# Patient Record
Sex: MALE | Race: WHITE | Employment: OTHER | URBAN - NONMETROPOLITAN AREA
[De-identification: names, ages, dates, MRNs, and addresses within clinical notes are randomized per-mention and may not be internally consistent; named-entity substitution may affect disease eponyms.]

---

## 2017-01-31 LAB
BUN BLDV-MCNC: 21 MG/DL
CALCIUM SERPL-MCNC: 9.3 MG/DL
CHLORIDE BLD-SCNC: 101 MMOL/L
CO2: 24 MMOL/L
CREAT SERPL-MCNC: 0.88 MG/DL
GFR CALCULATED: 83
GLUCOSE BLD-MCNC: 122 MG/DL
POTASSIUM SERPL-SCNC: 4.3 MMOL/L
SODIUM BLD-SCNC: 142 MMOL/L

## 2017-02-03 ENCOUNTER — TELEPHONE (OUTPATIENT)
Dept: NEPHROLOGY | Age: 77
End: 2017-02-03

## 2017-03-09 DIAGNOSIS — I10 ESSENTIAL HYPERTENSION: Primary | ICD-10-CM

## 2017-03-15 ENCOUNTER — TELEPHONE (OUTPATIENT)
Dept: NEPHROLOGY | Age: 77
End: 2017-03-15

## 2017-03-15 RX ORDER — AMLODIPINE BESYLATE 5 MG/1
5 TABLET ORAL DAILY
Qty: 90 TABLET | Refills: 2 | Status: SHIPPED | OUTPATIENT
Start: 2017-03-15 | End: 2018-03-12 | Stop reason: SDUPTHER

## 2017-03-16 RX ORDER — AMLODIPINE BESYLATE 5 MG/1
5 TABLET ORAL DAILY
Qty: 90 TABLET | Refills: 2 | Status: CANCELLED | OUTPATIENT
Start: 2017-03-16

## 2017-04-03 ENCOUNTER — TELEPHONE (OUTPATIENT)
Dept: NEPHROLOGY | Age: 77
End: 2017-04-03

## 2017-05-03 ENCOUNTER — TELEPHONE (OUTPATIENT)
Dept: NEPHROLOGY | Age: 77
End: 2017-05-03

## 2017-05-31 DIAGNOSIS — E78.5 HYPERLIPIDEMIA, UNSPECIFIED HYPERLIPIDEMIA TYPE: Primary | ICD-10-CM

## 2017-06-05 ENCOUNTER — TELEPHONE (OUTPATIENT)
Dept: NEPHROLOGY | Age: 77
End: 2017-06-05

## 2017-06-12 ENCOUNTER — OFFICE VISIT (OUTPATIENT)
Dept: NEPHROLOGY | Age: 77
End: 2017-06-12

## 2017-06-12 VITALS
BODY MASS INDEX: 27.76 KG/M2 | WEIGHT: 188 LBS | OXYGEN SATURATION: 97 % | DIASTOLIC BLOOD PRESSURE: 91 MMHG | SYSTOLIC BLOOD PRESSURE: 182 MMHG | HEART RATE: 58 BPM

## 2017-06-12 DIAGNOSIS — I10 ESSENTIAL HYPERTENSION: Primary | ICD-10-CM

## 2017-06-12 LAB
BACTERIA URINE, POC: NORMAL
BILIRUBIN URINE: NORMAL MG/DL
BLOOD, URINE: NEGATIVE
CASTS URINE, POC: NORMAL
CLARITY: CLEAR
COLOR: YELLOW
CRYSTALS URINE, POC: NORMAL
EPI CELLS URINE, POC: NORMAL
GLUCOSE URINE: NEGATIVE
KETONES, URINE: NEGATIVE
LEUKOCYTE EST, POC: NEGATIVE
NITRITE, URINE: NEGATIVE
PH UA: 6 (ref 4.5–8)
PROTEIN UA: POSITIVE
RBC URINE, POC: NORMAL
SPECIFIC GRAVITY UA: NORMAL (ref 1–1.03)
UROBILINOGEN, URINE: NORMAL
WBC URINE, POC: NORMAL
YEAST URINE, POC: NORMAL

## 2017-06-12 PROCEDURE — 99213 OFFICE O/P EST LOW 20 MIN: CPT | Performed by: INTERNAL MEDICINE

## 2017-06-12 PROCEDURE — 81000 URINALYSIS NONAUTO W/SCOPE: CPT | Performed by: INTERNAL MEDICINE

## 2017-06-12 RX ORDER — TRETINOIN 1 MG/G
CREAM TOPICAL
Refills: 0 | COMMUNITY
Start: 2017-06-02 | End: 2018-06-19 | Stop reason: ALTCHOICE

## 2017-06-12 RX ORDER — CARVEDILOL 25 MG/1
25 TABLET ORAL 2 TIMES DAILY WITH MEALS
COMMUNITY
End: 2018-07-23 | Stop reason: SDUPTHER

## 2017-06-12 ASSESSMENT — ENCOUNTER SYMPTOMS
WHEEZING: 0
BLOOD IN STOOL: 0
FACIAL SWELLING: 0
VOMITING: 0
SHORTNESS OF BREATH: 0
NAUSEA: 0
BACK PAIN: 0
DIARRHEA: 0
GASTROINTESTINAL NEGATIVE: 1
ABDOMINAL PAIN: 0
CHEST TIGHTNESS: 0
RESPIRATORY NEGATIVE: 1
ABDOMINAL DISTENTION: 0
COUGH: 0
CONSTIPATION: 0

## 2017-09-07 DIAGNOSIS — Z12.5 SCREENING PSA (PROSTATE SPECIFIC ANTIGEN): Primary | ICD-10-CM

## 2017-10-17 ENCOUNTER — TELEPHONE (OUTPATIENT)
Dept: NEPHROLOGY | Age: 77
End: 2017-10-17

## 2017-10-17 NOTE — TELEPHONE ENCOUNTER
Pt called and states he is now drinking beet juice. Pt heard that it is good for his blood pressure. Pt does notice his BP drops about 2-3 hours after drinking the beet juice. What are your thoughts on the beet juice?

## 2017-10-20 NOTE — TELEPHONE ENCOUNTER
I called pt and informed him of this. He states he spoke to his chiropractor who uses natural herbs and vitamins. He said that pt should be okay with taking this without any kidney problems.

## 2017-12-18 ENCOUNTER — HOSPITAL ENCOUNTER (OUTPATIENT)
Age: 77
Discharge: HOME | End: 2017-12-18
Payer: MEDICARE

## 2017-12-18 DIAGNOSIS — Z12.5 SCREENING PSA (PROSTATE SPECIFIC ANTIGEN): ICD-10-CM

## 2017-12-18 DIAGNOSIS — I10 ESSENTIAL HYPERTENSION: ICD-10-CM

## 2017-12-18 LAB
ANION GAP SERPL CALCULATED.3IONS-SCNC: 15 MEQ/L (ref 8–16)
BUN BLDV-MCNC: 20 MG/DL (ref 7–22)
CALCIUM SERPL-MCNC: 9.4 MG/DL (ref 8.5–10.5)
CHLORIDE BLD-SCNC: 102 MEQ/L (ref 98–111)
CO2: 25 MEQ/L (ref 23–33)
CREAT SERPL-MCNC: 0.6 MG/DL (ref 0.4–1.2)
GFR SERPL CREATININE-BSD FRML MDRD: > 90 ML/MIN/1.73M2
GLUCOSE BLD-MCNC: 113 MG/DL (ref 70–108)
POTASSIUM SERPL-SCNC: 4.5 MEQ/L (ref 3.5–5.2)
PROSTATE SPECIFIC ANTIGEN: 0.25 NG/ML (ref 0–1)
SODIUM BLD-SCNC: 142 MEQ/L (ref 135–145)

## 2017-12-18 PROCEDURE — G0103 PSA SCREENING: HCPCS

## 2017-12-18 PROCEDURE — 36415 COLL VENOUS BLD VENIPUNCTURE: CPT

## 2017-12-18 PROCEDURE — 80048 BASIC METABOLIC PNL TOTAL CA: CPT

## 2017-12-19 ENCOUNTER — OFFICE VISIT (OUTPATIENT)
Dept: NEPHROLOGY | Age: 77
End: 2017-12-19
Payer: MEDICARE

## 2017-12-19 VITALS
BODY MASS INDEX: 27.47 KG/M2 | SYSTOLIC BLOOD PRESSURE: 141 MMHG | DIASTOLIC BLOOD PRESSURE: 86 MMHG | OXYGEN SATURATION: 97 % | WEIGHT: 186 LBS | HEART RATE: 65 BPM

## 2017-12-19 DIAGNOSIS — I10 ESSENTIAL HYPERTENSION: Primary | ICD-10-CM

## 2017-12-19 DIAGNOSIS — I35.0 AORTIC VALVE STENOSIS, ETIOLOGY OF CARDIAC VALVE DISEASE UNSPECIFIED: ICD-10-CM

## 2017-12-19 DIAGNOSIS — R01.1 HEART MURMUR: ICD-10-CM

## 2017-12-19 DIAGNOSIS — I35.0 AORTIC STENOSIS, MODERATE: ICD-10-CM

## 2017-12-19 LAB
BACTERIA URINE, POC: NORMAL
BILIRUBIN URINE: NORMAL MG/DL
BLOOD, URINE: NEGATIVE
CASTS URINE, POC: NORMAL
CLARITY: CLEAR
COLOR: NORMAL
CRYSTALS URINE, POC: NORMAL
EPI CELLS URINE, POC: NORMAL
GLUCOSE URINE: NEGATIVE
KETONES, URINE: NEGATIVE
LEUKOCYTE EST, POC: NEGATIVE
NITRITE, URINE: NEGATIVE
PH UA: 6 (ref 4.5–8)
PROTEIN UA: NEGATIVE
RBC URINE, POC: NORMAL
SPECIFIC GRAVITY UA: NORMAL (ref 1–1.03)
UROBILINOGEN, URINE: NORMAL
WBC URINE, POC: NORMAL
YEAST URINE, POC: NORMAL

## 2017-12-19 PROCEDURE — G8484 FLU IMMUNIZE NO ADMIN: HCPCS | Performed by: INTERNAL MEDICINE

## 2017-12-19 PROCEDURE — 4040F PNEUMOC VAC/ADMIN/RCVD: CPT | Performed by: INTERNAL MEDICINE

## 2017-12-19 PROCEDURE — 81000 URINALYSIS NONAUTO W/SCOPE: CPT | Performed by: INTERNAL MEDICINE

## 2017-12-19 PROCEDURE — 99213 OFFICE O/P EST LOW 20 MIN: CPT | Performed by: INTERNAL MEDICINE

## 2017-12-19 PROCEDURE — G8419 CALC BMI OUT NRM PARAM NOF/U: HCPCS | Performed by: INTERNAL MEDICINE

## 2017-12-19 PROCEDURE — G8427 DOCREV CUR MEDS BY ELIG CLIN: HCPCS | Performed by: INTERNAL MEDICINE

## 2017-12-19 PROCEDURE — 1036F TOBACCO NON-USER: CPT | Performed by: INTERNAL MEDICINE

## 2017-12-19 PROCEDURE — 1123F ACP DISCUSS/DSCN MKR DOCD: CPT | Performed by: INTERNAL MEDICINE

## 2017-12-19 RX ORDER — SPIRONOLACTONE 25 MG/1
25 TABLET ORAL DAILY
Qty: 90 TABLET | Refills: 3 | Status: SHIPPED | OUTPATIENT
Start: 2017-12-19 | End: 2018-12-27 | Stop reason: SDUPTHER

## 2017-12-19 ASSESSMENT — ENCOUNTER SYMPTOMS
CONSTIPATION: 0
BACK PAIN: 0
BLOOD IN STOOL: 0
COUGH: 0
WHEEZING: 0
CHEST TIGHTNESS: 0
ABDOMINAL DISTENTION: 0
FACIAL SWELLING: 0
VOMITING: 0
SHORTNESS OF BREATH: 0
DIARRHEA: 0
RESPIRATORY NEGATIVE: 1
ABDOMINAL PAIN: 0
NAUSEA: 0
GASTROINTESTINAL NEGATIVE: 1

## 2017-12-19 NOTE — PROGRESS NOTES
WBC, HGB, HCT, MCV, PLT  BMP:    Lab Results   Component Value Date     12/18/2017     06/06/2017     01/31/2017    K 4.5 12/18/2017    K 4.3 06/06/2017    K 4.3 01/31/2017     12/18/2017     06/06/2017     01/31/2017    CO2 25 12/18/2017    CO2 25 06/06/2017    CO2 24 01/31/2017    BUN 20 12/18/2017    BUN 18 06/06/2017    BUN 21 01/31/2017    CREATININE 0.6 12/18/2017    CREATININE 0.6 06/06/2017    CREATININE 0.88 01/31/2017    GLUCOSE 113 (H) 12/18/2017    GLUCOSE 110 (H) 06/06/2017    GLUCOSE 122 01/31/2017      Hepatic: No results found for: AST, ALT, ALB, BILITOT, ALKPHOS  BNP: No results found for: BNP  Lipids:   Lab Results   Component Value Date    CHOL 185 06/06/2017    HDL 32 06/06/2017     INR: No results found for: INR       URINE: No results found for: Shahanaheriberto Nuñez                            Lab Results   Component Value Date    NITRU Negative 06/12/2017    COLORU Yellow 06/12/2017    PHUR 6.0 06/12/2017    CLARITYU Clear 06/12/2017    SPECGRAV 1.015 04/22/2014    LEUKOCYTESUR negative 06/12/2017    BILIRUBINUR negative 04/22/2014    BLOODU Negative 06/12/2017    GLUCOSEU negative 06/12/2017    KETUA Negative 06/12/2017         Microalbumen/Creatinine ratio:  No components found for: RUCREAT  psa 0.25  Assessment:     1. Essential hypertension  POC URINE with Microscopic   hyperlipidemia           Plan:    Fu 6 months   Serial labs  Discussed with patient        Rei Wahl DO

## 2017-12-28 ENCOUNTER — HOSPITAL ENCOUNTER (OUTPATIENT)
Dept: NON INVASIVE DIAGNOSTICS | Age: 77
Discharge: HOME | End: 2017-12-28
Payer: MEDICARE

## 2017-12-28 DIAGNOSIS — I35.0 AORTIC STENOSIS, MODERATE: ICD-10-CM

## 2017-12-28 DIAGNOSIS — I10 ESSENTIAL HYPERTENSION: ICD-10-CM

## 2017-12-28 DIAGNOSIS — I35.0 AORTIC VALVE STENOSIS, ETIOLOGY OF CARDIAC VALVE DISEASE UNSPECIFIED: ICD-10-CM

## 2017-12-28 DIAGNOSIS — R01.1 HEART MURMUR: ICD-10-CM

## 2017-12-28 LAB
LV EF: 68 %
LVEF MODALITY: NORMAL

## 2017-12-28 PROCEDURE — 93306 TTE W/DOPPLER COMPLETE: CPT

## 2018-01-02 ENCOUNTER — TELEPHONE (OUTPATIENT)
Dept: NEPHROLOGY | Age: 78
End: 2018-01-02

## 2018-03-12 RX ORDER — AMLODIPINE BESYLATE 5 MG/1
5 TABLET ORAL DAILY
Qty: 90 TABLET | Refills: 3 | Status: SHIPPED | OUTPATIENT
Start: 2018-03-12 | End: 2018-09-04 | Stop reason: DRUGHIGH

## 2018-06-13 ENCOUNTER — HOSPITAL ENCOUNTER (OUTPATIENT)
Age: 78
Discharge: HOME | End: 2018-06-13
Payer: MEDICARE

## 2018-06-13 DIAGNOSIS — I10 ESSENTIAL HYPERTENSION: ICD-10-CM

## 2018-06-13 LAB
ANION GAP SERPL CALCULATED.3IONS-SCNC: 14 MEQ/L (ref 8–16)
BUN BLDV-MCNC: 21 MG/DL (ref 7–22)
CALCIUM SERPL-MCNC: 9.2 MG/DL (ref 8.5–10.5)
CHLORIDE BLD-SCNC: 104 MEQ/L (ref 98–111)
CHOLESTEROL, TOTAL: 183 MG/DL (ref 100–199)
CO2: 25 MEQ/L (ref 23–33)
CREAT SERPL-MCNC: 0.6 MG/DL (ref 0.4–1.2)
GFR SERPL CREATININE-BSD FRML MDRD: > 90 ML/MIN/1.73M2
GLUCOSE BLD-MCNC: 109 MG/DL (ref 70–108)
HDLC SERPL-MCNC: 26 MG/DL
LDL CHOLESTEROL CALCULATED: 86 MG/DL
POTASSIUM SERPL-SCNC: 4.2 MEQ/L (ref 3.5–5.2)
SODIUM BLD-SCNC: 143 MEQ/L (ref 135–145)
TRIGL SERPL-MCNC: 355 MG/DL (ref 0–199)

## 2018-06-13 PROCEDURE — 36415 COLL VENOUS BLD VENIPUNCTURE: CPT

## 2018-06-13 PROCEDURE — 80061 LIPID PANEL: CPT

## 2018-06-13 PROCEDURE — 80048 BASIC METABOLIC PNL TOTAL CA: CPT

## 2018-06-19 ENCOUNTER — OFFICE VISIT (OUTPATIENT)
Dept: NEPHROLOGY | Age: 78
End: 2018-06-19
Payer: MEDICARE

## 2018-06-19 VITALS
BODY MASS INDEX: 27.23 KG/M2 | OXYGEN SATURATION: 98 % | WEIGHT: 184.4 LBS | DIASTOLIC BLOOD PRESSURE: 100 MMHG | HEART RATE: 74 BPM | SYSTOLIC BLOOD PRESSURE: 186 MMHG

## 2018-06-19 DIAGNOSIS — I10 ESSENTIAL HYPERTENSION: Primary | ICD-10-CM

## 2018-06-19 LAB
BACTERIA URINE, POC: NORMAL
BILIRUBIN URINE: NORMAL MG/DL
BLOOD, URINE: POSITIVE
CASTS URINE, POC: NORMAL
CLARITY: CLEAR
COLOR: YELLOW
CRYSTALS URINE, POC: NORMAL
EPI CELLS URINE, POC: NORMAL
GLUCOSE URINE: NEGATIVE
KETONES, URINE: NEGATIVE
LEUKOCYTE EST, POC: NEGATIVE
NITRITE, URINE: NEGATIVE
PH UA: 5 (ref 4.5–8)
PROTEIN UA: NEGATIVE
RBC URINE, POC: NORMAL
SPECIFIC GRAVITY UA: 1.02 (ref 1–1.03)
UROBILINOGEN, URINE: NORMAL
WBC URINE, POC: NORMAL
YEAST URINE, POC: NORMAL

## 2018-06-19 PROCEDURE — G8427 DOCREV CUR MEDS BY ELIG CLIN: HCPCS | Performed by: INTERNAL MEDICINE

## 2018-06-19 PROCEDURE — 81000 URINALYSIS NONAUTO W/SCOPE: CPT | Performed by: INTERNAL MEDICINE

## 2018-06-19 PROCEDURE — 4040F PNEUMOC VAC/ADMIN/RCVD: CPT | Performed by: INTERNAL MEDICINE

## 2018-06-19 PROCEDURE — 99213 OFFICE O/P EST LOW 20 MIN: CPT | Performed by: INTERNAL MEDICINE

## 2018-06-19 PROCEDURE — G8419 CALC BMI OUT NRM PARAM NOF/U: HCPCS | Performed by: INTERNAL MEDICINE

## 2018-06-19 PROCEDURE — 1036F TOBACCO NON-USER: CPT | Performed by: INTERNAL MEDICINE

## 2018-06-19 PROCEDURE — 1123F ACP DISCUSS/DSCN MKR DOCD: CPT | Performed by: INTERNAL MEDICINE

## 2018-06-19 ASSESSMENT — ENCOUNTER SYMPTOMS
VOMITING: 0
ABDOMINAL PAIN: 0
CHEST TIGHTNESS: 0
SHORTNESS OF BREATH: 0
DIARRHEA: 0
NAUSEA: 0
BLOOD IN STOOL: 0
ABDOMINAL DISTENTION: 0
WHEEZING: 0
BACK PAIN: 0
RESPIRATORY NEGATIVE: 1
COUGH: 0
GASTROINTESTINAL NEGATIVE: 1
FACIAL SWELLING: 0
CONSTIPATION: 0

## 2018-06-26 DIAGNOSIS — Z12.5 SCREENING PSA (PROSTATE SPECIFIC ANTIGEN): Primary | ICD-10-CM

## 2018-07-25 RX ORDER — CARVEDILOL 25 MG/1
25 TABLET ORAL 2 TIMES DAILY WITH MEALS
Qty: 180 TABLET | Refills: 0 | Status: SHIPPED | OUTPATIENT
Start: 2018-07-25 | End: 2018-10-16 | Stop reason: SDUPTHER

## 2018-08-07 ENCOUNTER — TELEPHONE (OUTPATIENT)
Dept: NEPHROLOGY | Age: 78
End: 2018-08-07

## 2018-08-07 NOTE — TELEPHONE ENCOUNTER
Pt called and states his BP has been running in the 160 range. This is a former pt of Dr. Erik Martin. This pt adjusts his medications to how he sees fit. Pt is taking the meds listed. What do you want to do?

## 2018-09-04 RX ORDER — AMLODIPINE BESYLATE 10 MG/1
10 TABLET ORAL DAILY
COMMUNITY
End: 2018-09-04 | Stop reason: SDUPTHER

## 2018-09-04 RX ORDER — AMLODIPINE BESYLATE 10 MG/1
10 TABLET ORAL DAILY
Qty: 90 TABLET | Refills: 1 | Status: SHIPPED | OUTPATIENT
Start: 2018-09-04 | End: 2018-12-26 | Stop reason: SDUPTHER

## 2018-10-17 RX ORDER — CARVEDILOL 25 MG/1
25 TABLET ORAL 2 TIMES DAILY WITH MEALS
Qty: 180 TABLET | Refills: 1 | Status: SHIPPED | OUTPATIENT
Start: 2018-10-17 | End: 2019-03-11 | Stop reason: SDUPTHER

## 2018-12-17 ENCOUNTER — HOSPITAL ENCOUNTER (OUTPATIENT)
Age: 78
Discharge: HOME | End: 2018-12-17
Payer: MEDICARE

## 2018-12-17 DIAGNOSIS — I10 ESSENTIAL HYPERTENSION: ICD-10-CM

## 2018-12-17 DIAGNOSIS — Z12.5 SCREENING PSA (PROSTATE SPECIFIC ANTIGEN): ICD-10-CM

## 2018-12-17 LAB
ANION GAP SERPL CALCULATED.3IONS-SCNC: 14 MEQ/L (ref 8–16)
BUN BLDV-MCNC: 19 MG/DL (ref 7–22)
CALCIUM SERPL-MCNC: 9.4 MG/DL (ref 8.5–10.5)
CHLORIDE BLD-SCNC: 104 MEQ/L (ref 98–111)
CO2: 25 MEQ/L (ref 23–33)
CREAT SERPL-MCNC: 0.7 MG/DL (ref 0.4–1.2)
GFR SERPL CREATININE-BSD FRML MDRD: > 90 ML/MIN/1.73M2
GLUCOSE BLD-MCNC: 114 MG/DL (ref 70–108)
POTASSIUM SERPL-SCNC: 4 MEQ/L (ref 3.5–5.2)
PROSTATE SPECIFIC ANTIGEN: 0.14 NG/ML (ref 0–1)
SODIUM BLD-SCNC: 143 MEQ/L (ref 135–145)

## 2018-12-17 PROCEDURE — 80048 BASIC METABOLIC PNL TOTAL CA: CPT

## 2018-12-17 PROCEDURE — 36415 COLL VENOUS BLD VENIPUNCTURE: CPT

## 2018-12-17 PROCEDURE — G0103 PSA SCREENING: HCPCS

## 2018-12-20 ENCOUNTER — OFFICE VISIT (OUTPATIENT)
Dept: NEPHROLOGY | Age: 78
End: 2018-12-20
Payer: MEDICARE

## 2018-12-20 VITALS
SYSTOLIC BLOOD PRESSURE: 172 MMHG | WEIGHT: 201.4 LBS | OXYGEN SATURATION: 97 % | DIASTOLIC BLOOD PRESSURE: 92 MMHG | HEART RATE: 59 BPM | BODY MASS INDEX: 29.74 KG/M2

## 2018-12-20 DIAGNOSIS — I10 ESSENTIAL HYPERTENSION: Primary | ICD-10-CM

## 2018-12-20 PROCEDURE — 1123F ACP DISCUSS/DSCN MKR DOCD: CPT | Performed by: INTERNAL MEDICINE

## 2018-12-20 PROCEDURE — G8484 FLU IMMUNIZE NO ADMIN: HCPCS | Performed by: INTERNAL MEDICINE

## 2018-12-20 PROCEDURE — G8419 CALC BMI OUT NRM PARAM NOF/U: HCPCS | Performed by: INTERNAL MEDICINE

## 2018-12-20 PROCEDURE — 1101F PT FALLS ASSESS-DOCD LE1/YR: CPT | Performed by: INTERNAL MEDICINE

## 2018-12-20 PROCEDURE — 99213 OFFICE O/P EST LOW 20 MIN: CPT | Performed by: INTERNAL MEDICINE

## 2018-12-20 PROCEDURE — 4040F PNEUMOC VAC/ADMIN/RCVD: CPT | Performed by: INTERNAL MEDICINE

## 2018-12-20 PROCEDURE — 1036F TOBACCO NON-USER: CPT | Performed by: INTERNAL MEDICINE

## 2018-12-20 PROCEDURE — G8427 DOCREV CUR MEDS BY ELIG CLIN: HCPCS | Performed by: INTERNAL MEDICINE

## 2018-12-20 NOTE — PROGRESS NOTES
BUN 19           Creatinine 0.7           eGFR >90                       UPCR            UMCR                          Renal Ultrasound Scan -- not done       Assessment    1. Renal - renal Fx is stable at baseline  - check UPCR next visit and a UA  2. Essential Hypertension - running high in office, at home runs well ~ 120  3. Hiatus Hernia  4. H/O Diastolic Herat failure  5. meds reviewed and D/W patient    Tests and orders placed this Encounter   No orders of the defined types were placed in this encounter. Alvin Alpers, M.D  [facility].

## 2018-12-26 RX ORDER — AMLODIPINE BESYLATE 10 MG/1
10 TABLET ORAL DAILY
Qty: 90 TABLET | Refills: 1 | Status: SHIPPED | OUTPATIENT
Start: 2018-12-26

## 2018-12-27 RX ORDER — SPIRONOLACTONE 25 MG/1
25 TABLET ORAL DAILY
Qty: 90 TABLET | Refills: 1 | Status: SHIPPED | OUTPATIENT
Start: 2018-12-27 | End: 2019-06-25 | Stop reason: SDUPTHER

## 2019-03-11 RX ORDER — CARVEDILOL 25 MG/1
25 TABLET ORAL 2 TIMES DAILY WITH MEALS
Qty: 180 TABLET | Refills: 3 | Status: SHIPPED | OUTPATIENT
Start: 2019-03-11 | End: 2020-06-25

## 2019-04-23 ENCOUNTER — TELEPHONE (OUTPATIENT)
Dept: NEPHROLOGY | Age: 79
End: 2019-04-23

## 2019-04-23 DIAGNOSIS — R94.31 ABNORMAL EKG: Primary | ICD-10-CM

## 2019-04-23 NOTE — TELEPHONE ENCOUNTER
Pt called and states he had EKG done while in [place]. It was abnormal and since he is coming back to [place] on 5/18/19, they recommend that he establish care with a local cardiologist.  Who do you recommend?

## 2019-04-25 NOTE — TELEPHONE ENCOUNTER
I called pt and informed him that Cardiology will be contacting him to set up an appt. He will call me if he does not hear anything within the week.

## 2019-05-09 NOTE — TELEPHONE ENCOUNTER
Called and talked to patient and appt set up for 5/20 at 1200. Patient confirmed appt date and time.

## 2019-05-20 ENCOUNTER — OFFICE VISIT (OUTPATIENT)
Dept: CARDIOLOGY CLINIC | Age: 79
End: 2019-05-20
Payer: MEDICARE

## 2019-05-20 VITALS
HEART RATE: 60 BPM | WEIGHT: 201 LBS | HEIGHT: 69 IN | SYSTOLIC BLOOD PRESSURE: 130 MMHG | BODY MASS INDEX: 29.77 KG/M2 | DIASTOLIC BLOOD PRESSURE: 92 MMHG

## 2019-05-20 DIAGNOSIS — R53.83 FATIGUE, UNSPECIFIED TYPE: Primary | ICD-10-CM

## 2019-05-20 DIAGNOSIS — R94.31 ABNORMAL ELECTROCARDIOGRAM: ICD-10-CM

## 2019-05-20 DIAGNOSIS — I49.3 PVC (PREMATURE VENTRICULAR CONTRACTION): ICD-10-CM

## 2019-05-20 PROCEDURE — 1123F ACP DISCUSS/DSCN MKR DOCD: CPT | Performed by: INTERNAL MEDICINE

## 2019-05-20 PROCEDURE — G8427 DOCREV CUR MEDS BY ELIG CLIN: HCPCS | Performed by: INTERNAL MEDICINE

## 2019-05-20 PROCEDURE — 4040F PNEUMOC VAC/ADMIN/RCVD: CPT | Performed by: INTERNAL MEDICINE

## 2019-05-20 PROCEDURE — 1036F TOBACCO NON-USER: CPT | Performed by: INTERNAL MEDICINE

## 2019-05-20 PROCEDURE — 99204 OFFICE O/P NEW MOD 45 MIN: CPT | Performed by: INTERNAL MEDICINE

## 2019-05-20 PROCEDURE — G8419 CALC BMI OUT NRM PARAM NOF/U: HCPCS | Performed by: INTERNAL MEDICINE

## 2019-05-20 NOTE — PROGRESS NOTES
family history of bicuspid aortic valve, aneurysms, heart transplant, pacemakers, defibrillators, or sudden cardiac death. Past Surgical History   Past Surgical History:   Procedure Laterality Date    CHOLECYSTECTOMY      COLONOSCOPY  2003, 2006, 2011   [address] [place] [address]    x 2    SIGMOIDOSCOPY      TONSILLECTOMY         Review of Systems   Constitutional: Negative for chills and fever  HENT: Negative for congestion, sinus pressure, sneezing and sore throat. Eyes: Negative for pain, discharge, redness and itching. Respiratory: Negative for apnea, cough  Gastrointestinal: Negative for blood in stool, constipation, diarrhea   Endocrine: Negative for cold intolerance, heat intolerance, polydipsia. Genitourinary: Negative for dysuria, enuresis, flank pain and hematuria. Musculoskeletal: Negative for arthralgias, joint swelling and neck pain. Neurological: Negative for numbness and headaches. Psychiatric/Behavioral: Negative for agitation, confusion, decreased concentration and dysphoric mood. Objective:     BP (!) 130/92   Pulse 60   Ht 5' 9\" (1.753 m)   Wt 201 lb (91.2 kg)   BMI 29.68 kg/m²     Wt Readings from Last 3 Encounters:   05/20/19 201 lb (91.2 kg)   12/20/18 201 lb 6.4 oz (91.4 kg)   06/19/18 184 lb 6.4 oz (83.6 kg)     BP Readings from Last 3 Encounters:   05/20/19 (!) 130/92   12/20/18 (!) 172/92   06/19/18 (!) 186/100       Nursing note and vitals reviewed. Physical Exam   Constitutional: Oriented to person, place, and time. Appears well-developed and well-nourished. HENT:   Head: Normocephalic and atraumatic. Eyes: EOM are normal. Pupils are equal, round, and reactive to light. Neck: Normal range of motion. Neck supple. No JVD present. Cardiovascular: Normal rate, regular rhythm, normal heart sounds and intact distal pulses. No murmur heard. Pulmonary/Chest: Effort normal and breath sounds normal. No respiratory distress. No wheezes. No rales.    Abdominal: Soft. Bowel sounds are normal. No distension. There is no tenderness. Musculoskeletal: Normal range of motion. No edema. Neurological: Alert and oriented to person, place, and time. No cranial nerve deficit. Coordination normal.   Skin: Skin is warm and dry. Psychiatric: Normal mood and affect.        No results found for: CKTOTAL, CKMB, CKMBINDEX    No results found for: WBC, RBC, HGB, HCT, MCV, MCH, MCHC, RDW, PLT, MPV    Lab Results   Component Value Date     12/17/2018    K 4.0 12/17/2018     12/17/2018    CO2 25 12/17/2018    BUN 19 12/17/2018    CREATININE 0.7 12/17/2018    CALCIUM 9.4 12/17/2018    LABGLOM >90 12/17/2018    GLUCOSE 114 12/17/2018       No results found for: ALKPHOS, ALT, AST, PROT, BILITOT, BILIDIR, IBILI, LABALBU    No results found for: MG    No results found for: INR, PROTIME      No results found for: LABA1C    Lab Results   Component Value Date    TRIG 355 06/13/2018    HDL 26 06/13/2018    LDLCALC 86 06/13/2018       No results found for: TSH      Testing Reviewed:      I have individually reviewed the cardiac test below:    ECHO:   Results for orders placed during the hospital encounter of 12/28/17   ECHO Complete 2D W Doppler W Color    Narrative Transthoracic Echocardiography Report (TTE)     Demographics      Patient Name   Campos Sarah  Gender              Male                  T      MR #           [id number]       Race                                                     Ethnicity      Account #      [de-identified]       Room Number      Accession      [id number]       Date of Study       12/28/2017   Number      Date of Birth  1940      Referring Physician Yesenia Spring MD      Age            68 year(s)      Ibrahima Tamayo,                                                      RDCS                                     Interpreting        Melina Moon MD Physician     Procedure    Type of Study      TTE procedure:ECHOCARDIOGRAM COMPLETE 2D W DOPPLER W COLOR. Procedure Date  Date: 12/28/2017 Start: 03:58 PM    Study Location: Echo Lab  Technical Quality: Adequate visualization    Indications: Aortic stenosis. Additional Medical History:Hypertension, left ventricular hypertrophy    Patient Status: Routine    Height: 69 inches Weight: 186 pounds BSA: 2 m^2 BMI: 27.47 kg/m^2    BP: 141/86 mmHg     Conclusions      Summary   Ejection fraction was estimated at 65-70%. There were no regional left   ventricular wall motion abnormalities. There is no subaortic obstruction by doppler, however, by 2D (M-mode) this   may be a possibility due to hypertrophied septum. There is no definitive   systolic anterior motion of the mitral valve. There is no late-peaking outflow tract obstruction. Moderate to severe hypertrophy; there is assymetric septal hypertrophy. Doppler parameters were consistent with abnormal left ventricular   relaxation (grade 1 diastolic dysfunction). Left atrial size was severely dilated. The aortic valve was trileaflet significant calcification, thickness, and   reduced opening. Transaortic velocity was within the normal range with mean gradient of 9   mmHg and a planimetered GREG of 2.51 cm2;   There was mild aortic regurgitation. The mitral valve structure demonstrated posterior leaflet calcification   with reduced mobility of the posterior leaflet. Aortic root dimension = 3.41 cm. Findings may be consistent with hypertrophic cardiomyopathy, consider   further imaging such as cardiac MRI. If clinically indicated, please consider JARAD or CT to further evaluate the   aortic valve. If clinically indicated, consider referral to the [id number] [place] [address]   ([phone]).       Signature      ----------------------------------------------------------------   Electronically signed by Neal Bansal MD Diastolic   LV Systolic Dimension:    AV Cusp Separation: 1.9 cmLA   Dimension: 4.2 2.5 cm                    Dimension: 4.5 cmAO Root   cm             LV Volume Diastolic: 76.2 Dimension: 3.7 cmLA Area: 23.8   LV FS:40.5 %   ml                        cm^2   LV PW          LV Volume Systolic: 86.7   Diastolic: 1.5 ml   cm             LV EDV/LV EDV Index: 78.6   Septum         ml/39 m^2LV ESV/LV ESV    RV Diastolic Dimension: 4.3 cm   Diastolic: 1.6 Index: 44.0 ml/11 m^2   cm             EF Calculated: 71.6 %     LA/Aorta: 1.22                                               LA volume/Index: 77.1 ml /39m^2     Doppler Measurements & Calculations      MV Peak E-Wave: 61.7 cm/s  AV Peak Velocity: 205   MV Peak A-Wave: 104 cm/s   cm/s   MV E/A Ratio: 0.59         AV Peak Gradient:      TV Peak E-Wave: 37 cm/s   MV Peak Gradient: 1.52     16.81 mmHg             TV Peak A-Wave: 47.4   mmHg                       AV Mean Velocity: 139  cm/s                              cm/s   MV Deceleration Time: 324  AV Mean Gradient: 9    TV Peak Gradient: 0.55   msec                       mmHg                   mmHg                              AV VTI: 42.4 cm        TR Velocity:201 cm/s                                                     TR Gradient:16.16 mmHg   MV E' Septal Velocity: 3.8                        PV Peak Velocity: 80.9   cm/s                                              cm/s   MV A' Septal Velocity:     IVRT: 127 msec         PV Peak Gradient: 2.62   6.63 cm/s                                         mmHg   MV E' Lateral Velocity:   3.9 cm/s   MV A' Lateral Velocity:   9.55 cm/s   E/E' septal: 16.24   E/E' lateral: 15.82     [email or web address] 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

## 2019-05-23 ENCOUNTER — HOSPITAL ENCOUNTER (OUTPATIENT)
Dept: NON INVASIVE DIAGNOSTICS | Age: 79
Discharge: HOME | End: 2019-05-23
Payer: MEDICARE

## 2019-05-23 DIAGNOSIS — R94.31 ABNORMAL ELECTROCARDIOGRAM: ICD-10-CM

## 2019-05-23 DIAGNOSIS — R53.83 FATIGUE, UNSPECIFIED TYPE: ICD-10-CM

## 2019-05-23 DIAGNOSIS — I49.3 PVC (PREMATURE VENTRICULAR CONTRACTION): ICD-10-CM

## 2019-05-23 LAB
LV EF: 68 %
LVEF MODALITY: NORMAL

## 2019-05-23 PROCEDURE — 0296T HC EXT ECG RECORDING 2-21 DAY HOOKUP: CPT

## 2019-05-23 PROCEDURE — 93306 TTE W/DOPPLER COMPLETE: CPT

## 2019-06-07 ENCOUNTER — TELEPHONE (OUTPATIENT)
Dept: CARDIOLOGY CLINIC | Age: 79
End: 2019-06-07

## 2019-06-13 ENCOUNTER — HOSPITAL ENCOUNTER (OUTPATIENT)
Age: 79
Discharge: HOME | End: 2019-06-13
Payer: MEDICARE

## 2019-06-13 ENCOUNTER — OFFICE VISIT (OUTPATIENT)
Dept: CARDIOLOGY CLINIC | Age: 79
End: 2019-06-13
Payer: MEDICARE

## 2019-06-13 ENCOUNTER — TELEPHONE (OUTPATIENT)
Dept: CARDIOLOGY CLINIC | Age: 79
End: 2019-06-13

## 2019-06-13 VITALS
BODY MASS INDEX: 28.66 KG/M2 | HEIGHT: 69 IN | HEART RATE: 60 BPM | SYSTOLIC BLOOD PRESSURE: 122 MMHG | WEIGHT: 193.5 LBS | DIASTOLIC BLOOD PRESSURE: 70 MMHG

## 2019-06-13 DIAGNOSIS — R94.31 ABNORMAL HOLTER MONITOR FINDING: Primary | ICD-10-CM

## 2019-06-13 DIAGNOSIS — I42.2 HYPERTROPHIC CARDIOMYOPATHY (HCC): Primary | ICD-10-CM

## 2019-06-13 DIAGNOSIS — I10 ESSENTIAL HYPERTENSION: ICD-10-CM

## 2019-06-13 LAB
ANION GAP SERPL CALCULATED.3IONS-SCNC: 13 MEQ/L (ref 8–16)
BACTERIA: NORMAL
BILIRUBIN URINE: NEGATIVE
BLOOD, URINE: NEGATIVE
BUN BLDV-MCNC: 18 MG/DL (ref 7–22)
CALCIUM SERPL-MCNC: 9.8 MG/DL (ref 8.5–10.5)
CASTS: NORMAL /LPF
CASTS: NORMAL /LPF
CHARACTER, URINE: NORMAL
CHLORIDE BLD-SCNC: 100 MEQ/L (ref 98–111)
CO2: 25 MEQ/L (ref 23–33)
COLOR: YELLOW
CREAT SERPL-MCNC: 0.8 MG/DL (ref 0.4–1.2)
CREATININE URINE: 167.1 MG/DL
CREATININE, URINE: 171.3 MG/DL
CRYSTALS: NORMAL
EPITHELIAL CELLS, UA: NORMAL /HPF
GFR SERPL CREATININE-BSD FRML MDRD: > 90 ML/MIN/1.73M2
GLUCOSE BLD-MCNC: 114 MG/DL (ref 70–108)
GLUCOSE, URINE: NEGATIVE MG/DL
KETONES, URINE: NEGATIVE
LEUKOCYTE ESTERASE, URINE: NEGATIVE
MICROALBUMIN UR-MCNC: 1.93 MG/DL
MICROALBUMIN/CREAT UR-RTO: 11 MG/G (ref 0–30)
MISCELLANEOUS LAB TEST RESULT: NORMAL
NITRITE, URINE: NEGATIVE
PH UA: 5.5 (ref 5–9)
POTASSIUM SERPL-SCNC: 4.7 MEQ/L (ref 3.5–5.2)
PROTEIN UA: NEGATIVE MG/DL
PROTEIN, URINE: 15.2 MG/DL
RBC URINE: NORMAL /HPF
RENAL EPITHELIAL, UA: NORMAL
SODIUM BLD-SCNC: 138 MEQ/L (ref 135–145)
SPECIFIC GRAVITY UA: 1.02 (ref 1–1.03)
UROBILINOGEN, URINE: 0.2 EU/DL (ref 0–1)
WBC UA: NORMAL /HPF
YEAST: NORMAL

## 2019-06-13 PROCEDURE — 4040F PNEUMOC VAC/ADMIN/RCVD: CPT | Performed by: INTERNAL MEDICINE

## 2019-06-13 PROCEDURE — G8419 CALC BMI OUT NRM PARAM NOF/U: HCPCS | Performed by: INTERNAL MEDICINE

## 2019-06-13 PROCEDURE — 82570 ASSAY OF URINE CREATININE: CPT

## 2019-06-13 PROCEDURE — 82043 UR ALBUMIN QUANTITATIVE: CPT

## 2019-06-13 PROCEDURE — G8427 DOCREV CUR MEDS BY ELIG CLIN: HCPCS | Performed by: INTERNAL MEDICINE

## 2019-06-13 PROCEDURE — 80048 BASIC METABOLIC PNL TOTAL CA: CPT

## 2019-06-13 PROCEDURE — 84156 ASSAY OF PROTEIN URINE: CPT

## 2019-06-13 PROCEDURE — 1036F TOBACCO NON-USER: CPT | Performed by: INTERNAL MEDICINE

## 2019-06-13 PROCEDURE — 81001 URINALYSIS AUTO W/SCOPE: CPT

## 2019-06-13 PROCEDURE — 36415 COLL VENOUS BLD VENIPUNCTURE: CPT

## 2019-06-13 PROCEDURE — 99213 OFFICE O/P EST LOW 20 MIN: CPT | Performed by: INTERNAL MEDICINE

## 2019-06-13 PROCEDURE — 1123F ACP DISCUSS/DSCN MKR DOCD: CPT | Performed by: INTERNAL MEDICINE

## 2019-06-13 NOTE — TELEPHONE ENCOUNTER
Per Dr Marianna Chowdary:  Abnormal rhythm, NSVT and PVC's. Take Coreg 25 mg BID. Have Cardiac MRI or see Dr Storm Rivero. Pt prefers to see Dr Storm Rivero. Referral ordered and given to Jesse Chowdhury. Please agree with verbal orders.

## 2019-06-13 NOTE — PROGRESS NOTES
 COLONOSCOPY  2003, 2006, 2011   [facility] [address]    x 2    SIGMOIDOSCOPY      TONSILLECTOMY         Review of Systems   Constitutional: Negative for chills and fever  HENT: Negative for congestion, sinus pressure, sneezing and sore throat. Eyes: Negative for pain, discharge, redness and itching. Respiratory: Negative for apnea, cough  Gastrointestinal: Negative for blood in stool, constipation, diarrhea   Endocrine: Negative for cold intolerance, heat intolerance, polydipsia. Genitourinary: Negative for dysuria, enuresis, flank pain and hematuria. Musculoskeletal: Negative for arthralgias, joint swelling and neck pain. Neurological: Negative for numbness and headaches. Psychiatric/Behavioral: Negative for agitation, confusion, decreased concentration and dysphoric mood. Objective:     /70   Pulse 60   Ht 5' 9\" (1.753 m)   Wt 193 lb 8 oz (87.8 kg)   BMI 28.57 kg/m²     Wt Readings from Last 3 Encounters:   06/13/19 193 lb 8 oz (87.8 kg)   05/20/19 201 lb (91.2 kg)   12/20/18 201 lb 6.4 oz (91.4 kg)     BP Readings from Last 3 Encounters:   06/13/19 122/70   05/20/19 (!) 130/92   12/20/18 (!) 172/92       Nursing note and vitals reviewed. Physical Exam   Constitutional: Oriented to person, place, and time. Appears well-developed and well-nourished. HENT:   Head: Normocephalic and atraumatic. Eyes: EOM are normal. Pupils are equal, round, and reactive to light. Neck: Normal range of motion. Neck supple. No JVD present. Cardiovascular: Normal rate, regular rhythm, normal heart sounds and intact distal pulses. Soft, 2/6 MEHDI, without augmentation. No radiation to the carotids. Pulmonary/Chest: Effort normal and breath sounds normal. No respiratory distress. No wheezes. No rales. Abdominal: Soft. Bowel sounds are normal. No distension. There is no tenderness. Musculoskeletal: Normal range of motion. No edema.    Neurological: Alert and oriented to person, place, and time. No cranial nerve deficit. Coordination normal.   Skin: Skin is warm and dry. Psychiatric: Normal mood and affect. No results found for: CKTOTAL, CKMB, CKMBINDEX    No results found for: WBC, RBC, HGB, HCT, MCV, MCH, MCHC, RDW, PLT, MPV    Lab Results   Component Value Date     06/13/2019    K 4.7 06/13/2019     06/13/2019    CO2 25 06/13/2019    BUN 18 06/13/2019    CREATININE 0.8 06/13/2019    CALCIUM 9.8 06/13/2019    LABGLOM >90 06/13/2019    GLUCOSE 114 06/13/2019       No results found for: ALKPHOS, ALT, AST, PROT, BILITOT, BILIDIR, IBILI, LABALBU    No results found for: MG    No results found for: INR, PROTIME      No results found for: LABA1C    Lab Results   Component Value Date    TRIG 355 06/13/2018    HDL 26 06/13/2018    LDLCALC 86 06/13/2018       No results found for: TSH      Testing Reviewed:      I have individually reviewed the cardiac test below:    ECHO:   Results for orders placed during the hospital encounter of 05/23/19   Echo 2D w doppler w color complete    Narrative Transthoracic Echocardiography Report (TTE)     Demographics      Patient Name   Brittney Madrid  Gender              Male                  T      MR #           [id number]       Race                                                     Ethnicity      Account #      [de-identified]       Room Number      Accession      [id number]       Date of Study       05/23/2019   Number      Date of Birth  1940      Referring Physician Sapna Cuellar MD      Age            66 year(s)      Reggie Stevens MD                                  Physician     Procedure    Type of Study      TTE procedure:ECHOCARDIOGRAM COMPLETE 2D W DOPPLER W COLOR.      Procedure Date  Date: 05/23/2019 Start: 01:01 PM    Study Location: Echo Lab  Technical Quality: Limited visualization due to poor acoustical window. Indications:Left Ventricular Hypertrophy. Additional Medical History:Hypertension, abnormal EKG, chronic kidney  disease    Patient Status: Routine    Height: 69 inches Weight: 201 pounds BSA: 2.07 m^2 BMI: 29.68 kg/m^2    BP: 130/92 mmHg     Conclusions      Summary   Normal left ventricular size and hyperdynamic systolic function. There were no regional wall motion abnormalities. Severe concentric hypertrophy. Ejection fraction was estimated at 65-70%. Doppler parameters were consistent with abnormal left ventricular   relaxation (grade 1 diastolic dysfunction). Possible LVOT obstruction with a peak velocity of approximately 2 m/s. Left atrial size was severely dilated. The mitral valve structure demonstrated posterior leaflet calcification   with normal leaflet separation. There is systolic anterior motion of the anterior mitral valve leaflet. There was trace mitral regurgitation. Ascending aorta = 3.6 cm. The above findings may be indicative of HOCM, consider further   investigation with cardiac MRI, if clinically indicated. Signature      ----------------------------------------------------------------   Electronically signed by Kevin Strickland MD (Interpreting   physician) on 05/23/2019 at 03:51 PM   ----------------------------------------------------------------      Findings      Mitral Valve   The mitral valve structure demonstrated posterior leaflet calcification   with normal leaflet separation. There is systolic anterior motion of the   anterior mitral valve leaflet. DOPPLER: The transmitral velocity was   within the normal range with no evidence for mitral stenosis. There was   trace mitral regurgitation. Aortic Valve   The aortic valve was trileaflet with increased thickness/calcification and   mildly reduced cuspal separation.  DOPPLER: Transaortic velocity was within   the normal range with no evidence of aortic stenosis. There was no   evidence of aortic regurgitation. Tricuspid Valve   The tricuspid valve structure was normal with normal leaflet separation. DOPPLER: There was no evidence of tricuspid stenosis. There was no   evidence of tricuspid regurgitation. Pulmonic Valve   The pulmonic valve leaflets were not well seen. DOPPLER: The transpulmonic   velocity was within the normal range with trace regurgitation. Left Atrium   Left atrial size was severely dilated. Left Ventricle   Normal left ventricular size and hyperdynamic systolic function. There were no regional wall motion abnormalities. Severe concentric hypertrophy. Ejection fraction was estimated at 65-70%. Doppler parameters were consistent with abnormal left ventricular   relaxation (grade 1 diastolic dysfunction). Possible LVOT obstruction with a peak velocity of approximately 2 m/s. Right Atrium   Right atrial size was normal.      Right Ventricle   The right ventricular size was normal with normal systolic function and   wall thickness. Pericardial Effusion   The pericardium was normal in appearance with no evidence of a pericardial   effusion. Pleural Effusion   No evidence of pleural effusion. Aorta / Great Vessels   -Ascending aorta = 3.6 cm.      M-Mode/2D Measurements & Calculations      LV Diastolic   LV Systolic Dimension:    AV Cusp Separation: 2 cmLA   Dimension: 4   2.4 cm                    Dimension: 5.4 cmAO Root   cm             LV Volume Diastolic: 70   Dimension: 4 cmLA Area: 29.1 cm^2   LV FS:40 %     ml   LV PW          LV Volume Systolic: 03.0   Diastolic: 1.5 ml   cm             LV EDV/LV EDV Index: 70   RV Diastolic Dimension: 4.5 cm   Septum         ml/34 m^2LV ESV/LV ESV   Diastolic: 1.9 Index: 50.9 ml/10 m^2     LA/Aorta: 1.35   cm             EF Calculated: 71.1 %     Ascending Aorta: 3.6 cm LA volume/Index: 106.6 ml /51m^2     Doppler Measurements & Calculations      MV Peak E-Wave: 72.4 cm/s   AV Peak Velocity: 216   MV Peak A-Wave: 111 cm/s    cm/s   MV E/A Ratio: 0.65          AV Peak Gradient:      TV Peak E-Wave: 41.9   MV Peak Gradient: 2.1 mmHg  18.66 mmHg             cm/s   MV Mean Gradient: 3 mmHg    AV Mean Velocity: 156  TV Peak A-Wave: 60.2   MV Mean Velocity: 78.3 cm/s cm/s                   cm/s   MV Deceleration Time: 320   AV Mean Gradient: 11   msec                        mmHg                   TV Peak Gradient: 0.7   MV P1/2t: 101 msec          AV VTI: 44.5 cm        mmHg   MVA by PHT:2.18 cm^2                                                      PV Peak Velocity: 79.8   MV E' Septal Velocity: 3.6                         cm/s   cm/s                        IVRT: 95 msec          PV Peak Gradient: 2.55   MV A' Septal Velocity: 9.4                         mmHg   cm/s   MV E' Lateral Velocity: 7.5   cm/s   MV A' Lateral Velocity:   14.6 cm/s   E/E' septal: 20.11   E/E' lateral: 9.65   MR Velocity: 675 cm/s     [email or web address] 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 Re-enforced adequate hydration, compliance with BB. Avoid dehydration. Zio patch pending. No volume on exam.  BP and HR well controlled. Discussed pursuing cardiac MRI to get the formal diagnosis as there can be findings of fibrosis or ventricular rhythm issues that may need prophylactic ICD. He does not want to pursue the MRI at this time. Will await Zio patch results. Discussed diet/exercise/BP/weight loss/health lifestyle choices/lipids; the patient understands the goals and will try to comply.     Disposition:  6-12 months         Electronically signed by Ritu Mujica MD   6/13/2019 at 1:23 PM

## 2019-06-14 NOTE — TELEPHONE ENCOUNTER
Agree he has findings suggestive of HCM and has NSVT on Zio patch, but asymptomatic. Increase BB, would check CMR and then f/u Jovi. Thanks.

## 2019-06-19 ENCOUNTER — OFFICE VISIT (OUTPATIENT)
Dept: NEPHROLOGY | Age: 79
End: 2019-06-19
Payer: MEDICARE

## 2019-06-19 ENCOUNTER — OFFICE VISIT (OUTPATIENT)
Dept: CARDIOLOGY CLINIC | Age: 79
End: 2019-06-19
Payer: MEDICARE

## 2019-06-19 VITALS
OXYGEN SATURATION: 97 % | HEART RATE: 56 BPM | SYSTOLIC BLOOD PRESSURE: 108 MMHG | BODY MASS INDEX: 28.56 KG/M2 | DIASTOLIC BLOOD PRESSURE: 70 MMHG | WEIGHT: 193.4 LBS

## 2019-06-19 VITALS
HEART RATE: 65 BPM | SYSTOLIC BLOOD PRESSURE: 123 MMHG | HEIGHT: 69 IN | WEIGHT: 191.3 LBS | DIASTOLIC BLOOD PRESSURE: 75 MMHG | BODY MASS INDEX: 28.33 KG/M2

## 2019-06-19 DIAGNOSIS — I42.2 HYPERTROPHIC CARDIOMYOPATHY (HCC): Primary | ICD-10-CM

## 2019-06-19 DIAGNOSIS — I10 ESSENTIAL HYPERTENSION: Primary | ICD-10-CM

## 2019-06-19 PROCEDURE — 93000 ELECTROCARDIOGRAM COMPLETE: CPT | Performed by: INTERNAL MEDICINE

## 2019-06-19 PROCEDURE — 4040F PNEUMOC VAC/ADMIN/RCVD: CPT | Performed by: INTERNAL MEDICINE

## 2019-06-19 PROCEDURE — 1123F ACP DISCUSS/DSCN MKR DOCD: CPT | Performed by: INTERNAL MEDICINE

## 2019-06-19 PROCEDURE — 1036F TOBACCO NON-USER: CPT | Performed by: INTERNAL MEDICINE

## 2019-06-19 PROCEDURE — 99213 OFFICE O/P EST LOW 20 MIN: CPT | Performed by: INTERNAL MEDICINE

## 2019-06-19 PROCEDURE — G8419 CALC BMI OUT NRM PARAM NOF/U: HCPCS | Performed by: INTERNAL MEDICINE

## 2019-06-19 PROCEDURE — G8427 DOCREV CUR MEDS BY ELIG CLIN: HCPCS | Performed by: INTERNAL MEDICINE

## 2019-06-19 PROCEDURE — 99214 OFFICE O/P EST MOD 30 MIN: CPT | Performed by: INTERNAL MEDICINE

## 2019-06-19 ASSESSMENT — ENCOUNTER SYMPTOMS
NAUSEA: 0
RIGHT EYE: 0
SORE THROAT: 0
WHEEZING: 0
DIARRHEA: 0
ABDOMINAL PAIN: 0
SHORTNESS OF BREATH: 0
LEFT EYE: 0
BACK PAIN: 0
COUGH: 0
BLURRED VISION: 0
VOMITING: 0
CONSTIPATION: 0
DOUBLE VISION: 0

## 2019-06-19 NOTE — PROGRESS NOTES
Needs    Financial resource strain: Not on file    Food insecurity:     Worry: Not on file     Inability: Not on file    Transportation needs:     Medical: Not on file     Non-medical: Not on file   Tobacco Use    Smoking status: Never Smoker    Smokeless tobacco: Never Used   Substance and Sexual Activity    Alcohol use: Yes     Comment: 1 drink a day     Drug use: No    Sexual activity: Not on file   Lifestyle    Physical activity:     Days per week: Not on file     Minutes per session: Not on file    Stress: Not on file   Relationships    Social connections:     Talks on phone: Not on file     Gets together: Not on file     Attends [employer] service: Not on file     Active member of club or organization: Not on file     Attends meetings of clubs or organizations: Not on file     Relationship status: Not on file    Intimate partner violence:     Fear of current or ex partner: Not on file     Emotionally abused: Not on file     Physically abused: Not on file     Forced sexual activity: Not on file   Other Topics Concern    Not on file   Social History Narrative    Not on file       MEDICATIONS:  Current Outpatient Medications   Medication Sig Dispense Refill    Coenzyme Q10 (CO Q 10 PO) Take 200 mg by mouth daily      carvedilol (COREG) 25 MG tablet Take 1 tablet by mouth 2 times daily (with meals) 180 tablet 3    spironolactone (ALDACTONE) 25 MG tablet Take 1 tablet by mouth daily 90 tablet 1    amLODIPine (NORVASC) 10 MG tablet Take 1 tablet by mouth daily 90 tablet 1    aspirin 81 MG EC tablet Take 81 mg by mouth daily.  MULTIPLE VITAMIN PO Take by mouth       No current facility-administered medications for this visit. REVIEW OFSYSTEMS:    Review of Systems   Constitution: Positive for malaise/fatigue. Negative for fever, weight gain and weight loss. HENT: Negative for hearing loss and sore throat.     Eyes: Negative for blurred vision, double vision, vision loss in left eye and vision loss in right eye. Cardiovascular: Positive for dyspnea on exertion. Negative for chest pain, irregular heartbeat, near-syncope, palpitations and syncope. Respiratory: Negative for cough, shortness of breath and wheezing. Endocrine: Negative for cold intolerance, heat intolerance and polyuria. Hematologic/Lymphatic: Negative for bleeding problem. Does not bruise/bleed easily. Skin: Negative for dry skin, itching and rash. Musculoskeletal: Negative for arthritis, back pain, joint pain and myalgias. Gastrointestinal: Negative for abdominal pain, constipation, diarrhea, nausea and vomiting. Genitourinary: Negative for dysuria, frequency, hematuria and urgency. Neurological: Negative for dizziness, headaches, light-headedness, numbness, paresthesias, seizures and vertigo. Psychiatric/Behavioral: Negative for depression and memory loss. The patient is not nervous/anxious. PHYSICAL EXAM:  Ht 5' 9\" (1.753 m)   Wt 191 lb 4.8 oz (86.8 kg)   BMI 28.25 kg/m²     Physical Exam   Constitutional: He is oriented to person, place, and time. No distress. HENT:   Head: Normocephalic and atraumatic. Head is without contusion. Right Ear: Hearing and external ear normal. No decreased hearing is noted. Left Ear: Hearing and external ear normal. No decreased hearing is noted. Nose: Nose normal. No sinus tenderness. No epistaxis. Mouth/Throat: Oropharynx is clear and moist. Mucous membranes are not dry. No oropharyngeal exudate. Eyes: Pupils are equal, round, and reactive to light. Conjunctivae and EOM are normal. Right eye exhibits no discharge. Left eye exhibits no discharge. Neck: Trachea normal. Neck supple. No JVD present. No edema present. No thyroid mass present. Cardiovascular: Normal rate, regular rhythm, normal heart sounds and normal pulses. Frequent extrasystoles are present. Pulmonary/Chest: Effort normal and breath sounds normal. He exhibits no tenderness.  Breasts are symmetrical.   Abdominal: Soft. Normal appearance and bowel sounds are normal. He exhibits no mass. There is no hepatosplenomegaly. There is no tenderness. No hernia. Musculoskeletal: Normal range of motion. Right shoulder: Normal. He exhibits normal range of motion and no swelling. Left shoulder: Normal. He exhibits normal range of motion and no swelling. Right hip: Normal. He exhibits normal range of motion and no swelling. Left hip: Normal. He exhibits normal range of motion and no swelling. Right knee: Normal. He exhibits normal range of motion and no swelling. Left knee: Normal. He exhibits normal range of motion and no swelling. Right upper arm: Normal.        Left upper arm: Normal.        Right upper leg: Normal.        Left upper leg: Normal.        Right lower leg: Normal.        Left lower leg: Normal.   Lymphadenopathy:        Head (right side): No submandibular adenopathy present. Head (left side): No submandibular adenopathy present. Neurological: He is alert and oriented to person, place, and time. He has normal strength. He displays normal reflexes. No cranial nerve deficit or sensory deficit. Coordination and gait normal.   Skin: Skin is warm and dry. No lesion and no rash noted. He is not diaphoretic. No cyanosis. Nails show no clubbing. Psychiatric: His speech is normal and behavior is normal. Judgment and thought content normal. His mood appears not anxious. His affect is not angry. Cognition and memory are normal. He does not exhibit a depressed mood. Nursing note and vitals reviewed. DIAGNOSTIC STUDIES & LABORATORY DATA:    I have personally reviewed andinterpreted the results of the following diagnostic testing  CARDIAC HISTORY:    ECHO: 05/23/2019  Summary   Normal left ventricular size and hyperdynamic systolic function.   There were no regional wall motion abnormalities.   Severe concentric hypertrophy.     LV Systolic Dimension 2.4 cm   Ejection fraction was estimated at 65-70%.   Doppler parameters were consistent with abnormal left ventricular   relaxation (grade 1 diastolic dysfunction).   Possible LVOT obstruction with a peak velocity of approximately 2 m/s.   Left atrial size was severely dilated.   The mitral valve structure demonstrated posterior leaflet calcification   with normal leaflet separation.   There is systolic anterior motion of the anterior mitral valve leaflet.   There was trace mitral regurgitation.   Ascending aorta = 3.6 cm.      The above findings may be indicative of HOCM, consider further   investigation with cardiac MRI, if clinically indicated. EC2019 Normal Sinus Rhythm, RBBB, PVC's. EVENT MONITOR:  2019        Lab Results   Component Value Date    CHOL 183 2018    TRIG 355 (H) 2018    HDL 26 2018     2019    K 4.7 2019     2019    CREATININE 0.8 2019    BUN 18 2019    CO2 25 2019    LABMICR 1.93 2019          IMPRESSION:  PVC's:  The patient has documentation of frequent monomorphic PVC's. 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 I discussed the diagnosis with the patient and recommended continued observation. I told him that these are not an indication for a high risk of SCD. However, I did explain to the patient that they could be a contributing factor to his symptoms of fatigue. I discussed treatment for this problem can be medication but it is usually ineffective. The other option is an ablation, but I do not feel the risks outweigh the benefits at this time. Concentric LVH:  The patient has concentric LVH from long standing hypertension. I reviewed the patient's measurements and the maximum diastolic dimension is 2.4 cm. Since it is less than 3.0 cm, he is low risk for SCD.   He does have diastolic dysfunction and I suspect this

## 2019-06-19 NOTE — PROGRESS NOTES
ekg Obtained    Zio patch results      Patient denies SOB,   Patient has some increased Fatigue, \"seems to be moving at a slower pace\"

## 2019-06-19 NOTE — PROGRESS NOTES
awake and alert    Labs, Radiology and Tests    Labs -    12/18 6/19          Potassium 4.0 4.7          BUN 19 18          Creatinine 0.7 0.8          eGFR >90 > 90                      UPCR  100          UMCR  11                        Renal Ultrasound Scan -- not done       Assessment    1. Renal - renal Fx is stable at baseline  - no protein and urine has no rbc or protein  2. Essential Hypertension - running lower. Decrease norvasc to 5 mg po daily  3. Hiatus Hernia  4. H/O Diastolic Herat failure  5. meds reviewed and D/W patient  6. See Prn    Advised PCP to check UA and BMP yearly    Tests and orders placed this Encounter   No orders of the defined types were placed in this encounter. Isabel Cornell M.D  [facility].

## 2019-06-25 RX ORDER — SPIRONOLACTONE 25 MG/1
25 TABLET ORAL DAILY
Qty: 90 TABLET | Refills: 3 | Status: SHIPPED | OUTPATIENT
Start: 2019-06-25 | End: 2020-06-30 | Stop reason: SDUPTHER

## 2019-09-18 ENCOUNTER — OFFICE VISIT (OUTPATIENT)
Dept: CARDIOLOGY CLINIC | Age: 79
End: 2019-09-18
Payer: MEDICARE

## 2019-09-18 VITALS
BODY MASS INDEX: 28.65 KG/M2 | SYSTOLIC BLOOD PRESSURE: 134 MMHG | HEART RATE: 57 BPM | DIASTOLIC BLOOD PRESSURE: 77 MMHG | WEIGHT: 194 LBS

## 2019-09-18 DIAGNOSIS — I49.3 PVC (PREMATURE VENTRICULAR CONTRACTION): Primary | ICD-10-CM

## 2019-09-18 PROCEDURE — G8427 DOCREV CUR MEDS BY ELIG CLIN: HCPCS | Performed by: INTERNAL MEDICINE

## 2019-09-18 PROCEDURE — 93000 ELECTROCARDIOGRAM COMPLETE: CPT | Performed by: INTERNAL MEDICINE

## 2019-09-18 PROCEDURE — 1123F ACP DISCUSS/DSCN MKR DOCD: CPT | Performed by: INTERNAL MEDICINE

## 2019-09-18 PROCEDURE — 4040F PNEUMOC VAC/ADMIN/RCVD: CPT | Performed by: INTERNAL MEDICINE

## 2019-09-18 PROCEDURE — 99213 OFFICE O/P EST LOW 20 MIN: CPT | Performed by: INTERNAL MEDICINE

## 2019-09-18 PROCEDURE — 1036F TOBACCO NON-USER: CPT | Performed by: INTERNAL MEDICINE

## 2019-09-18 PROCEDURE — G8419 CALC BMI OUT NRM PARAM NOF/U: HCPCS | Performed by: INTERNAL MEDICINE

## 2019-09-18 ASSESSMENT — ENCOUNTER SYMPTOMS
NAUSEA: 0
CONSTIPATION: 0
BLURRED VISION: 0
SHORTNESS OF BREATH: 0
LEFT EYE: 0
BACK PAIN: 0
DIARRHEA: 0
ABDOMINAL PAIN: 0
VOMITING: 0
SORE THROAT: 0
COUGH: 0
RIGHT EYE: 0
WHEEZING: 0
DOUBLE VISION: 0

## 2019-09-18 NOTE — PROGRESS NOTES
CARDIOLOGY - ELECTROPHYSIOLOGY  PROGRESS NOTE    Date:   9/18/2019  Patient name: Meryle Casa  Date of admission:   No admission date for patient encounter. MRN:   [id number]  YOB: 1940  PCP: Fabiana Araya MD    Subjective:  I am doing the same. Clinical Changes / Abnormalities:    HPI     06/19/2019:  The patient is a 67 y/o male that has a h/o hypertension since he was in his 19's. The patient has developed significant concentric LVH and diastolic dysfunction. There is also evidence on his ECHO he has LVOT obstruction and TYSHAWN.  Evaluation for frequent PVC's showed a burden of 13.8%. The patient has noted having shortness of breath with exertion and easy fatigue. He denies having any episodes of passing out, but he will have an occasional episode of dizziness. He also denies having any palpitations. The patient is being referred for further evaluation and management of his PVC's and abnormal ECHO findings. 09/18/2019:  The patient is doing the same since his last office visit. He is active but will require a period of time to rest after exerting himself. He does nopt feel his activities are limited.       Past Medical History:      Diagnosis Date    Diverticulosis     Heart murmur     High triglycerides     History of colonic polyps     Hypertension     Insulin resistance     LVH (left ventricular hypertrophy)      Past Surgical History:      Procedure Laterality Date    CHOLECYSTECTOMY      COLONOSCOPY  2003, 2006, 2011   [facility]    x 2    SIGMOIDOSCOPY      TONSILLECTOMY       Past Family History:       Problem Relation Age of Onset    Alzheimer's Disease Mother     Parkinsonism Father     Colon Cancer Neg Hx       Past Social History:     Social History     Socioeconomic History    Marital status:      Spouse name: None    Number of children: None    Years of education: None    Highest education level: None   Occupational History    palpitations and syncope. Respiratory: Negative for cough, shortness of breath and wheezing. Endocrine: Negative for cold intolerance, heat intolerance and polyuria. Hematologic/Lymphatic: Negative for bleeding problem. Does not bruise/bleed easily. Skin: Negative for dry skin, itching and rash. Musculoskeletal: Negative for arthritis, back pain, joint pain and myalgias. Gastrointestinal: Negative for abdominal pain, constipation, diarrhea, nausea and vomiting. Genitourinary: Negative for dysuria, frequency, hematuria and urgency. Neurological: Negative for dizziness, headaches, light-headedness, numbness, paresthesias, seizures and vertigo. Psychiatric/Behavioral: Negative for depression and memory loss. The patient is not nervous/anxious. PHYSICAL EXAM:  /77   Pulse 57   Wt 194 lb (88 kg)   BMI 28.65 kg/m²     Physical Exam   Constitutional: He is oriented to person, place, and time. No distress. HENT:   Head: Normocephalic and atraumatic. Head is without contusion. Right Ear: Hearing and external ear normal. No decreased hearing is noted. Left Ear: Hearing and external ear normal. No decreased hearing is noted. Nose: Nose normal. No sinus tenderness. No epistaxis. Mouth/Throat: Oropharynx is clear and moist. Mucous membranes are not dry. No oropharyngeal exudate. Eyes: Pupils are equal, round, and reactive to light. Conjunctivae and EOM are normal. Right eye exhibits no discharge. Left eye exhibits no discharge. Neck: Trachea normal. Neck supple. No JVD present. No edema present. No thyroid mass present. Cardiovascular: Normal rate, regular rhythm, normal heart sounds and normal pulses. No extrasystoles are present. Pulmonary/Chest: Effort normal and breath sounds normal. He exhibits no tenderness. Breasts are symmetrical.   Abdominal: Soft. Normal appearance and bowel sounds are normal. He exhibits no mass. There is no hepatosplenomegaly.  There is no

## 2020-06-01 ENCOUNTER — TELEPHONE (OUTPATIENT)
Dept: CARDIOLOGY CLINIC | Age: 80
End: 2020-06-01

## 2020-06-25 ENCOUNTER — OFFICE VISIT (OUTPATIENT)
Dept: CARDIOLOGY CLINIC | Age: 80
End: 2020-06-25
Payer: COMMERCIAL

## 2020-06-25 ENCOUNTER — TELEPHONE (OUTPATIENT)
Dept: CARDIOLOGY CLINIC | Age: 80
End: 2020-06-25

## 2020-06-25 VITALS
WEIGHT: 204.4 LBS | HEIGHT: 69 IN | SYSTOLIC BLOOD PRESSURE: 130 MMHG | HEART RATE: 64 BPM | DIASTOLIC BLOOD PRESSURE: 76 MMHG | BODY MASS INDEX: 30.27 KG/M2

## 2020-06-25 PROCEDURE — 99213 OFFICE O/P EST LOW 20 MIN: CPT | Performed by: INTERNAL MEDICINE

## 2020-06-25 RX ORDER — CARVEDILOL PHOSPHATE 10 MG/1
10 CAPSULE, EXTENDED RELEASE ORAL DAILY
Qty: 30 CAPSULE | Refills: 3 | Status: SHIPPED | OUTPATIENT
Start: 2020-06-25 | End: 2020-07-23 | Stop reason: ALTCHOICE

## 2020-06-25 NOTE — PROGRESS NOTES
[facility],[place] [address] [address] [place] [address]  Dept: [phone]  Dept Fax: [phone]  Loc: [phone]    Visit Date: 6/25/2020    Mr. Davina Machado is a [de-identified] y.o. male  who presented for:  Chief Complaint   Patient presents with    Follow-up       HPI:   HPI   67 yo M with CKD stage I, HTN who presents for follow-up. Hx of LAFB/RBBB. EF preserved. PVC burden 13.8%. He does feel fatigued. He has concetric LVH and mild LVOT obstruction. BP 130s. No chest pain, angina, DOS SANTOS, orthopnea, PND, sob at rest, palpitations, LE edema, or syncope. Able to walk and exert himself all day without major issue. Current Outpatient Medications:     spironolactone (ALDACTONE) 25 MG tablet, Take 1 tablet by mouth daily, Disp: 90 tablet, Rfl: 3    Coenzyme Q10 (CO Q 10 PO), Take 200 mg by mouth daily, Disp: , Rfl:     carvedilol (COREG) 25 MG tablet, Take 1 tablet by mouth 2 times daily (with meals), Disp: 180 tablet, Rfl: 3    amLODIPine (NORVASC) 10 MG tablet, Take 1 tablet by mouth daily (Patient taking differently: Take 10 mg by mouth every evening ), Disp: 90 tablet, Rfl: 1    MULTIPLE VITAMIN PO, Take by mouth, Disp: , Rfl:     aspirin 81 MG EC tablet, Take 81 mg by mouth daily. , Disp: , Rfl:     Past Medical History  Lester Christie  has a past medical history of Diverticulosis, Heart murmur, High triglycerides, History of colonic polyps, Hypertension, Insulin resistance, and LVH (left ventricular hypertrophy). Social History  Lester Christie  reports that he has never smoked. He has never used smokeless tobacco. He reports current alcohol use. He reports that he does not use drugs. Family History  Lester Christie family history includes Alzheimer's Disease in his mother; Parkinsonism in his father. There is no family history of bicuspid aortic valve, aneurysms, heart transplant, pacemakers, defibrillators, or sudden cardiac death.       Past Surgical oriented to person, place, and time. No cranial nerve deficit. Coordination normal.   Skin: Skin is warm and dry. Psychiatric: Normal mood and affect. No results found for: CKTOTAL, CKMB, CKMBINDEX    No results found for: WBC, RBC, HGB, HCT, MCV, MCH, MCHC, RDW, PLT, MPV    Lab Results   Component Value Date     06/13/2019    K 4.7 06/13/2019     06/13/2019    CO2 25 06/13/2019    BUN 18 06/13/2019    CREATININE 0.8 06/13/2019    CALCIUM 9.8 06/13/2019    LABGLOM >90 06/13/2019    GLUCOSE 114 06/13/2019       No results found for: ALKPHOS, ALT, AST, PROT, BILITOT, BILIDIR, IBILI, LABALBU    No results found for: MG    No results found for: INR, PROTIME      No results found for: LABA1C    Lab Results   Component Value Date    TRIG 355 06/13/2018    HDL 26 06/13/2018    LDLCALC 86 06/13/2018       No results found for: TSH      Testing Reviewed:      I have individually reviewed the cardiac test below:    ECHO:   Results for orders placed during the hospital encounter of 05/23/19   Echo 2D w doppler w color complete    Narrative Transthoracic Echocardiography Report (TTE)     Demographics      Patient Name   Denisa Vallejo  Gender              Male                  T      MR #           [id number]       Race                                                     Ethnicity      Account #      [de-identified]       Room Number      Accession      [id number]       Date of Study       05/23/2019   Number      Date of Birth  1940      Referring Physician Abel Beavers MD      Age            66 year(s)      Mi Parker MD                                  Physician     Procedure    Type of Study      TTE procedure:ECHOCARDIOGRAM COMPLETE 2D W DOPPLER W COLOR.      Procedure Date  Date: 05/23/2019 Start: 01:01 PM    Study Location: Echo Lab  Technical Quality: Limited visualization due to poor acoustical window. Indications:Left Ventricular Hypertrophy. Additional Medical History:Hypertension, abnormal EKG, chronic kidney  disease    Patient Status: Routine    Height: 69 inches Weight: 201 pounds BSA: 2.07 m^2 BMI: 29.68 kg/m^2    BP: 130/92 mmHg     Conclusions      Summary   Normal left ventricular size and hyperdynamic systolic function. There were no regional wall motion abnormalities. Severe concentric hypertrophy. Ejection fraction was estimated at 65-70%. Doppler parameters were consistent with abnormal left ventricular   relaxation (grade 1 diastolic dysfunction). Possible LVOT obstruction with a peak velocity of approximately 2 m/s. Left atrial size was severely dilated. The mitral valve structure demonstrated posterior leaflet calcification   with normal leaflet separation. There is systolic anterior motion of the anterior mitral valve leaflet. There was trace mitral regurgitation. Ascending aorta = 3.6 cm. The above findings may be indicative of HOCM, consider further   investigation with cardiac MRI, if clinically indicated. Signature      ----------------------------------------------------------------   Electronically signed by Augustine Shin MD (Interpreting   physician) on 05/23/2019 at 03:51 PM   ----------------------------------------------------------------      Findings      Mitral Valve   The mitral valve structure demonstrated posterior leaflet calcification   with normal leaflet separation. There is systolic anterior motion of the   anterior mitral valve leaflet. DOPPLER: The transmitral velocity was   within the normal range with no evidence for mitral stenosis. There was   trace mitral regurgitation.       Aortic Valve   The aortic valve was trileaflet with increased thickness/calcification and   mildly reduced cuspal separation. DOPPLER: Transaortic velocity was within   the normal range with no evidence of aortic stenosis. There was no   evidence of aortic regurgitation. Tricuspid Valve   The tricuspid valve structure was normal with normal leaflet separation. DOPPLER: There was no evidence of tricuspid stenosis. There was no   evidence of tricuspid regurgitation. Pulmonic Valve   The pulmonic valve leaflets were not well seen. DOPPLER: The transpulmonic   velocity was within the normal range with trace regurgitation. Left Atrium   Left atrial size was severely dilated. Left Ventricle   Normal left ventricular size and hyperdynamic systolic function. There were no regional wall motion abnormalities. Severe concentric hypertrophy. Ejection fraction was estimated at 65-70%. Doppler parameters were consistent with abnormal left ventricular   relaxation (grade 1 diastolic dysfunction). Possible LVOT obstruction with a peak velocity of approximately 2 m/s. Right Atrium   Right atrial size was normal.      Right Ventricle   The right ventricular size was normal with normal systolic function and   wall thickness. Pericardial Effusion   The pericardium was normal in appearance with no evidence of a pericardial   effusion. Pleural Effusion   No evidence of pleural effusion. Aorta / Great Vessels   -Ascending aorta = 3.6 cm.      M-Mode/2D Measurements & Calculations      LV Diastolic   LV Systolic Dimension:    AV Cusp Separation: 2 cmLA   Dimension: 4   2.4 cm                    Dimension: 5.4 cmAO Root   cm             LV Volume Diastolic: 70   Dimension: 4 cmLA Area: 29.1 cm^2   LV FS:40 %     ml   LV PW          LV Volume Systolic: 28.0   Diastolic: 1.5 ml   cm             LV EDV/LV EDV Index: 70   RV Diastolic Dimension: 4.5 cm   Septum         ml/34 m^2LV ESV/LV ESV   Diastolic: 1.9 Index: 08.3 ml/10 m^2     LA/Aorta: 1.35   cm             EF Calculated: 71.1 %     Ascending

## 2020-06-30 NOTE — TELEPHONE ENCOUNTER
Pt called and states he needs a refill on his spironolactone. Pt states he is to see his PCP at the end of July. Can you give him a 30 day supply?

## 2020-07-01 RX ORDER — SPIRONOLACTONE 25 MG/1
25 TABLET ORAL DAILY
Qty: 30 TABLET | Refills: 0 | Status: SHIPPED | OUTPATIENT
Start: 2020-07-01

## 2020-07-01 NOTE — TELEPHONE ENCOUNTER
Pt called back in regards to the spironolactone. I filled this for 30 days per his request until he can see his PCP.

## 2020-07-22 NOTE — TELEPHONE ENCOUNTER
Pt states Coreg extended release is not working. Pt states he is not feeling well on this medication. Pt c/o fatigue. Please advise.

## 2020-07-23 RX ORDER — METOPROLOL SUCCINATE 25 MG/1
25 TABLET, EXTENDED RELEASE ORAL DAILY
Qty: 30 TABLET | Refills: 11 | Status: SHIPPED | OUTPATIENT
Start: 2020-07-23 | End: 2020-10-08 | Stop reason: SDUPTHER

## 2020-07-23 RX ORDER — METOPROLOL SUCCINATE 25 MG/1
25 TABLET, EXTENDED RELEASE ORAL DAILY
COMMUNITY
End: 2020-07-23 | Stop reason: SDUPTHER

## 2020-07-23 NOTE — TELEPHONE ENCOUNTER
Called and talked to patient. He isn't feeling well on Coreg ER. B/P has been running 130s/70-80s. He doesn't monitor his HR. He has had lightheadedness and complains of feeling fatigue. Please advise.

## 2020-10-05 ENCOUNTER — TELEPHONE (OUTPATIENT)
Dept: CARDIOLOGY CLINIC | Age: 80
End: 2020-10-05

## 2020-10-08 ENCOUNTER — OFFICE VISIT (OUTPATIENT)
Dept: CARDIOLOGY CLINIC | Age: 80
End: 2020-10-08
Payer: COMMERCIAL

## 2020-10-08 ENCOUNTER — TELEPHONE (OUTPATIENT)
Dept: CARDIOLOGY CLINIC | Age: 80
End: 2020-10-08

## 2020-10-08 VITALS
HEIGHT: 69 IN | DIASTOLIC BLOOD PRESSURE: 88 MMHG | WEIGHT: 205.4 LBS | SYSTOLIC BLOOD PRESSURE: 160 MMHG | HEART RATE: 90 BPM | BODY MASS INDEX: 30.42 KG/M2

## 2020-10-08 PROCEDURE — 99212 OFFICE O/P EST SF 10 MIN: CPT | Performed by: INTERNAL MEDICINE

## 2020-10-08 PROCEDURE — 93000 ELECTROCARDIOGRAM COMPLETE: CPT | Performed by: INTERNAL MEDICINE

## 2020-10-08 RX ORDER — METOPROLOL SUCCINATE 25 MG/1
25 TABLET, EXTENDED RELEASE ORAL DAILY
Qty: 90 TABLET | Refills: 3 | Status: SHIPPED | OUTPATIENT
Start: 2020-10-08 | End: 2020-10-08

## 2020-10-08 NOTE — TELEPHONE ENCOUNTER
Pt was seen 10-8-20 by Dr. Le Mukherjee. Pt refused to schedule a follow up appt due to going to [place]. Pt states he will call to schedule an appt when he gets ready to come back from [place].

## 2020-10-08 NOTE — PROGRESS NOTES
REPAIR  1980    x 2    SIGMOIDOSCOPY      TONSILLECTOMY         Review of Systems   Constitutional: Negative for chills and fever  HENT: Negative for congestion, sinus pressure, sneezing and sore throat. Eyes: Negative for pain, discharge, redness and itching. Respiratory: Negative for apnea, cough  Gastrointestinal: Negative for blood in stool, constipation, diarrhea   Endocrine: Negative for cold intolerance, heat intolerance, polydipsia. Genitourinary: Negative for dysuria, enuresis, flank pain and hematuria. Musculoskeletal: Negative for arthralgias, joint swelling and neck pain. Neurological: Negative for numbness and headaches. Psychiatric/Behavioral: Negative for agitation, confusion, decreased concentration and dysphoric mood. Objective:     BP (!) 160/88   Pulse 90   Ht 5' 9\" (1.753 m)   Wt 205 lb 6.4 oz (93.2 kg)   BMI 30.33 kg/m²     Wt Readings from Last 3 Encounters:   10/08/20 205 lb 6.4 oz (93.2 kg)   06/25/20 204 lb 6.4 oz (92.7 kg)   09/18/19 194 lb (88 kg)     BP Readings from Last 3 Encounters:   10/08/20 (!) 160/88   06/25/20 130/76   09/18/19 134/77       Nursing note and vitals reviewed. Physical Exam   Constitutional: Oriented to person, place, and time. Appears well-developed and well-nourished. HENT:   Head: Normocephalic and atraumatic. Eyes: EOM are normal. Pupils are equal, round, and reactive to light. Neck: Normal range of motion. Neck supple. No JVD present. Cardiovascular: Normal rate, regular rhythm, normal heart sounds and intact distal pulses. 2/6 MEHDI. Pulmonary/Chest: Effort normal and breath sounds normal. No respiratory distress. No wheezes. No rales. Abdominal: Soft. Bowel sounds are normal. No distension. There is no tenderness. Musculoskeletal: Normal range of motion. No edema. Neurological: Alert and oriented to person, place, and time. No cranial nerve deficit. Coordination normal.   Skin: Skin is warm and dry.    Psychiatric: acoustical window. Indications:Left Ventricular Hypertrophy. Additional Medical History:Hypertension, abnormal EKG, chronic kidney  disease    Patient Status: Routine    Height: 69 inches Weight: 201 pounds BSA: 2.07 m^2 BMI: 29.68 kg/m^2    BP: 130/92 mmHg     Conclusions      Summary   Normal left ventricular size and hyperdynamic systolic function. There were no regional wall motion abnormalities. Severe concentric hypertrophy. Ejection fraction was estimated at 65-70%. Doppler parameters were consistent with abnormal left ventricular   relaxation (grade 1 diastolic dysfunction). Possible LVOT obstruction with a peak velocity of approximately 2 m/s. Left atrial size was severely dilated. The mitral valve structure demonstrated posterior leaflet calcification   with normal leaflet separation. There is systolic anterior motion of the anterior mitral valve leaflet. There was trace mitral regurgitation. Ascending aorta = 3.6 cm. The above findings may be indicative of HOCM, consider further   investigation with cardiac MRI, if clinically indicated. Signature      ----------------------------------------------------------------   Electronically signed by Anny Brandon MD (Interpreting   physician) on 05/23/2019 at 03:51 PM   ----------------------------------------------------------------      Findings      Mitral Valve   The mitral valve structure demonstrated posterior leaflet calcification   with normal leaflet separation. There is systolic anterior motion of the   anterior mitral valve leaflet. DOPPLER: The transmitral velocity was   within the normal range with no evidence for mitral stenosis. There was   trace mitral regurgitation. Aortic Valve   The aortic valve was trileaflet with increased thickness/calcification and   mildly reduced cuspal separation. DOPPLER: Transaortic velocity was within   the normal range with no evidence of aortic stenosis.  There was no evidence of aortic regurgitation. Tricuspid Valve   The tricuspid valve structure was normal with normal leaflet separation. DOPPLER: There was no evidence of tricuspid stenosis. There was no   evidence of tricuspid regurgitation. Pulmonic Valve   The pulmonic valve leaflets were not well seen. DOPPLER: The transpulmonic   velocity was within the normal range with trace regurgitation. Left Atrium   Left atrial size was severely dilated. Left Ventricle   Normal left ventricular size and hyperdynamic systolic function. There were no regional wall motion abnormalities. Severe concentric hypertrophy. Ejection fraction was estimated at 65-70%. Doppler parameters were consistent with abnormal left ventricular   relaxation (grade 1 diastolic dysfunction). Possible LVOT obstruction with a peak velocity of approximately 2 m/s. Right Atrium   Right atrial size was normal.      Right Ventricle   The right ventricular size was normal with normal systolic function and   wall thickness. Pericardial Effusion   The pericardium was normal in appearance with no evidence of a pericardial   effusion. Pleural Effusion   No evidence of pleural effusion. Aorta / Great Vessels   -Ascending aorta = 3.6 cm.      M-Mode/2D Measurements & Calculations      LV Diastolic   LV Systolic Dimension:    AV Cusp Separation: 2 cmLA   Dimension: 4   2.4 cm                    Dimension: 5.4 cmAO Root   cm             LV Volume Diastolic: 70   Dimension: 4 cmLA Area: 29.1 cm^2   LV FS:40 %     ml   LV PW          LV Volume Systolic: 15.1   Diastolic: 1.5 ml   cm             LV EDV/LV EDV Index: 70   RV Diastolic Dimension: 4.5 cm   Septum         ml/34 m^2LV ESV/LV ESV   Diastolic: 1.9 Index: 62.4 ml/10 m^2     LA/Aorta: 1.35   cm             EF Calculated: 71.1 %     Ascending Aorta: 3.6 cm                                            LA volume/Index: 106.6 ml /51m^2     Doppler Measurements & Calculations      MV Peak E-Wave: 72.4 cm/s   AV Peak Velocity: 216   MV Peak A-Wave: 111 cm/s    cm/s   MV E/A Ratio: 0.65          AV Peak Gradient:      TV Peak E-Wave: 41.9   MV Peak Gradient: 2.1 mmHg  18.66 mmHg             cm/s   MV Mean Gradient: 3 mmHg    AV Mean Velocity: 156  TV Peak A-Wave: 60.2   MV Mean Velocity: 78.3 cm/s cm/s                   cm/s   MV Deceleration Time: 320   AV Mean Gradient: 11   msec                        mmHg                   TV Peak Gradient: 0.7   MV P1/2t: 101 msec          AV VTI: 44.5 cm        mmHg   MVA by PHT:2.18 cm^2                                                      PV Peak Velocity: 79.8   MV E' Septal Velocity: 3.6                         cm/s   cm/s                        IVRT: 95 msec          PV Peak Gradient: 2.55   MV A' Septal Velocity: 9.4                         mmHg   cm/s   MV E' Lateral Velocity: 7.5   cm/s   MV A' Lateral Velocity:   14.6 cm/s   E/E' septal: 20.11   E/E' lateral: 9.65   MR Velocity: 675 cm/s     [email or web address] 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 He will continue the other meds, stop BB. BP elevated. Needs follow-up BP check. He is taking CCB 5 mg BID. BP running well at home. No syncope, remain hydrated. Discussed diet/exercise/BP/weight loss/health lifestyle choices/lipids; the patient understands the goals and will try to comply.       Disposition:  1 year         Electronically signed by Lu Rojas MD   10/8/2020 at 9:43 AM EDT

## 2021-06-16 ENCOUNTER — TELEPHONE (OUTPATIENT)
Dept: CARDIOLOGY CLINIC | Age: 81
End: 2021-06-16

## 2021-06-16 ENCOUNTER — OFFICE VISIT (OUTPATIENT)
Dept: CARDIOLOGY CLINIC | Age: 81
End: 2021-06-16
Payer: COMMERCIAL

## 2021-06-16 VITALS
WEIGHT: 200 LBS | DIASTOLIC BLOOD PRESSURE: 85 MMHG | BODY MASS INDEX: 29.62 KG/M2 | HEIGHT: 69 IN | HEART RATE: 81 BPM | SYSTOLIC BLOOD PRESSURE: 138 MMHG

## 2021-06-16 DIAGNOSIS — R35.1 NOCTURIA: ICD-10-CM

## 2021-06-16 DIAGNOSIS — R53.83 FATIGUE, UNSPECIFIED TYPE: Primary | ICD-10-CM

## 2021-06-16 DIAGNOSIS — G47.33 OSA (OBSTRUCTIVE SLEEP APNEA): ICD-10-CM

## 2021-06-16 PROCEDURE — 99213 OFFICE O/P EST LOW 20 MIN: CPT | Performed by: INTERNAL MEDICINE

## 2021-06-16 NOTE — PROGRESS NOTES
[address] [facility] [address]  [place] [address]  Dept: [phone]  Dept Fax: [phone]  Loc: [phone]    Visit Date: 6/16/2021    Mr. Janie Lang is a [de-identified] y.o. male  who presented for:  Chief Complaint   Patient presents with    Follow-up       HPI:   HPI   [de-identified] yo M with CKD stage 1, HTN, LAFB/RBBB, PVC 13.8% burden with mild LVOT obstruction who presents for follow-up. BB causing lightheadedness and dizziness. He is on ASA/aldactone/norvasc. He is fatigued and tired. He only sleeps 2 hours at a time due to bathroom issues. He has also had bladder/prostate surgery in the past.  No chest pain, angina, DOS SANTOS, orthopnea, PND, sob at rest, palpitations, LE edema, or syncope. Current Outpatient Medications:     spironolactone (ALDACTONE) 25 MG tablet, Take 1 tablet by mouth daily, Disp: 30 tablet, Rfl: 0    Coenzyme Q10 (CO Q 10 PO), Take 200 mg by mouth daily, Disp: , Rfl:     amLODIPine (NORVASC) 10 MG tablet, Take 1 tablet by mouth daily (Patient taking differently: Take 10 mg by mouth every evening ), Disp: 90 tablet, Rfl: 1    aspirin 81 MG EC tablet, Take 81 mg by mouth daily. , Disp: , Rfl:     Past Medical History  Yesika Huertas  has a past medical history of Diverticulosis, Heart murmur, High triglycerides, History of colonic polyps, Hypertension, Insulin resistance, and LVH (left ventricular hypertrophy). Social History  Yesika Huertas  reports that he has never smoked. He has never used smokeless tobacco. He reports current alcohol use. He reports that he does not use drugs. Family History  Yesika Huertas family history includes Alzheimer's Disease in his mother; Parkinsonism in his father. There is no family history of bicuspid aortic valve, aneurysms, heart transplant, pacemakers, defibrillators, or sudden cardiac death.       Past Surgical History   Past Surgical History:   Procedure Laterality Date    CHOLECYSTECTOMY      COLONOSCOPY  2003, 2006, 2011   [facility] [address]    x 2    SIGMOIDOSCOPY      TONSILLECTOMY         Review of Systems   Constitutional: Negative for chills and fever  HENT: Negative for congestion, sinus pressure, sneezing and sore throat. Eyes: Negative for pain, discharge, redness and itching. Respiratory: Negative for apnea, cough  Gastrointestinal: Negative for blood in stool, constipation, diarrhea   Endocrine: Negative for cold intolerance, heat intolerance, polydipsia. Genitourinary: Negative for dysuria, enuresis, flank pain and hematuria. Musculoskeletal: Negative for arthralgias, joint swelling and neck pain. Neurological: Negative for numbness and headaches. Psychiatric/Behavioral: Negative for agitation, confusion, decreased concentration and dysphoric mood. Objective:     /85   Pulse 81   Ht 5' 9\" (1.753 m)   Wt 200 lb (90.7 kg)   BMI 29.53 kg/m²     Wt Readings from Last 3 Encounters:   06/16/21 200 lb (90.7 kg)   10/08/20 205 lb 6.4 oz (93.2 kg)   06/25/20 204 lb 6.4 oz (92.7 kg)     BP Readings from Last 3 Encounters:   06/16/21 138/85   10/08/20 (!) 160/88   06/25/20 130/76       Nursing note and vitals reviewed. Physical Exam   Constitutional: Oriented to person, place, and time. Appears well-developed and well-nourished. HENT:   Head: Normocephalic and atraumatic. Eyes: EOM are normal. Pupils are equal, round, and reactive to light. Neck: Normal range of motion. Neck supple. No JVD present. Cardiovascular: Normal rate, regular rhythm, normal heart sounds and intact distal pulses. No murmur heard. Pulmonary/Chest: Effort normal and breath sounds normal. No respiratory distress. No wheezes. No rales. Abdominal: Soft. Bowel sounds are normal. No distension. There is no tenderness. Musculoskeletal: Normal range of motion. No edema. Neurological: Alert and oriented to person, place, and time. No cranial nerve deficit.  Coordination normal.   Skin: Valve  The pulmonic valve leaflets were not well seen. DOPPLER: The transpulmonic  velocity was within the normal range with trace regurgitation. Left Atrium  Left atrial size was severely dilated. Left Ventricle  Normal left ventricular size and hyperdynamic systolic function. There were no regional wall motion abnormalities. Severe concentric hypertrophy. Ejection fraction was estimated at 65-70%. Doppler parameters were consistent with abnormal left ventricular  relaxation (grade 1 diastolic dysfunction). Possible LVOT obstruction with a peak velocity of approximately 2 m/s. Right Atrium  Right atrial size was normal.    Right Ventricle  The right ventricular size was normal with normal systolic function and  wall thickness. Pericardial Effusion  The pericardium was normal in appearance with no evidence of a pericardial  effusion. Pleural Effusion  No evidence of pleural effusion. Aorta / Great Vessels  -Ascending aorta = 3.6 cm.     M-Mode/2D Measurements & Calculations    LV Diastolic   LV Systolic Dimension:    AV Cusp Separation: 2 cmLA  Dimension: 4   2.4 cm                    Dimension: 5.4 cmAO Root  cm             LV Volume Diastolic: 70   Dimension: 4 cmLA Area: 29.1 cm^2  LV FS:40 %     ml  LV PW          LV Volume Systolic: 73.5  Diastolic: 1.5 ml  cm             LV EDV/LV EDV Index: 70   RV Diastolic Dimension: 4.5 cm  Septum         ml/34 m^2LV ESV/LV ESV  Diastolic: 1.9 Index: 17.3 ml/10 m^2     LA/Aorta: 1.35  cm             EF Calculated: 71.1 %     Ascending Aorta: 3.6 cm  LA volume/Index: 106.6 ml /51m^2    Doppler Measurements & Calculations    MV Peak E-Wave: 72.4 cm/s   AV Peak Velocity: 216  MV Peak A-Wave: 111 cm/s    cm/s  MV E/A Ratio: 0.65          AV Peak Gradient:      TV Peak E-Wave: 41.9  MV Peak Gradient: 2.1 mmHg  18.66 mmHg             cm/s  MV Mean Gradient: 3 mmHg    AV Mean Velocity: 156  TV Peak A-Wave: 60.2  MV Mean Velocity: 78.3 cm/s cm/s cm/s  MV Deceleration Time: 320   AV Mean Gradient: 11  msec                        mmHg                   TV Peak Gradient: 0.7  MV P1/2t: 101 msec          AV VTI: 44.5 cm        mmHg  MVA by PHT:2.18 cm^2  PV Peak Velocity: 79.8  MV E' Septal Velocity: 3.6                         cm/s  cm/s                        IVRT: 95 msec          PV Peak Gradient: 2.55  MV A' Septal Velocity: 9.4                         mmHg  cm/s  MV E' Lateral Velocity: 7.5  cm/s  MV A' Lateral Velocity:  14.6 cm/s  E/E' septal: 20.11  E/E' lateral: 9.65  MR Velocity: 675 cm/s    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 Otherwise, continue current therapy. Discussed diet/exercise/BP/weight loss/health lifestyle choices/lipids; the patient understands the goals and will try to comply.       Disposition:  1 year         Electronically signed by Jose Treviño MD   6/16/2021 at 1:34 PM EDT

## 2023-06-21 ENCOUNTER — OFFICE VISIT (OUTPATIENT)
Dept: FAMILY MEDICINE CLINIC | Age: 83
End: 2023-06-21
Payer: MEDICARE

## 2023-06-21 VITALS
WEIGHT: 203.3 LBS | SYSTOLIC BLOOD PRESSURE: 124 MMHG | HEART RATE: 68 BPM | RESPIRATION RATE: 16 BRPM | DIASTOLIC BLOOD PRESSURE: 60 MMHG | HEIGHT: 67 IN | BODY MASS INDEX: 31.91 KG/M2

## 2023-06-21 DIAGNOSIS — R73.01 IFG (IMPAIRED FASTING GLUCOSE): ICD-10-CM

## 2023-06-21 DIAGNOSIS — R01.1 HEART MURMUR: ICD-10-CM

## 2023-06-21 DIAGNOSIS — R41.3 MEMORY DIFFICULTIES: Primary | ICD-10-CM

## 2023-06-21 DIAGNOSIS — I51.89 DIASTOLIC DYSFUNCTION: ICD-10-CM

## 2023-06-21 DIAGNOSIS — I10 HYPERTENSION, UNSPECIFIED TYPE: ICD-10-CM

## 2023-06-21 DIAGNOSIS — E78.5 HYPERLIPIDEMIA, UNSPECIFIED HYPERLIPIDEMIA TYPE: ICD-10-CM

## 2023-06-21 PROCEDURE — 99204 OFFICE O/P NEW MOD 45 MIN: CPT | Performed by: FAMILY MEDICINE

## 2023-06-21 PROCEDURE — 3078F DIAST BP <80 MM HG: CPT | Performed by: FAMILY MEDICINE

## 2023-06-21 PROCEDURE — 3074F SYST BP LT 130 MM HG: CPT | Performed by: FAMILY MEDICINE

## 2023-06-21 PROCEDURE — 1123F ACP DISCUSS/DSCN MKR DOCD: CPT | Performed by: FAMILY MEDICINE

## 2023-06-21 RX ORDER — METFORMIN HYDROCHLORIDE 500 MG/1
500 TABLET, EXTENDED RELEASE ORAL 2 TIMES DAILY
COMMUNITY

## 2023-06-21 RX ORDER — AMOXICILLIN 500 MG
1 CAPSULE ORAL 2 TIMES DAILY
COMMUNITY

## 2023-06-21 RX ORDER — CHOLECALCIFEROL (VITAMIN D3) 125 MCG
1 CAPSULE ORAL 2 TIMES DAILY
COMMUNITY

## 2023-06-21 SDOH — ECONOMIC STABILITY: INCOME INSECURITY: HOW HARD IS IT FOR YOU TO PAY FOR THE VERY BASICS LIKE FOOD, HOUSING, MEDICAL CARE, AND HEATING?: NOT HARD AT ALL

## 2023-06-21 SDOH — ECONOMIC STABILITY: FOOD INSECURITY: WITHIN THE PAST 12 MONTHS, YOU WORRIED THAT YOUR FOOD WOULD RUN OUT BEFORE YOU GOT MONEY TO BUY MORE.: NEVER TRUE

## 2023-06-21 SDOH — ECONOMIC STABILITY: FOOD INSECURITY: WITHIN THE PAST 12 MONTHS, THE FOOD YOU BOUGHT JUST DIDN'T LAST AND YOU DIDN'T HAVE MONEY TO GET MORE.: NEVER TRUE

## 2023-06-21 SDOH — ECONOMIC STABILITY: HOUSING INSECURITY
IN THE LAST 12 MONTHS, WAS THERE A TIME WHEN YOU DID NOT HAVE A STEADY PLACE TO SLEEP OR SLEPT IN A SHELTER (INCLUDING NOW)?: NO

## 2023-06-21 ASSESSMENT — PATIENT HEALTH QUESTIONNAIRE - PHQ9
SUM OF ALL RESPONSES TO PHQ QUESTIONS 1-9: 0
SUM OF ALL RESPONSES TO PHQ QUESTIONS 1-9: 0
1. LITTLE INTEREST OR PLEASURE IN DOING THINGS: 0
SUM OF ALL RESPONSES TO PHQ QUESTIONS 1-9: 0
SUM OF ALL RESPONSES TO PHQ QUESTIONS 1-9: 0
2. FEELING DOWN, DEPRESSED OR HOPELESS: 0
SUM OF ALL RESPONSES TO PHQ9 QUESTIONS 1 & 2: 0

## 2023-06-21 NOTE — PATIENT INSTRUCTIONS
Next appt 8/21/2023   Last appt 2/20/23    Refill Requested / Last Refill Info:  allopurinol (ZYLOPRIM) 100 MG tablet 180 tablet 1 8/10/2022     Sig: TAKE 2 TABLETS BY MOUTH DAILY        Refill unable to be completed per standing System's protocol due to: Medication Discontinued and  Unable to refill due to: Please see reason under each med    Gout Agents Refill  Protocol - 12 Month Protocol Failed 02/23/2023 02:06 PM   Protocol Details  AST resulted within last 12 months looking at last value    ALT resulted within last 12 months looking at last value         Orders pended, and routed to provider for approval.   Please route any notes back to your nursing pool via patient call NOT Rx Auth.  Thank you, Refill Center Staff     You may receive a survey about your visit with us today. The feedback from our patients helps us identify what is working well and where the service to all patients can be enhanced. Thank you!

## 2023-06-21 NOTE — PROGRESS NOTES
Attention and Perception: Attention normal.         Mood and Affect: Mood normal.         Speech: Speech normal.         Behavior: Behavior normal. Behavior is cooperative. Thought Content: Thought content normal.         Cognition and Memory: He exhibits impaired recent memory. Judgment: Judgment normal.       No flowsheet data found. Lab Results   Component Value Date/Time    CHOL 183 06/13/2018 06:15 AM    CHOL 185 06/06/2017 07:30 AM    CHOL 165 06/16/2016 06:16 AM    TRIG 355 06/13/2018 06:15 AM    TRIG 321 06/06/2017 07:30 AM    TRIG 255 06/16/2016 06:16 AM    HDL 26 06/13/2018 06:15 AM    HDL 32 06/06/2017 07:30 AM    HDL 32 06/16/2016 06:16 AM    LDLCALC 86 06/13/2018 06:15 AM    LDLCALC 89 06/06/2017 07:30 AM    LDLCALC 82 06/16/2016 06:16 AM    GLUCOSE 114 06/13/2019 06:42 AM       The ASCVD Risk score (Jacklyn CLEMENTS, et al., 2019) failed to calculate for the following reasons: The 2019 ASCVD risk score is only valid for ages 36 to 78    Immunization History   Administered Date(s) Administered    COVID-19, MODERNA BLUE border, Primary or Immunocompromised, (age 12y+), IM, 100 mcg/0.5mL 03/02/2021, 03/30/2021       Health Maintenance   Topic Date Due    DTaP/Tdap/Td vaccine (1 - Tdap) Never done    Shingles vaccine (1 of 2) Never done    Pneumococcal 65+ years Vaccine (1 - PCV) Never done    COVID-19 Vaccine (3 - Booster for Moderna series) 05/25/2021    Annual Wellness Visit (AWV)  06/21/2023    Flu vaccine (Season Ended) 08/01/2023    Depression Screen  06/21/2024    Hepatitis A vaccine  Aged Out    Hib vaccine  Aged Out    Meningococcal (ACWY) vaccine  Aged Out       Assessment & Plan   Memory difficulties  -     TSH with Reflex; Future  -     Vitamin B12 & Folate; Future  -     Vitamin B6; Future  -     Vitamin B1; Future  Hypertension, unspecified type  -     CBC with Auto Differential; Future  IFG (impaired fasting glucose)  -     Comprehensive Metabolic Panel;  Future  -

## 2023-06-22 PROBLEM — I51.89 DIASTOLIC DYSFUNCTION: Status: ACTIVE | Noted: 2023-06-22

## 2023-06-22 PROBLEM — R73.01 IFG (IMPAIRED FASTING GLUCOSE): Status: ACTIVE | Noted: 2023-06-22

## 2023-06-22 PROBLEM — E78.5 HYPERLIPIDEMIA: Status: ACTIVE | Noted: 2023-06-22

## 2023-06-22 PROBLEM — R01.1 HEART MURMUR: Status: ACTIVE | Noted: 2023-06-22

## 2023-06-22 PROBLEM — R41.3 MEMORY DIFFICULTIES: Status: ACTIVE | Noted: 2023-06-22

## 2023-06-22 ASSESSMENT — ENCOUNTER SYMPTOMS
RESPIRATORY NEGATIVE: 1
GASTROINTESTINAL NEGATIVE: 1

## 2023-06-28 ENCOUNTER — NURSE ONLY (OUTPATIENT)
Dept: LAB | Age: 83
End: 2023-06-28

## 2023-06-28 DIAGNOSIS — E78.5 HYPERLIPIDEMIA, UNSPECIFIED HYPERLIPIDEMIA TYPE: ICD-10-CM

## 2023-06-28 DIAGNOSIS — R41.3 MEMORY DIFFICULTIES: ICD-10-CM

## 2023-06-28 DIAGNOSIS — R73.01 IFG (IMPAIRED FASTING GLUCOSE): ICD-10-CM

## 2023-06-28 DIAGNOSIS — I10 HYPERTENSION, UNSPECIFIED TYPE: ICD-10-CM

## 2023-06-28 LAB
ALBUMIN SERPL BCG-MCNC: 4.6 G/DL (ref 3.5–5.1)
ALP SERPL-CCNC: 49 U/L (ref 38–126)
ALT SERPL W/O P-5'-P-CCNC: 18 U/L (ref 11–66)
ANION GAP SERPL CALC-SCNC: 13 MEQ/L (ref 8–16)
AST SERPL-CCNC: 22 U/L (ref 5–40)
BASOPHILS ABSOLUTE: 0.1 THOU/MM3 (ref 0–0.1)
BASOPHILS NFR BLD AUTO: 0.8 %
BILIRUB SERPL-MCNC: 1.2 MG/DL (ref 0.3–1.2)
BUN SERPL-MCNC: 20 MG/DL (ref 7–22)
CALCIUM SERPL-MCNC: 9.9 MG/DL (ref 8.5–10.5)
CHLORIDE SERPL-SCNC: 103 MEQ/L (ref 98–111)
CHOLEST SERPL-MCNC: 169 MG/DL (ref 100–199)
CO2 SERPL-SCNC: 26 MEQ/L (ref 23–33)
CREAT SERPL-MCNC: 0.7 MG/DL (ref 0.4–1.2)
DEPRECATED MEAN GLUCOSE BLD GHB EST-ACNC: 126 MG/DL (ref 70–126)
DEPRECATED RDW RBC AUTO: 44.1 FL (ref 35–45)
EOSINOPHIL NFR BLD AUTO: 2.9 %
EOSINOPHILS ABSOLUTE: 0.2 THOU/MM3 (ref 0–0.4)
ERYTHROCYTE [DISTWIDTH] IN BLOOD BY AUTOMATED COUNT: 13.2 % (ref 11.5–14.5)
FOLATE SERPL-MCNC: > 20 NG/ML (ref 4.8–24.2)
GFR SERPL CREATININE-BSD FRML MDRD: > 60 ML/MIN/1.73M2
GLUCOSE SERPL-MCNC: 109 MG/DL (ref 70–108)
HBA1C MFR BLD HPLC: 6.2 % (ref 4.4–6.4)
HCT VFR BLD AUTO: 52.2 % (ref 42–52)
HDLC SERPL-MCNC: 31 MG/DL
HGB BLD-MCNC: 16.9 GM/DL (ref 14–18)
IMM GRANULOCYTES # BLD AUTO: 0.03 THOU/MM3 (ref 0–0.07)
IMM GRANULOCYTES NFR BLD AUTO: 0.4 %
LDLC SERPL CALC-MCNC: 78 MG/DL
LYMPHOCYTES ABSOLUTE: 3.2 THOU/MM3 (ref 1–4.8)
LYMPHOCYTES NFR BLD AUTO: 41.7 %
MCH RBC QN AUTO: 29.8 PG (ref 26–33)
MCHC RBC AUTO-ENTMCNC: 32.4 GM/DL (ref 32.2–35.5)
MCV RBC AUTO: 91.9 FL (ref 80–94)
MONOCYTES ABSOLUTE: 0.7 THOU/MM3 (ref 0.4–1.3)
MONOCYTES NFR BLD AUTO: 9.4 %
NEUTROPHILS NFR BLD AUTO: 44.8 %
NRBC BLD AUTO-RTO: 0 /100 WBC
PLATELET # BLD AUTO: 181 THOU/MM3 (ref 130–400)
PMV BLD AUTO: 11 FL (ref 9.4–12.4)
POTASSIUM SERPL-SCNC: 4 MEQ/L (ref 3.5–5.2)
PROT SERPL-MCNC: 6.9 G/DL (ref 6.1–8)
RBC # BLD AUTO: 5.68 MILL/MM3 (ref 4.7–6.1)
SEGMENTED NEUTROPHILS ABSOLUTE COUNT: 3.4 THOU/MM3 (ref 1.8–7.7)
SODIUM SERPL-SCNC: 142 MEQ/L (ref 135–145)
TRIGL SERPL-MCNC: 300 MG/DL (ref 0–199)
TSH SERPL DL<=0.005 MIU/L-ACNC: 2.57 UIU/ML (ref 0.4–4.2)
VIT B12 SERPL-MCNC: 462 PG/ML (ref 211–911)
WBC # BLD AUTO: 7.6 THOU/MM3 (ref 4.8–10.8)

## 2023-07-01 LAB
PYRIDOXAL PHOS SERPL-SCNC: 100 NMOL/L (ref 20–125)
VIT B1 PYROPHOSHATE BLD-SCNC: 136 NMOL/L (ref 70–180)

## 2023-07-05 ENCOUNTER — OFFICE VISIT (OUTPATIENT)
Dept: FAMILY MEDICINE CLINIC | Age: 83
End: 2023-07-05
Payer: MEDICARE

## 2023-07-05 VITALS
BODY MASS INDEX: 31.76 KG/M2 | HEART RATE: 84 BPM | RESPIRATION RATE: 16 BRPM | DIASTOLIC BLOOD PRESSURE: 78 MMHG | SYSTOLIC BLOOD PRESSURE: 138 MMHG | WEIGHT: 202.8 LBS

## 2023-07-05 DIAGNOSIS — I51.89 DIASTOLIC DYSFUNCTION: ICD-10-CM

## 2023-07-05 DIAGNOSIS — R01.1 HEART MURMUR: ICD-10-CM

## 2023-07-05 DIAGNOSIS — I10 HYPERTENSION, UNSPECIFIED TYPE: ICD-10-CM

## 2023-07-05 DIAGNOSIS — R73.01 IFG (IMPAIRED FASTING GLUCOSE): ICD-10-CM

## 2023-07-05 DIAGNOSIS — R41.3 MEMORY DIFFICULTIES: Primary | ICD-10-CM

## 2023-07-05 DIAGNOSIS — E78.5 HYPERLIPIDEMIA, UNSPECIFIED HYPERLIPIDEMIA TYPE: ICD-10-CM

## 2023-07-05 PROCEDURE — 1123F ACP DISCUSS/DSCN MKR DOCD: CPT | Performed by: FAMILY MEDICINE

## 2023-07-05 PROCEDURE — 3078F DIAST BP <80 MM HG: CPT | Performed by: FAMILY MEDICINE

## 2023-07-05 PROCEDURE — 3075F SYST BP GE 130 - 139MM HG: CPT | Performed by: FAMILY MEDICINE

## 2023-07-05 PROCEDURE — 99214 OFFICE O/P EST MOD 30 MIN: CPT | Performed by: FAMILY MEDICINE

## 2023-07-05 ASSESSMENT — ENCOUNTER SYMPTOMS
GASTROINTESTINAL NEGATIVE: 1
RESPIRATORY NEGATIVE: 1

## 2023-07-05 NOTE — PROGRESS NOTES
Carmen Castellanos (:  1940) is a 80 y.o. male,Established patient, here for evaluation of the following chief complaint(s):  2 Week Follow-Up          Subjective   SUBJECTIVE/OBJECTIVE:  HPI  Chief Complaint   Patient presents with    2 Week Follow-Up     MMSE 27/30. Patient Active Problem List   Diagnosis    Myalgia    Bursitis of ankle    Diverticulitis large intestine    Hypertension    Memory difficulties    IFG (impaired fasting glucose)    Heart murmur    Diastolic dysfunction    Hyperlipidemia       Current Outpatient Medications   Medication Sig Dispense Refill    Cholecalciferol (VITAMIN D3) 50 MCG ( UT) TABS Take 1 tablet by mouth in the morning and at bedtime      Probiotic Product (PROBIOTIC DAILY PO) Take 1 tablet by mouth daily      Omega-3 Fatty Acids (FISH OIL) 1200 MG CAPS Take 1,200 mg by mouth in the morning and at bedtime      metFORMIN (GLUCOPHAGE-XR) 500 MG extended release tablet Take 1 tablet by mouth in the morning and at bedtime      NONFORMULARY Balance of nature take 3 pills BID      spironolactone (ALDACTONE) 25 MG tablet Take 1 tablet by mouth daily 30 tablet 0    Coenzyme Q10 (CO Q 10 PO) Take 200 mg by mouth daily      amLODIPine (NORVASC) 10 MG tablet Take 1 tablet by mouth daily (Patient taking differently: Take 0.5 tablets by mouth in the morning and at bedtime) 90 tablet 1    aspirin 81 MG EC tablet Take 1 tablet by mouth daily       No current facility-administered medications for this visit. Past Surgical History:   Procedure Laterality Date    CHOLECYSTECTOMY      COLONOSCOPY  , ,     [facility]    x 2    SIGMOIDOSCOPY      TONSILLECTOMY         Review of Systems   Constitutional: Negative. HENT: Negative. Respiratory: Negative. Cardiovascular: Negative. Gastrointestinal: Negative. Musculoskeletal: Negative. All other systems reviewed and are negative.        Objective   Physical Exam  Vitals and nursing note

## 2024-01-04 NOTE — TELEPHONE ENCOUNTER
----- Message from Kenneth Amezquita MD sent at 5/29/2019  9:50 PM EDT -----  When is he following up? Does not need to be immediate, but ensure 4-6 weeks.   Thanks.  ----- Message -----  From: Krzysztof Frazier Incoming Cardiovascular Orders To [facility]  Sent: 5/23/2019   3:51 PM  To: Kenneth Amezquita MD See Lactation Notes, LC to follow up with mother

## 2024-05-14 ENCOUNTER — TELEPHONE (OUTPATIENT)
Dept: CARDIOLOGY CLINIC | Age: 84
End: 2024-05-14

## 2024-05-14 NOTE — TELEPHONE ENCOUNTER
Juan Carlos-son, on HIPAA called for appt with Dr. Arguello.   Patient will be home from [place] on 5/22/2024.  I called and spoke to son and appt scheduled.   I called Dr. Qiu's office and spoke to Elizabeth to request records, images of heart cath, echo and stress test on disc.  She tells me there is a minimum 30 day turn around.   I called son back and he will find out what hospital patient was at so we can try to put a rush on this.   Pt appt with Dr. Arguello is 5/28/2024.

## 2024-05-14 NOTE — TELEPHONE ENCOUNTER
Spoke to patient's son.  Patient was at [facility].   Request faxed and they are aware to prepare CD asap and call son when completed to  and to also mail copy to our office.

## 2024-05-16 NOTE — TELEPHONE ENCOUNTER
Son called office today stating that he hasn't heard anything from [facility] as far as picking up CD.   [facility] and spoke to Katerine.  She states CD not completed.  She called [facility] and they are going to work on this CD.  I gave them the son's cell number to call when completed but this needs to be done by tomorrow as they fly home on Sunday.   I called son back and he is aware.  I advised him to call them tomorrow if they don't hear anything at [phone].  He is aware they are only open until 4pm.  Son verbalized understanding.

## 2024-05-28 ENCOUNTER — OFFICE VISIT (OUTPATIENT)
Dept: CARDIOLOGY CLINIC | Age: 84
End: 2024-05-28
Payer: MEDICARE

## 2024-05-28 ENCOUNTER — TELEPHONE (OUTPATIENT)
Dept: CARDIOLOGY CLINIC | Age: 84
End: 2024-05-28

## 2024-05-28 VITALS
BODY MASS INDEX: 29.83 KG/M2 | HEART RATE: 72 BPM | WEIGHT: 201.4 LBS | DIASTOLIC BLOOD PRESSURE: 75 MMHG | HEIGHT: 69 IN | SYSTOLIC BLOOD PRESSURE: 136 MMHG

## 2024-05-28 DIAGNOSIS — I35.0 NONRHEUMATIC AORTIC VALVE STENOSIS: ICD-10-CM

## 2024-05-28 DIAGNOSIS — I25.118 CORONARY ARTERY DISEASE INVOLVING NATIVE CORONARY ARTERY OF NATIVE HEART WITH OTHER FORM OF ANGINA PECTORIS (HCC): Primary | ICD-10-CM

## 2024-05-28 DIAGNOSIS — R01.1 SYSTOLIC MURMUR: ICD-10-CM

## 2024-05-28 DIAGNOSIS — R93.1 ABNORMAL FINDINGS ON CARDIAC CATHETERIZATION: ICD-10-CM

## 2024-05-28 PROCEDURE — 3075F SYST BP GE 130 - 139MM HG: CPT | Performed by: INTERNAL MEDICINE

## 2024-05-28 PROCEDURE — 3078F DIAST BP <80 MM HG: CPT | Performed by: INTERNAL MEDICINE

## 2024-05-28 PROCEDURE — 99214 OFFICE O/P EST MOD 30 MIN: CPT | Performed by: INTERNAL MEDICINE

## 2024-05-28 PROCEDURE — 1123F ACP DISCUSS/DSCN MKR DOCD: CPT | Performed by: INTERNAL MEDICINE

## 2024-05-28 RX ORDER — DICYCLOMINE HCL 20 MG
20 TABLET ORAL DAILY
COMMUNITY

## 2024-05-28 RX ORDER — ATORVASTATIN CALCIUM 10 MG/1
10 TABLET, FILM COATED ORAL DAILY
COMMUNITY

## 2024-05-28 RX ORDER — METOPROLOL SUCCINATE 25 MG/1
25 TABLET, EXTENDED RELEASE ORAL DAILY
COMMUNITY

## 2024-05-28 RX ORDER — VALSARTAN AND HYDROCHLOROTHIAZIDE 160; 12.5 MG/1; MG/1
1 TABLET, FILM COATED ORAL DAILY
COMMUNITY

## 2024-05-28 NOTE — TELEPHONE ENCOUNTER
PROCEDURE: cardiac cath     DATE OF SERVICE: 06/12/2024    SERVICE LOCATION: [facility]    CPT CODE: 84567    PHYSICIAN: DR WEAVER    DATE PRIOR AUTH SUBMITTED: 05/28/2024    STATUS: APPROVED.     AUTH NUMBER: [id number]     VALID:  05/28/2024-08/25/2024

## 2024-05-28 NOTE — PATIENT INSTRUCTIONS
Rooming documentation of social history includes tobacco screening only.       Your nurses today were JERAMY Turcios and JAVIER Williamson  Your provider today was Dr. Arguello  Phone number: [phone]     You may receive a survey regarding the care you received during your visit.  Your input is valuable to us.  We encourage you to complete and return your survey.  We hope you will choose us in the future for your healthcare needs.

## 2024-05-28 NOTE — PROGRESS NOTES
[facility] PHYSICIANS [place] SPECIALTY  [facility] CARDIOLOGY  [address]  [address]  [facility] [address]  Dept: [phone]  Dept Fax: [phone]  Loc: [phone]    Visit Date: 5/28/2024    Mr. Hummel is a 83 y.o. male  who presented for:  Chief Complaint   Patient presents with    Follow-up    Coronary Artery Disease    Valvular Heart Disease       HPI:   HPI   84 yo M hx of CKD, HTN, PVCS, LVOT obstruction who presents for follow-up.  He had a cath in [place] showing MV CAD.  He was recommended for bypass.  Patient apparently had a transaortic gradient of 100 mmHg with an GREG 0.4 cm2.  He was last seen 2 years ago.  He thinks he had an MI.  Apparently, he had a chest pain while he was walking and an ECG was done and that resulted in a heart cath.  His son tells us that he did not have a heart attack.  His cath was reviewed by myself shows LAD disease, distal RCA disease, and small OM disease.  , P 72.  He at times gets lightheaded at times.  There was history of LVOT obstruction 2 m/s previously.       Current Outpatient Medications:     dicyclomine (BENTYL) 20 MG tablet, Take 1 tablet by mouth daily, Disp: , Rfl:     metoprolol succinate (TOPROL XL) 25 MG extended release tablet, Take 1 tablet by mouth daily, Disp: , Rfl:     valsartan-hydroCHLOROthiazide (DIOVAN-HCT) 160-12.5 MG per tablet, Take 1 tablet by mouth daily, Disp: , Rfl:     atorvastatin (LIPITOR) 10 MG tablet, Take 1 tablet by mouth daily, Disp: , Rfl:     Misc Natural Products (PROSTATE HEALTH PO), Take by mouth, Disp: , Rfl:     Cholecalciferol (VITAMIN D3) 50 MCG (2000 UT) TABS, Take 1 tablet by mouth in the morning and at bedtime, Disp: , Rfl:     Probiotic Product (PROBIOTIC DAILY PO), Take 1 tablet by mouth daily, Disp: , Rfl:     Omega-3 Fatty Acids (FISH OIL) 1200 MG CAPS, Take 1,200 mg by mouth in the morning and at bedtime, Disp: , Rfl:     metFORMIN (GLUCOPHAGE-XR) 500 MG extended release tablet, Take 1 tablet

## 2024-05-28 NOTE — PROGRESS NOTES
Follow-up.   He denies having any chest pain, pressure, shortness of breath, dizziness or palpitations.   Family states his voice sounds weaker.   Cath CD from [place] reviewed.

## 2024-05-28 NOTE — TELEPHONE ENCOUNTER
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    Labs: needs blood work

## 2024-05-28 NOTE — TELEPHONE ENCOUNTER
Pt brought to scheduling to schedule right and left heart cath and JARAD.   ?Severe AS.  Please follow.

## 2024-05-29 PROBLEM — R93.1 ABNORMAL FINDINGS ON CARDIAC CATHETERIZATION: Status: ACTIVE | Noted: 2024-05-28

## 2024-06-11 ENCOUNTER — PREP FOR PROCEDURE (OUTPATIENT)
Dept: CARDIOLOGY | Age: 84
End: 2024-06-11

## 2024-06-11 RX ORDER — SODIUM CHLORIDE 9 MG/ML
INJECTION, SOLUTION INTRAVENOUS CONTINUOUS
Status: CANCELLED | OUTPATIENT
Start: 2024-06-11

## 2024-06-11 RX ORDER — NITROGLYCERIN 0.4 MG/1
0.4 TABLET SUBLINGUAL EVERY 5 MIN PRN
Status: CANCELLED | OUTPATIENT
Start: 2024-06-11

## 2024-06-11 RX ORDER — ASPIRIN 325 MG
325 TABLET ORAL ONCE
Status: CANCELLED | OUTPATIENT
Start: 2024-06-11 | End: 2024-06-11

## 2024-06-11 RX ORDER — DIPHENHYDRAMINE HYDROCHLORIDE 50 MG/ML
50 INJECTION INTRAMUSCULAR; INTRAVENOUS ONCE
Status: CANCELLED | OUTPATIENT
Start: 2024-06-11 | End: 2024-06-11

## 2024-06-11 RX ORDER — SODIUM CHLORIDE 0.9 % (FLUSH) 0.9 %
5-40 SYRINGE (ML) INJECTION EVERY 12 HOURS SCHEDULED
Status: CANCELLED | OUTPATIENT
Start: 2024-06-11

## 2024-06-11 RX ORDER — SODIUM CHLORIDE 0.9 % (FLUSH) 0.9 %
5-40 SYRINGE (ML) INJECTION PRN
Status: CANCELLED | OUTPATIENT
Start: 2024-06-11

## 2024-06-11 NOTE — PROGRESS NOTES
NPO after midnight  Bring drivers license and insurance information  Wear comfortable clean clothes  Shower morning of and night before with liquid antibacterial soap  Remove jewelry   May have to stay overnight if have PTCA/stent  Bring medications in original bottles  Made aware of visitors limit to 2 at a time  Follow all instructions given by your physician  Please notify doctor office if you need to cancel or reschedule your procedure   needed at discharge

## 2024-06-12 ENCOUNTER — HOSPITAL ENCOUNTER (OUTPATIENT)
Age: 84
Discharge: HOME | End: 2024-06-13
Attending: INTERNAL MEDICINE | Admitting: INTERNAL MEDICINE
Payer: MEDICARE

## 2024-06-12 ENCOUNTER — APPOINTMENT (OUTPATIENT)
Age: 84
End: 2024-06-12
Attending: INTERNAL MEDICINE
Payer: MEDICARE

## 2024-06-12 DIAGNOSIS — I35.0 NONRHEUMATIC AORTIC VALVE STENOSIS: ICD-10-CM

## 2024-06-12 DIAGNOSIS — R93.1 ABNORMAL FINDINGS ON CARDIAC CATHETERIZATION: ICD-10-CM

## 2024-06-12 PROBLEM — Z95.820 STATUS POST ANGIOPLASTY WITH STENT: Status: ACTIVE | Noted: 2024-06-12

## 2024-06-12 LAB
ABO: NORMAL
ACTIVATED CLOTTING TIME: 244 SECONDS (ref 1–150)
ANION GAP SERPL CALC-SCNC: 14 MEQ/L (ref 8–16)
ANTIBODY SCREEN: NORMAL
APTT PPP: 34 SECONDS (ref 22–38)
BDY SITE: ABNORMAL
BDY SITE: NORMAL
BUN SERPL-MCNC: 22 MG/DL (ref 7–22)
CALCIUM SERPL-MCNC: 9.8 MG/DL (ref 8.5–10.5)
CHLORIDE SERPL-SCNC: 103 MEQ/L (ref 98–111)
CO2 SERPL-SCNC: 25 MEQ/L (ref 23–33)
COLLECTED BY:: ABNORMAL
COLLECTED BY:: NORMAL
CREAT SERPL-MCNC: 0.7 MG/DL (ref 0.4–1.2)
DEPRECATED RDW RBC AUTO: 41.8 FL (ref 35–45)
EKG ATRIAL RATE: 64 BPM
EKG ATRIAL RATE: 74 BPM
EKG P AXIS: 51 DEGREES
EKG P AXIS: 62 DEGREES
EKG P-R INTERVAL: 208 MS
EKG P-R INTERVAL: 252 MS
EKG Q-T INTERVAL: 462 MS
EKG Q-T INTERVAL: 496 MS
EKG QRS DURATION: 174 MS
EKG QRS DURATION: 182 MS
EKG QTC CALCULATION (BAZETT): 511 MS
EKG QTC CALCULATION (BAZETT): 512 MS
EKG R AXIS: -80 DEGREES
EKG R AXIS: -93 DEGREES
EKG T AXIS: 58 DEGREES
EKG T AXIS: 61 DEGREES
EKG VENTRICULAR RATE: 64 BPM
EKG VENTRICULAR RATE: 74 BPM
ERYTHROCYTE [DISTWIDTH] IN BLOOD BY AUTOMATED COUNT: 12.7 % (ref 11.5–14.5)
GFR SERPL CREATININE-BSD FRML MDRD: > 90 ML/MIN/1.73M2
GLUCOSE SERPL-MCNC: 126 MG/DL (ref 70–108)
HCT VFR BLD AUTO: 49.3 % (ref 42–52)
HGB BLD-MCNC: 16.7 GM/DL (ref 14–18)
INR PPP: 1.03 (ref 0.85–1.13)
MCH RBC QN AUTO: 30.4 PG (ref 26–33)
MCHC RBC AUTO-ENTMCNC: 33.9 GM/DL (ref 32.2–35.5)
MCV RBC AUTO: 89.8 FL (ref 80–94)
PLATELET # BLD AUTO: 176 THOU/MM3 (ref 130–400)
PMV BLD AUTO: 11.1 FL (ref 9.4–12.4)
POTASSIUM SERPL-SCNC: 3.8 MEQ/L (ref 3.5–5.2)
RBC # BLD AUTO: 5.49 MILL/MM3 (ref 4.7–6.1)
RH FACTOR: NORMAL
SAO2 % BLD: 67 % (ref 94–97)
SAO2 % BLD: 94 % (ref 94–97)
SODIUM SERPL-SCNC: 142 MEQ/L (ref 135–145)
WBC # BLD AUTO: 8.4 THOU/MM3 (ref 4.8–10.8)

## 2024-06-12 PROCEDURE — 2580000003 HC RX 258: Performed by: STUDENT IN AN ORGANIZED HEALTH CARE EDUCATION/TRAINING PROGRAM

## 2024-06-12 PROCEDURE — C9600 PERC DRUG-EL COR STENT SING: HCPCS | Performed by: INTERNAL MEDICINE

## 2024-06-12 PROCEDURE — 36415 COLL VENOUS BLD VENIPUNCTURE: CPT

## 2024-06-12 PROCEDURE — 6370000000 HC RX 637 (ALT 250 FOR IP): Performed by: INTERNAL MEDICINE

## 2024-06-12 PROCEDURE — 93005 ELECTROCARDIOGRAM TRACING: CPT | Performed by: STUDENT IN AN ORGANIZED HEALTH CARE EDUCATION/TRAINING PROGRAM

## 2024-06-12 PROCEDURE — 93320 DOPPLER ECHO COMPLETE: CPT | Performed by: INTERNAL MEDICINE

## 2024-06-12 PROCEDURE — C1887 CATHETER, GUIDING: HCPCS | Performed by: INTERNAL MEDICINE

## 2024-06-12 PROCEDURE — 99152 MOD SED SAME PHYS/QHP 5/>YRS: CPT | Performed by: INTERNAL MEDICINE

## 2024-06-12 PROCEDURE — 85347 COAGULATION TIME ACTIVATED: CPT

## 2024-06-12 PROCEDURE — 86900 BLOOD TYPING SEROLOGIC ABO: CPT

## 2024-06-12 PROCEDURE — 2500000003 HC RX 250 WO HCPCS: Performed by: INTERNAL MEDICINE

## 2024-06-12 PROCEDURE — 2580000003 HC RX 258: Performed by: INTERNAL MEDICINE

## 2024-06-12 PROCEDURE — 92928 PRQ TCAT PLMT NTRAC ST 1 LES: CPT | Performed by: INTERNAL MEDICINE

## 2024-06-12 PROCEDURE — C1760 CLOSURE DEV, VASC: HCPCS | Performed by: INTERNAL MEDICINE

## 2024-06-12 PROCEDURE — 7100000010 HC PHASE II RECOVERY - FIRST 15 MIN: Performed by: INTERNAL MEDICINE

## 2024-06-12 PROCEDURE — 2709999900 HC NON-CHARGEABLE SUPPLY: Performed by: INTERNAL MEDICINE

## 2024-06-12 PROCEDURE — C1874 STENT, COATED/COV W/DEL SYS: HCPCS | Performed by: INTERNAL MEDICINE

## 2024-06-12 PROCEDURE — 93460 R&L HRT ART/VENTRICLE ANGIO: CPT | Performed by: INTERNAL MEDICINE

## 2024-06-12 PROCEDURE — 99153 MOD SED SAME PHYS/QHP EA: CPT | Performed by: INTERNAL MEDICINE

## 2024-06-12 PROCEDURE — C1725 CATH, TRANSLUMIN NON-LASER: HCPCS | Performed by: INTERNAL MEDICINE

## 2024-06-12 PROCEDURE — 93325 DOPPLER ECHO COLOR FLOW MAPG: CPT | Performed by: INTERNAL MEDICINE

## 2024-06-12 PROCEDURE — 93312 ECHO TRANSESOPHAGEAL: CPT

## 2024-06-12 PROCEDURE — 6370000000 HC RX 637 (ALT 250 FOR IP)

## 2024-06-12 PROCEDURE — 85027 COMPLETE CBC AUTOMATED: CPT

## 2024-06-12 PROCEDURE — 93312 ECHO TRANSESOPHAGEAL: CPT | Performed by: INTERNAL MEDICINE

## 2024-06-12 PROCEDURE — 82810 BLOOD GASES O2 SAT ONLY: CPT

## 2024-06-12 PROCEDURE — 80048 BASIC METABOLIC PNL TOTAL CA: CPT

## 2024-06-12 PROCEDURE — C1769 GUIDE WIRE: HCPCS | Performed by: INTERNAL MEDICINE

## 2024-06-12 PROCEDURE — 6360000004 HC RX CONTRAST MEDICATION: Performed by: INTERNAL MEDICINE

## 2024-06-12 PROCEDURE — 86901 BLOOD TYPING SEROLOGIC RH(D): CPT

## 2024-06-12 PROCEDURE — 7100000011 HC PHASE II RECOVERY - ADDTL 15 MIN: Performed by: INTERNAL MEDICINE

## 2024-06-12 PROCEDURE — 85730 THROMBOPLASTIN TIME PARTIAL: CPT

## 2024-06-12 PROCEDURE — 93010 ELECTROCARDIOGRAM REPORT: CPT | Performed by: INTERNAL MEDICINE

## 2024-06-12 PROCEDURE — 86850 RBC ANTIBODY SCREEN: CPT

## 2024-06-12 PROCEDURE — C1894 INTRO/SHEATH, NON-LASER: HCPCS | Performed by: INTERNAL MEDICINE

## 2024-06-12 PROCEDURE — 85610 PROTHROMBIN TIME: CPT

## 2024-06-12 PROCEDURE — 6360000002 HC RX W HCPCS: Performed by: INTERNAL MEDICINE

## 2024-06-12 PROCEDURE — 93005 ELECTROCARDIOGRAM TRACING: CPT | Performed by: INTERNAL MEDICINE

## 2024-06-12 DEVICE — STENT ONYXNG25030UX ONYX 2.50X30RX
Type: IMPLANTABLE DEVICE | Status: FUNCTIONAL
Brand: ONYX FRONTIER™

## 2024-06-12 RX ORDER — ACETAMINOPHEN 325 MG/1
650 TABLET ORAL EVERY 4 HOURS PRN
Status: DISCONTINUED | OUTPATIENT
Start: 2024-06-12 | End: 2024-06-13 | Stop reason: HOSPADM

## 2024-06-12 RX ORDER — SODIUM CHLORIDE 0.9 % (FLUSH) 0.9 %
5-40 SYRINGE (ML) INJECTION PRN
Status: DISCONTINUED | OUTPATIENT
Start: 2024-06-12 | End: 2024-06-13 | Stop reason: HOSPADM

## 2024-06-12 RX ORDER — SODIUM CHLORIDE 0.9 % (FLUSH) 0.9 %
5-40 SYRINGE (ML) INJECTION EVERY 12 HOURS SCHEDULED
Status: DISCONTINUED | OUTPATIENT
Start: 2024-06-12 | End: 2024-06-13 | Stop reason: HOSPADM

## 2024-06-12 RX ORDER — NITROGLYCERIN 0.4 MG/1
0.4 TABLET SUBLINGUAL EVERY 5 MIN PRN
Status: ON HOLD | COMMUNITY
End: 2024-06-13 | Stop reason: HOSPADM

## 2024-06-12 RX ORDER — SODIUM CHLORIDE 9 MG/ML
INJECTION, SOLUTION INTRAVENOUS PRN
Status: DISCONTINUED | OUTPATIENT
Start: 2024-06-12 | End: 2024-06-13 | Stop reason: HOSPADM

## 2024-06-12 RX ORDER — SODIUM CHLORIDE 0.9 % (FLUSH) 0.9 %
5-40 SYRINGE (ML) INJECTION PRN
Status: DISCONTINUED | OUTPATIENT
Start: 2024-06-12 | End: 2024-06-12 | Stop reason: HOSPADM

## 2024-06-12 RX ORDER — SODIUM CHLORIDE 0.9 % (FLUSH) 0.9 %
5-40 SYRINGE (ML) INJECTION EVERY 12 HOURS SCHEDULED
Status: DISCONTINUED | OUTPATIENT
Start: 2024-06-12 | End: 2024-06-12 | Stop reason: HOSPADM

## 2024-06-12 RX ORDER — ASPIRIN 325 MG
325 TABLET ORAL ONCE
Status: DISCONTINUED | OUTPATIENT
Start: 2024-06-12 | End: 2024-06-12 | Stop reason: HOSPADM

## 2024-06-12 RX ORDER — DIPHENHYDRAMINE HYDROCHLORIDE 50 MG/ML
50 INJECTION INTRAMUSCULAR; INTRAVENOUS ONCE
Status: DISCONTINUED | OUTPATIENT
Start: 2024-06-12 | End: 2024-06-12 | Stop reason: HOSPADM

## 2024-06-12 RX ORDER — MIDAZOLAM HYDROCHLORIDE 1 MG/ML
INJECTION INTRAMUSCULAR; INTRAVENOUS PRN
Status: DISCONTINUED | OUTPATIENT
Start: 2024-06-12 | End: 2024-06-12 | Stop reason: HOSPADM

## 2024-06-12 RX ORDER — SODIUM CHLORIDE 9 MG/ML
INJECTION, SOLUTION INTRAVENOUS CONTINUOUS
Status: DISCONTINUED | OUTPATIENT
Start: 2024-06-12 | End: 2024-06-12 | Stop reason: HOSPADM

## 2024-06-12 RX ORDER — LIDOCAINE HCL/PF 100 MG/5ML
SYRINGE (ML) INJECTION PRN
Status: DISCONTINUED | OUTPATIENT
Start: 2024-06-12 | End: 2024-06-12 | Stop reason: HOSPADM

## 2024-06-12 RX ORDER — HEPARIN SODIUM 1000 [USP'U]/ML
INJECTION, SOLUTION INTRAVENOUS; SUBCUTANEOUS PRN
Status: DISCONTINUED | OUTPATIENT
Start: 2024-06-12 | End: 2024-06-12 | Stop reason: HOSPADM

## 2024-06-12 RX ORDER — ASPIRIN 81 MG/1
81 TABLET, CHEWABLE ORAL DAILY
Status: DISCONTINUED | OUTPATIENT
Start: 2024-06-13 | End: 2024-06-13 | Stop reason: HOSPADM

## 2024-06-12 RX ORDER — NITROGLYCERIN 0.4 MG/1
0.4 TABLET SUBLINGUAL EVERY 5 MIN PRN
Status: DISCONTINUED | OUTPATIENT
Start: 2024-06-12 | End: 2024-06-12 | Stop reason: HOSPADM

## 2024-06-12 RX ORDER — FENTANYL CITRATE 50 UG/ML
INJECTION, SOLUTION INTRAMUSCULAR; INTRAVENOUS PRN
Status: DISCONTINUED | OUTPATIENT
Start: 2024-06-12 | End: 2024-06-12 | Stop reason: HOSPADM

## 2024-06-12 RX ADMIN — SODIUM CHLORIDE: 9 INJECTION, SOLUTION INTRAVENOUS at 11:26

## 2024-06-12 RX ADMIN — TICAGRELOR 90 MG: 90 TABLET ORAL at 20:46

## 2024-06-12 RX ADMIN — SODIUM CHLORIDE, PRESERVATIVE FREE 10 ML: 5 INJECTION INTRAVENOUS at 19:43

## 2024-06-12 NOTE — PROGRESS NOTES
1124 Patient admitted to 2E08   Ambulatory for left and right heart cath , JARAD.  Patient NPO. Patient accompanied by wife and 2 sons.  Vital signs obtained.   Assessment and data collection intiated.   Oriented to room.  Policies and procedures for 2E explained.   All questions answered with no further questions at this time.   Fall prevention and safety precautions discussed with patient.       1125 Spoke with patient regarding holding metformin after procedure due to dye reaction, voices understanding, instructed patient additional instructions on discharge.  1126 Care plan reviewed with patient and family.  Patient and family verbalize understanding of the plan of care and contribute to goal setting.       1252 To cath lab per bed, stable condition.     1528Care taken over from cath lab, right groin stable . Patient reinstructed on bedrest, instructed to keep legs straight, not to cross legs, not to lift head off of pillow, not to laugh hard, if coughs to guard site with hand, voices understanding, taking water without difficulty.Radial site stable, no bleeding seen, site soft.  Armboard continued with vascband. Patient instructed not to bend wrist, not to put pressure on wrist and not to lift  or twist with wrist. Patient voices understanding.   Rt Brachial site dressing dry and intact.     1720 Dr Arguello called, patient unable to urinate. On bedrest.   1725 Number 16 Fr morrissey inserted under sterile technique, 600 ml yellow clear urine obtained.     1730 Janell called report , patient going to 8b23, family wanted patients personal meds used but locked up. Janell will lock meds once patient gets there, aware meds are in suitcase.     1805 Patient received and reviewed booklet \"How to care for your heart after coronary artery disease\". Reviewed how to take care of the incision site, the importance of participating in cardiac rehab and the hours of operations. Reviewed importance of reducing coronary artery disease  risk factors.   Coupon for brilinta given to patient.       Bandaid applied to site at 1845 and vascband removed, armboard continued, site stable.   Patient instructed not to bend, twist, lift, push or pull with right wrist, voices understanding.       1845 Patient transported to [facility] via bed, stable condition.   1900 Son and wife returned from supper, spoke with them regarding How to care for your heart booklet, Brilinta coupon and importance of taking brilinta routinely.

## 2024-06-12 NOTE — H&P
[facility]  Sedation/Analgesia History & Physical    Pt Name: Raymond Hummel  Account number: [id number]  MRN: [id number]  YOB: 1940  Provider Performing Procedure: Erick Arguello MD MD  Referring Provider: Erick Arguello MD   Primary Care Physician: Vincent Sanders DO  Date: 6/12/2024    PRE-PROCEDURE    Code Status: FULL CODE  Brief History/Pre-Procedure Diagnosis:  MEHDI  Severe MV CAD    Consent: : I have discussed with the patient risks, benefits, and alternatives (and relevant risks, benefits, and side effects related to alternatives or not receiving care), and likelihood of the success.   The patient and/or representative appear to understand and agree to proceed.  The discussion encompasses risks, benefits, and side effects related to the alternatives and the risks related to not receiving the proposed care, treatment, and services.     The indication, risks and benefits of the procedure and possible therapeutic consequences and alternatives were discussed with the patient. The patient was given the opportunity to ask questions and to have them answered to his/her satisfaction. Risks of the procedure include but are not limited to the following: Bleeding, hematoma including retroperitoneal hemmorhage, infection, pain and discomfort, injury to the aorta and other blood vessels, rhythm disturbance, low blood pressure, myocardial infarction, stroke, kidney damage/failure, myocardial perforation, allergic reactions to sedatives/contrast material, loss of pulse/vascular compromise requiring surgery, aneurysm/pseudoaneurysm formation, possible loss of a limb/hand/leg, needing blood transfusion, requiring emergent open heart surgery or emergent coronary intervention, the need for intubation/respiratory support, the requirement for defibrillation/cardioversion, and death. Alternatives to and omission of the suggested procedure were discussed. The patient had no further

## 2024-06-12 NOTE — BRIEF OP NOTE
[facility]  Sedation/Analgesia Post Sedation Record    Pt Name: Raymond Hummel  Account number: [id number]  MRN: [id number]  YOB: 1940  Procedure Performed By: Erick Arguello MD MD FACC, [facility], RPVI  Primary Care Physician: Vincent Sanders DO  Date: 6/12/2024    POST-PROCEDURE    Physicians/Assistants: Erick Arguello MD, AVNI, JUAN, RPVI    Procedure Performed: Systemic specific angiogram +/- intervention    Sedation/Anesthesia: Versed/ Fentanyl and 2% xylocaine local anesthesia.      Estimated Blood Loss: < 50 ml.     Specimens Removed: None         Disposition of Specimen: N/A        Complications: No Immediate Complications.       Post-procedure Diagnosis/Findings:       See completed final report           Erick Arguello MD, AVNI, [facility], VI  Interventional Cardiology

## 2024-06-13 ENCOUNTER — APPOINTMENT (OUTPATIENT)
Dept: CT IMAGING | Age: 84
End: 2024-06-13
Attending: INTERNAL MEDICINE
Payer: MEDICARE

## 2024-06-13 VITALS
WEIGHT: 195.55 LBS | HEIGHT: 69 IN | OXYGEN SATURATION: 98 % | RESPIRATION RATE: 18 BRPM | DIASTOLIC BLOOD PRESSURE: 93 MMHG | TEMPERATURE: 97.5 F | HEART RATE: 73 BPM | SYSTOLIC BLOOD PRESSURE: 175 MMHG | BODY MASS INDEX: 28.96 KG/M2

## 2024-06-13 LAB
ANION GAP SERPL CALC-SCNC: 12 MEQ/L (ref 8–16)
BUN SERPL-MCNC: 19 MG/DL (ref 7–22)
CALCIUM SERPL-MCNC: 9.4 MG/DL (ref 8.5–10.5)
CHLORIDE SERPL-SCNC: 105 MEQ/L (ref 98–111)
CHOLEST SERPL-MCNC: 110 MG/DL (ref 100–199)
CO2 SERPL-SCNC: 23 MEQ/L (ref 23–33)
CREAT SERPL-MCNC: 0.7 MG/DL (ref 0.4–1.2)
DEPRECATED RDW RBC AUTO: 42.4 FL (ref 35–45)
ECHO AV MEAN GRADIENT: 27 MMHG
ECHO AV MEAN VELOCITY: 2.4 M/S
ECHO AV PEAK GRADIENT: 51 MMHG
ECHO AV PEAK VELOCITY: 3.6 M/S
ECHO AV VELOCITY RATIO: 0.36
ECHO AV VTI: 70.9 CM
ECHO BSA: 2.08 M2
ECHO BSA: 2.08 M2
ECHO LVOT AV VTI INDEX: 0.37
ECHO LVOT MEAN GRADIENT: 3 MMHG
ECHO LVOT PEAK GRADIENT: 6 MMHG
ECHO LVOT PEAK VELOCITY: 1.3 M/S
ECHO LVOT VTI: 26.2 CM
ERYTHROCYTE [DISTWIDTH] IN BLOOD BY AUTOMATED COUNT: 13 % (ref 11.5–14.5)
GFR SERPL CREATININE-BSD FRML MDRD: > 90 ML/MIN/1.73M2
GLUCOSE SERPL-MCNC: 163 MG/DL (ref 70–108)
HCT VFR BLD AUTO: 47.4 % (ref 42–52)
HDLC SERPL-MCNC: 28 MG/DL
HGB BLD-MCNC: 16.1 GM/DL (ref 14–18)
LDLC SERPL CALC-MCNC: 45 MG/DL
MCH RBC QN AUTO: 30.6 PG (ref 26–33)
MCHC RBC AUTO-ENTMCNC: 34 GM/DL (ref 32.2–35.5)
MCV RBC AUTO: 90.1 FL (ref 80–94)
PLATELET # BLD AUTO: 154 THOU/MM3 (ref 130–400)
PMV BLD AUTO: 11.1 FL (ref 9.4–12.4)
POTASSIUM SERPL-SCNC: 3.9 MEQ/L (ref 3.5–5.2)
RBC # BLD AUTO: 5.26 MILL/MM3 (ref 4.7–6.1)
SODIUM SERPL-SCNC: 140 MEQ/L (ref 135–145)
TRIGL SERPL-MCNC: 187 MG/DL (ref 0–199)
WBC # BLD AUTO: 8.5 THOU/MM3 (ref 4.8–10.8)

## 2024-06-13 PROCEDURE — 6370000000 HC RX 637 (ALT 250 FOR IP): Performed by: INTERNAL MEDICINE

## 2024-06-13 PROCEDURE — 80061 LIPID PANEL: CPT

## 2024-06-13 PROCEDURE — 71275 CT ANGIOGRAPHY CHEST: CPT

## 2024-06-13 PROCEDURE — 74174 CTA ABD&PLVS W/CONTRAST: CPT

## 2024-06-13 PROCEDURE — 36415 COLL VENOUS BLD VENIPUNCTURE: CPT

## 2024-06-13 PROCEDURE — 85027 COMPLETE CBC AUTOMATED: CPT

## 2024-06-13 PROCEDURE — 99232 SBSQ HOSP IP/OBS MODERATE 35: CPT | Performed by: NURSE PRACTITIONER

## 2024-06-13 PROCEDURE — 80048 BASIC METABOLIC PNL TOTAL CA: CPT

## 2024-06-13 PROCEDURE — 6360000004 HC RX CONTRAST MEDICATION: Performed by: INTERNAL MEDICINE

## 2024-06-13 RX ORDER — ATORVASTATIN CALCIUM 40 MG/1
40 TABLET, FILM COATED ORAL DAILY
Qty: 30 TABLET | Refills: 3 | Status: SHIPPED | OUTPATIENT
Start: 2024-06-13

## 2024-06-13 RX ORDER — METFORMIN HYDROCHLORIDE 500 MG/1
500 TABLET, EXTENDED RELEASE ORAL 2 TIMES DAILY
Qty: 30 TABLET | Refills: 3 | Status: SHIPPED
Start: 2024-06-13

## 2024-06-13 RX ADMIN — TICAGRELOR 90 MG: 90 TABLET ORAL at 07:54

## 2024-06-13 RX ADMIN — ASPIRIN 81 MG 81 MG: 81 TABLET ORAL at 07:54

## 2024-06-13 RX ADMIN — IOPAMIDOL 80 ML: 755 INJECTION, SOLUTION INTRAVENOUS at 11:48

## 2024-06-13 NOTE — PLAN OF CARE
Problem: ABCDS Injury Assessment  Goal: Absence of physical injury  Outcome: Progressing  Note: Keep patient safe from injury during stay

## 2024-06-13 NOTE — CONSULTS
CT/CV TAVR Consultation    2024 1:09 PM    Surgeon:  Dr. Giang    Reason for Consult: Severe Aortic Stenosis    HPI:    Mr. Hummel presented for an out patient elective cardiac cath. Successful PCI of the LAD was performed. A JARAD was performed which showed severe aortic stenosis. He currently denies chest pressure, SOB, fever, chills, N/V/D. We were consulted for TAVR vs SAVR evaluation. After detailed discussion with the patient, he wishes to proceed with TAVR.    Vital Signs: BP (!) 175/93   Pulse 73   Temp 97.5 °F (36.4 °C) (Oral)   Resp 18   Ht 1.753 m (5' 9\")   Wt 88.7 kg (195 lb 8.8 oz)   SpO2 98%   BMI 28.88 kg/m²    Temp (24hrs), Av.9 °F (36.6 °C), Min:97.5 °F (36.4 °C), Max:98.3 °F (36.8 °C)    Labs:   CBC:   Recent Labs     24  1031 24  0624   WBC 8.4 8.5   HGB 16.7 16.1   HCT 49.3 47.4   MCV 89.8 90.1    154   APTT 34.0  --    INR 1.03  --      BMP:   Recent Labs     24  1031 24  0624    140   K 3.8 3.9    105   CO2 25 23   BUN 22 19   CREATININE 0.7 0.7     Trop: No results found for: \"TROPONINT\"  Last HgA1C:   Lab Results   Component Value Date    LABA1C 6.2 2023     Imaging:  JARAD: 24  Interpretation Summary         Left Ventricle: Normal left ventricular systolic function with a visually estimated EF of 60 - 65%. Left ventricle size is normal. Moderately increased wall thickness. Severe basal septal thickening.Findings consistent with severe concentric hypertrophy. Normal wall motion. Severe LVOT obstruction at rest.    Aortic Valve: Trileaflet valve. Moderately thickened cusp. Severely calcified cusp. Severe stenosis of the aortic valve. AV mean gradient is 27 mmHg--unable to get parallel to LVOT.  Planimetry demonstratates GREG 0.685 cm2.  Vmax 4.1 m/s, max gradient 66 mmHg, mean gradient 32 mmHg.    Mitral Valve: Moderately thickened leaflet. Moderately calcified leaflet, at the posterior leaflet. Severe systolic anterior motion  in the morning and at bedtime    Niki Lewis MD   NONFORMULARY Balance of nature take 3 pills BID    Niki Lewis MD   spironolactone (ALDACTONE) 25 MG tablet Take 1 tablet by mouth daily 7/1/20   Adam Payton MD   Coenzyme Q10 (CO Q 10 PO) Take 200 mg by mouth daily    Niki Lewis MD   amLODIPine (NORVASC) 10 MG tablet Take 1 tablet by mouth daily  Patient taking differently: Take 0.5 tablets by mouth in the morning and at bedtime 12/26/18   Adam Payton MD   aspirin 81 MG EC tablet Take 1 tablet by mouth daily    Niki Lewis MD     PastMedical History:  Raymond  has a past medical history of Diverticulosis, Heart murmur, High triglycerides, History of colonic polyps, Hypertension, Insulin resistance, and LVH (left ventricular hypertrophy).    Past Surgical History:  The patient  has a past surgical history that includes Tonsillectomy; hernia repair (1980); Cholecystectomy; Colonoscopy (2003, 2006, 2011); and sigmoidoscopy.    Allergies:  The patient is allergic to sulfa antibiotics.    Family History:  This patient's family history includes Alzheimer's Disease in his mother; Parkinsonism in his father.    Social History:  Raymond  reports that he has never smoked. He has been exposed to tobacco smoke. He has never used smokeless tobacco. He reports current alcohol use of about 7.0 standard drinks of alcohol per week. He reports that he does not use drugs.    ROS:  Negative other than described above    Physical Exam:   General appearance:  No apparent distress, appears stated age and cooperative.  HEENT:  Normal cephalic, atraumatic without obvious deformity. Conjunctivae/corneas clear.  Neck: Supple, with full range of motion. No jugular venous distention. Trachea midline.  Respiratory:  Normal respiratory effort. Clear to auscultation, bilaterally without rales/wheezes/rhonchi  Cardiovascular:  Regular rate and rhythm with normal S1/S2 without murmurs, rubs or

## 2024-06-13 NOTE — DISCHARGE INSTRUCTIONS
Discharge Instructions for Radial Heart Catherization    1.  Take it easy for 3-4 days.  2.  No driving for 2 days.  3.  No lifting of 5 lbs or more for 5 days with the affected arm.  4.  Can shower after 24 hours.  5.  Remove arm board after 24 hours.  6.  Apply a band aid to the insertion site daily for 5 days.  Wash site daily with soap and water.  7.  No creams, ointments, or powders near the insertion site.   8.  No tub baths, swimming, hot tubs, or hand washing dishes for 1 week.  9.  Watch for signs of infection (redness, warmth, swelling, or pus drainage) or coolness of extremity and call physician if this occurs  10.  If bleeding occurs from insertion site, apply pressure and call 911.     Watch for numbness/tingling - if this occurs go to ER ASAP  Apply ice 15min with hour break in between and alternate with heat compress for 15 min to reduce swelling for 3-5 days      **No cardiac Rehab until after TAVR per Dr Arguello**  Follow with Structural heart clinic for TAVR   Follow with Dr. Arguello et al.... 2-3 weeks sp PCI

## 2024-06-13 NOTE — PROGRESS NOTES
Called patient to screen for [facility] since patient has no active insurance coverage. Spoke to patient wife Gela, initially. She stated they had no interest in receiving prescriptions at no cost. Informed that the cost of Brilinta would be hundreds of dollars per month if they did not actively seek/accept assistance for it. Gela then put me on the phone with Raymond and I explained that we could dispense all of his prescriptions to him today at no cost if he qualified for [facility] and again reiterated the costliness of Brilinta if they didn't receive assistance for it. Patient again refused, stated that he didn't think they would qualify for [facility] and that he needed to leave because someone was in the room waiting on him. Patient requested scripts be transferred to [facility] in [place]. I called and informed JERAMY Hall that scripts would be transferred.   Jessica Kaur, [facility] - Prescription Assistance ([phone]) 6/13/2024,11:39 AM

## 2024-06-13 NOTE — PROGRESS NOTES
Inpatient Cardiac Rehabilitation Consult    Received consult for Phase II Cardiac Rehabilitation.  Patient needs cardiac rehab due to PCI on 6/12/24.  Importance of Cardiac Rehab discussed with patient.  Reviewed cardiac rehab class times.  Patient questions answered.  We will contact patient at home to schedule evaluation appointment.    Cardiac Rehab brochure given.

## 2024-06-13 NOTE — PROGRESS NOTES
Cardiology Progress Note      Patient:  Raymond Hummel  YOB: 1940  MRN: [id number]   Acct: [id number]  Admit Date:  6/12/2024  Primary Cardiologist: Erick Arguello MD      Chief Complaint: presented for OP elective cardiac cath     Subjective (Events in last 24 hours): pt up in chair   Family of brother and wife at bedside     Pt without chest pain- SOB or dizziness - no palpitations     Tele SR no ectopy  VSS    Rt radial cath site - no ecchymosis or hematoma noted - pulses present - neurovascualr check WNL     DC instructions and cath restrictions given to pt / family     Objective:   BP (!) 144/80   Pulse 91   Temp 97.6 °F (36.4 °C) (Oral)   Resp 18   Ht 1.753 m (5' 9\")   Wt 88.7 kg (195 lb 8.8 oz)   SpO2 97%   BMI 28.88 kg/m²        TELEMETRY: SR no ectopy     Physical Exam:  General Appearance: alert and oriented to person, place and time, in no acute distress  Cardiovascular: normal rate, regular rhythm, normal S1 and S2, no murmurs, rubs, clicks, or gallops, distal pulses intact  Pulmonary/Chest: clear to auscultation bilaterally- no wheezes, rales or rhonchi, normal air movement, no respiratory distress  Abdomen: soft, non-tender, non-distended, normal bowel sounds, no masses Extremities: no cyanosis, clubbing or edema, pulses present    Musculoskeletal: normal range of motion, no joint swelling, deformity or tenderness  Neurological: alert, oriented, normal speech, no focal findings or movement disorder noted    Medications:    sodium chloride flush  5-40 mL IntraVENous 2 times per day    aspirin  81 mg Oral Daily    ticagrelor  90 mg Oral BID      sodium chloride       sodium chloride flush, 5-40 mL, PRN  sodium chloride, , PRN  acetaminophen, 650 mg, Q4H PRN        Diagnostics:    JARAD:  Left Ventricle Normal left ventricular systolic function with a visually estimated EF of 60 - 65%. Left ventricle size is normal. Moderately increased wall thickness. Severe basal septal  successfully placed.   Supplies used: STENT CORONARY EMIR FRONTIER RX 2.5X30 MM ZOTAROLIMUS ELUT   Angioplasty   Angioplasty was performed following stent deployment.   Supplies used: Medtronic NC EUPHORA 2.5X20MM Balloon   Angioplasty   Supplies used: Medtronic NC EUPHORA 2.5X20MM Balloon   Angioplasty   Angioplasty was performed following stent deployment.   Supplies used: Medtronic NC EUPORA 3.0X8mm Balloon   Angioplasty   Supplies used: Medtronic NC EUPORA 3.0X8mm Balloon   Post-Intervention Lesion Assessment   The intervention was successful. The guidewire crossed the lesion. Device was deployed. The pre-interventional distal flow is normal (TED 3). Post-intervention TED flow is 3. There were no complications.   There is a 0% residual stenosis post intervention.        Hemodynamic Interpretation    Provocable LVOT obstruction with PVCs (BBM sign)  Severe systolic gradient across AoV above the LVOT but below the valve     Right Heart    Right Heart Cath Access site for the RHC was obtained via the right brachial vein. Scott-Shahid catheter was used.     Coronary Diagram    Diagnostic  Dominance: Right    Intervention             Lab Data:    Cardiac Enzymes:  No results for input(s): \"CKTOTAL\", \"CKMB\", \"CKMBINDEX\", \"TROPONINI\" in the last 72 hours.    CBC:   Lab Results   Component Value Date/Time    WBC 8.5 06/13/2024 06:24 AM    RBC 5.26 06/13/2024 06:24 AM    HGB 16.1 06/13/2024 06:24 AM    HCT 47.4 06/13/2024 06:24 AM     06/13/2024 06:24 AM       CMP:    Lab Results   Component Value Date/Time     06/13/2024 06:24 AM    K 3.9 06/13/2024 06:24 AM     06/13/2024 06:24 AM    CO2 23 06/13/2024 06:24 AM    BUN 19 06/13/2024 06:24 AM    CREATININE 0.7 06/13/2024 06:24 AM    LABGLOM > 90 06/13/2024 06:24 AM    LABGLOM >60 06/28/2023 06:33 AM    GLUCOSE 163 06/13/2024 06:24 AM    CALCIUM 9.4 06/13/2024 06:24 AM       Hepatic Function Panel:    Lab Results   Component Value Date/Time    ALKPHOS 49

## 2024-06-13 NOTE — PROGRESS NOTES
Patient discharged home with wife and son. Educated on discharge instructions, follow ups and medications. No questions or concerns voiced.       Heart attack teaching covered with patient and/or family or significant other:  Signs and symptoms of a heart attack.  When to call 911 and the importance of calling 911.  Personal risk factors and ways to lower their risk.  4.   Importance of quitting smoking if applicable.     Heart attack booklet given to the patient and/or family or significant other. Reviewed:  How to take Nitroglycerin.  The importance of participating in Cardiac Rehab and hours of operation.   Heart Healthy Diet.  Risk factor modification.(Overweight, Obesity, Diabetes, Hypertension, Smoking, High Cholesterol, Stress)  Discharge instructions for Cath/Intervention procedure site if applicable.

## 2024-06-14 ENCOUNTER — TELEPHONE (OUTPATIENT)
Dept: FAMILY MEDICINE CLINIC | Age: 84
End: 2024-06-14

## 2024-06-14 NOTE — TELEPHONE ENCOUNTER
Care Transitions Initial Follow Up Call    Outreach made within 2 business days of discharge: Yes    Patient: Raymond Hummel Patient : 1940   MRN: [id number]  Reason for Admission: There are no discharge diagnoses documented for the most recent discharge.  Discharge Date: 24       Spoke with: Patient    Discharge department/facility: [facility]    TCM Interactive Patient Contact:  Was patient able to fill all prescriptions: Yes  Was patient instructed to bring all medications to the follow-up visit: Yes  Is patient taking all medications as directed in the discharge summary? Yes  Does patient understand their discharge instructions: Yes  Does patient have questions or concerns that need addressed prior to 7-14 day follow up office visit: no    Scheduled appointment with PCP within 7-14 days --  Declines need for follow-up with PCP at this time.    Follow Up  Future Appointments   Date Time Provider Department Center   2024  1:45 PM Erick Arguello MD N [facility] - Ida Avalos LPN

## 2024-06-15 NOTE — PROGRESS NOTES
Severe Symptomatic Aortic Stenosis - Given the STS scores, frailty, comorbidities, and the imaging findings, the patient is clinically high risk for surgery, but is a candidate for TAVR (See PreTAVR Assesment).  Discussed transcatheter aortic valve replacement (TAVR) with the patient/family regarding risks, benefits, alternatives to the procedure.  The patient understands the associated visits with CT surgery and also understands the need for TAVR CTA.  The patient is FULL CODE. The POA is listed in the chart.  We will schedule the above as appropriate.  Once complete and appropriate based on the findings, a procedure date will be aligned at [facility], and we will notify the patient of further instructions.      We had a long discussion with myself, family, patient, and structural heart coordinators.  The family and patient were explained the risks/benefits/alternatives of the procedure, how the procedure works, the work-up, post-procedural care, and all of the possible complications of the procedure, including, but not limited to vascular injury, debilitating stroke, need for permanent pacemaker, and death.  The patient/family would like to pursue TAVR at our facility and we will work towards this goal.         The patient and family understand that should there be any findings or issues that arise during the evaluation that would preclude TAVR, that this will need to be evaluated prior to proceeding with a percutaneous approach.  Further, a unified heart team approach will be performed prior to proceeding with TAVR which includes the patient's primary care givers, cardiologist, and the entire structural heart team (interventionalist, CT surgeons, structural heart NP).       The patient and family appeared to understand everything, all of their questions were answered to their satisfaction and they are agreeable to proceed with further evaluation for TAVR.       Thank you for allowing us to participate in the care of

## 2024-06-25 DIAGNOSIS — F41.9 ANXIETY: Primary | ICD-10-CM

## 2024-06-28 ENCOUNTER — TELEPHONE (OUTPATIENT)
Dept: CARDIOLOGY CLINIC | Age: 84
End: 2024-06-28

## 2024-06-28 ENCOUNTER — OFFICE VISIT (OUTPATIENT)
Dept: PULMONOLOGY | Age: 84
End: 2024-06-28
Payer: MEDICARE

## 2024-06-28 VITALS
WEIGHT: 201.6 LBS | SYSTOLIC BLOOD PRESSURE: 132 MMHG | TEMPERATURE: 98.1 F | BODY MASS INDEX: 29.86 KG/M2 | HEART RATE: 74 BPM | OXYGEN SATURATION: 98 % | DIASTOLIC BLOOD PRESSURE: 80 MMHG | HEIGHT: 69 IN

## 2024-06-28 DIAGNOSIS — I35.0 SEVERE AORTIC STENOSIS: ICD-10-CM

## 2024-06-28 DIAGNOSIS — R91.8 MULTIPLE LUNG NODULES ON CT: Primary | ICD-10-CM

## 2024-06-28 PROCEDURE — 3075F SYST BP GE 130 - 139MM HG: CPT | Performed by: INTERNAL MEDICINE

## 2024-06-28 PROCEDURE — 3079F DIAST BP 80-89 MM HG: CPT | Performed by: INTERNAL MEDICINE

## 2024-06-28 PROCEDURE — 99204 OFFICE O/P NEW MOD 45 MIN: CPT | Performed by: INTERNAL MEDICINE

## 2024-06-28 PROCEDURE — 1123F ACP DISCUSS/DSCN MKR DOCD: CPT | Performed by: INTERNAL MEDICINE

## 2024-06-28 RX ORDER — LORAZEPAM 1 MG/1
1 TABLET ORAL PRN
Qty: 2 TABLET | Refills: 0 | Status: SHIPPED | OUTPATIENT
Start: 2024-07-09 | End: 2024-07-10

## 2024-06-28 NOTE — TELEPHONE ENCOUNTER
PROCEDURE: cardiac mri    DATE OF SERVICE: 07/09/2024    SERVICE LOCATION: [facility]    CPT CODE: 38763    PHYSICIAN: DR WEAVER     DATE PRIOR AUTH SUBMITTED: 06/28/2024    STATUS: APPROVED.    AUTH NUMBER: [id number]       VALID:  06/28/2024-09/25/2024

## 2024-06-28 NOTE — PROGRESS NOTES
[facility]    Patient: LEIGH ANN FRANKLIN, 84 y.o.   : 1940    Patient of Vincent Sanders DO   Referring Provider: Erick Arguello MD       Subjective     Chief Complaint   Patient presents with    New Patient     New patient referred for lung nodule. CTA 24        HPI    This is a very pleasant gentleman referred by Dr. 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    CTA of the heart is available for review and to 6 mm nodules are visualized in the fissure in the left lower lobe no other lung masses nodules or lymphadenopathy noted    His main complaint is dyspnea on exertion and fatigue    History of coronary artery disease, severe aortic stenosis, chronic kidney disease, hypertension, left ventricular hypertrophy, insulin resistant, diverticulosis, hyper triglyceridemia, colon polyps    He is a retired businessman      Immunizations:  Immunization History   Administered Date(s) Administered    COVID-19, MODERNA BLUE border, Primary or Immunocompromised, (age 12y+), IM, 100 mcg/0.5mL 2021, 2021      Past Medical hx   PMH:  Past Medical History:   Diagnosis Date    Diverticulosis     Heart murmur     High triglycerides     History of colonic polyps     Hypertension     Insulin resistance     LVH (left ventricular hypertrophy)      SURGICAL HISTORY:  Past Surgical History:   Procedure Laterality Date    CARDIAC PROCEDURE Bilateral 2024    Left and right heart cath / coronary angiography performed by Erick Arguello MD at [facility] CARDIAC CATH LAB    CARDIAC PROCEDURE N/A 2024    Percutaneous coronary intervention performed by Jos

## 2024-07-03 ENCOUNTER — TELEPHONE (OUTPATIENT)
Dept: FAMILY MEDICINE CLINIC | Age: 84
End: 2024-07-03

## 2024-07-10 ENCOUNTER — TELEPHONE (OUTPATIENT)
Dept: PULMONOLOGY | Age: 84
End: 2024-07-10

## 2024-07-10 NOTE — TELEPHONE ENCOUNTER
Katy from Dr. Arguello's office is calling. She states since patients last office visit that she has been trying to get the clearance for for patient's TAVR procedure . There is no mention of this In patients chart. I reprinted the forms , took them to Dr. Prado and he signed in front of me. He stated he has signed this form multiple times, yet I do not see it ever faxed back in media. I faxed back to Katy, will scan confirmation.

## 2024-07-18 ENCOUNTER — OFFICE VISIT (OUTPATIENT)
Dept: CARDIOLOGY CLINIC | Age: 84
End: 2024-07-18
Payer: MEDICARE

## 2024-07-18 VITALS
DIASTOLIC BLOOD PRESSURE: 74 MMHG | HEART RATE: 100 BPM | SYSTOLIC BLOOD PRESSURE: 130 MMHG | BODY MASS INDEX: 30.13 KG/M2 | WEIGHT: 203.4 LBS | HEIGHT: 69 IN

## 2024-07-18 DIAGNOSIS — I42.2 HYPERTROPHIC CARDIOMYOPATHY (HCC): ICD-10-CM

## 2024-07-18 DIAGNOSIS — I35.0 NONRHEUMATIC AORTIC VALVE STENOSIS: Primary | ICD-10-CM

## 2024-07-18 DIAGNOSIS — R01.1 SYSTOLIC MURMUR: ICD-10-CM

## 2024-07-18 PROCEDURE — 3078F DIAST BP <80 MM HG: CPT | Performed by: INTERNAL MEDICINE

## 2024-07-18 PROCEDURE — 3075F SYST BP GE 130 - 139MM HG: CPT | Performed by: INTERNAL MEDICINE

## 2024-07-18 PROCEDURE — 99214 OFFICE O/P EST MOD 30 MIN: CPT | Performed by: INTERNAL MEDICINE

## 2024-07-18 PROCEDURE — 1123F ACP DISCUSS/DSCN MKR DOCD: CPT | Performed by: INTERNAL MEDICINE

## 2024-07-18 NOTE — PROGRESS NOTES
(ALDACTONE) 25 MG tablet, Take 1 tablet by mouth daily, Disp: 30 tablet, Rfl: 0    Coenzyme Q10 (CO Q 10 PO), Take 200 mg by mouth daily, Disp: , Rfl:     amLODIPine (NORVASC) 10 MG tablet, Take 1 tablet by mouth daily (Patient taking differently: Take 0.5 tablets by mouth in the morning and at bedtime), Disp: 90 tablet, Rfl: 1    aspirin 81 MG EC tablet, Take 1 tablet by mouth daily, Disp: , Rfl:     Past Medical History  Raymond  has a past medical history of Diverticulosis, Heart murmur, High triglycerides, History of colonic polyps, Hypertension, Insulin resistance, and LVH (left ventricular hypertrophy).    Social History  Raymond  reports that he has never smoked. He has been exposed to tobacco smoke. He has never used smokeless tobacco. He reports current alcohol use of about 7.0 standard drinks of alcohol per week. He reports that he does not use drugs.    Family History  Raymond family history includes Alzheimer's Disease in his mother; Parkinsonism in his father.    There is no family history of bicuspid aortic valve, aneurysms, heart transplant, pacemakers, defibrillators, or sudden cardiac death.      Past Surgical History   Past Surgical History:   Procedure Laterality Date    CARDIAC PROCEDURE Bilateral 6/12/2024    Left and right heart cath / coronary angiography performed by Erick Arguello MD at [facility] CARDIAC CATH LAB    CARDIAC PROCEDURE N/A 6/12/2024    Percutaneous coronary intervention performed by Erick Arguello MD at [facility] CARDIAC CATH LAB    CHOLECYSTECTOMY      COLONOSCOPY  2003, 2006, 2011    HERNIA REPAIR  1980    x 2    SIGMOIDOSCOPY      TONSILLECTOMY         Review of Systems   Constitutional: Negative for chills and fever  HENT: Negative for congestion, sinus pressure, sneezing and sore throat.    Eyes: Negative for pain, discharge, redness and itching.   Respiratory: Negative for apnea, cough  Gastrointestinal: Negative for blood in stool, constipation, diarrhea   Endocrine: Negative

## 2024-07-21 ENCOUNTER — HOSPITAL ENCOUNTER (OUTPATIENT)
Dept: MRI IMAGING | Age: 84
Discharge: HOME | End: 2024-07-21
Attending: RADIOLOGY

## 2024-07-21 DIAGNOSIS — Z00.6 ENCOUNTER FOR EXAMINATION FOR NORMAL COMPARISON OR CONTROL IN CLINICAL RESEARCH PROGRAM: ICD-10-CM

## 2024-07-23 ENCOUNTER — HOSPITAL ENCOUNTER (OUTPATIENT)
Dept: INTERVENTIONAL RADIOLOGY/VASCULAR | Age: 84
Discharge: HOME | End: 2024-07-25
Attending: INTERNAL MEDICINE
Payer: MEDICARE

## 2024-07-23 ENCOUNTER — OFFICE VISIT (OUTPATIENT)
Dept: CARDIOLOGY CLINIC | Age: 84
End: 2024-07-23
Payer: MEDICARE

## 2024-07-23 VITALS
SYSTOLIC BLOOD PRESSURE: 162 MMHG | WEIGHT: 204.25 LBS | HEIGHT: 69 IN | OXYGEN SATURATION: 95 % | HEART RATE: 78 BPM | DIASTOLIC BLOOD PRESSURE: 80 MMHG | BODY MASS INDEX: 30.25 KG/M2

## 2024-07-23 DIAGNOSIS — R42 DIZZINESS: ICD-10-CM

## 2024-07-23 DIAGNOSIS — I42.2 HYPERTROPHIC CARDIOMYOPATHY (HCC): ICD-10-CM

## 2024-07-23 DIAGNOSIS — Z91.89 AT RISK FOR FLUID VOLUME OVERLOAD: ICD-10-CM

## 2024-07-23 DIAGNOSIS — I35.0 NONRHEUMATIC AORTIC VALVE STENOSIS: ICD-10-CM

## 2024-07-23 DIAGNOSIS — I35.0 SEVERE AORTIC STENOSIS: ICD-10-CM

## 2024-07-23 DIAGNOSIS — I50.22 CHRONIC SYSTOLIC CONGESTIVE HEART FAILURE, NYHA CLASS 2 (HCC): Primary | ICD-10-CM

## 2024-07-23 PROCEDURE — 99214 OFFICE O/P EST MOD 30 MIN: CPT | Performed by: NURSE PRACTITIONER

## 2024-07-23 PROCEDURE — 3077F SYST BP >= 140 MM HG: CPT | Performed by: NURSE PRACTITIONER

## 2024-07-23 PROCEDURE — 3079F DIAST BP 80-89 MM HG: CPT | Performed by: NURSE PRACTITIONER

## 2024-07-23 PROCEDURE — 93880 EXTRACRANIAL BILAT STUDY: CPT

## 2024-07-23 PROCEDURE — 1123F ACP DISCUSS/DSCN MKR DOCD: CPT | Performed by: NURSE PRACTITIONER

## 2024-07-23 ASSESSMENT — ENCOUNTER SYMPTOMS
VOMITING: 0
COUGH: 0
ABDOMINAL DISTENTION: 0
SHORTNESS OF BREATH: 0
NAUSEA: 0

## 2024-07-23 NOTE — PATIENT INSTRUCTIONS
You may receive a survey regarding the care you received during your visit.  Your input is valuable to us.  We encourage you to complete and return your survey.  We hope you will choose us in the future for your healthcare needs.    Your nurses today were Kylie.  Office hours:   Mon-Thurs 8-4:30  Friday 8-12  Phone: [phone]    Continue:  Continue current medications  Daily weights and record  Fluid restriction of 2 Liters per day  Limit sodium in diet to around 7357-0194 mg/day  Monitor BP  Activity as tolerated     Call the Heart Failure Clinic for any of the following symptoms:   Weight gain of -3 pounds in 1 day or 5 pounds in 1 week  Increased shortness of breath  Shortness of breath while laying down  Cough  Chest pain  Swelling in feet, ankles or legs  Bloating in abdomen  Fatigue        No med changes today    Continue diet/fluid adherence  Continue daily wts.  F/U w/ Cardiology  F/U in clinic post procedure

## 2024-07-23 NOTE — PROGRESS NOTES
if he is taking    Diuretic -   AA - Aldactone 25 daily   SGLT2 -    Vasodilator -   Other - norvasc 5 BID, ASA/Brilinta      HFrEF 42% on CMR 7/2024 (60-65% 2024)  Severe LV concentric hypertrophy w/ LVOT obstruction at rest w/ TYSHAWN - HOCM - following [facility] - APPT 8/2  Severe AS - GREG of 0.685 w/ mean gradient of 32 - TAVR workup currently   HTN - stable on current medications - acutely elevated in office today    Stable, trace lower leg swelling of left leg- unchanged. Not active at this time. No CP since [place]. Double checking that he is taking his Toprol. No additional diuretic at this time. F/U post procedure.       Lab reviewed - K 3.9 Cr 0.7 hgb 16.1    ECHO 2024: mod dilated LA, severe concentric hypertrophy w/ TYSHAWN of mitral leaflet  CATH 6/2024: s/p PCI to LAD, 50% of RCA, 70% of OM 2  RHC 2024: wedge of 15, RA 12, PAD 19, CO 3.8 CI 1.9    No med changes today    Continue diet/fluid adherence  Continue daily wts.  F/U w/ Cardiology  F/U in clinic post procedure       Tolerating above noted HF meds, no ill side effects noted. Will continue to monitor kidney function and electrolytes. Will optimize as tolerated.   Pt is compliant w/ medications.    Total visit time of 45 minutes has been spent with patient on education of symptoms, management, medication, and plan of care; as well as review of chart: labs, ECHO, radiology reports, etc.   I personally spent more then 50% of the appt time face to face with the patient.  Daily weights  Fluid restriction of 2 Liters per day  Limit sodium in diet to around 5325-2951 mg/day  Monitor BP  Activity as tolerated     Patient was instructed to call the Heart Failure Clinic for any changes in symptoms as noted in AVS.      No follow-ups on file. or sooner if needed     Patient given educational materials - see patient instructions.   We discussed the importance of weighing oneself and recording daily. We also discussed the importance of a low sodium diet, higher sodium foods to

## 2024-08-06 ENCOUNTER — TELEPHONE (OUTPATIENT)
Dept: CARDIOLOGY CLINIC | Age: 84
End: 2024-08-06

## 2024-08-06 ENCOUNTER — OFFICE VISIT (OUTPATIENT)
Dept: CARDIOLOGY CLINIC | Age: 84
End: 2024-08-06
Payer: MEDICARE

## 2024-08-06 VITALS
DIASTOLIC BLOOD PRESSURE: 70 MMHG | HEIGHT: 69 IN | SYSTOLIC BLOOD PRESSURE: 138 MMHG | HEART RATE: 80 BPM | OXYGEN SATURATION: 97 % | WEIGHT: 202.6 LBS | BODY MASS INDEX: 30.01 KG/M2

## 2024-08-06 DIAGNOSIS — I35.0 SEVERE AORTIC STENOSIS: ICD-10-CM

## 2024-08-06 DIAGNOSIS — I42.2 HYPERTROPHIC CARDIOMYOPATHY (HCC): ICD-10-CM

## 2024-08-06 DIAGNOSIS — I50.22 CHRONIC SYSTOLIC CONGESTIVE HEART FAILURE, NYHA CLASS 2 (HCC): Primary | ICD-10-CM

## 2024-08-06 DIAGNOSIS — I35.0 SEVERE AORTIC STENOSIS: Primary | ICD-10-CM

## 2024-08-06 DIAGNOSIS — Z91.89 AT RISK FOR FLUID VOLUME OVERLOAD: ICD-10-CM

## 2024-08-06 PROCEDURE — 99214 OFFICE O/P EST MOD 30 MIN: CPT | Performed by: NURSE PRACTITIONER

## 2024-08-06 PROCEDURE — 3078F DIAST BP <80 MM HG: CPT | Performed by: NURSE PRACTITIONER

## 2024-08-06 PROCEDURE — 3075F SYST BP GE 130 - 139MM HG: CPT | Performed by: NURSE PRACTITIONER

## 2024-08-06 PROCEDURE — 1123F ACP DISCUSS/DSCN MKR DOCD: CPT | Performed by: NURSE PRACTITIONER

## 2024-08-06 RX ORDER — ATORVASTATIN CALCIUM 40 MG/1
40 TABLET, FILM COATED ORAL DAILY
COMMUNITY
Start: 2024-07-03

## 2024-08-06 RX ORDER — METOPROLOL SUCCINATE 25 MG/1
12.5 TABLET, EXTENDED RELEASE ORAL DAILY
Qty: 45 TABLET | Refills: 3 | Status: SHIPPED | OUTPATIENT
Start: 2024-08-06

## 2024-08-06 RX ORDER — BIFIDOBACTERIUM LONGUM SUBSP. INFANTIS, AVOBENZONE, HOMOSALATE, OCTISALATE, OCTOCRYLENE, AND OXYBENZONE
1 KIT DAILY
COMMUNITY

## 2024-08-06 ASSESSMENT — ENCOUNTER SYMPTOMS
COUGH: 0
NAUSEA: 0
VOMITING: 0
SHORTNESS OF BREATH: 0
ABDOMINAL DISTENTION: 0

## 2024-08-06 NOTE — PROGRESS NOTES
is systolic anterior motion (TYSHAWN).   There is eccentric, posterior directed mild mitral regurgitation. Mitral regurgitant volume 18 ml. Mitral   regurgitant fraction 27 %.     TRICUSPID VALVE: There is mild tricuspid regurgitation. Tricuspid regurgitant volume 18 ml. Tricuspid regurgitant fraction   27 %.     PULMONIC VALVE: There is mild pulmonic regurgitation. Pulmonic regurgitant volume 4 ml.     AORTIC ROOT: The aortic root size is normal.     OTHER FINDINGS: There are two cystic lesions about 1.2 and 1.5cm at liver dome.         Echo Findings 6/2024    Left Ventricle Normal left ventricular systolic function with a visually estimated EF of 60 - 65%. Left ventricle size is normal. Moderately increased wall thickness. Severe basal septal thickening.Findings consistent with severe concentric hypertrophy. Normal wall motion. Indeterminate diastolic function. Severe LVOT obstruction at rest.   Left Atrium Left atrium is moderately dilated. Normal sized appendage. No left atrial appendage thrombus noted. No left atrial appendage mass noted. Normal sized pulmonary veins.   Interatrial Septum No interatrial shunt visualized with color Doppler. Agitated saline study was negative with and without provocation.   Right Ventricle Right ventricle size is normal. Normal systolic function.   Right Atrium Right atrium size is normal.   Aortic Valve Trileaflet valve. Moderately thickened cusp. Severely calcified cusp. No regurgitation. Severe stenosis of the aortic valve. AV mean gradient is 27 mmHg--unable to get parallel to LVOT.  Planimetry demonstratates GREG 0.685 cm2.  Vmax 4.1 m/s, max gradient 66 mmHg, mean gradient 32 mmHg.   Mitral Valve Moderately thickened leaflet. Moderately calcified leaflet, at the posterior leaflet. Severe systolic anterior motion with a pressure gradient. No regurgitation. No stenosis noted.   Tricuspid Valve Valve structure is normal. No regurgitation. No stenosis noted.   Pulmonic Valve Valve

## 2024-08-06 NOTE — PATIENT INSTRUCTIONS
You may receive a survey regarding the care you received during your visit.  Your input is valuable to us.  We encourage you to complete and return your survey.  We hope you will choose us in the future for your healthcare needs.    Your nurses today were Kylie.  Office hours:   Mon-Thurs 8-4:30  Friday 8-12  Phone: [phone]    Continue:  Continue current medications  Daily weights and record  Fluid restriction of 2 Liters per day  Limit sodium in diet to around 7478-2601 mg/day  Monitor BP  Activity as tolerated     Call the Heart Failure Clinic for any of the following symptoms:   Weight gain of -3 pounds in 1 day or 5 pounds in 1 week  Increased shortness of breath  Shortness of breath while laying down  Cough  Chest pain  Swelling in feet, ankles or legs  Bloating in abdomen  Fatigue        No med changes today    Continue diet/fluid adherence  Continue daily wts.  F/U w/ Cardiology  F/U in clinic post procedure

## 2024-08-06 NOTE — TELEPHONE ENCOUNTER
Orders received from Dr. Arguello to schedule the patient for a TAVR procedure and have the patient hold the follow medications the morning of the TAVR procedure: 8/15.    TAVR: 8/15 at 1200 with an arrival of 1000  Discharge home with wife    Valve Rep  notified with [employer]  Pacer Rep  notified with     Post TAVR instructions per Dr. Arguello: have the patient be seen in the Structural [facility] Clinic 7 days post TAVR, obtain a CBC, BMP and ECHO prior to the 30 day post TAVR follow up appointment.    7 day post TAVR appointment  at   CHF Clinic follow up appointment  at   CBC/BMP/ECHO: at   30 day post TAVR appointment: at     Primary Cardiologist:

## 2024-08-07 NOTE — TELEPHONE ENCOUNTER
Went over instructions in person with patient at his CHF appointment.     Called patient's son Juan Carlos and went over instructions as well. Emailed them to him

## 2024-08-12 ENCOUNTER — TELEPHONE (OUTPATIENT)
Dept: CARDIOLOGY CLINIC | Age: 84
End: 2024-08-12

## 2024-08-12 NOTE — TELEPHONE ENCOUNTER
Attached is correspondence sent from Dr. Kevin from [facility] regarding this mutual patient. Pt is established with Dr. Arguello and Ly.    Please Review.

## 2024-08-14 ENCOUNTER — PREP FOR PROCEDURE (OUTPATIENT)
Dept: CARDIOLOGY | Age: 84
End: 2024-08-14

## 2024-08-14 RX ORDER — SODIUM CHLORIDE 9 MG/ML
INJECTION, SOLUTION INTRAVENOUS CONTINUOUS
Status: CANCELLED | OUTPATIENT
Start: 2024-08-14

## 2024-08-14 RX ORDER — SODIUM CHLORIDE 0.9 % (FLUSH) 0.9 %
5-40 SYRINGE (ML) INJECTION PRN
Status: CANCELLED | OUTPATIENT
Start: 2024-08-14

## 2024-08-14 RX ORDER — SODIUM CHLORIDE 0.9 % (FLUSH) 0.9 %
5-40 SYRINGE (ML) INJECTION EVERY 12 HOURS SCHEDULED
Status: CANCELLED | OUTPATIENT
Start: 2024-08-14

## 2024-08-14 RX ORDER — SODIUM CHLORIDE 9 MG/ML
INJECTION, SOLUTION INTRAVENOUS PRN
Status: CANCELLED | OUTPATIENT
Start: 2024-08-14

## 2024-08-14 RX ORDER — CHLORHEXIDINE GLUCONATE 40 MG/ML
SOLUTION TOPICAL ONCE
Status: CANCELLED | OUTPATIENT
Start: 2024-08-14 | End: 2024-08-14

## 2024-08-15 ENCOUNTER — HOSPITAL ENCOUNTER (INPATIENT)
Age: 84
LOS: 1 days | Discharge: HOME | DRG: 267 | End: 2024-08-16
Attending: INTERNAL MEDICINE | Admitting: INTERNAL MEDICINE
Payer: MEDICARE

## 2024-08-15 DIAGNOSIS — I35.0 AORTIC STENOSIS, SEVERE: ICD-10-CM

## 2024-08-15 DIAGNOSIS — Z95.2 HISTORY OF TRANSCATHETER AORTIC VALVE REPLACEMENT (TAVR): Primary | ICD-10-CM

## 2024-08-15 DIAGNOSIS — I50.32 CHRONIC DIASTOLIC CONGESTIVE HEART FAILURE (HCC): ICD-10-CM

## 2024-08-15 LAB
ABO: NORMAL
ACTIVATED CLOTTING TIME: 354 SECONDS (ref 1–150)
ALBUMIN SERPL BCG-MCNC: 4.5 G/DL (ref 3.5–5.1)
ALP SERPL-CCNC: 61 U/L (ref 38–126)
ALT SERPL W/O P-5'-P-CCNC: 18 U/L (ref 11–66)
ANION GAP SERPL CALC-SCNC: 12 MEQ/L (ref 8–16)
ANTIBODY SCREEN: NORMAL
APTT PPP: 32.7 SECONDS (ref 22–38)
AST SERPL-CCNC: 27 U/L (ref 5–40)
BILIRUB SERPL-MCNC: 1.9 MG/DL (ref 0.3–1.2)
BUN SERPL-MCNC: 14 MG/DL (ref 7–22)
CALCIUM SERPL-MCNC: 9.5 MG/DL (ref 8.5–10.5)
CHLORIDE SERPL-SCNC: 101 MEQ/L (ref 98–111)
CO2 SERPL-SCNC: 25 MEQ/L (ref 23–33)
CREAT SERPL-MCNC: 0.6 MG/DL (ref 0.4–1.2)
DEPRECATED RDW RBC AUTO: 41.1 FL (ref 35–45)
ECHO BSA: 2.09 M2
ERYTHROCYTE [DISTWIDTH] IN BLOOD BY AUTOMATED COUNT: 13 % (ref 11.5–14.5)
GFR SERPL CREATININE-BSD FRML MDRD: > 90 ML/MIN/1.73M2
GLUCOSE SERPL-MCNC: 148 MG/DL (ref 70–108)
HCT VFR BLD AUTO: 47.6 % (ref 42–52)
HGB BLD-MCNC: 16.3 GM/DL (ref 14–18)
INR PPP: 1.09 (ref 0.85–1.13)
MCH RBC QN AUTO: 30 PG (ref 26–33)
MCHC RBC AUTO-ENTMCNC: 34.2 GM/DL (ref 32.2–35.5)
MCV RBC AUTO: 87.5 FL (ref 80–94)
NT-PROBNP SERPL IA-MCNC: 263.4 PG/ML (ref 0–449)
PLATELET # BLD AUTO: 181 THOU/MM3 (ref 130–400)
PMV BLD AUTO: 10.6 FL (ref 9.4–12.4)
POTASSIUM SERPL-SCNC: 4.5 MEQ/L (ref 3.5–5.2)
PROT SERPL-MCNC: 7.1 G/DL (ref 6.1–8)
RBC # BLD AUTO: 5.44 MILL/MM3 (ref 4.7–6.1)
RH FACTOR: NORMAL
SODIUM SERPL-SCNC: 138 MEQ/L (ref 135–145)
WBC # BLD AUTO: 7.4 THOU/MM3 (ref 4.8–10.8)

## 2024-08-15 PROCEDURE — 51702 INSERT TEMP BLADDER CATH: CPT

## 2024-08-15 PROCEDURE — 99152 MOD SED SAME PHYS/QHP 5/>YRS: CPT | Performed by: INTERNAL MEDICINE

## 2024-08-15 PROCEDURE — C1760 CLOSURE DEV, VASC: HCPCS | Performed by: INTERNAL MEDICINE

## 2024-08-15 PROCEDURE — 33361 REPLACE AORTIC VALVE PERQ: CPT | Performed by: INTERNAL MEDICINE

## 2024-08-15 PROCEDURE — 85027 COMPLETE CBC AUTOMATED: CPT

## 2024-08-15 PROCEDURE — 85610 PROTHROMBIN TIME: CPT

## 2024-08-15 PROCEDURE — 86901 BLOOD TYPING SEROLOGIC RH(D): CPT

## 2024-08-15 PROCEDURE — 2720000010 HC SURG SUPPLY STERILE: Performed by: INTERNAL MEDICINE

## 2024-08-15 PROCEDURE — 2580000003 HC RX 258: Performed by: INTERNAL MEDICINE

## 2024-08-15 PROCEDURE — 36415 COLL VENOUS BLD VENIPUNCTURE: CPT

## 2024-08-15 PROCEDURE — 93005 ELECTROCARDIOGRAM TRACING: CPT | Performed by: PHYSICIAN ASSISTANT

## 2024-08-15 PROCEDURE — 6360000004 HC RX CONTRAST MEDICATION: Performed by: INTERNAL MEDICINE

## 2024-08-15 PROCEDURE — 33361 REPLACE AORTIC VALVE PERQ: CPT | Performed by: THORACIC SURGERY (CARDIOTHORACIC VASCULAR SURGERY)

## 2024-08-15 PROCEDURE — C1894 INTRO/SHEATH, NON-LASER: HCPCS | Performed by: INTERNAL MEDICINE

## 2024-08-15 PROCEDURE — 2709999900 HC NON-CHARGEABLE SUPPLY: Performed by: INTERNAL MEDICINE

## 2024-08-15 PROCEDURE — 85730 THROMBOPLASTIN TIME PARTIAL: CPT

## 2024-08-15 PROCEDURE — 6360000002 HC RX W HCPCS: Performed by: PHYSICIAN ASSISTANT

## 2024-08-15 PROCEDURE — G0269 OCCLUSIVE DEVICE IN VEIN ART: HCPCS | Performed by: INTERNAL MEDICINE

## 2024-08-15 PROCEDURE — 51701 INSERT BLADDER CATHETER: CPT

## 2024-08-15 PROCEDURE — 87086 URINE CULTURE/COLONY COUNT: CPT

## 2024-08-15 PROCEDURE — 99153 MOD SED SAME PHYS/QHP EA: CPT | Performed by: INTERNAL MEDICINE

## 2024-08-15 PROCEDURE — 2580000003 HC RX 258: Performed by: PHYSICIAN ASSISTANT

## 2024-08-15 PROCEDURE — 86900 BLOOD TYPING SEROLOGIC ABO: CPT

## 2024-08-15 PROCEDURE — 99291 CRITICAL CARE FIRST HOUR: CPT | Performed by: INTERNAL MEDICINE

## 2024-08-15 PROCEDURE — 85347 COAGULATION TIME ACTIVATED: CPT

## 2024-08-15 PROCEDURE — 02RF38Z REPLACEMENT OF AORTIC VALVE WITH ZOOPLASTIC TISSUE, PERCUTANEOUS APPROACH: ICD-10-PCS | Performed by: THORACIC SURGERY (CARDIOTHORACIC VASCULAR SURGERY)

## 2024-08-15 PROCEDURE — 2000000000 HC ICU R&B

## 2024-08-15 PROCEDURE — 83880 ASSAY OF NATRIURETIC PEPTIDE: CPT

## 2024-08-15 PROCEDURE — C1889 IMPLANT/INSERT DEVICE, NOC: HCPCS | Performed by: INTERNAL MEDICINE

## 2024-08-15 PROCEDURE — 86923 COMPATIBILITY TEST ELECTRIC: CPT

## 2024-08-15 PROCEDURE — 6360000002 HC RX W HCPCS: Performed by: INTERNAL MEDICINE

## 2024-08-15 PROCEDURE — 51798 US URINE CAPACITY MEASURE: CPT

## 2024-08-15 PROCEDURE — 94761 N-INVAS EAR/PLS OXIMETRY MLT: CPT

## 2024-08-15 PROCEDURE — 6370000000 HC RX 637 (ALT 250 FOR IP): Performed by: INTERNAL MEDICINE

## 2024-08-15 PROCEDURE — 2500000003 HC RX 250 WO HCPCS: Performed by: INTERNAL MEDICINE

## 2024-08-15 PROCEDURE — C1769 GUIDE WIRE: HCPCS | Performed by: INTERNAL MEDICINE

## 2024-08-15 PROCEDURE — 80053 COMPREHEN METABOLIC PANEL: CPT

## 2024-08-15 PROCEDURE — 93005 ELECTROCARDIOGRAM TRACING: CPT | Performed by: INTERNAL MEDICINE

## 2024-08-15 PROCEDURE — 86850 RBC ANTIBODY SCREEN: CPT

## 2024-08-15 DEVICE — VALVE AORT SAPIEN 3 ULTRA RESILIA 26MM: Type: IMPLANTABLE DEVICE | Status: FUNCTIONAL

## 2024-08-15 RX ORDER — ASPIRIN 81 MG/1
81 TABLET ORAL DAILY
Status: DISCONTINUED | OUTPATIENT
Start: 2024-08-15 | End: 2024-08-15 | Stop reason: SDUPTHER

## 2024-08-15 RX ORDER — HEPARIN SODIUM 1000 [USP'U]/ML
INJECTION, SOLUTION INTRAVENOUS; SUBCUTANEOUS PRN
Status: DISCONTINUED | OUTPATIENT
Start: 2024-08-15 | End: 2024-08-15 | Stop reason: HOSPADM

## 2024-08-15 RX ORDER — ATROPINE SULFATE 0.4 MG/ML
0.5 INJECTION, SOLUTION INTRAVENOUS
Status: ACTIVE | OUTPATIENT
Start: 2024-08-15 | End: 2024-08-16

## 2024-08-15 RX ORDER — HYDRALAZINE HYDROCHLORIDE 20 MG/ML
10 INJECTION INTRAMUSCULAR; INTRAVENOUS EVERY 10 MIN PRN
Status: COMPLETED | OUTPATIENT
Start: 2024-08-15 | End: 2024-08-16

## 2024-08-15 RX ORDER — SODIUM CHLORIDE 0.9 % (FLUSH) 0.9 %
5-40 SYRINGE (ML) INJECTION PRN
Status: DISCONTINUED | OUTPATIENT
Start: 2024-08-15 | End: 2024-08-16 | Stop reason: HOSPADM

## 2024-08-15 RX ORDER — PROTAMINE SULFATE 10 MG/ML
INJECTION, SOLUTION INTRAVENOUS PRN
Status: DISCONTINUED | OUTPATIENT
Start: 2024-08-15 | End: 2024-08-15 | Stop reason: HOSPADM

## 2024-08-15 RX ORDER — IOPAMIDOL 755 MG/ML
INJECTION, SOLUTION INTRAVASCULAR PRN
Status: DISCONTINUED | OUTPATIENT
Start: 2024-08-15 | End: 2024-08-15 | Stop reason: HOSPADM

## 2024-08-15 RX ORDER — SODIUM CHLORIDE 9 MG/ML
INJECTION, SOLUTION INTRAVENOUS PRN
Status: DISCONTINUED | OUTPATIENT
Start: 2024-08-15 | End: 2024-08-15 | Stop reason: SDUPTHER

## 2024-08-15 RX ORDER — ONDANSETRON 2 MG/ML
4 INJECTION INTRAMUSCULAR; INTRAVENOUS EVERY 6 HOURS PRN
Status: DISCONTINUED | OUTPATIENT
Start: 2024-08-15 | End: 2024-08-16 | Stop reason: HOSPADM

## 2024-08-15 RX ORDER — SODIUM CHLORIDE 0.9 % (FLUSH) 0.9 %
5-40 SYRINGE (ML) INJECTION PRN
Status: DISCONTINUED | OUTPATIENT
Start: 2024-08-15 | End: 2024-08-15 | Stop reason: SDUPTHER

## 2024-08-15 RX ORDER — MIDAZOLAM HYDROCHLORIDE 1 MG/ML
INJECTION INTRAMUSCULAR; INTRAVENOUS PRN
Status: DISCONTINUED | OUTPATIENT
Start: 2024-08-15 | End: 2024-08-15 | Stop reason: HOSPADM

## 2024-08-15 RX ORDER — ATROPINE SULFATE 0.1 MG/ML
INJECTION INTRAVENOUS PRN
Status: DISCONTINUED | OUTPATIENT
Start: 2024-08-15 | End: 2024-08-15 | Stop reason: HOSPADM

## 2024-08-15 RX ORDER — SODIUM CHLORIDE 9 MG/ML
INJECTION, SOLUTION INTRAVENOUS CONTINUOUS
Status: DISCONTINUED | OUTPATIENT
Start: 2024-08-15 | End: 2024-08-16

## 2024-08-15 RX ORDER — VITAMIN B COMPLEX
2 TABLET ORAL 2 TIMES DAILY
Status: DISCONTINUED | OUTPATIENT
Start: 2024-08-15 | End: 2024-08-16 | Stop reason: HOSPADM

## 2024-08-15 RX ORDER — ASPIRIN 81 MG/1
81 TABLET ORAL DAILY
Status: DISCONTINUED | OUTPATIENT
Start: 2024-08-16 | End: 2024-08-16 | Stop reason: HOSPADM

## 2024-08-15 RX ORDER — BISACODYL 5 MG/1
5 TABLET, DELAYED RELEASE ORAL DAILY PRN
Status: DISCONTINUED | OUTPATIENT
Start: 2024-08-15 | End: 2024-08-16 | Stop reason: HOSPADM

## 2024-08-15 RX ORDER — CHLORHEXIDINE GLUCONATE 40 MG/ML
SOLUTION TOPICAL ONCE
Status: DISCONTINUED | OUTPATIENT
Start: 2024-08-15 | End: 2024-08-16 | Stop reason: HOSPADM

## 2024-08-15 RX ORDER — 0.9 % SODIUM CHLORIDE 0.9 %
INTRAVENOUS SOLUTION INTRAVENOUS CONTINUOUS PRN
Status: COMPLETED | OUTPATIENT
Start: 2024-08-15 | End: 2024-08-15

## 2024-08-15 RX ORDER — ATORVASTATIN CALCIUM 40 MG/1
40 TABLET, FILM COATED ORAL DAILY
Status: DISCONTINUED | OUTPATIENT
Start: 2024-08-15 | End: 2024-08-16 | Stop reason: HOSPADM

## 2024-08-15 RX ORDER — AMOXICILLIN 500 MG
1 CAPSULE ORAL 2 TIMES DAILY
Status: DISCONTINUED | OUTPATIENT
Start: 2024-08-15 | End: 2024-08-15

## 2024-08-15 RX ORDER — SODIUM CHLORIDE 0.9 % (FLUSH) 0.9 %
5-40 SYRINGE (ML) INJECTION EVERY 12 HOURS SCHEDULED
Status: DISCONTINUED | OUTPATIENT
Start: 2024-08-15 | End: 2024-08-16 | Stop reason: HOSPADM

## 2024-08-15 RX ORDER — SODIUM CHLORIDE 9 MG/ML
INJECTION, SOLUTION INTRAVENOUS CONTINUOUS
Status: DISCONTINUED | OUTPATIENT
Start: 2024-08-15 | End: 2024-08-15 | Stop reason: SDUPTHER

## 2024-08-15 RX ORDER — PHENYLEPHRINE HCL IN 0.9% NACL 1 MG/10 ML
VIAL (ML) INTRAVENOUS PRN
Status: DISCONTINUED | OUTPATIENT
Start: 2024-08-15 | End: 2024-08-15 | Stop reason: HOSPADM

## 2024-08-15 RX ORDER — POLYETHYLENE GLYCOL 3350 17 G/17G
17 POWDER, FOR SOLUTION ORAL DAILY PRN
Status: DISCONTINUED | OUTPATIENT
Start: 2024-08-15 | End: 2024-08-16 | Stop reason: HOSPADM

## 2024-08-15 RX ORDER — ACETAMINOPHEN 325 MG/1
650 TABLET ORAL EVERY 4 HOURS PRN
Status: DISCONTINUED | OUTPATIENT
Start: 2024-08-15 | End: 2024-08-16 | Stop reason: HOSPADM

## 2024-08-15 RX ORDER — SODIUM CHLORIDE 9 MG/ML
INJECTION, SOLUTION INTRAVENOUS PRN
Status: DISCONTINUED | OUTPATIENT
Start: 2024-08-15 | End: 2024-08-16

## 2024-08-15 RX ORDER — FENTANYL CITRATE 50 UG/ML
INJECTION, SOLUTION INTRAMUSCULAR; INTRAVENOUS PRN
Status: DISCONTINUED | OUTPATIENT
Start: 2024-08-15 | End: 2024-08-15 | Stop reason: HOSPADM

## 2024-08-15 RX ORDER — SODIUM CHLORIDE 0.9 % (FLUSH) 0.9 %
5-40 SYRINGE (ML) INJECTION EVERY 12 HOURS SCHEDULED
Status: DISCONTINUED | OUTPATIENT
Start: 2024-08-15 | End: 2024-08-15 | Stop reason: SDUPTHER

## 2024-08-15 RX ORDER — LACTOBACILLUS RHAMNOSUS GG 10B CELL
1 CAPSULE ORAL DAILY
Status: DISCONTINUED | OUTPATIENT
Start: 2024-08-15 | End: 2024-08-16 | Stop reason: HOSPADM

## 2024-08-15 RX ADMIN — SODIUM CHLORIDE, PRESERVATIVE FREE 10 ML: 5 INJECTION INTRAVENOUS at 21:39

## 2024-08-15 RX ADMIN — Medication 2000 UNITS: at 21:40

## 2024-08-15 RX ADMIN — SODIUM CHLORIDE, PRESERVATIVE FREE 10 ML: 5 INJECTION INTRAVENOUS at 10:09

## 2024-08-15 RX ADMIN — SODIUM CHLORIDE: 9 INJECTION, SOLUTION INTRAVENOUS at 21:47

## 2024-08-15 RX ADMIN — TICAGRELOR 90 MG: 90 TABLET ORAL at 21:39

## 2024-08-15 RX ADMIN — Medication 1 CAPSULE: at 18:24

## 2024-08-15 RX ADMIN — HYDRALAZINE HYDROCHLORIDE 10 MG: 20 INJECTION INTRAMUSCULAR; INTRAVENOUS at 18:57

## 2024-08-15 RX ADMIN — SODIUM CHLORIDE: 9 INJECTION, SOLUTION INTRAVENOUS at 10:08

## 2024-08-15 RX ADMIN — ATORVASTATIN CALCIUM 40 MG: 40 TABLET, FILM COATED ORAL at 18:24

## 2024-08-15 RX ADMIN — WATER 2000 MG: 1 INJECTION INTRAMUSCULAR; INTRAVENOUS; SUBCUTANEOUS at 11:55

## 2024-08-15 NOTE — PROGRESS NOTES
1012 Admitted into 2e09   ambulatory for TAVR.  Dheeraj NPO, accompanied by wife and 2 sons.  Vital signs obtained.  Assessment and data collection intiated.   Oriented to room.   Policies and procedures for 2E explained.   All questions answered with no further questions at this time.   Fall precautions reviewed with patient.     Patient instructed NO METFORMIN OR GLUCOPHAGE  For 3 days post procedure due to dye use.       Optiview sacrum pad put on coccyx, dated and marked with a P for preventative.   Body shave done.    1030 Care plan reviewed with patient and  family.   Patient and  family verbalize understanding of the plan of care and contribute to goal setting.     1155 Ancef given as ordered.   Dr Arguello in to speak with family.    1230 to cath lab per bed, stable condition.     1252 report called to Anya on 3b.  Family aware patient going to 3b27 post procedure.      1449 family aware of patients new room number, family brought to ICU, belongings brought to ICU by family.  Report to Miya on ICU, aware family is waiting in ICU waiting room.

## 2024-08-15 NOTE — CONSULTS
CRITICAL CARE PROGRESS NOTE      Patient:  Raymond Hummel    Unit/Bed:4D-14/014-A  YOB: 1940  MRN: [id number]   PCP: Duran Dukes MD  Date of Admission: 8/15/2024  Chief Complaint:- elective TAVR    Assessment and Plan:    Shock: Secondary to suicide ventricle.  Patient was hypotensive after TAVR procedure.  Patient received 1.5 L of normal saline.  To which patient responded well; SBP 150s to 160s.  Patient also had a brief episode of asystole for which he got received atropine.  Continue fluid resuscitation.  Sever aortic stenosis:  s/p TAVR. Echo (6/12/24) reported AV mean gradient 27 mmHg.  GREG 0.6 at 5 cm².  V-max 4.1 m/s, max gradient 66 mmH, mean gradient 32mmhg. Patient is following Dr. Arguello.   HOCM: Echo 6/12/24: Reported concentric hypertrophy of the left ventricle w/ LVOT obstruction at rest w/ TYSHAWN. Pt declines tx because of cost.   Systolic heart failure: CMR 45%. Echo 6/12/24 reported   On GDMT therapy:  On hold, Will appreciated cardiology recommendations.   Valsartan-/12.5 daily  Toprol 25 daily  Aldactone 25 daily  Norvasc 5 twice daily  Started on Brilinta 90mg and Aspirin 81mg.   HLD:   continue home medication atorvastatin 40mg.   HTN:  Norvasc currently on hold due to presenting with Hypotension  Will resume under cardiology recommendations.   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   POC glucose check q6h.   Placed on low scale insulin  CAD: s/p PCI to LAD, 50% of RCA, 70% of OM 2.     INITIAL H AND P AND ICU COURSE:  Patient is an 84-year-old male with past medical history of CAD with status post PCI to LAD x 1, aortic stenosis status post TAVR, HLD, diverticulosis, type II diabetic, systolic heart failure who was here for scheduled TAVR surgery by Dr. Arguello.  Post TAVR surgery patient was found to be hypotensive and was given 1.5 L of saline bolus.  Patient blood pressure responded well in between 150s to 160s.  Patient also had a brief episode of asystole for which he  received atropine.  Patient was transferred to ICU for further monitoring.      Past Medical History: HTN, HLD, HCOM, diverticulitis, CAD PCI stent to LADx 1.  Family History: Mother Alzheimer disease.  Father Parkinson disease.  Social History: Notes past smoking history.  Alcohol 7 glasses of wine per week.    ROS   Patient complains of increased urinary frequency.  Patient denies chest pain, chest palpitation, headache, nausea, vomiting, abdominal pain.    Scheduled Meds:   sodium chloride flush  5-40 mL IntraVENous 2 times per day    chlorhexidine gluconate   Topical Once    [START ON 8/16/2024] aspirin  81 mg Oral Daily    Vitamin D  2 tablet Oral BID    ticagrelor  90 mg Oral BID    atorvastatin  40 mg Oral Daily    lactobacillus  1 capsule Oral Daily     Continuous Infusions:   sodium chloride      VASOpressin 20 Units in sodium chloride 0.9 % 100 mL infusion      sodium chloride         PHYSICAL EXAMINATION:  T:  97.9.  P:  100. RR:  26. B/P:  129/66. O2 Sat:  94.  I/O:  0/30  Body mass index is 29.27 kg/m².  GCS:   15    General:   Patient is an ill-appearing male.  HEENT:  normocephalic and atraumatic.  No scleral icterus. PERR  Neck: supple.  No Thyromegaly.  Lungs: clear to auscultation.  No retractions  Cardiac: RRR.  No JVD.  Abdomen: soft, nontender.  Extremities:  No clubbing, cyanosis, or edema x 4.    Vasculature: capillary refill < 3 seconds. Palpable dorsalis pedis pulses.  Skin:  warm and dry.  Psych:  Alert and oriented x3.  Affect appropriate  Lymph:  No supraclavicular adenopathy.  Neurologic:  No focal deficit. No seizures.    Data: (All radiographs, tracings, PFTs, and imaging are personally viewed and interpreted unless otherwise noted).   Sodium 138, potassium 4.5, bicarb 25, BUN 14, creatinine 0.6, glucose 148, calcium 9.5, total protein 7.1.  WBC 7.4, RBC 5.44, hemoglobin 16.3, MCV 87.5, platelets 181.    Case and plan discussed with Dr. Ordonez.      Electronically signed by Geremias MCCALL

## 2024-08-15 NOTE — H&P
[facility]  Sedation/Analgesia History & Physical    Pt Name: Raymond Hummel  Account number: [id number]  MRN: [id number]  YOB: 1940  Provider Performing Procedure: Erick Arguello MD MD  Referring Provider: Erick Arguello MD   Primary Care Physician: Vincent Sanders DO  Date: 8/15/2024    PRE-PROCEDURE    Code Status: FULL CODE  Brief History/Pre-Procedure Diagnosis:  Severe As  NYHA III CHF    Consent: : I have discussed with the patient risks, benefits, and alternatives (and relevant risks, benefits, and side effects related to alternatives or not receiving care), and likelihood of the success.   The patient and/or representative appear to understand and agree to proceed.  The discussion encompasses risks, benefits, and side effects related to the alternatives and the risks related to not receiving the proposed care, treatment, and services.     The indication, risks and benefits of the procedure and possible therapeutic consequences and alternatives were discussed with the patient. The patient was given the opportunity to ask questions and to have them answered to his/her satisfaction. Risks of the procedure include but are not limited to the following: Bleeding, hematoma including retroperitoneal hemmorhage, infection, pain and discomfort, injury to the aorta and other blood vessels, rhythm disturbance, low blood pressure, myocardial infarction, stroke, kidney damage/failure, myocardial perforation, allergic reactions to sedatives/contrast material, loss of pulse/vascular compromise requiring surgery, aneurysm/pseudoaneurysm formation, possible loss of a limb/hand/leg, needing blood transfusion, requiring emergent open heart surgery or emergent coronary intervention, the need for intubation/respiratory support, the requirement for defibrillation/cardioversion, and death. Alternatives to and omission of the suggested procedure were discussed. The patient had no further  3/6 MEHDI  NYHA: 3  Lungs: Clear to auscultation    Abdomen: BS present, without HSM, masses, or tenderness    Extremities: without C,C,E.  Pulses 2+ bilaterally  Mental Status: Alert & Oriented        PLANNED PROCEDURE   []Cath  []PCI                []Pacemaker/AICD  []JARAD             []Cardioversion []Peripheral angiography/PTA  [x]Other: TAVR     SEDATION  Planned agent:[x]Midazolam []Meperidine [x]Sublimaze []Morphine  []Diazepam  []Other:     ASA Classification:  []1 []2 [x]3 []4 []5  Class 1: A normal healthy patient  Class 2: Pt with mild to moderate systemic disease  Class 3: Severe systemic disease or disturbance  Class 4: Severe systemic disorders that are already life threatening.  Class 5: Moribund pt with little chances of survival, for more than 24 hours.  Mallampati I Airway Classification:   []1 []2 [x]3 []4    [x]Pre-procedure diagnostic studies complete and results available.   Comment:    [x]Previous sedation/anesthesia experiences assessed.   Comment:    [x]The patient is an appropriate candidate to undergo the planned procedure sedation and anesthesia. (Refer to nursing sedation/analgesia documentation record)  [x]Formulation and discussion of sedation/procedure plan, risks, and expectations with patient and/or responsible adult completed.  [x]Patient examined immediately prior to the procedure. (Refer to nursing sedation/analgesia documentation record)    Erick Arguello MD MD   Electronically signed 8/15/2024 at 11:54 AM

## 2024-08-15 NOTE — PROGRESS NOTES
Patient arrived to unit from cath lab via bed. Patient transferred to ICU bed and placed on continuous ICU bedside monitor. Patient admitted for Aortic stenosis, severe [I35.0]  Severe aortic stenosis [I35.0]. Vitals obtained. See flowsheets. Patient's IV access includes right AC, left hand. Current infusions and rates of infusion include NS @ 999 mL/hr. Assessment completed by Miya DESHPANDE. Two nurse skin assessment completed by Miya DESHPANDE and Andre DESHPANDE. See flowsheets for assessment details. Policies and procedures of ICU able to be explained to patient at this time. Family member(s)/representative(s) present at time of admission include wife, son. Patient rights explained to family member(s)/representatives and patient, as able. Patient/patient's family member(s)/representative(s) Declined to have physician notified of their admission. All questions posed by patient's family member(s)/representative(s) and patient answered at this time.     Patient complaining of urge to urinate upon arrival. Patient attempted using urinal, but unable to. Bladder scan 400, Straight cath per order by Dr. Sargent. Fem stop in place from cath lab.     At 1530 patient complaining of severe urge to urinate again, bladder scan showing 400 again. Straight cath'd per verbal order from Dr. Sargent. At 1800, patient complaining of urge to urinate. Attempted urinal, but no success due to patient being unable to sit up because of bedrest restrictions post cath. Orders to place morrissey. Coude catheter placed.

## 2024-08-15 NOTE — SIGNIFICANT EVENT
Patient to ICU for concern for suicide ventricle.  Patient was reportedly hypotensive and Cath Lab.  Patient received 1.5 L of saline.  Blood pressure now systolically in the 150s to 160s.  FemoStop in place on right groin access which is hemostatic at this time.  Patient has what appears to be left bundle branch block on monitor.  Alert and oriented.  Nurse plans to start releasing FemoStop around 5 PM.  Wife is at bedside.  No complaints.  Primary nurse updated.    Electronically signed by JEAN Santos CNP on 8/15/2024 at 4:50 PM

## 2024-08-15 NOTE — OP NOTE
DATE: 08/15/24    PATIENT: Raymond Hummel    MRN: [id number]     Acct: [id number]    PREOP DIAGNOSIS:   Severe aortic stenosis    Postop diagnosis:  Severe aortic stenosis    Procedure:  Transcatheter aortic valve replacement with an Zapata Resilia prosthesis    OPERATORS:  Erick Arguello MD; Lydia Giang MD    PROCEDURE:  Co-surgeon procedure performed.  Vascular access via fluoroscopy with valve sizing and positioning. Valve deployed and functioning well.    ESTIMATED BLOOD LOSS: 50 ml     IMMEDIATE COMPLICATIONS:  None.     MEDICATIONS:  See EMR.     SUMMARY:  Successful transcatheter aortic valve replacement with a Zapata resilia valve via transfemoral approach.

## 2024-08-15 NOTE — CARE COORDINATION
Case Management Assessment Initial Evaluation    Date/Time of Evaluation: 8/15/2024 12:26 PM  Assessment Completed by: Anna Ramos RN    If patient is discharged prior to next notation, then this note serves as note for discharge by case management.    Patient Name: Raymond Hummel                   YOB: 1940  Diagnosis: Aortic stenosis, severe [I35.0]  Severe aortic stenosis [I35.0]                   Date / Time: 8/15/2024  8:44 AM  Location: Cath Pool Room/     Patient Admission Status: Inpatient   Readmission Risk Low 0-14, Mod 15-19), High > 20: Readmission Risk Score: 7.7    Current PCP: Duran Dueks MD  Health Care Decision Makers:   Primary Decision Maker: Gela Estephanie-Cell - Spouse - [phone]    Secondary Decision Maker: Heriberto Hummel - Child - [phone]    Secondary Decision Maker: JuanC arlos Hummel POA - Child - [phone]    Additional Case Management Notes: Here for elective procedure, TAVR to be done today with Dr. Arguello. ECHO to be done in the am.  Anticipate discharge tomorrow if medically ready and after ECHO is read by Dr. Arguello.     Procedure:   8/15 TAVR procedure with Dr. Arguello.   8/16 ECHO: to be done.     Patient Goals/Plan/Treatment Preferences: Spoke with pt and wife. They live at home together. Denies any discharge needs. PCP verified with pt, he sees Dr. Dukes not Dr. Sanders. Updated Care Team.       08/15/24 1221   Service Assessment   Patient Orientation Alert and Oriented   Cognition Alert   History Provided By Patient;Spouse   Primary Caregiver Self   Accompanied By/Relationship wife   Support Systems Spouse/Significant Other;Children;Family Members   Patient's Healthcare Decision Maker is: Patient Declined (Legal Next of Kin Remains as Decision Maker)   PCP Verified by CM Yes   Last Visit to PCP Within last 3 months   Prior Functional Level Independent in ADLs/IADLs   Current Functional Level Independent in ADLs/IADLs   Can patient return to prior living  arrangement Yes   Ability to make needs known: Good   Family able to assist with home care needs: Yes   Would you like for me to discuss the discharge plan with any other family members/significant others, and if so, who? No   Financial Resources Medicare   Community Resources None   Social/Functional History   Active  Yes   Discharge Planning   Type of Residence Other (Comment)  (Condo)   Living Arrangements Spouse/Significant Other   Current Services Prior To Admission None   Potential Assistance Needed N/A   DME Ordered? No   Potential Assistance Purchasing Medications No   Type of Home Care Services None   Patient expects to be discharged to: Other (comment)  (Condo)   Services At/After Discharge   Mode of Transport at Discharge Other (see comment)  (family)   Confirm Follow Up Transport Self   Condition of Participation: Discharge Planning   The Plan for Transition of Care is related to the following treatment goals: \"get this done and go home\"

## 2024-08-16 ENCOUNTER — APPOINTMENT (OUTPATIENT)
Age: 84
DRG: 267 | End: 2024-08-16
Attending: INTERNAL MEDICINE
Payer: MEDICARE

## 2024-08-16 ENCOUNTER — TELEPHONE (OUTPATIENT)
Dept: UROLOGY | Age: 84
End: 2024-08-16

## 2024-08-16 VITALS
DIASTOLIC BLOOD PRESSURE: 87 MMHG | SYSTOLIC BLOOD PRESSURE: 171 MMHG | BODY MASS INDEX: 29.36 KG/M2 | HEIGHT: 69 IN | OXYGEN SATURATION: 99 % | TEMPERATURE: 97.6 F | HEART RATE: 79 BPM | RESPIRATION RATE: 28 BRPM | WEIGHT: 198.19 LBS

## 2024-08-16 LAB
ANION GAP SERPL CALC-SCNC: 12 MEQ/L (ref 8–16)
APTT PPP: 31.9 SECONDS (ref 22–38)
BUN SERPL-MCNC: 17 MG/DL (ref 7–22)
CALCIUM SERPL-MCNC: 8.4 MG/DL (ref 8.5–10.5)
CHLORIDE SERPL-SCNC: 104 MEQ/L (ref 98–111)
CO2 SERPL-SCNC: 21 MEQ/L (ref 23–33)
CREAT SERPL-MCNC: 0.5 MG/DL (ref 0.4–1.2)
DEPRECATED RDW RBC AUTO: 42.1 FL (ref 35–45)
ECHO AO ASC DIAM: 4.2 CM
ECHO AO ASCENDING AORTA INDEX: 2.04 CM/M2
ECHO AV MEAN GRADIENT: 13 MMHG
ECHO AV MEAN VELOCITY: 1.7 M/S
ECHO AV PEAK GRADIENT: 26 MMHG
ECHO AV PEAK VELOCITY: 2.5 M/S
ECHO AV VTI: 51.2 CM
ECHO BSA: 2.09 M2
ECHO LA AREA 2C: 30.8 CM2
ECHO LA AREA 4C: 27.1 CM2
ECHO LA DIAMETER INDEX: 2.28 CM/M2
ECHO LA DIAMETER: 4.7 CM
ECHO LA MAJOR AXIS: 6.4 CM
ECHO LA MINOR AXIS: 8 CM
ECHO LA VOL BP: 107 ML (ref 18–58)
ECHO LA VOL MOD A2C: 97 ML (ref 18–58)
ECHO LA VOL MOD A4C: 95 ML (ref 18–58)
ECHO LA VOL/BSA BIPLANE: 52 ML/M2 (ref 16–34)
ECHO LA VOLUME INDEX MOD A2C: 47 ML/M2 (ref 16–34)
ECHO LA VOLUME INDEX MOD A4C: 46 ML/M2 (ref 16–34)
ECHO LV E' LATERAL VELOCITY: 3 CM/S
ECHO LV E' SEPTAL VELOCITY: 3 CM/S
ECHO LV EDV A2C: 175 ML
ECHO LV EDV A4C: 183 ML
ECHO LV EDV INDEX A4C: 89 ML/M2
ECHO LV EDV NDEX A2C: 85 ML/M2
ECHO LV EJECTION FRACTION A2C: 60 %
ECHO LV EJECTION FRACTION A4C: 57 %
ECHO LV EJECTION FRACTION BIPLANE: 59 % (ref 55–100)
ECHO LV ESV A2C: 70 ML
ECHO LV ESV A4C: 78 ML
ECHO LV ESV INDEX A2C: 34 ML/M2
ECHO LV ESV INDEX A4C: 38 ML/M2
ECHO LV FRACTIONAL SHORTENING: 38 % (ref 28–44)
ECHO LV INTERNAL DIMENSION DIASTOLE INDEX: 2.33 CM/M2
ECHO LV INTERNAL DIMENSION DIASTOLIC: 4.8 CM (ref 4.2–5.9)
ECHO LV INTERNAL DIMENSION SYSTOLIC INDEX: 1.46 CM/M2
ECHO LV INTERNAL DIMENSION SYSTOLIC: 3 CM
ECHO LV ISOVOLUMETRIC RELAXATION TIME (IVRT): 74 MS
ECHO LV IVSD: 2.1 CM (ref 0.6–1)
ECHO LV MASS 2D: 419.5 G (ref 88–224)
ECHO LV MASS INDEX 2D: 203.7 G/M2 (ref 49–115)
ECHO LV POSTERIOR WALL DIASTOLIC: 1.6 CM (ref 0.6–1)
ECHO LV RELATIVE WALL THICKNESS RATIO: 0.67
ECHO LVOT AREA: 3.5 CM2
ECHO LVOT DIAM: 2.1 CM
ECHO MV A VELOCITY: 1.78 M/S
ECHO MV E DECELERATION TIME (DT): 260 MS
ECHO MV E VELOCITY: 1 M/S
ECHO MV E/A RATIO: 0.56
ECHO MV E/E' LATERAL: 33.33
ECHO MV E/E' RATIO (AVERAGED): 33.33
ECHO MV E/E' SEPTAL: 33.33
ECHO MV MAX VELOCITY: 1.7 M/S
ECHO MV MEAN GRADIENT: 5 MMHG
ECHO MV MEAN VELOCITY: 1.1 M/S
ECHO MV PEAK GRADIENT: 11 MMHG
ECHO MV VTI: 66.2 CM
ECHO PULMONARY ARTERY END DIASTOLIC PRESSURE: 8 MMHG
ECHO PV MAX VELOCITY: 0.6 M/S
ECHO PV PEAK GRADIENT: 1 MMHG
ECHO PV REGURGITANT MAX VELOCITY: 1.4 M/S
ECHO RV INTERNAL DIMENSION: 4 CM
ECHO RV TAPSE: 1.8 CM (ref 1.7–?)
ECHO TV E WAVE: 0.8 M/S
ECHO TV REGURGITANT MAX VELOCITY: 2.29 M/S
ECHO TV REGURGITANT PEAK GRADIENT: 21 MMHG
EKG ATRIAL RATE: 67 BPM
EKG ATRIAL RATE: 81 BPM
EKG P AXIS: 44 DEGREES
EKG P AXIS: 54 DEGREES
EKG P-R INTERVAL: 240 MS
EKG P-R INTERVAL: 252 MS
EKG Q-T INTERVAL: 432 MS
EKG Q-T INTERVAL: 440 MS
EKG Q-T INTERVAL: 478 MS
EKG QRS DURATION: 170 MS
EKG QRS DURATION: 172 MS
EKG QRS DURATION: 178 MS
EKG QTC CALCULATION (BAZETT): 501 MS
EKG QTC CALCULATION (BAZETT): 505 MS
EKG QTC CALCULATION (BAZETT): 573 MS
EKG R AXIS: -70 DEGREES
EKG R AXIS: -84 DEGREES
EKG R AXIS: -84 DEGREES
EKG T AXIS: 57 DEGREES
EKG T AXIS: 77 DEGREES
EKG T AXIS: 88 DEGREES
EKG VENTRICULAR RATE: 102 BPM
EKG VENTRICULAR RATE: 67 BPM
EKG VENTRICULAR RATE: 81 BPM
ERYTHROCYTE [DISTWIDTH] IN BLOOD BY AUTOMATED COUNT: 13.1 % (ref 11.5–14.5)
GFR SERPL CREATININE-BSD FRML MDRD: > 90 ML/MIN/1.73M2
GLUCOSE SERPL-MCNC: 245 MG/DL (ref 70–108)
HCT VFR BLD AUTO: 41.4 % (ref 42–52)
HGB BLD-MCNC: 14 GM/DL (ref 14–18)
INR PPP: 1.16 (ref 0.85–1.13)
MCH RBC QN AUTO: 30 PG (ref 26–33)
MCHC RBC AUTO-ENTMCNC: 33.8 GM/DL (ref 32.2–35.5)
MCV RBC AUTO: 88.7 FL (ref 80–94)
PLATELET # BLD AUTO: 141 THOU/MM3 (ref 130–400)
PMV BLD AUTO: 10.7 FL (ref 9.4–12.4)
POTASSIUM SERPL-SCNC: 3.8 MEQ/L (ref 3.5–5.2)
RBC # BLD AUTO: 4.67 MILL/MM3 (ref 4.7–6.1)
SODIUM SERPL-SCNC: 137 MEQ/L (ref 135–145)
WBC # BLD AUTO: 10.3 THOU/MM3 (ref 4.8–10.8)

## 2024-08-16 PROCEDURE — 2580000003 HC RX 258: Performed by: INTERNAL MEDICINE

## 2024-08-16 PROCEDURE — 99232 SBSQ HOSP IP/OBS MODERATE 35: CPT | Performed by: INTERNAL MEDICINE

## 2024-08-16 PROCEDURE — 93010 ELECTROCARDIOGRAM REPORT: CPT | Performed by: INTERNAL MEDICINE

## 2024-08-16 PROCEDURE — APPSS60 APP SPLIT SHARED TIME 46-60 MINUTES: Performed by: PHYSICIAN ASSISTANT

## 2024-08-16 PROCEDURE — 80048 BASIC METABOLIC PNL TOTAL CA: CPT

## 2024-08-16 PROCEDURE — 6370000000 HC RX 637 (ALT 250 FOR IP): Performed by: PHYSICIAN ASSISTANT

## 2024-08-16 PROCEDURE — 6360000002 HC RX W HCPCS: Performed by: INTERNAL MEDICINE

## 2024-08-16 PROCEDURE — 36415 COLL VENOUS BLD VENIPUNCTURE: CPT

## 2024-08-16 PROCEDURE — 93306 TTE W/DOPPLER COMPLETE: CPT | Performed by: INTERNAL MEDICINE

## 2024-08-16 PROCEDURE — 6370000000 HC RX 637 (ALT 250 FOR IP): Performed by: NURSE PRACTITIONER

## 2024-08-16 PROCEDURE — 93005 ELECTROCARDIOGRAM TRACING: CPT | Performed by: INTERNAL MEDICINE

## 2024-08-16 PROCEDURE — 93306 TTE W/DOPPLER COMPLETE: CPT

## 2024-08-16 PROCEDURE — 85027 COMPLETE CBC AUTOMATED: CPT

## 2024-08-16 PROCEDURE — 85730 THROMBOPLASTIN TIME PARTIAL: CPT

## 2024-08-16 PROCEDURE — 6370000000 HC RX 637 (ALT 250 FOR IP): Performed by: INTERNAL MEDICINE

## 2024-08-16 PROCEDURE — 85610 PROTHROMBIN TIME: CPT

## 2024-08-16 RX ORDER — FINASTERIDE 5 MG/1
5 TABLET, FILM COATED ORAL DAILY
Qty: 30 TABLET | Refills: 3 | Status: SHIPPED | OUTPATIENT
Start: 2024-08-16

## 2024-08-16 RX ORDER — HYDRALAZINE HYDROCHLORIDE 25 MG/1
25 TABLET, FILM COATED ORAL EVERY 8 HOURS SCHEDULED
Qty: 90 TABLET | Refills: 3 | Status: SHIPPED | OUTPATIENT
Start: 2024-08-16

## 2024-08-16 RX ORDER — HYDRALAZINE HYDROCHLORIDE 25 MG/1
25 TABLET, FILM COATED ORAL EVERY 8 HOURS SCHEDULED
Status: DISCONTINUED | OUTPATIENT
Start: 2024-08-16 | End: 2024-08-16 | Stop reason: HOSPADM

## 2024-08-16 RX ORDER — METFORMIN HCL 500 MG
500 TABLET, EXTENDED RELEASE 24 HR ORAL 2 TIMES DAILY
Qty: 30 TABLET | Refills: 3 | Status: SHIPPED
Start: 2024-08-17

## 2024-08-16 RX ORDER — AMLODIPINE BESYLATE 5 MG/1
5 TABLET ORAL 2 TIMES DAILY
Status: DISCONTINUED | OUTPATIENT
Start: 2024-08-16 | End: 2024-08-16 | Stop reason: HOSPADM

## 2024-08-16 RX ORDER — LOSARTAN POTASSIUM 25 MG/1
25 TABLET ORAL DAILY
Qty: 30 TABLET | Refills: 3 | Status: SHIPPED | OUTPATIENT
Start: 2024-08-16

## 2024-08-16 RX ORDER — FINASTERIDE 5 MG/1
5 TABLET, FILM COATED ORAL DAILY
Status: DISCONTINUED | OUTPATIENT
Start: 2024-08-16 | End: 2024-08-16 | Stop reason: HOSPADM

## 2024-08-16 RX ORDER — METOPROLOL SUCCINATE 25 MG/1
12.5 TABLET, EXTENDED RELEASE ORAL DAILY
Qty: 45 TABLET | Refills: 3 | Status: SHIPPED
Start: 2024-08-20

## 2024-08-16 RX ADMIN — HYDRALAZINE HYDROCHLORIDE 10 MG: 20 INJECTION INTRAMUSCULAR; INTRAVENOUS at 12:08

## 2024-08-16 RX ADMIN — BISACODYL 5 MG: 5 TABLET, COATED ORAL at 03:29

## 2024-08-16 RX ADMIN — TICAGRELOR 90 MG: 90 TABLET ORAL at 07:51

## 2024-08-16 RX ADMIN — HYDRALAZINE HYDROCHLORIDE 25 MG: 25 TABLET ORAL at 14:50

## 2024-08-16 RX ADMIN — SODIUM CHLORIDE, PRESERVATIVE FREE 10 ML: 5 INJECTION INTRAVENOUS at 07:51

## 2024-08-16 RX ADMIN — ATORVASTATIN CALCIUM 40 MG: 40 TABLET, FILM COATED ORAL at 07:51

## 2024-08-16 RX ADMIN — SODIUM CHLORIDE: 9 INJECTION, SOLUTION INTRAVENOUS at 11:30

## 2024-08-16 RX ADMIN — AMLODIPINE BESYLATE 5 MG: 5 TABLET ORAL at 13:32

## 2024-08-16 RX ADMIN — Medication 2000 UNITS: at 07:51

## 2024-08-16 RX ADMIN — ASPIRIN 81 MG: 81 TABLET, COATED ORAL at 07:51

## 2024-08-16 RX ADMIN — ACETAMINOPHEN 650 MG: 325 TABLET ORAL at 04:09

## 2024-08-16 RX ADMIN — Medication 1 CAPSULE: at 07:51

## 2024-08-16 ASSESSMENT — PAIN DESCRIPTION - LOCATION
LOCATION: GENERALIZED
LOCATION: GENERALIZED

## 2024-08-16 ASSESSMENT — PAIN SCALES - GENERAL
PAINLEVEL_OUTOF10: 2
PAINLEVEL_OUTOF10: 5
PAINLEVEL_OUTOF10: 3

## 2024-08-16 NOTE — TELEPHONE ENCOUNTER
Consult for urinary retention post TAVR  He denies problems voinding prior   Beatty removed, needs OV next wk for PVR with kimberly to establish care  Allergy to flomax, started finasteride

## 2024-08-16 NOTE — PLAN OF CARE
Problem: ABCDS Injury Assessment  Goal: Absence of physical injury  Outcome: Progressing     Problem: Discharge Planning  Goal: Discharge to home or other facility with appropriate resources  Outcome: Progressing     Problem: Safety - Adult  Goal: Free from fall injury  Outcome: Progressing     Problem: Respiratory - Adult  Goal: Achieves optimal ventilation and oxygenation  Outcome: Progressing     Problem: Cardiovascular - Adult  Goal: Maintains optimal cardiac output and hemodynamic stability  Outcome: Progressing  Goal: Absence of cardiac dysrhythmias or at baseline  Outcome: Progressing     Problem: Metabolic/Fluid and Electrolytes - Adult  Goal: Electrolytes maintained within normal limits  Outcome: Progressing  Goal: Hemodynamic stability and optimal renal function maintained  Outcome: Progressing  Goal: Glucose maintained within prescribed range  Outcome: Progressing     Problem: Hematologic - Adult  Goal: Maintains hematologic stability  Outcome: Progressing

## 2024-08-16 NOTE — DISCHARGE INSTRUCTIONS
Post Transcatheter Aortic Valve Replacement (TAVR) Discharge Instructions    Your follow-up appointments and echocardiogram will be scheduled by Dr. Arguello's office and their office staff and will call you with the date and time.  We are here for you! If you have any questions, please call:   Katy VAZQUEZ, [phone]    Do you have the help you need at home? Someone must be with you for the first 7 days or until you are seen in the office post TAVR.      ACTIVITY:  Weigh yourself every day in the morning after using the bathroom.    NO DRIVING UNTIL YOU RETURN TO THE DOCTOR'S OFFICE.  You may ride in a car.   Do not lift more than five to ten pounds for the first week (A gallon of milk is 7 lbs)  Get up and get dressed every day. Do not stay in bed. Set a daily routine. This is an important step in re-gaining your strength.  You may walk up and down a few stairs.   Walk as much as you can.  This will help facilitate the healing process.  Plan rest periods during the day.  Deep breathe and use your incentive spirometer 10 times every hour while you are awake.   Avoid work that increases muscle tension (straining with bowel movements, moving furniture)      WOUND CARE:  Remove Band-Aids after 48 hours of placement.  May shower once Band-Aids are removed. No bathtubs, pools, or hot tubs for the first week you are home.   Cleanse wounds with Hibiclens soap and water. Pat incision dry. Keep wounds open to air after Band-Aids are removed and keep dry.   A small amount of bloody or clear drainage is normal. Call if there is any extensive bruising, oozing or hematoma.  Place manual pressure over incision sites when coughing, sneezing, vomiting, or straining for a bowl movement.  Watch for signs of infections: redness, incision hot to the touch, fever greater than 101 degrees, swelling at the groin or incision site, or discolored drainage from your incision.  When coughing, sneezing, straining for a bowel  in damage to the heart valve that could result in death.   Before an MRI (magnetic resonance imaging) procedure, always notify the doctor (or medical technician) that you have an implanted heart valve.      What symptoms or health problems do you need to look out for after you leave the hospital? Call your physician if you have any of these symptoms: (Phone Number: [phone])  Weight pound gain of 3 pounds in one day or 5 pounds in one week. Weight gain is NOT normal.    Swelling of the legs, ankles, or feet  Increasing shortness of breath  Change in the color or temperature of your lower legs and feet  Develop abdominal pain or unrelieved nausea and/or vomiting  Develop any redness, incision, or limited movement of your arms or legs  Signs of infection: redness, incision hot to the touch, fevers greater than 101 degrees, or colored drainage from your incision  Seroma is an accumulation of fluid in the tissues at the groin surgical site. Symptoms may include: a lump near the groin surgical site, clear fluid draining from the site, redness, warmth, or swelling.            Cardiac Rehab:  Cardiac Rehab has been recommended for you. It is an outpatient program of support, exercise, and education led by health professionals and personalized to strengthen your your heart. It helps you make long-term lifestyle changes so you can live a longer healthier life. The program is proven to keep you out of the hospital and reduce your risk of death from heart conditions. The Cardiac Rehab staff will follow-up with you and provide further information regarding the program and hours of operation. For questions or more information, call Cardiac Rehab at [phone].

## 2024-08-16 NOTE — PROGRESS NOTES
Inpatient Cardiac Rehabilitation Consult    Received consult for Phase II Cardiac Rehabilitation.  Patient needs cardiac rehab due to TAVR on 8/15/24.  Importance of Cardiac Rehab discussed with patient.  Reviewed cardiac rehab class times.  Patient questions answered.  We will contact patient at home to schedule evaluation appointment.    Cardiac Rehab brochure given.

## 2024-08-16 NOTE — PLAN OF CARE
Problem: ABCDS Injury Assessment  Goal: Absence of physical injury  8/16/2024 1027 by Miya Simmons, RN  Outcome: Progressing  Flowsheets (Taken 8/16/2024 1027)  Absence of Physical Injury: Implement safety measures based on patient assessment     Problem: Discharge Planning  Goal: Discharge to home or other facility with appropriate resources  8/16/2024 1027 by Miya Simmons, RN  Outcome: Progressing  Flowsheets (Taken 8/16/2024 1027)  Discharge to home or other facility with appropriate resources:   Identify barriers to discharge with patient and caregiver   Arrange for needed discharge resources and transportation as appropriate   Identify discharge learning needs (meds, wound care, etc)   Refer to discharge planning if patient needs post-hospital services based on physician order or complex needs related to functional status, cognitive ability or social support system     Problem: Safety - Adult  Goal: Free from fall injury  8/16/2024 1027 by Miya Simmons, RN  Outcome: Progressing  Flowsheets (Taken 8/16/2024 1027)  Free From Fall Injury:   Instruct family/caregiver on patient safety   Based on caregiver fall risk screen, instruct family/caregiver to ask for assistance with transferring infant if caregiver noted to have fall risk factors     Problem: Respiratory - Adult  Goal: Achieves optimal ventilation and oxygenation  8/16/2024 1027 by Miya Simmons, RN  Outcome: Progressing  Flowsheets (Taken 8/16/2024 1027)  Achieves optimal ventilation and oxygenation:   Assess for changes in respiratory status   Assess for changes in mentation and behavior   Position to facilitate oxygenation and minimize respiratory effort   Oxygen supplementation based on oxygen saturation or arterial blood gases   Encourage broncho-pulmonary hygiene including cough, deep breathe, incentive spirometry   Assess the need for suctioning and aspirate as needed   Assess and instruct to report shortness of breath or any respiratory

## 2024-08-16 NOTE — CONSULTS
[facility] ICU 4D  [address] [address]  Dept: [phone]  Loc: [phone]  Visit Date: 8/7/2024    Urology Consult Note    Reason for Consult:  urinary retention, hematuria  Requesting Physician:  tate MCKEON    History Obtained From:  patient, electronic medical record    Chief Complaint: aortic stenosis    HISTORY OF PRESENT ILLNESS:                The patient is a 84 y.o. male with significant past medical history of see below who presents with for procedure  TAVR  Uro consulted for see above      Past Medical History:        Diagnosis Date    Diverticulosis     Heart murmur     High triglycerides     History of colonic polyps     Hypertension     Insulin resistance     LVH (left ventricular hypertrophy)      Past Surgical History:        Procedure Laterality Date    CARDIAC PROCEDURE Bilateral 6/12/2024    Left and right heart cath / coronary angiography performed by Erick Arguello MD at [facility] CARDIAC CATH LAB    CARDIAC PROCEDURE N/A 6/12/2024    Percutaneous coronary intervention performed by Erick Arguello MD at [facility] CARDIAC CATH LAB    CHOLECYSTECTOMY      COLONOSCOPY  2003, 2006, 2011    HERNIA REPAIR  1980    x 2    SIGMOIDOSCOPY      TONSILLECTOMY       Allergies:  Sulfa antibiotics  Social History:  Social History     Socioeconomic History    Marital status:      Spouse name: Tali    Number of children: 2    Years of education: Not on file    Highest education level: Not on file   Occupational History    Not on file   Tobacco Use    Smoking status: Never     Passive exposure: Past    Smokeless tobacco: Never   Vaping Use    Vaping status: Never Used   Substance and Sexual Activity    Alcohol use: Yes     Alcohol/week: 7.0 standard drinks of alcohol     Types: 7 Glasses of wine per week     Comment: 1 drink a day     Drug use: No    Sexual activity: Not on file   Other Topics Concern    Not on file   Social History Narrative    Not  oriented times x3, no acute distress, and cooperative to examination with appropriate mood and affect.   HEENT:   Head:         Normocephalic and atraumatic.   Mouth/Throat:          Mucous membranes are normal.   Eyes:         EOM are normal. No scleral icterus.  Nose:    The external appearance of the nose is normal  Ears:     The ears appear normal to external inspection.   Cardiovascular:       Normal rate, regular rhythm.  Pulmonary/Chest:  Normal respiratory rate and rhthym. No use of accessory muscles.  Abdominal:          Soft. No tenderness. Active bowel sounds             Genitalia:    Beatty +tea colored urine  Neurological:    Alert and oriented.     DATA:  CBC:   Lab Results   Component Value Date/Time    WBC 10.3 08/16/2024 04:15 AM    RBC 4.67 08/16/2024 04:15 AM    HGB 14.0 08/16/2024 04:15 AM    HCT 41.4 08/16/2024 04:15 AM    MCV 88.7 08/16/2024 04:15 AM    MCH 30.0 08/16/2024 04:15 AM    MCHC 33.8 08/16/2024 04:15 AM     08/16/2024 04:15 AM    MPV 10.7 08/16/2024 04:15 AM     BMP:    Lab Results   Component Value Date/Time     08/16/2024 04:15 AM    K 3.8 08/16/2024 04:15 AM     08/16/2024 04:15 AM    CO2 21 08/16/2024 04:15 AM    BUN 17 08/16/2024 04:15 AM    CREATININE 0.5 08/16/2024 04:15 AM    CALCIUM 8.4 08/16/2024 04:15 AM    LABGLOM > 90 08/16/2024 04:15 AM    LABGLOM >60 06/28/2023 06:33 AM    GLUCOSE 245 08/16/2024 04:15 AM     BUN/Creatinine:    Lab Results   Component Value Date/Time    BUN 17 08/16/2024 04:15 AM    CREATININE 0.5 08/16/2024 04:15 AM     Magnesium:  No results found for: \"MG\"  Phosphorus:  No results found for: \"PHOS\"  PT/INR:    Lab Results   Component Value Date/Time    INR 1.16 08/16/2024 04:15 AM     U/A:    Lab Results   Component Value Date/Time    NITRITE negative 06/21/2016 02:36 PM    COLORU YELLOW 06/13/2019 06:36 AM    PHUR 5.5 06/13/2019 06:36 AM    LABCAST NONE SEEN 06/13/2019 06:36 AM    LABCAST NONE SEEN 06/13/2019 06:36 AM    WBCUA 0-2

## 2024-08-16 NOTE — PROGRESS NOTES
Pt was in bed as his wife was with him. He was dealing with aortic stenosis. He was encouraged   08/16/24 1248   Encounter Summary   Encounter Overview/Reason Initial Encounter   Service Provided For Patient and family together   Referral/Consult From Other    Support System Spouse   Last Encounter  08/16/24  (Anointed.)   Complexity of Encounter Low   Begin Time 1010   End Time  1017   Total Time Calculated 7 min   Spiritual/Emotional needs   Type Spiritual Support   Rituals, Rites and Sacraments   Type Anointing   Assessment/Intervention/Outcome   Assessment Hopeful   Intervention Empowerment      and anointed.

## 2024-08-16 NOTE — CARE COORDINATION
8/16/24, 12:45 PM EDT    Home with wife. Declines HH.   Patient goals/plan/ treatment preferences discussed by  and .  Patient goals/plan/ treatment preferences reviewed with patient/ family.  Patient/ family verbalize understanding of discharge plan and are in agreement with goal/plan/treatment preferences.  Understanding was demonstrated using the teach back method.  AVS provided by RN at time of discharge, which includes all necessary medical information pertaining to the patients current course of illness, treatment, post-discharge goals of care, and treatment preferences.     Services At/After Discharge: None    08/16/24 2:14 PM    Decision made to keep patient today. Beatty to be removed, and voiding trial. Monitor overnight to make sure no more issues w/bleeding or urinating. Plan for possible discharge tomorrow if stable.

## 2024-08-16 NOTE — PROGRESS NOTES
Patient noted to have bloody urine overnight. Urology consult placed by Juan Carlos Avendano NP contacted. Upon rounding, ordered to remove morrissey to see if patient can urinate on own, if so ok to go home if family is comfortable with it. Wife Tali and son Raymond Pascal Verbalized they would be ok with him being discharged home if able to void on his own. At 1455 patient voided pink urine. Discharge order received for today. Patient education provided to patient and family who verbalized understanding of medications and follow up appointments. Urology to call on Monday to follow up. Ivs removed, belongings sent home with patient.

## 2024-08-16 NOTE — DISCHARGE SUMMARY
Structural Heart/Cardiology Discharge Summary       Name:  Raymond Hummel  YOB: 1940  Medical Record Number:  [id number]    Date of Admission:  8/15/2024  Date of Discharge:  8/16/2024    Admitting physician: Erick Arguello MD  Discharge to: Home  Condition at d/c: Stable    Time spent on discharge: <60 minutes / >60 minutes    Hospital Problem List:  Patient Active Problem List   Diagnosis    Myalgia    Bursitis of ankle    Diverticulitis large intestine    Hypertension    Memory difficulties    IFG (impaired fasting glucose)    Heart murmur    Diastolic dysfunction    Hyperlipidemia    Abnormal findings on cardiac catheterization    Status post angioplasty with stent    Nonrheumatic aortic valve stenosis    Aortic stenosis, severe    History of transcatheter aortic valve replacement (TAVR)       Procedures: TAVR 8/15/2024    Hospital Course: Underwent successful TAVR on 8/15/2024.  Hgb remained stable post procedure.  No rhythm issues post-procedure.  Urology consulted for hematuria with recommendations given, see consult for full disclosure.    Discharge physical examination:   Vitals:    08/16/24 0900   BP: (!) 150/58   Pulse: 66   Resp: 29   Temp:    SpO2:      General Appearance: alert ,conversing, in no acute distress  Cardiovascular: normal rate, regular rhythm, normal S1 and S2, no murmurs, rubs, clicks, or gallops  Pulmonary/Chest: clear to auscultation bilaterally- no wheezes, rales or rhonchi, normal air movement, no respiratory distress  Neurological: alert, oriented, normal speech, no focal findings or movement disorder noted.   Pulses: Bilateral radial, femoral, DP, and PT pulses noted  Lower Extremity: No edema noted. Groin incisions healing appropriately-No signs of infection or hematoma.    Discharge Medications:         Medication List        ASK your doctor about these medications      Align Prebiotic-Probiotic 5-1.25 MG-GM Chew     amLODIPine 10 MG tablet  Commonly known as:

## 2024-08-16 NOTE — PROCEDURES
PROCEDURE NOTE  Date: 8/16/2024   Name: Raymond Hummel  YOB: 1940    Procedures  12 lead EKG completed. Results handed to Holly DESHPANDE.

## 2024-08-16 NOTE — SIGNIFICANT EVENT
Patient had Beatty removed per urology.  I did speak with patient and family they would like to be discharged I explained that patient must void prior to discharge.  Patient voiced understanding.  I did explain that he cannot stop his antiplatelet secondary to recent stent placement and if he was unable to void and sent home that he could experience urinary retention.  He voices understanding.  I explained to follow-up family and patient if he was able to void would be happy to discharge him this afternoon.    Updated by nurse that patient did void in the toilet.  She states it was mildly pink in color.  Patient is comfortable going home.  I did explain if he has difficulty voiding or has not voided for multiple hours or feels distention or pressure in his lower abdomen that he needs to be seen in the emergency department.  He voiced understanding.  His sons voiced understanding and his wife also voiced understanding.  Primary nurse updated.  Patient will discharge with family.  I updated Dr. Arguello    Electronically signed by JEAN Santos CNP on 8/16/2024 at 3:24 PM

## 2024-08-17 LAB — BACTERIA UR CULT: NORMAL

## 2024-08-17 NOTE — CONSULTS
CRITICAL CARE PROGRESS NOTE      Patient:  Raymond Hummel    Unit/Bed:4D-14/014-A  YOB: 1940  MRN: [id number]   PCP: Duran Dukes MD  Date of Admission: 8/15/2024  Chief Complaint:- elective TAVR    Assessment and Plan:    Gross hematuria: Likely secondary from traumatic Beatty/straight cath insertions.  Beatty bag was noted to contain gross red urine.  Urology consult was placed, recommended removal of the Beatty for self voiding.  If patient able to void he can be discharged, if unable to void can be discharged with/without Beatty and follow-up on Monday with urology as an outpatient.  Patient was able to void, noted to have mildly pink in color urination in the toilet bowl.  Patient was informed that his antiplatelet therapy cannot be stopped.  Patient voiced understanding.  Patient family member also updated on the situation, in agreement for discharge if patient able to void.   Patient educated if he has persistent bleeding/unable to void, follow-up with urology.  Hx of Sever aortic stenosis:  s/p TAVR. Echo (6/12/24) reported AV mean gradient 27 mmHg.  GREG 0.6 at 5 cm².  V-max 4.1 m/s, max gradient 66 mmH, mean gradient 32mmhg. Patient is following Dr. Arguello.   Patient did not have any overnight episodes of arrhythmias, pauses, asystole.  HOCM: Echo 6/12/24: Reported concentric hypertrophy of the left ventricle w/ LVOT obstruction at rest w/ TYSHAWN. Pt declines tx because of cost.   Systolic heart failure: CMR 45%. Echo 6/12/24 reported   On GDMT therapy:  On hold, Will appreciated cardiology recommendations.   Valsartan-/12.5 daily  Toprol 25 daily  Aldactone 25 daily  Norvasc 5 twice daily  Started on Brilinta 90mg and Aspirin 81mg.   HLD:   continue home medication atorvastatin 40mg.   HTN:  Norvasc currently on hold due to presenting with Hypotension  Will resume under cardiology recommendations.   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   POC glucose check q6h.   Placed on low scale  x3.  Affect appropriate  Lymph:  No supraclavicular adenopathy.  Neurologic:  No focal deficit. No seizures.    Data: (All radiographs, tracings, PFTs, and imaging are personally viewed and interpreted unless otherwise noted).   Sodium 137, potassium 3.8, chloride 104, bicarb 21, BUN 17, creatinine 0.5, calcium 8.4.  WBC 10.3, RBC 4.67, hemoglobin 14.0, hematocrit 41.4, MCV 88.7, platelets 141.    Case and plan discussed with Dr. Ordonez.      Electronically signed by Geremias Sargent DO  CRITICAL CARE SPECIALIST  Patient seen by me including key components of medical care.  Case discussed with resident physician.  Discharge home.  Patient in stable condition..  Italicized bold font, if present,  represents changes to the note made by me.    Time was discontiguous. Time does not include procedure. Time does include my direct assessment of the patient and coordination of care.  Time represents more than 50% of the time involved with patient care by the CC team.  Electronically signed by Duran Ordonez MD.

## 2024-08-19 ENCOUNTER — TELEPHONE (OUTPATIENT)
Dept: CARDIOLOGY CLINIC | Age: 84
End: 2024-08-19

## 2024-08-19 DIAGNOSIS — Z95.2 S/P TAVR (TRANSCATHETER AORTIC VALVE REPLACEMENT): Primary | ICD-10-CM

## 2024-08-19 NOTE — TELEPHONE ENCOUNTER
Orders received from Dr. Arguello to schedule the patient for her 1 year post TAVR follow up appointment with an ECHO, CBC, and BMP.    Appointment is scheduled with Lavern for 8/21/25 at 1130.  ECHO scheduled for 8/15/25 at ___.     Diana, can you please schedule ECHO?    Dr. Arguello, please agree.

## 2024-08-19 NOTE — DISCHARGE SUMMARY
Patient:  Raymond Hummel                 Unit/Bed:4D-14/014-A  YOB: 1940  MRN: [id number]          PCP: Duran Dukes MD  Date of Admission: 8/15/2024  Chief Complaint:- elective TAVR     Assessment and Plan:    Gross hematuria: Likely secondary from traumatic Beatty/straight cath insertions.  Beatty bag was noted to contain gross red urine.  Urology consult was placed, recommended removal of the Beatty for self voiding.  If patient able to void he can be discharged, if unable to void can be discharged with/without Beatty and follow-up on Monday with urology as an outpatient.  Patient was able to void, noted to have mildly pink in color urination in the toilet bowl.  Patient was informed that his antiplatelet therapy cannot be stopped.  Patient voiced understanding.  Patient family member also updated on the situation, in agreement for discharge if patient able to void.   Patient educated if he has persistent bleeding/unable to void, follow-up with urology.  Hx of Sever aortic stenosis:  s/p TAVR. Echo (6/12/24) reported AV mean gradient 27 mmHg.  GREG 0.6 at 5 cm².  V-max 4.1 m/s, max gradient 66 mmH, mean gradient 32mmhg. Patient is following Dr. Arguello.   Patient did not have any overnight episodes of arrhythmias, pauses, asystole.  HOCM: Echo 6/12/24: Reported concentric hypertrophy of the left ventricle w/ LVOT obstruction at rest w/ TYSHAWN. Pt declines tx because of cost.   Systolic heart failure: CMR 45%. Echo 6/12/24 reported   On GDMT therapy:  On hold, Will appreciated cardiology recommendations.   Valsartan-/12.5 daily  Toprol 25 daily  Aldactone 25 daily  Norvasc 5 twice daily  Started on Brilinta 90mg and Aspirin 81mg.   HLD:   continue home medication atorvastatin 40mg.   HTN:  Norvasc currently on hold due to presenting with Hypotension  Will resume under cardiology recommendations.   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   POC glucose check q6h.   Placed on low scale insulin  CAD: s/p PCI to

## 2024-08-20 ENCOUNTER — OFFICE VISIT (OUTPATIENT)
Dept: CARDIOTHORACIC SURGERY | Age: 84
End: 2024-08-20
Payer: MEDICARE

## 2024-08-20 VITALS
HEART RATE: 97 BPM | HEIGHT: 69 IN | WEIGHT: 204 LBS | DIASTOLIC BLOOD PRESSURE: 59 MMHG | SYSTOLIC BLOOD PRESSURE: 142 MMHG | BODY MASS INDEX: 30.21 KG/M2 | OXYGEN SATURATION: 97 %

## 2024-08-20 DIAGNOSIS — Z95.2 S/P TAVR (TRANSCATHETER AORTIC VALVE REPLACEMENT): Primary | ICD-10-CM

## 2024-08-20 PROCEDURE — 99214 OFFICE O/P EST MOD 30 MIN: CPT | Performed by: PHYSICIAN ASSISTANT

## 2024-08-20 PROCEDURE — 3078F DIAST BP <80 MM HG: CPT | Performed by: PHYSICIAN ASSISTANT

## 2024-08-20 PROCEDURE — 1124F ACP DISCUSS-NO DSCNMKR DOCD: CPT | Performed by: PHYSICIAN ASSISTANT

## 2024-08-20 PROCEDURE — 3077F SYST BP >= 140 MM HG: CPT | Performed by: PHYSICIAN ASSISTANT

## 2024-08-20 NOTE — PROGRESS NOTES
gallops  Pulmonary/Chest: clear to auscultation bilaterally- no wheezes, rales or rhonchi, normal air movement, no respiratory distress  Abdomen:soft, non-tender, non-distended, normal bowel sounds, no bruits,   Extremities: no cyanosis, clubbing or edema.   Pulses: Bilateral radial, femoral and DP pulses intact. No femoral thrills noted.  Skin: warm and dry, no rash or erythema  Head: normocephalic and atraumatic  Neck: supple and non-tender without mass, no thyromegaly, no JVD   Musculoskeletal: no joint swelling, deformity or tenderness.   Neurological: alert, oriented, normal speech, no focal findings or movement disorder noted.   Groin: Wounds healing appropriately. No signs of infection or hematoma.     Assessment:   Aortic Stenosis  -  S/P TAVR on 8/15/24  -  no symptoms of AS at this time    Plan:   Follow up with Dr. Arguello in 1 month with a CBC, BMP, and Echo.  Resume Toprol XL    Issues discussed in detail with the patient, who understands and has no further questions. Time spent with patient: 32 minutes, of which more than 50% was spent counseling/coordinating the patient's care.     The plan of care was discussed in detail with Dr. Arguello.

## 2024-08-20 NOTE — TELEPHONE ENCOUNTER
1 year post TAVR ECHO scheduled for 8/15/25 at 11:00am.    Future appts will be given at 30 day appt.

## 2024-08-20 NOTE — PATIENT INSTRUCTIONS
You may receive a survey regarding the care you received during your visit.  Your input is valuable to us.  We encourage you to complete and return your survey.  We hope you will choose us in the future for your healthcare needs.    Thank you for choosing [facility]!    Your Medical Assistant Today:  Kayy CAMPBELL   Your Provider for Today: Rancho [place] PA-C, Ph. [phone]    Have a Great Day!      Restart the Metoprolol

## 2024-08-23 ENCOUNTER — TELEPHONE (OUTPATIENT)
Dept: CARDIAC REHAB | Age: 84
End: 2024-08-23

## 2024-08-23 NOTE — TELEPHONE ENCOUNTER
8/23/24: attempted to schedule pt for cardiac rehab evaluation, he asked us to call him back next week

## 2024-09-03 ENCOUNTER — TELEPHONE (OUTPATIENT)
Dept: CARDIOLOGY CLINIC | Age: 84
End: 2024-09-03

## 2024-09-03 ENCOUNTER — OFFICE VISIT (OUTPATIENT)
Dept: UROLOGY | Age: 84
End: 2024-09-03
Payer: MEDICARE

## 2024-09-03 VITALS — BODY MASS INDEX: 30.26 KG/M2 | HEIGHT: 69 IN | WEIGHT: 204.3 LBS

## 2024-09-03 DIAGNOSIS — R39.12 BENIGN PROSTATIC HYPERPLASIA WITH WEAK URINARY STREAM: ICD-10-CM

## 2024-09-03 DIAGNOSIS — N40.1 BENIGN PROSTATIC HYPERPLASIA WITH WEAK URINARY STREAM: ICD-10-CM

## 2024-09-03 DIAGNOSIS — R31.9 HEMATURIA, UNSPECIFIED TYPE: ICD-10-CM

## 2024-09-03 DIAGNOSIS — R33.9 URINARY RETENTION: Primary | ICD-10-CM

## 2024-09-03 LAB
BILIRUBIN, URINE: NEGATIVE
BLOOD URINE, POC: ABNORMAL
CHARACTER, URINE: CLEAR
COLOR, UA: YELLOW
GLUCOSE URINE: NEGATIVE MG/DL
KETONES, URINE: NEGATIVE
LEUKOCYTE CLUMPS, URINE: NEGATIVE
NITRITE, URINE: NEGATIVE
PH, URINE: 5 (ref 5–9)
POST VOID RESIDUAL (PVR): 152 ML
PROTEIN, URINE: 100 MG/DL
SPECIFIC GRAVITY UA: 1.02 (ref 1–1.03)
UROBILINOGEN, URINE: 2 EU/DL (ref 0–1)

## 2024-09-03 PROCEDURE — 81003 URINALYSIS AUTO W/O SCOPE: CPT

## 2024-09-03 PROCEDURE — 99214 OFFICE O/P EST MOD 30 MIN: CPT

## 2024-09-03 PROCEDURE — 1124F ACP DISCUSS-NO DSCNMKR DOCD: CPT

## 2024-09-03 PROCEDURE — 51798 US URINE CAPACITY MEASURE: CPT

## 2024-09-03 RX ORDER — TAMSULOSIN HYDROCHLORIDE 0.4 MG/1
0.4 CAPSULE ORAL DAILY
Qty: 30 CAPSULE | Refills: 5 | Status: SHIPPED | OUTPATIENT
Start: 2024-09-03

## 2024-09-03 NOTE — PROGRESS NOTES
Passive exposure: Past   Smokeless Tobacco Never       Social History     Substance and Sexual Activity   Alcohol Use Yes    Alcohol/week: 7.0 standard drinks of alcohol    Types: 7 Glasses of wine per week    Comment: 1 drink a day        REVIEW OF SYSTEMS:  Constitutional: negative  Eyes: negative  Respiratory: negative  Cardiovascular: negative  Gastrointestinal: negative  Musculoskeletal: negative  Genitourinary: negative except for what is in HPI  Skin: negative   Neurological: negative  Hematological/Lymphatic: negative  Psychological: negative    Physical Exam:    There were no vitals filed for this visit.  Patient is a 84 y.o. male in no acute distress and alert and oriented to person, place and time.    Pulmonary: Non-labored respiration.  Cardiovascular: Normal rate, regular rhythm, normal peripheral pulses.  Skin: Warm and dry.  Psych: Normal mood and affect.  Genitourinary: Bladder non-distended and non-tender.      Assessment and Plan   Acute Urinary Retention  Gross Hematuria  - Urinary retention in the setting of UTI and gross hematuria. Started on Finasteride while IP due to \"Flomax allergy.\" Successful VT at discharge. Urine has remained clear since coming out of the hospital.  - Hematuria likely due to Brilinta and ASA. Prescribed following TAVR in June. First episode of gross hematuria. No smoking history. No family history of  malignancy.  - Patient and family would prefer to hold off on Cystoscopy. If patient experiences more episodes of gross hematuria then will revisit hematuria workup.    3.   BPH w/ LUTS  - Worsening urinary symptoms leading up to retention. Patient does not have a true Sulfa Allergy. Will trial Flomax 0.4 mg QD.  - No PSA since 2018. Check in 3-4 weeks to avoid false elevation.    *Follow-up in 4-6 weeks for symptom check, PSA review, and PVR.    Doug Lockwood PA-C  Urology

## 2024-09-04 ENCOUNTER — OFFICE VISIT (OUTPATIENT)
Dept: CARDIOLOGY CLINIC | Age: 84
End: 2024-09-04
Payer: MEDICARE

## 2024-09-04 VITALS
HEART RATE: 75 BPM | OXYGEN SATURATION: 96 % | SYSTOLIC BLOOD PRESSURE: 132 MMHG | RESPIRATION RATE: 18 BRPM | WEIGHT: 202 LBS | HEIGHT: 69 IN | DIASTOLIC BLOOD PRESSURE: 58 MMHG | BODY MASS INDEX: 29.92 KG/M2

## 2024-09-04 DIAGNOSIS — R42 DIZZINESS: ICD-10-CM

## 2024-09-04 DIAGNOSIS — Z95.2 S/P TAVR (TRANSCATHETER AORTIC VALVE REPLACEMENT): Primary | ICD-10-CM

## 2024-09-04 DIAGNOSIS — R60.0 EDEMA OF BOTH LOWER LEGS: ICD-10-CM
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
## 2024-09-04 DIAGNOSIS — I50.32 CHRONIC DIASTOLIC CONGESTIVE HEART FAILURE, NYHA CLASS 3 (HCC): ICD-10-CM

## 2024-09-04 DIAGNOSIS — I42.2 HYPERTROPHIC CARDIOMYOPATHY (HCC): ICD-10-CM

## 2024-09-04 DIAGNOSIS — R55 PRE-SYNCOPE: ICD-10-CM

## 2024-09-04 LAB
BASOPHILS ABSOLUTE: 0.02 K/UL (ref 0–0.2)
BASOPHILS RELATIVE PERCENT: 0.3 %
EOSINOPHILS ABSOLUTE: 0.08 K/UL (ref 0–0.5)
EOSINOPHILS RELATIVE PERCENT: 1.3 %
HCT VFR BLD CALC: 32.7 % (ref 40–51)
HEMOGLOBIN: 10.5 G/DL (ref 13.5–17)
IMMATURE GRANS (ABS): 0.01
IMMATURE GRANULOCYTES %: 0.2 %
LYMPHOCYTES ABSOLUTE: 1.05 K/UL (ref 1–4)
LYMPHOCYTES RELATIVE PERCENT: 16.9 %
MCH RBC QN AUTO: 29.2 PG (ref 25–33)
MCHC RBC AUTO-ENTMCNC: 32.1 G/DL (ref 31–36)
MCV RBC AUTO: 90.8 FL (ref 80–99)
MONOCYTES ABSOLUTE: 0.49 K/UL (ref 0.2–1)
MONOCYTES RELATIVE PERCENT: 7.9 %
NEUTROPHILS ABSOLUTE: 4.57 K/UL (ref 1.5–7.5)
NEUTROPHILS RELATIVE PERCENT: 73.4 %
PDW BLD-RTO: 13.3 % (ref 11.5–15)
PLATELET # BLD: 132 K/UL (ref 130–400)
RBC # BLD: 3.6 M/UL (ref 4.5–6.1)
WBC # BLD: 6.2 K/UL (ref 3.5–11)

## 2024-09-04 PROCEDURE — 99214 OFFICE O/P EST MOD 30 MIN: CPT | Performed by: NURSE PRACTITIONER

## 2024-09-04 PROCEDURE — 3078F DIAST BP <80 MM HG: CPT | Performed by: NURSE PRACTITIONER

## 2024-09-04 PROCEDURE — 1124F ACP DISCUSS-NO DSCNMKR DOCD: CPT | Performed by: NURSE PRACTITIONER

## 2024-09-04 PROCEDURE — 3075F SYST BP GE 130 - 139MM HG: CPT | Performed by: NURSE PRACTITIONER

## 2024-09-04 RX ORDER — BUMETANIDE 0.5 MG/1
0.5 TABLET ORAL DAILY PRN
Qty: 30 TABLET | Refills: 0 | Status: SHIPPED | OUTPATIENT
Start: 2024-09-04

## 2024-09-04 ASSESSMENT — ENCOUNTER SYMPTOMS
VOMITING: 0
COUGH: 0
ABDOMINAL DISTENTION: 0
NAUSEA: 0
SHORTNESS OF BREATH: 0

## 2024-09-04 NOTE — PATIENT INSTRUCTIONS
You may receive a survey regarding the care you received during your visit.  Your input is valuable to us.  We encourage you to complete and return your survey.  We hope you will choose us in the future for your healthcare needs.    Your nurses today were Kylie.  Office hours:   Mon-Thurs 8-4:30  Friday 8-12  Phone: [phone]    Continue:  Continue current medications  Daily weights and record  Fluid restriction of 2 Liters per day  Limit sodium in diet to around 0258-1194 mg/day  Monitor BP  Activity as tolerated     Call the Heart Failure Clinic for any of the following symptoms:   Weight gain of -3 pounds in 1 day or 5 pounds in 1 week  Increased shortness of breath  Shortness of breath while laying down  Cough  Chest pain  Swelling in feet, ankles or legs  Bloating in abdomen  Fatigue        Bumex 0.5mg daily for 5 days with a banana    Stop your hydralazine    14 day monitor ordered    Blood work today    Continue diet/fluid adherence  Continue daily wts.  F/U w/ Cardiology  F/U in clinic 8 weeks

## 2024-09-04 NOTE — PROGRESS NOTES
today. Stopping hydralazine and he will keep BP log.     PT was instructed to go to the ER d/t being a poor historian and no one being sure of the severity of the fall. Pt refused to be evaluated. He understands his risk of a brain bleed. Neuro exam was negative and no obvious contusions/abrasions on his head/face.      Lab reviewed - K 3.8 Cr 0.5 hgb 14    ECHO 2024: mod dilated LA, trace paravalvular regurg,   ECHO 2024: mod dilated LA, severe concentric hypertrophy w/ TYSHAWN of mitral leaflet  CATH 6/2024: s/p PCI to LAD, 50% of RCA, 70% of OM 2  RHC 2024: wedge of 15, RA 12, PAD 19, CO 3.8 CI 1.9    Bumex 0.5mg daily for 5 days with a banana    Stop your hydralazine    14 day monitor ordered    Blood work today    Continue diet/fluid adherence  Continue daily wts.  F/U w/ Cardiology  F/U in clinic 8 weeks      Tolerating above noted HF meds, no ill side effects noted. Will continue to monitor kidney function and electrolytes. Will optimize as tolerated.   Pt is compliant w/ medications.    Total visit time of 25 minutes has been spent with patient on education of symptoms, management, medication, and plan of care; as well as review of chart: labs, ECHO, radiology reports, etc.   I personally spent more then 50% of the appt time face to face with the patient.  Daily weights  Fluid restriction of 2 Liters per day  Limit sodium in diet to around 6215-1026 mg/day  Monitor BP  Activity as tolerated     Patient was instructed to call the Heart Failure Clinic for any changes in symptoms as noted in AVS.      Return in about 8 weeks (around 10/30/2024). or sooner if needed     Patient given educational materials - see patient instructions.   We discussed the importance of weighing oneself and recording daily. We also discussed the importance of a low sodium diet, higher sodium foods to avoid and better low sodium food options.   Patient verbalizes understanding of plan of care using teach back method, and is agreeable to

## 2024-09-05 ENCOUNTER — HOSPITAL ENCOUNTER (OUTPATIENT)
Age: 84
Discharge: HOME | End: 2024-09-05
Payer: MEDICARE

## 2024-09-05 ENCOUNTER — TELEPHONE (OUTPATIENT)
Dept: CARDIOLOGY CLINIC | Age: 84
End: 2024-09-05

## 2024-09-05 DIAGNOSIS — I50.32 CHRONIC DIASTOLIC CONGESTIVE HEART FAILURE, NYHA CLASS 3 (HCC): Primary | ICD-10-CM

## 2024-09-05 DIAGNOSIS — I50.32 CHRONIC DIASTOLIC CONGESTIVE HEART FAILURE, NYHA CLASS 3 (HCC): ICD-10-CM

## 2024-09-05 DIAGNOSIS — Z95.2 S/P TAVR (TRANSCATHETER AORTIC VALVE REPLACEMENT): Primary | ICD-10-CM

## 2024-09-05 LAB
ALBUMIN: 4.5 G/DL (ref 3.5–5.2)
ALK PHOSPHATASE: 60 U/L (ref 40–125)
ALT SERPL-CCNC: 22 U/L (ref 5–50)
ANION GAP SERPL CALC-SCNC: 12 MEQ/L (ref 8–16)
AST SERPL-CCNC: 58 U/L (ref 9–50)
BILIRUB SERPL-MCNC: 2.4 MG/DL
BILIRUBIN DIRECT: 0.4 MG/DL (ref 0–0.3)
BUN SERPL-MCNC: 17 MG/DL (ref 7–22)
CALCIUM SERPL-MCNC: 9.2 MG/DL (ref 8.5–10.5)
CHLORIDE SERPL-SCNC: 104 MEQ/L (ref 98–111)
CO2 SERPL-SCNC: 22 MEQ/L (ref 23–33)
CREAT SERPL-MCNC: 0.5 MG/DL (ref 0.4–1.2)
GFR SERPL CREATININE-BSD FRML MDRD: > 90 ML/MIN/1.73M2
GLUCOSE SERPL-MCNC: 124 MG/DL (ref 70–108)
POTASSIUM SERPL-SCNC: 4 MEQ/L (ref 3.5–5.2)
PSA, ULTRASENSITIVE: 0.14 NG/ML
SODIUM SERPL-SCNC: 138 MEQ/L (ref 135–145)
TOTAL PROTEIN: 6.6 G/DL (ref 6.1–8.3)

## 2024-09-05 PROCEDURE — 80048 BASIC METABOLIC PNL TOTAL CA: CPT

## 2024-09-05 PROCEDURE — 36415 COLL VENOUS BLD VENIPUNCTURE: CPT

## 2024-09-05 NOTE — TELEPHONE ENCOUNTER
Case discussed w/ Dr. Arguello.     Ordering JARAD for further evaluation of aortic valve. - Discussed with pt, please call Heriberto to schedule.

## 2024-09-06 RX ORDER — SODIUM CHLORIDE 0.9 % (FLUSH) 0.9 %
5-40 SYRINGE (ML) INJECTION EVERY 12 HOURS SCHEDULED
Status: DISCONTINUED | OUTPATIENT
Start: 2024-09-06 | End: 2024-09-09 | Stop reason: HOSPADM

## 2024-09-06 RX ORDER — SODIUM CHLORIDE 0.9 % (FLUSH) 0.9 %
5-40 SYRINGE (ML) INJECTION PRN
Status: DISCONTINUED | OUTPATIENT
Start: 2024-09-06 | End: 2024-09-09 | Stop reason: HOSPADM

## 2024-09-06 RX ORDER — SODIUM CHLORIDE 9 MG/ML
25 INJECTION, SOLUTION INTRAVENOUS PRN
Status: DISCONTINUED | OUTPATIENT
Start: 2024-09-06 | End: 2024-09-09 | Stop reason: HOSPADM

## 2024-09-06 RX ORDER — SODIUM CHLORIDE 9 MG/ML
INJECTION, SOLUTION INTRAVENOUS CONTINUOUS
Status: DISCONTINUED | OUTPATIENT
Start: 2024-09-06 | End: 2024-09-09 | Stop reason: HOSPADM

## 2024-09-06 NOTE — TELEPHONE ENCOUNTER
JARAD has been scheduled for 9/9/24 at 7am with Dr. Arguello, patient to arrive at 5:30am. Patient to be NPO at 10pm the night before. OR scheduling notified. Order faxed. On schedule.     Patient's son Heriberto was notified of the JARAD.     ECHO scheduled for 9/12/24 has been cancelled due to patient getting the JARAD done. Patient's son Heriberto was notified of cancellation.

## 2024-09-09 ENCOUNTER — TELEPHONE (OUTPATIENT)
Dept: CARDIOLOGY CLINIC | Age: 84
End: 2024-09-09

## 2024-09-09 ENCOUNTER — APPOINTMENT (OUTPATIENT)
Age: 84
DRG: 267 | End: 2024-09-09
Attending: INTERNAL MEDICINE
Payer: MEDICARE

## 2024-09-09 ENCOUNTER — HOSPITAL ENCOUNTER (OUTPATIENT)
Age: 84
Setting detail: OUTPATIENT SURGERY
Discharge: HOME | DRG: 267 | End: 2024-09-09
Attending: INTERNAL MEDICINE | Admitting: INTERNAL MEDICINE
Payer: MEDICARE

## 2024-09-09 VITALS
OXYGEN SATURATION: 93 % | HEART RATE: 71 BPM | WEIGHT: 198.8 LBS | DIASTOLIC BLOOD PRESSURE: 49 MMHG | TEMPERATURE: 97.5 F | HEIGHT: 69 IN | SYSTOLIC BLOOD PRESSURE: 105 MMHG | RESPIRATION RATE: 16 BRPM | BODY MASS INDEX: 29.44 KG/M2

## 2024-09-09 DIAGNOSIS — Z95.2 S/P TAVR (TRANSCATHETER AORTIC VALVE REPLACEMENT): ICD-10-CM

## 2024-09-09 LAB
AUTO DIFF PNL BLD: ABNORMAL
BASOPHILS ABSOLUTE: 0 THOU/MM3 (ref 0–0.1)
BASOPHILS NFR BLD AUTO: 0.6 %
BILIRUB CONJ SERPL-MCNC: 0.5 MG/DL (ref 0.1–13.8)
BILIRUB INDIRECT SERPL-MCNC: 2.2 MG/DL (ref 0–0.6)
BILIRUB SERPL-MCNC: 2.7 MG/DL (ref 0.3–1.2)
DEPRECATED RDW RBC AUTO: 48.1 FL (ref 35–45)
ECHO BSA: 2.1 M2
EOSINOPHIL NFR BLD AUTO: 1.1 %
EOSINOPHILS ABSOLUTE: 0.1 THOU/MM3 (ref 0–0.4)
ERYTHROCYTE [DISTWIDTH] IN BLOOD BY AUTOMATED COUNT: 14.8 % (ref 11.5–14.5)
HCT VFR BLD AUTO: 28.1 % (ref 42–52)
HGB BLD-MCNC: 9.2 GM/DL (ref 14–18)
HGB RETIC QN AUTO: 33.5 PG (ref 28.2–35.7)
IMM GRANULOCYTES # BLD AUTO: 0.03 THOU/MM3 (ref 0–0.07)
IMM GRANULOCYTES NFR BLD AUTO: 0.6 %
IMM RETICS NFR: 27.6 % (ref 2.3–13.4)
LYMPHOCYTES ABSOLUTE: 0.8 THOU/MM3 (ref 1–4.8)
LYMPHOCYTES NFR BLD AUTO: 14.7 %
MCH RBC QN AUTO: 29.9 PG (ref 26–33)
MCHC RBC AUTO-ENTMCNC: 32.7 GM/DL (ref 32.2–35.5)
MCV RBC AUTO: 91.2 FL (ref 80–94)
MONOCYTES ABSOLUTE: 0.5 THOU/MM3 (ref 0.4–1.3)
MONOCYTES NFR BLD AUTO: 9 %
NEUTROPHILS ABSOLUTE: 4 THOU/MM3 (ref 1.8–7.7)
NEUTROPHILS NFR BLD AUTO: 74 %
NRBC BLD AUTO-RTO: 0 /100 WBC
PATHOLOGIST REVIEW: ABNORMAL
PLATELET # BLD AUTO: 115 THOU/MM3 (ref 130–400)
PLATELET BLD QL SMEAR: ABNORMAL
PMV BLD AUTO: 13.8 FL (ref 9.4–12.4)
POIKILOCYTES: ABNORMAL
POLYCHROMASIA BLD QL SMEAR: ABNORMAL
RBC # BLD AUTO: 3.08 MILL/MM3 (ref 4.7–6.1)
RETICS # AUTO: 163 THOU/MM3 (ref 20–115)
RETICS/RBC NFR AUTO: 5.3 % (ref 0.5–2)
SCAN OF BLOOD SMEAR: NORMAL
SCHISTOCYTES BLD QL SMEAR: ABNORMAL
WBC # BLD AUTO: 5.4 THOU/MM3 (ref 4.8–10.8)

## 2024-09-09 PROCEDURE — 99152 MOD SED SAME PHYS/QHP 5/>YRS: CPT | Performed by: INTERNAL MEDICINE

## 2024-09-09 PROCEDURE — 93312 ECHO TRANSESOPHAGEAL: CPT | Performed by: INTERNAL MEDICINE

## 2024-09-09 PROCEDURE — 7100000011 HC PHASE II RECOVERY - ADDTL 15 MIN: Performed by: INTERNAL MEDICINE

## 2024-09-09 PROCEDURE — 83010 ASSAY OF HAPTOGLOBIN QUANT: CPT

## 2024-09-09 PROCEDURE — 2580000003 HC RX 258: Performed by: INTERNAL MEDICINE

## 2024-09-09 PROCEDURE — 93312 ECHO TRANSESOPHAGEAL: CPT

## 2024-09-09 PROCEDURE — 36415 COLL VENOUS BLD VENIPUNCTURE: CPT

## 2024-09-09 PROCEDURE — 93320 DOPPLER ECHO COMPLETE: CPT | Performed by: INTERNAL MEDICINE

## 2024-09-09 PROCEDURE — 82248 BILIRUBIN DIRECT: CPT

## 2024-09-09 PROCEDURE — 85046 RETICYTE/HGB CONCENTRATE: CPT

## 2024-09-09 PROCEDURE — 7100000010 HC PHASE II RECOVERY - FIRST 15 MIN: Performed by: INTERNAL MEDICINE

## 2024-09-09 PROCEDURE — 6360000002 HC RX W HCPCS: Performed by: INTERNAL MEDICINE

## 2024-09-09 PROCEDURE — 6370000000 HC RX 637 (ALT 250 FOR IP)

## 2024-09-09 PROCEDURE — 82247 BILIRUBIN TOTAL: CPT

## 2024-09-09 PROCEDURE — 85025 COMPLETE CBC W/AUTO DIFF WBC: CPT

## 2024-09-09 PROCEDURE — 2709999900 HC NON-CHARGEABLE SUPPLY: Performed by: INTERNAL MEDICINE

## 2024-09-09 PROCEDURE — 93325 DOPPLER ECHO COLOR FLOW MAPG: CPT | Performed by: INTERNAL MEDICINE

## 2024-09-09 RX ORDER — MIDAZOLAM HYDROCHLORIDE 1 MG/ML
INJECTION INTRAMUSCULAR; INTRAVENOUS PRN
Status: DISCONTINUED | OUTPATIENT
Start: 2024-09-09 | End: 2024-09-09 | Stop reason: ALTCHOICE

## 2024-09-09 RX ORDER — FENTANYL CITRATE 50 UG/ML
INJECTION, SOLUTION INTRAMUSCULAR; INTRAVENOUS PRN
Status: DISCONTINUED | OUTPATIENT
Start: 2024-09-09 | End: 2024-09-09 | Stop reason: ALTCHOICE

## 2024-09-09 RX ADMIN — SODIUM CHLORIDE: 9 INJECTION, SOLUTION INTRAVENOUS at 06:51

## 2024-09-09 ASSESSMENT — PAIN - FUNCTIONAL ASSESSMENT
PAIN_FUNCTIONAL_ASSESSMENT: NONE - DENIES PAIN

## 2024-09-09 NOTE — H&P
[facility]  Sedation/Analgesia History & Physical    Pt Name: Raymond Hummel  Account number: [id number]  MRN: [id number]  YOB: 1940  Provider Performing Procedure: Erick Arguello MD MD  Referring Provider: Erick Arguello MD   Primary Care Physician: Duran Dukes MD  Date: 9/9/2024    PRE-PROCEDURE    Code Status: FULL CODE  Brief History/Pre-Procedure Diagnosis:   S/p TAVR    Consent: : I have discussed with the patient risks, benefits, and alternatives (and relevant risks, benefits, and side effects related to alternatives or not receiving care), and likelihood of the success.   The patient and/or representative appear to understand and agree to proceed.  The discussion encompasses risks, benefits, and side effects related to the alternatives and the risks related to not receiving the proposed care, treatment, and services.     The indication, risks and benefits of the procedure and possible therapeutic consequences and alternatives were discussed with the patient. The patient was given the opportunity to ask questions and to have them answered to his/her satisfaction. Risks of the procedure include but are not limited to the following: Bleeding, hematoma including retroperitoneal hemmorhage, infection, pain and discomfort, injury to the aorta and other blood vessels, rhythm disturbance, low blood pressure, myocardial infarction, stroke, kidney damage/failure, myocardial perforation, allergic reactions to sedatives/contrast material, loss of pulse/vascular compromise requiring surgery, aneurysm/pseudoaneurysm formation, possible loss of a limb/hand/leg, needing blood transfusion, requiring emergent open heart surgery or emergent coronary intervention, the need for intubation/respiratory support, the requirement for defibrillation/cardioversion, and death. Alternatives to and omission of the suggested procedure were discussed. The patient had no further questions and

## 2024-09-09 NOTE — PROGRESS NOTES
Pt admitted to Endo department and admitted to Endo room 9  Plan of care reviewed with patient.   Call light within reach.   Bed in lowest position, locked, with one bed rail up.   Appropriate arm bands on patient.   Bathroom offered.   All questions and concerns of patient addressed    Name: Rupesh  Relationship to patient: Wife and Son  Phone number: [phone] (Son)

## 2024-09-09 NOTE — PROGRESS NOTES
Pt in phase 2 of recovery. Fully awake, denies pain.     Dr. Arguello spoke with family regarding procedure findings. Labs drawn in recovery.     Discharge instructions given.

## 2024-09-11 LAB — HAPTOGLOB SERPL-MCNC: < 10 MG/DL (ref 30–200)

## 2024-09-12 ENCOUNTER — OFFICE VISIT (OUTPATIENT)
Dept: CARDIOLOGY CLINIC | Age: 84
End: 2024-09-12
Payer: MEDICARE

## 2024-09-12 ENCOUNTER — HOSPITAL ENCOUNTER (INPATIENT)
Age: 84
LOS: 12 days | Discharge: HOME | DRG: 267 | End: 2024-09-24
Attending: INTERNAL MEDICINE | Admitting: INTERNAL MEDICINE
Payer: MEDICARE

## 2024-09-12 ENCOUNTER — TELEPHONE (OUTPATIENT)
Dept: CARDIOLOGY CLINIC | Age: 84
End: 2024-09-12

## 2024-09-12 VITALS
HEIGHT: 69 IN | WEIGHT: 198.4 LBS | DIASTOLIC BLOOD PRESSURE: 68 MMHG | HEART RATE: 68 BPM | BODY MASS INDEX: 29.38 KG/M2 | SYSTOLIC BLOOD PRESSURE: 150 MMHG

## 2024-09-12 DIAGNOSIS — T82.03XA PARAVALVULAR LEAK OF PROSTHETIC HEART VALVE, INITIAL ENCOUNTER: ICD-10-CM

## 2024-09-12 DIAGNOSIS — I50.32 CHRONIC DIASTOLIC CONGESTIVE HEART FAILURE, NYHA CLASS 3 (HCC): ICD-10-CM

## 2024-09-12 DIAGNOSIS — R53.81 PHYSICAL DECONDITIONING: ICD-10-CM

## 2024-09-12 DIAGNOSIS — Z95.2 HISTORY OF TRANSCATHETER AORTIC VALVE REPLACEMENT (TAVR): ICD-10-CM

## 2024-09-12 DIAGNOSIS — I35.0 AORTIC STENOSIS, SEVERE: ICD-10-CM

## 2024-09-12 DIAGNOSIS — D59.9 ACQUIRED HEMOLYTIC ANEMIA, UNSPECIFIED (HCC): ICD-10-CM

## 2024-09-12 DIAGNOSIS — I44.2 COMPLETE HEART BLOCK (HCC): ICD-10-CM

## 2024-09-12 DIAGNOSIS — Z95.0 TEMPORARY TRANSVENOUS CARDIAC PACEMAKER PRESENT: ICD-10-CM

## 2024-09-12 DIAGNOSIS — Z95.2 S/P TAVR (TRANSCATHETER AORTIC VALVE REPLACEMENT): Primary | ICD-10-CM

## 2024-09-12 DIAGNOSIS — I35.1 SEVERE AORTIC INSUFFICIENCY: Primary | ICD-10-CM

## 2024-09-12 DIAGNOSIS — R60.0 EDEMA OF BOTH LOWER LEGS: ICD-10-CM

## 2024-09-12 LAB
ABO: NORMAL
ALBUMIN SERPL BCG-MCNC: 4.3 G/DL (ref 3.5–5.1)
ALP SERPL-CCNC: 50 U/L (ref 38–126)
ALT SERPL W/O P-5'-P-CCNC: 17 U/L (ref 11–66)
ANION GAP SERPL CALC-SCNC: 15 MEQ/L (ref 8–16)
ANTIBODY SCREEN: NORMAL
APTT PPP: 32.6 SECONDS (ref 22–38)
AST SERPL-CCNC: 62 U/L (ref 5–40)
BILIRUB SERPL-MCNC: 2.8 MG/DL (ref 0.3–1.2)
BUN SERPL-MCNC: 16 MG/DL (ref 7–22)
CALCIUM SERPL-MCNC: 9.4 MG/DL (ref 8.5–10.5)
CHLORIDE SERPL-SCNC: 104 MEQ/L (ref 98–111)
CO2 SERPL-SCNC: 20 MEQ/L (ref 23–33)
CREAT SERPL-MCNC: 0.6 MG/DL (ref 0.4–1.2)
DEPRECATED RDW RBC AUTO: 49.1 FL (ref 35–45)
ERYTHROCYTE [DISTWIDTH] IN BLOOD BY AUTOMATED COUNT: 15.2 % (ref 11.5–14.5)
GFR SERPL CREATININE-BSD FRML MDRD: > 90 ML/MIN/1.73M2
GLUCOSE SERPL-MCNC: 137 MG/DL (ref 70–108)
HCT VFR BLD AUTO: 31.6 % (ref 42–52)
HGB BLD-MCNC: 10.4 GM/DL (ref 14–18)
INR PPP: 1.15 (ref 0.85–1.13)
MCH RBC QN AUTO: 30.1 PG (ref 26–33)
MCHC RBC AUTO-ENTMCNC: 32.9 GM/DL (ref 32.2–35.5)
MCV RBC AUTO: 91.6 FL (ref 80–94)
NT-PROBNP SERPL IA-MCNC: 1117 PG/ML (ref 0–449)
PLATELET # BLD AUTO: 110 THOU/MM3 (ref 130–400)
PMV BLD AUTO: 14 FL (ref 9.4–12.4)
POTASSIUM SERPL-SCNC: 3.9 MEQ/L (ref 3.5–5.2)
PROT SERPL-MCNC: 6.5 G/DL (ref 6.1–8)
RBC # BLD AUTO: 3.45 MILL/MM3 (ref 4.7–6.1)
RH FACTOR: NORMAL
SODIUM SERPL-SCNC: 139 MEQ/L (ref 135–145)
WBC # BLD AUTO: 5.9 THOU/MM3 (ref 4.8–10.8)

## 2024-09-12 PROCEDURE — 1123F ACP DISCUSS/DSCN MKR DOCD: CPT | Performed by: INTERNAL MEDICINE

## 2024-09-12 PROCEDURE — 99223 1ST HOSP IP/OBS HIGH 75: CPT | Performed by: INTERNAL MEDICINE

## 2024-09-12 PROCEDURE — 80053 COMPREHEN METABOLIC PANEL: CPT

## 2024-09-12 PROCEDURE — 85027 COMPLETE CBC AUTOMATED: CPT

## 2024-09-12 PROCEDURE — 2000000000 HC ICU R&B

## 2024-09-12 PROCEDURE — 83880 ASSAY OF NATRIURETIC PEPTIDE: CPT

## 2024-09-12 PROCEDURE — 99215 OFFICE O/P EST HI 40 MIN: CPT | Performed by: INTERNAL MEDICINE

## 2024-09-12 PROCEDURE — 3077F SYST BP >= 140 MM HG: CPT | Performed by: INTERNAL MEDICINE

## 2024-09-12 PROCEDURE — 2580000003 HC RX 258: Performed by: INTERNAL MEDICINE

## 2024-09-12 PROCEDURE — 2580000003 HC RX 258: Performed by: PHYSICIAN ASSISTANT

## 2024-09-12 PROCEDURE — 85610 PROTHROMBIN TIME: CPT

## 2024-09-12 PROCEDURE — 6370000000 HC RX 637 (ALT 250 FOR IP): Performed by: NURSE PRACTITIONER

## 2024-09-12 PROCEDURE — 3078F DIAST BP <80 MM HG: CPT | Performed by: INTERNAL MEDICINE

## 2024-09-12 PROCEDURE — 86900 BLOOD TYPING SEROLOGIC ABO: CPT

## 2024-09-12 PROCEDURE — 85730 THROMBOPLASTIN TIME PARTIAL: CPT

## 2024-09-12 PROCEDURE — 86923 COMPATIBILITY TEST ELECTRIC: CPT

## 2024-09-12 PROCEDURE — 86901 BLOOD TYPING SEROLOGIC RH(D): CPT

## 2024-09-12 PROCEDURE — 86850 RBC ANTIBODY SCREEN: CPT

## 2024-09-12 PROCEDURE — 36415 COLL VENOUS BLD VENIPUNCTURE: CPT

## 2024-09-12 RX ORDER — SPIRONOLACTONE 25 MG/1
25 TABLET ORAL DAILY
Status: DISCONTINUED | OUTPATIENT
Start: 2024-09-13 | End: 2024-09-24 | Stop reason: HOSPADM

## 2024-09-12 RX ORDER — SODIUM CHLORIDE 9 MG/ML
INJECTION, SOLUTION INTRAVENOUS CONTINUOUS
Status: CANCELLED | OUTPATIENT
Start: 2024-09-12

## 2024-09-12 RX ORDER — ATORVASTATIN CALCIUM 40 MG/1
40 TABLET, FILM COATED ORAL DAILY
Status: DISCONTINUED | OUTPATIENT
Start: 2024-09-12 | End: 2024-09-12 | Stop reason: SDUPTHER

## 2024-09-12 RX ORDER — ATORVASTATIN CALCIUM 40 MG/1
40 TABLET, FILM COATED ORAL DAILY
Status: DISCONTINUED | OUTPATIENT
Start: 2024-09-13 | End: 2024-09-24 | Stop reason: HOSPADM

## 2024-09-12 RX ORDER — SODIUM CHLORIDE 0.9 % (FLUSH) 0.9 %
5-40 SYRINGE (ML) INJECTION PRN
Status: CANCELLED | OUTPATIENT
Start: 2024-09-12

## 2024-09-12 RX ORDER — SODIUM CHLORIDE 9 MG/ML
INJECTION, SOLUTION INTRAVENOUS PRN
Status: CANCELLED | OUTPATIENT
Start: 2024-09-12

## 2024-09-12 RX ORDER — FINASTERIDE 5 MG/1
5 TABLET, FILM COATED ORAL DAILY
Status: DISCONTINUED | OUTPATIENT
Start: 2024-09-13 | End: 2024-09-24 | Stop reason: HOSPADM

## 2024-09-12 RX ORDER — SODIUM CHLORIDE 9 MG/ML
INJECTION, SOLUTION INTRAVENOUS PRN
Status: DISCONTINUED | OUTPATIENT
Start: 2024-09-12 | End: 2024-09-17 | Stop reason: SDUPTHER

## 2024-09-12 RX ORDER — TAMSULOSIN HYDROCHLORIDE 0.4 MG/1
0.4 CAPSULE ORAL DAILY
Status: DISCONTINUED | OUTPATIENT
Start: 2024-09-13 | End: 2024-09-24 | Stop reason: HOSPADM

## 2024-09-12 RX ORDER — ASPIRIN 81 MG/1
81 TABLET ORAL DAILY
Status: DISCONTINUED | OUTPATIENT
Start: 2024-09-13 | End: 2024-09-23

## 2024-09-12 RX ORDER — CHLORHEXIDINE GLUCONATE 40 MG/ML
SOLUTION TOPICAL ONCE
Status: CANCELLED | OUTPATIENT
Start: 2024-09-12 | End: 2024-09-12

## 2024-09-12 RX ORDER — CHLORHEXIDINE GLUCONATE 40 MG/ML
SOLUTION TOPICAL ONCE
Status: DISCONTINUED | OUTPATIENT
Start: 2024-09-12 | End: 2024-09-24 | Stop reason: HOSPADM

## 2024-09-12 RX ORDER — SODIUM CHLORIDE 0.9 % (FLUSH) 0.9 %
5-40 SYRINGE (ML) INJECTION EVERY 12 HOURS SCHEDULED
Status: DISCONTINUED | OUTPATIENT
Start: 2024-09-12 | End: 2024-09-17 | Stop reason: SDUPTHER

## 2024-09-12 RX ORDER — SODIUM CHLORIDE 0.9 % (FLUSH) 0.9 %
5-40 SYRINGE (ML) INJECTION EVERY 12 HOURS SCHEDULED
Status: CANCELLED | OUTPATIENT
Start: 2024-09-12

## 2024-09-12 RX ORDER — SODIUM CHLORIDE 9 MG/ML
INJECTION, SOLUTION INTRAVENOUS CONTINUOUS
Status: DISCONTINUED | OUTPATIENT
Start: 2024-09-13 | End: 2024-09-14

## 2024-09-12 RX ORDER — LOSARTAN POTASSIUM 25 MG/1
25 TABLET ORAL DAILY
Status: DISCONTINUED | OUTPATIENT
Start: 2024-09-13 | End: 2024-09-15

## 2024-09-12 RX ORDER — METOPROLOL SUCCINATE 25 MG/1
12.5 TABLET, EXTENDED RELEASE ORAL DAILY
Status: DISCONTINUED | OUTPATIENT
Start: 2024-09-13 | End: 2024-09-13

## 2024-09-12 RX ORDER — SODIUM CHLORIDE 9 MG/ML
INJECTION, SOLUTION INTRAVENOUS CONTINUOUS
Status: DISCONTINUED | OUTPATIENT
Start: 2024-09-12 | End: 2024-09-12

## 2024-09-12 RX ORDER — SODIUM CHLORIDE 0.9 % (FLUSH) 0.9 %
5-40 SYRINGE (ML) INJECTION PRN
Status: DISCONTINUED | OUTPATIENT
Start: 2024-09-12 | End: 2024-09-17 | Stop reason: SDUPTHER

## 2024-09-12 RX ORDER — AMLODIPINE BESYLATE 5 MG/1
5 TABLET ORAL 2 TIMES DAILY
Status: DISCONTINUED | OUTPATIENT
Start: 2024-09-12 | End: 2024-09-24 | Stop reason: HOSPADM

## 2024-09-12 RX ADMIN — TICAGRELOR 90 MG: 90 TABLET ORAL at 20:41

## 2024-09-12 RX ADMIN — SODIUM CHLORIDE, PRESERVATIVE FREE 10 ML: 5 INJECTION INTRAVENOUS at 20:41

## 2024-09-12 RX ADMIN — AMLODIPINE BESYLATE 5 MG: 5 TABLET ORAL at 20:41

## 2024-09-12 RX ADMIN — SODIUM CHLORIDE: 9 INJECTION, SOLUTION INTRAVENOUS at 23:44

## 2024-09-12 ASSESSMENT — PAIN SCALES - GENERAL: PAINLEVEL_OUTOF10: 0

## 2024-09-12 NOTE — H&P
CRITICAL CARE PROGRESS NOTE      Patient:  Raymond Hummel    Unit/Bed:4D-10/010-A  YOB: 1940  MRN: [id number]   PCP: Duran Dukes MD  Date of Admission: 9/12/2024  Chief Complaint:- elective TAVR    Assessment and Plan:    Perivalvular leak around Zapata Resilia prosthesis: TTE noted perivalvular leak around AV prosthesis which was placed 8/15/2024 by Dr. Arguello and Dr. Giang as described in severe aortic stenosis section.  Patient will require replacement, is going for repeat TAVR 9/13 AM with Dr. Arguello, plan to discharge home after.  Will follow-up with Dr. Arguello, follow-up appointments already scheduled.  N.p.o. at midnight, TAVR 9/13 AM  HOCM: 9/9 TTE report indicated EF 60 to 65%, features suggestive of hypertrophic cardiomyopathy, including severely increased wall thickness of LV, with LVOT at rest-peak gradient 61 mmHg.  HTN: Home meds include Norvasc  CAD: Patient has CAD, is s/p PCI x 1 in the LAD.  Heart cath also showed 50% stenosis of RCA, 70% stenosis of OM 2.  On home Lipitor, aspirin, Brilinta  NIDDM2: Patient on home metformin.  Holding metformin 9/12 through 9/14 per Dr. Arguello.  POCT glucose checks 4 times daily  BPH: patient has history of BPH, recent history of gross hematuria noted on prior admission, colposcopy deferred by patient, will follow with urology out-patient   Systolic HF: Prior echo June 2024  History of Severe aortic stenosis s/p TAVR 8/15/24:  patient follows with Dr Arguello; previous TTE June 2024 showed severe aortic stenosis with AV mean gradient 27 mm Hg. 8/15/24 Transcatheter AV replacement with an Zapata Resilia prosthesis procedure by Nubia Arguello and Rahat successfully completed via transfemoral approach.   DVT prophylaxis: SCDs    INITIAL H AND P AND ICU COURSE:    Raymond Hummel is an 84-year-old male with PMH notable for CAD s/p PCI LAD x 1, aortic stenosis s/p TAVR, HLD, diverticulosis, NIDDM 2, HLD, LVH, BPH.  Patient previously underwent TAVR procedure  Alert and oriented x3.  Affect appropriate  Lymph:  No supraclavicular adenopathy.  Neurologic:  No focal deficit. No seizures.    Data: (All radiographs, tracings, PFTs, and imaging are personally viewed and interpreted unless otherwise noted).   Labs: Sodium 139, potassium 3.9, chloride 104, bicarb 20, bun 16, creatinine 0.6, EGFR above 90, glucose 137, calcium 9.4, proBNP 1117, albumin 4.3, alk phos 50, ALT 17, AST 62, total bilirubin 2.8.  WBC 5.9  Telemetry shows sinus rhythm         Seen with multidisciplinary ICU team.  Meets Continued ICU Level Care Criteria:    [x] Yes   [] No - Transfer Planned to listed location:  [] HOSPITALIST CONTACTED-      Case and plan discussed with Dr. Ordonez.        Electronically signed by Iggy Gray MD IM resident, PGY-1   Patient seen by me including key components of medical care.  Case discussed with Dr. Arguello.  Case discussed with resident physician.  Will intubate tomorrow for TAVR redo. Hemolysis from malfunctioning valve.  Italicized bold font, if present,  represents changes to the note made by me.  Time was discontiguous. Time does not include procedure. Time does include my direct assessment of the patient and coordination of care.  Time represents more than 50% of the time involved with patient care by the CC team.  Electronically signed by Duran Ordonez MD.

## 2024-09-12 NOTE — TELEPHONE ENCOUNTER
Orders received from Dr. Arguello to schedule the patient for a aortic valvuloplasty/TAVR procedure and have the patient hold the follow medications the morning of the TAVR procedure: metformin the day before, the day of and the day after.    TAVR: 9/13/24 at 0800 with an arrival of 9/12/24.  Discharge home with wife.     Valve Rep Jose Eduardo notified with Zapata  Pacer Rep not needed.    Post TAVR instructions per Dr. Arguello: have the patient be seen in the Structural Heart Clinic 7 days post TAVR, obtain a CBC, BMP and ECHO prior to the 30 day post TAVR follow up appointment.    7 day post TAVR appointment:9/17/24 at 1130  CHF Clinic follow up appointment 7/24/24 at 1000  CBC/BMP/ECHO:10/15/24 at 1015  30 day post TAVR appointment:10/22/24 at 1215    Primary Cardiologist: Dr. Arguello    JARAD 2 weeks post procedure to be scheduled.     Dr. Arguello, please agree.     Discussed all instructions with pt, wife Gela, sons Heriberto and Juan Carlos.  All questions were answered.     Pt was brought to the ICU for preadmission.  Case discussed with SARAH Nur.

## 2024-09-12 NOTE — PROGRESS NOTES
Patient arrived to unit from [facility] heart clinic via wheelchair. Patient transferred to ICU bed and placed on continuous ICU bedside monitor. Patient admitted for Aortic stenosis, severe [I35.0]. Vitals obtained. See flowsheets. Patient's IV access includes none. Current infusions and rates of infusion include none. Assessment completed by Shanae DESHPANDE. Two nurse skin assessment completed by Shanae DESHPANDE  and Cherry DESHPANDE. See flowsheets for assessment details. Policies and procedures of ICU able to be explained to patient at this time. Family member(s)/representative(s) present at time of admission include wife and son. Patient rights explained to family member(s)/representatives and patient, as able. Patient/patient's family member(s)/representative(s) N/A to have physician notified of their admission. All questions posed by patient's family member(s)/representative(s) and patient answered at this time. '

## 2024-09-13 ENCOUNTER — APPOINTMENT (OUTPATIENT)
Dept: GENERAL RADIOLOGY | Age: 84
DRG: 267 | End: 2024-09-13
Attending: INTERNAL MEDICINE
Payer: MEDICARE

## 2024-09-13 PROBLEM — I35.1 SEVERE AORTIC INSUFFICIENCY: Status: ACTIVE | Noted: 2024-09-13

## 2024-09-13 LAB
ACTIVATED CLOTTING TIME: 256 SECONDS (ref 1–150)
ACTIVATED CLOTTING TIME: 293 SECONDS (ref 1–150)
ALBUMIN SERPL BCG-MCNC: 4.6 G/DL (ref 3.5–5.1)
ALP SERPL-CCNC: 53 U/L (ref 38–126)
ALT SERPL W/O P-5'-P-CCNC: 18 U/L (ref 11–66)
ANION GAP SERPL CALC-SCNC: 14 MEQ/L (ref 8–16)
APTT PPP: 32.3 SECONDS (ref 22–38)
AST SERPL-CCNC: 62 U/L (ref 5–40)
BILIRUB SERPL-MCNC: 3.2 MG/DL (ref 0.3–1.2)
BUN SERPL-MCNC: 16 MG/DL (ref 7–22)
CALCIUM SERPL-MCNC: 9.4 MG/DL (ref 8.5–10.5)
CHLORIDE SERPL-SCNC: 105 MEQ/L (ref 98–111)
CO2 SERPL-SCNC: 23 MEQ/L (ref 23–33)
CREAT SERPL-MCNC: 0.5 MG/DL (ref 0.4–1.2)
DEPRECATED RDW RBC AUTO: 48.8 FL (ref 35–45)
EKG ATRIAL RATE: 63 BPM
EKG P AXIS: 30 DEGREES
EKG P-R INTERVAL: 206 MS
EKG Q-T INTERVAL: 498 MS
EKG QRS DURATION: 178 MS
EKG QTC CALCULATION (BAZETT): 509 MS
EKG R AXIS: -60 DEGREES
EKG T AXIS: 68 DEGREES
EKG VENTRICULAR RATE: 63 BPM
ERYTHROCYTE [DISTWIDTH] IN BLOOD BY AUTOMATED COUNT: 15 % (ref 11.5–14.5)
GFR SERPL CREATININE-BSD FRML MDRD: > 90 ML/MIN/1.73M2
GLUCOSE BLD STRIP.AUTO-MCNC: 227 MG/DL (ref 70–108)
GLUCOSE SERPL-MCNC: 109 MG/DL (ref 70–108)
HCT VFR BLD AUTO: 33.1 % (ref 42–52)
HGB BLD-MCNC: 10.9 GM/DL (ref 14–18)
INR PPP: 1.11 (ref 0.85–1.13)
MCH RBC QN AUTO: 30 PG (ref 26–33)
MCHC RBC AUTO-ENTMCNC: 32.9 GM/DL (ref 32.2–35.5)
MCV RBC AUTO: 91.2 FL (ref 80–94)
NT-PROBNP SERPL IA-MCNC: 1069 PG/ML (ref 0–449)
PLATELET # BLD AUTO: 120 THOU/MM3 (ref 130–400)
PMV BLD AUTO: 12.1 FL (ref 9.4–12.4)
POTASSIUM SERPL-SCNC: 3.8 MEQ/L (ref 3.5–5.2)
PROT SERPL-MCNC: 7.2 G/DL (ref 6.1–8)
RBC # BLD AUTO: 3.63 MILL/MM3 (ref 4.7–6.1)
REVIEWED BY: NORMAL
SMEAR REVIEW: NORMAL
SODIUM SERPL-SCNC: 142 MEQ/L (ref 135–145)
WBC # BLD AUTO: 6.7 THOU/MM3 (ref 4.8–10.8)

## 2024-09-13 PROCEDURE — 6360000002 HC RX W HCPCS: Performed by: NURSE PRACTITIONER

## 2024-09-13 PROCEDURE — 2580000003 HC RX 258: Performed by: NURSE PRACTITIONER

## 2024-09-13 PROCEDURE — 31500 INSERT EMERGENCY AIRWAY: CPT

## 2024-09-13 PROCEDURE — 82948 REAGENT STRIP/BLOOD GLUCOSE: CPT

## 2024-09-13 PROCEDURE — 80053 COMPREHEN METABOLIC PANEL: CPT

## 2024-09-13 PROCEDURE — 5A1935Z RESPIRATORY VENTILATION, LESS THAN 24 CONSECUTIVE HOURS: ICD-10-PCS

## 2024-09-13 PROCEDURE — 6360000002 HC RX W HCPCS: Performed by: INTERNAL MEDICINE

## 2024-09-13 PROCEDURE — 85027 COMPLETE CBC AUTOMATED: CPT

## 2024-09-13 PROCEDURE — 2720000010 HC SURG SUPPLY STERILE: Performed by: INTERNAL MEDICINE

## 2024-09-13 PROCEDURE — C1725 CATH, TRANSLUMIN NON-LASER: HCPCS | Performed by: INTERNAL MEDICINE

## 2024-09-13 PROCEDURE — 6360000002 HC RX W HCPCS

## 2024-09-13 PROCEDURE — 2700000000 HC OXYGEN THERAPY PER DAY

## 2024-09-13 PROCEDURE — 71045 X-RAY EXAM CHEST 1 VIEW: CPT

## 2024-09-13 PROCEDURE — C1894 INTRO/SHEATH, NON-LASER: HCPCS | Performed by: INTERNAL MEDICINE

## 2024-09-13 PROCEDURE — 2580000003 HC RX 258: Performed by: INTERNAL MEDICINE

## 2024-09-13 PROCEDURE — 99291 CRITICAL CARE FIRST HOUR: CPT | Performed by: INTERNAL MEDICINE

## 2024-09-13 PROCEDURE — 02RF3JZ REPLACEMENT OF AORTIC VALVE WITH SYNTHETIC SUBSTITUTE, PERCUTANEOUS APPROACH: ICD-10-PCS | Performed by: INTERNAL MEDICINE

## 2024-09-13 PROCEDURE — 85730 THROMBOPLASTIN TIME PARTIAL: CPT

## 2024-09-13 PROCEDURE — 2500000003 HC RX 250 WO HCPCS: Performed by: INTERNAL MEDICINE

## 2024-09-13 PROCEDURE — 83880 ASSAY OF NATRIURETIC PEPTIDE: CPT

## 2024-09-13 PROCEDURE — C1769 GUIDE WIRE: HCPCS | Performed by: INTERNAL MEDICINE

## 2024-09-13 PROCEDURE — 85610 PROTHROMBIN TIME: CPT

## 2024-09-13 PROCEDURE — 6370000000 HC RX 637 (ALT 250 FOR IP): Performed by: NURSE PRACTITIONER

## 2024-09-13 PROCEDURE — C1889 IMPLANT/INSERT DEVICE, NOC: HCPCS | Performed by: INTERNAL MEDICINE

## 2024-09-13 PROCEDURE — 2709999900 HC NON-CHARGEABLE SUPPLY: Performed by: INTERNAL MEDICINE

## 2024-09-13 PROCEDURE — 2580000003 HC RX 258: Performed by: PHYSICIAN ASSISTANT

## 2024-09-13 PROCEDURE — 2500000003 HC RX 250 WO HCPCS: Performed by: NURSE PRACTITIONER

## 2024-09-13 PROCEDURE — 93005 ELECTROCARDIOGRAM TRACING: CPT | Performed by: INTERNAL MEDICINE

## 2024-09-13 PROCEDURE — 36415 COLL VENOUS BLD VENIPUNCTURE: CPT

## 2024-09-13 PROCEDURE — 2500000003 HC RX 250 WO HCPCS

## 2024-09-13 PROCEDURE — 33361 REPLACE AORTIC VALVE PERQ: CPT | Performed by: INTERNAL MEDICINE

## 2024-09-13 PROCEDURE — 51701 INSERT BLADDER CATHETER: CPT

## 2024-09-13 PROCEDURE — 93010 ELECTROCARDIOGRAM REPORT: CPT | Performed by: NUCLEAR MEDICINE

## 2024-09-13 PROCEDURE — 87086 URINE CULTURE/COLONY COUNT: CPT

## 2024-09-13 PROCEDURE — C1760 CLOSURE DEV, VASC: HCPCS | Performed by: INTERNAL MEDICINE

## 2024-09-13 PROCEDURE — 94761 N-INVAS EAR/PLS OXIMETRY MLT: CPT

## 2024-09-13 PROCEDURE — 33361 REPLACE AORTIC VALVE PERQ: CPT | Performed by: THORACIC SURGERY (CARDIOTHORACIC VASCULAR SURGERY)

## 2024-09-13 PROCEDURE — 2000000000 HC ICU R&B

## 2024-09-13 PROCEDURE — 94002 VENT MGMT INPAT INIT DAY: CPT

## 2024-09-13 PROCEDURE — 85347 COAGULATION TIME ACTIVATED: CPT

## 2024-09-13 PROCEDURE — 6360000004 HC RX CONTRAST MEDICATION: Performed by: INTERNAL MEDICINE

## 2024-09-13 PROCEDURE — 6360000002 HC RX W HCPCS: Performed by: PHYSICIAN ASSISTANT

## 2024-09-13 PROCEDURE — G0269 OCCLUSIVE DEVICE IN VEIN ART: HCPCS | Performed by: INTERNAL MEDICINE

## 2024-09-13 DEVICE — VALVE AORTIC SAPIEN SYSTEM 29MM 29MM TAVR: Type: IMPLANTABLE DEVICE | Status: FUNCTIONAL

## 2024-09-13 DEVICE — VLV EVOLUTFX-34 COMM US ROW
Type: IMPLANTABLE DEVICE | Status: FUNCTIONAL
Brand: EVOLUT™ FX

## 2024-09-13 RX ORDER — IOPAMIDOL 755 MG/ML
INJECTION, SOLUTION INTRAVASCULAR PRN
Status: DISCONTINUED | OUTPATIENT
Start: 2024-09-13 | End: 2024-09-13 | Stop reason: HOSPADM

## 2024-09-13 RX ORDER — PROPOFOL 10 MG/ML
5-50 INJECTION, EMULSION INTRAVENOUS CONTINUOUS
Status: DISCONTINUED | OUTPATIENT
Start: 2024-09-13 | End: 2024-09-14

## 2024-09-13 RX ORDER — MIDAZOLAM HYDROCHLORIDE 1 MG/ML
6 INJECTION INTRAMUSCULAR; INTRAVENOUS ONCE
Status: COMPLETED | OUTPATIENT
Start: 2024-09-13 | End: 2024-09-13

## 2024-09-13 RX ORDER — SODIUM CHLORIDE 9 MG/ML
INJECTION, SOLUTION INTRAVENOUS CONTINUOUS
Status: DISCONTINUED | OUTPATIENT
Start: 2024-09-13 | End: 2024-09-14

## 2024-09-13 RX ORDER — MIDAZOLAM HYDROCHLORIDE 1 MG/ML
INJECTION INTRAMUSCULAR; INTRAVENOUS PRN
Status: DISCONTINUED | OUTPATIENT
Start: 2024-09-13 | End: 2024-09-13 | Stop reason: HOSPADM

## 2024-09-13 RX ORDER — FENTANYL CITRATE 50 UG/ML
INJECTION, SOLUTION INTRAMUSCULAR; INTRAVENOUS PRN
Status: DISCONTINUED | OUTPATIENT
Start: 2024-09-13 | End: 2024-09-13 | Stop reason: HOSPADM

## 2024-09-13 RX ORDER — DEXMEDETOMIDINE HYDROCHLORIDE 4 UG/ML
.1-1.5 INJECTION, SOLUTION INTRAVENOUS CONTINUOUS
Status: DISCONTINUED | OUTPATIENT
Start: 2024-09-13 | End: 2024-09-14

## 2024-09-13 RX ORDER — CHLORHEXIDINE GLUCONATE 40 MG/ML
SOLUTION TOPICAL ONCE
Status: COMPLETED | OUTPATIENT
Start: 2024-09-13 | End: 2024-09-13

## 2024-09-13 RX ORDER — HEPARIN SODIUM 1000 [USP'U]/ML
INJECTION, SOLUTION INTRAVENOUS; SUBCUTANEOUS PRN
Status: DISCONTINUED | OUTPATIENT
Start: 2024-09-13 | End: 2024-09-13 | Stop reason: HOSPADM

## 2024-09-13 RX ORDER — PROTAMINE SULFATE 10 MG/ML
INJECTION, SOLUTION INTRAVENOUS PRN
Status: DISCONTINUED | OUTPATIENT
Start: 2024-09-13 | End: 2024-09-13 | Stop reason: HOSPADM

## 2024-09-13 RX ORDER — SODIUM CHLORIDE 0.9 % (FLUSH) 0.9 %
5-40 SYRINGE (ML) INJECTION PRN
Status: DISCONTINUED | OUTPATIENT
Start: 2024-09-13 | End: 2024-09-24 | Stop reason: HOSPADM

## 2024-09-13 RX ORDER — SODIUM CHLORIDE 9 MG/ML
INJECTION, SOLUTION INTRAVENOUS PRN
Status: DISCONTINUED | OUTPATIENT
Start: 2024-09-13 | End: 2024-09-24 | Stop reason: HOSPADM

## 2024-09-13 RX ORDER — 0.9 % SODIUM CHLORIDE 0.9 %
1000 INTRAVENOUS SOLUTION INTRAVENOUS ONCE
Status: COMPLETED | OUTPATIENT
Start: 2024-09-13 | End: 2024-09-13

## 2024-09-13 RX ORDER — FENTANYL CITRATE 50 UG/ML
50 INJECTION, SOLUTION INTRAMUSCULAR; INTRAVENOUS
Status: DISCONTINUED | OUTPATIENT
Start: 2024-09-13 | End: 2024-09-15

## 2024-09-13 RX ORDER — FENTANYL CITRATE 50 UG/ML
50 INJECTION, SOLUTION INTRAMUSCULAR; INTRAVENOUS ONCE
Status: COMPLETED | OUTPATIENT
Start: 2024-09-13 | End: 2024-09-13

## 2024-09-13 RX ORDER — ETOMIDATE 2 MG/ML
20 INJECTION INTRAVENOUS ONCE
Status: DISCONTINUED | OUTPATIENT
Start: 2024-09-13 | End: 2024-09-14

## 2024-09-13 RX ORDER — NOREPINEPHRINE BITARTRATE 0.06 MG/ML
1-100 INJECTION, SOLUTION INTRAVENOUS CONTINUOUS
Status: DISCONTINUED | OUTPATIENT
Start: 2024-09-13 | End: 2024-09-14

## 2024-09-13 RX ORDER — PROPOFOL 10 MG/ML
50 INJECTION, EMULSION INTRAVENOUS ONCE
Status: COMPLETED | OUTPATIENT
Start: 2024-09-13 | End: 2024-09-13

## 2024-09-13 RX ORDER — NOREPINEPHRINE BITARTRATE 0.06 MG/ML
INJECTION, SOLUTION INTRAVENOUS
Status: COMPLETED
Start: 2024-09-13 | End: 2024-09-13

## 2024-09-13 RX ORDER — ATROPINE SULFATE 0.1 MG/ML
INJECTION INTRAVENOUS PRN
Status: DISCONTINUED | OUTPATIENT
Start: 2024-09-13 | End: 2024-09-13 | Stop reason: HOSPADM

## 2024-09-13 RX ORDER — SODIUM CHLORIDE 0.9 % (FLUSH) 0.9 %
5-40 SYRINGE (ML) INJECTION EVERY 12 HOURS SCHEDULED
Status: DISCONTINUED | OUTPATIENT
Start: 2024-09-13 | End: 2024-09-24 | Stop reason: HOSPADM

## 2024-09-13 RX ADMIN — WATER 2000 MG: 1 INJECTION INTRAMUSCULAR; INTRAVENOUS; SUBCUTANEOUS at 06:04

## 2024-09-13 RX ADMIN — SODIUM CHLORIDE 1000 ML: 9 INJECTION, SOLUTION INTRAVENOUS at 12:01

## 2024-09-13 RX ADMIN — WATER 2000 MG: 1 INJECTION INTRAMUSCULAR; INTRAVENOUS; SUBCUTANEOUS at 14:25

## 2024-09-13 RX ADMIN — PHENYLEPHRINE HYDROCHLORIDE 30 MCG/MIN: 50 INJECTION INTRAVENOUS at 12:14

## 2024-09-13 RX ADMIN — NOREPINEPHRINE BITARTRATE 5 MCG/MIN: 0.06 INJECTION, SOLUTION INTRAVENOUS at 12:00

## 2024-09-13 RX ADMIN — SODIUM CHLORIDE, PRESERVATIVE FREE 10 ML: 5 INJECTION INTRAVENOUS at 20:38

## 2024-09-13 RX ADMIN — SODIUM CHLORIDE, PRESERVATIVE FREE 10 ML: 5 INJECTION INTRAVENOUS at 13:33

## 2024-09-13 RX ADMIN — MIDAZOLAM 6 MG: 1 INJECTION INTRAMUSCULAR; INTRAVENOUS at 06:26

## 2024-09-13 RX ADMIN — PROPOFOL 20 MCG/KG/MIN: 10 INJECTION, EMULSION INTRAVENOUS at 06:06

## 2024-09-13 RX ADMIN — Medication 0.2 MCG/KG/HR: at 14:45

## 2024-09-13 RX ADMIN — FENTANYL CITRATE 50 MCG: 50 INJECTION, SOLUTION INTRAMUSCULAR; INTRAVENOUS at 06:25

## 2024-09-13 RX ADMIN — CHLORHEXIDINE GLUCONATE: 40 SOLUTION TOPICAL at 03:00

## 2024-09-13 RX ADMIN — SODIUM CHLORIDE, PRESERVATIVE FREE 10 ML: 5 INJECTION INTRAVENOUS at 13:34

## 2024-09-13 RX ADMIN — PROPOFOL 50 MG: 10 INJECTION, EMULSION INTRAVENOUS at 06:14

## 2024-09-13 RX ADMIN — SODIUM CHLORIDE: 9 INJECTION, SOLUTION INTRAVENOUS at 16:29

## 2024-09-13 RX ADMIN — SODIUM CHLORIDE, PRESERVATIVE FREE 10 ML: 5 INJECTION INTRAVENOUS at 20:39

## 2024-09-13 RX ADMIN — TICAGRELOR 90 MG: 90 TABLET ORAL at 20:38

## 2024-09-13 RX ADMIN — Medication 0.5 MCG/KG/HR: at 23:38

## 2024-09-13 RX ADMIN — Medication 5 MCG/MIN: at 12:00

## 2024-09-13 ASSESSMENT — PULMONARY FUNCTION TESTS
PIF_VALUE: 18
PIF_VALUE: 21

## 2024-09-13 ASSESSMENT — PAIN SCALES - GENERAL: PAINLEVEL_OUTOF10: 0

## 2024-09-13 NOTE — CARE COORDINATION
09/13/24 1350   Readmission Assessment   Number of Days since last admission? 8-30 days   Previous Disposition Home with Family   Who is being Interviewed Patient;Caregiver   What was the patient's/caregiver's perception as to why they think they needed to return back to the hospital? Other (Comment)  (Scheduled to come in and have TAVR valve replaced d/t valvular leak)   Did you visit your Primary Care Physician after you left the hospital, before you returned this time? No   Why weren't you able to visit your PCP? Did not have an appointment   Did you see a specialist, such as Cardiac, Pulmonary, Orthopedic Physician, etc. after you left the hospital? Yes  (Cardiology and Urology)   Who advised the patient to return to the hospital? Physician  (Scheduled for TAVR)   Does the patient report anything that got in the way of taking their medications? No   In our efforts to provide the best possible care to you and others like you, can you think of anything that we could have done to help you after you left the hospital the first time, so that you might not have needed to return so soon? Other (Comment)  (No)

## 2024-09-13 NOTE — BRIEF OP NOTE
[facility]  Sedation/Analgesia Post Sedation Record    Pt Name: Raymond Hummel  Account number: [id number]  MRN: [id number]  YOB: 1940  Procedure Performed By: Erick Arguello MD MD FACC, [facility], RPVI  Primary Care Physician: Duran Dukes MD  Date: 9/13/2024    POST-PROCEDURE    Physicians/Assistants: Erick Arguello MD, [facility], [facility], [facility]    Procedure Performed: TAVR    Sedation/Anesthesia: Versed/ Fentanyl and 2% xylocaine local anesthesia.      Estimated Blood Loss: < 50 ml.     Specimens Removed: None         Disposition of Specimen: N/A        Complications: No Immediate Complications.       Post-procedure Diagnosis/Findings:       [id number] --> EFX 34; 28 x 4.5 TruDilation balloon BAV  Trace PVL      Electronically signed by Erick Arguello MD on 9/13/24 at 11:07 AM EDT   Interventional Cardiology

## 2024-09-13 NOTE — PROGRESS NOTES
Pt's wife said that it was not a good time for a visit. It was respected.    09/13/24 1710   Encounter Summary   Encounter Overview/Reason Initial Encounter   Service Provided For Patient and family together   Referral/Consult From [facility]   Support System Spouse   Last Encounter  09/13/24   Complexity of Encounter Low   Begin Time 1507   End Time  1512   Total Time Calculated 5 min   Spiritual/Emotional needs   Type Spiritual Support   Assessment/Intervention/Outcome   Assessment Other (Comment)

## 2024-09-13 NOTE — PROCEDURES
PROCEDURE NOTE  Date: 9/13/2024   Name: Raymond Hummel  YOB: 1940    Intubation    Date/Time: 9/13/2024 6:32 AM    Performed by: Blanquita Villanueva DO  Authorized by: Erick Arguello MD  Consent: Verbal consent obtained. Written consent obtained.  Risks and benefits: risks, benefits and alternatives were discussed  Consent given by: patient  Patient understanding: patient states understanding of the procedure being performed  Patient consent: the patient's understanding of the procedure matches consent given  Procedure consent: procedure consent matches procedure scheduled  Relevant documents: relevant documents present and verified  Test results: test results available and properly labeled  Site marked: the operative site was marked  Imaging studies: imaging studies available  Patient identity confirmed: verbally with patient, arm band and hospital-assigned identification number  Time out: Immediately prior to procedure a \"time out\" was called to verify the correct patient, procedure, equipment, support staff and site/side marked as required.  Indications: for surgery by I/Cardio.  Intubation method: video-assisted  Preoxygenation: BVM  Pretreatment medications: midazolam  Sedatives: propofol (propofol and fentanyl)  Paralytic: none  Laryngoscope size: Mac 3  Tube size: 7.5 mm  Tube type: cuffed  Number of attempts: 2  Ventilation between attempts: BVM  Cords visualized: yes  Post-procedure assessment: chest rise and CO2 detector  Breath sounds: equal  Cuff inflated: yes  ETT to lip: 23 cm  Tube secured with: ETT lewis  Chest x-ray interpreted by me.  Chest x-ray findings: endotracheal tube in appropriate position  Patient tolerance: patient tolerated the procedure well with no immediate complications  Comments: Preliminary on CXR, Et tube position is appropriate and no pneumothorax seen

## 2024-09-13 NOTE — PLAN OF CARE
Problem: Discharge Planning  Goal: Discharge to home or other facility with appropriate resources  Outcome: Progressing  Flowsheets  Taken 9/12/2024 2211 by Marilynn Cristobal RN  Discharge to home or other facility with appropriate resources:   Identify barriers to discharge with patient and caregiver   Arrange for needed discharge resources and transportation as appropriate  Taken 9/12/2024 1115 by Shanae Osuna RN  Discharge to home or other facility with appropriate resources: Identify barriers to discharge with patient and caregiver     Problem: Safety - Adult  Goal: Free from fall injury  Outcome: Progressing  Flowsheets (Taken 9/12/2024 2211)  Free From Fall Injury: Instruct family/caregiver on patient safety     Problem: ABCDS Injury Assessment  Goal: Absence of physical injury  Outcome: Progressing  Flowsheets (Taken 9/12/2024 2211)  Absence of Physical Injury: Implement safety measures based on patient assessment

## 2024-09-13 NOTE — PLAN OF CARE
Problem: Discharge Planning  Goal: Discharge to home or other facility with appropriate resources  Outcome: Progressing  Flowsheets (Taken 9/13/2024 1428)  Discharge to home or other facility with appropriate resources: Identify barriers to discharge with patient and caregiver     Problem: Safety - Adult  Goal: Free from fall injury  Outcome: Progressing  Flowsheets (Taken 9/13/2024 1428)  Free From Fall Injury: Instruct family/caregiver on patient safety     Problem: ABCDS Injury Assessment  Goal: Absence of physical injury  Outcome: Progressing  Flowsheets (Taken 9/13/2024 1428)  Absence of Physical Injury: Implement safety measures based on patient assessment     Problem: Pain  Goal: Verbalizes/displays adequate comfort level or baseline comfort level  Outcome: Progressing  Flowsheets (Taken 9/13/2024 1428)  Verbalizes/displays adequate comfort level or baseline comfort level:   Consider cultural and social influences on pain and pain management   Encourage patient to monitor pain and request assistance   Administer analgesics based on type and severity of pain and evaluate response   Assess pain using appropriate pain scale   Implement non-pharmacological measures as appropriate and evaluate response   Notify Licensed Independent Practitioner if interventions unsuccessful or patient reports new pain    Care plan reviewed with patient and family. Family verbalized understand and contributes to goal setting.

## 2024-09-13 NOTE — PROGRESS NOTES
CRITICAL CARE PROGRESS NOTE    The progress note was previously documented as a \"H&P\" note type date of service 9/13 5:18am, this was done in error, note has been re-designated as Progress note, H&P from that date of service deleted.     Patient:  Raymond Hummel    Unit/Bed:4D-10/010-A  YOB: 1940  MRN: [id number]   PCP: Duran Dukes MD  Date of Admission: 9/12/2024  Chief Complaint:- elective TAVR    Assessment and Plan:    Perivalvular leak around Zapata Resilia prosthesis: TTE noted perivalvular leak around AV prosthesis which was placed 8/15/2024 by Dr. Arguello and Dr. Giang as described in severe aortic stenosis section. Patient will require replacement, is going for repeat TAVR 9/13 AM with Dr. Arguello, plan to discharge home after.  Will follow-up with Dr. Arguello, follow-up appointments already scheduled.  N.p.o. at midnight, TAVR 9/13 AM  Hyperbilirubinemia 2/2 hemolysis due to perivalvular leak: Hemoglobin stable but mildly low (11) while bilirubin is elevated 3.2 total (2.8 on admission).  No jaundice or neurological status change.  Reassess after repeat TAVR.   HOCM: 9/9 TTE report indicated EF 60 to 65%, features suggestive of hypertrophic cardiomyopathy, including severely increased wall thickness of LV, with LVOT at rest-peak gradient 61 mmHg.  HTN: Home meds include Norvasc, continue   CAD: Patient has CAD, is s/p PCI x 1 in the LAD.  Heart cath also showed 50% stenosis of RCA, 70% stenosis of OM 2.  On home Lipitor, aspirin, Brilinta  NIDDM2: Patient on home metformin.  Holding metformin 9/12 through 9/14 per Dr. Arguello.  POCT glucose checks 4 times daily  BPH: patient has history of BPH, recent history of gross hematuria noted on prior admission, colposcopy deferred by patient, will follow with urology out-patient   Systolic HF: Prior diagnosis based on echo June 2024 which proceeded first TAVR procedure, will reassess upon follow-up with Dr. Arguello after repeat TAVR  History of  Severe aortic stenosis s/p TAVR 8/15/24:  patient follows with Dr Arguello; previous TTE June 2024 showed severe aortic stenosis with AV mean gradient 27 mm Hg. 8/15/24 Transcatheter AV replacement with an Zapata Resilia prosthesis procedure by Nubia Arguello and Rahat successfully completed via transfemoral approach.   DVT prophylaxis: SCDs    INITIAL H AND P AND ICU COURSE:    Raymond Hummel is an 84-year-old male with PMH notable for CAD s/p PCI LAD x 1, aortic stenosis s/p TAVR, HLD, diverticulosis, NIDDM 2, HLD, LVH, BPH.  Patient previously underwent TAVR procedure with Dr. Arguello and Dr. Giang on 8/15 in order to rectify severe aortic stenosis.  Patient had previous TTE demonstrating severe aortic stenosis with LVH, which necessitated valve replacement.  TAVR was done with Zapata Resilia prosthesis by Dr. Arguello and Dr. Giang via transfemoral approach, completed successfully.     Recent TTE showed perivalvular leak around AV prosthesis, will necessitate replacement via repeat TAVR, will be done by Dr. Arguello on 9/13.    ICU course:  9/12: Admitted to ICU, seen and examined at bedside, orders him to make n.p.o. at midnight, intubate between 6 and 7 AM and put arterial line in to be ready for procedure scheduled 9/13 at 8 AM.    9/13: Patient seen and examined at bedside, no acute complaints or changes overnight, patient being prepared for TAVR this morning with Dr. Arguello, will be intubated at approximately 6 AM, will not need arterial line per Dr. Arguello.    Past Medical History:  CAD, LVH, aortic stenosis, HLD, diverticulosis, HTN, BPH  Family History: Parents: Alzheimer's disease and Parkinson's.  Social History: EtOH 7 glasses of wine per week (1/day), never smoker, never used e-cigarettes.    ROS   Patient denies CP, dyspnea, reduced exercise tolerance, abdominal pain, vision changes,, headaches, dysuria, dyschezia, muscle pain, rash  All other ROS otherwise negative.      Scheduled Meds:   sodium chloride flush  5-40

## 2024-09-13 NOTE — H&P
CRITICAL CARE PROGRESS NOTE      Patient:  Raymond Hummel    Unit/Bed:4D-10/010-A  YOB: 1940  MRN: [id number]   PCP: Duran Dukes MD  Date of Admission: 9/12/2024  Chief Complaint:- elective TAVR    Assessment and Plan:    Perivalvular leak around Zapata Resilia prosthesis: TTE noted perivalvular leak around AV prosthesis which was placed 8/15/2024 by Dr. Arguello and Dr. Giang as described in severe aortic stenosis section. Patient will require replacement, is going for repeat TAVR 9/13 AM with Dr. Arguello, plan to discharge home after.  Will follow-up with Dr. Arguello, follow-up appointments already scheduled.  N.p.o. at midnight, TAVR 9/13 AM  Hyperbilirubinemia 2/2 hemolysis due to perivalvular leak: Hemoglobin stable but mildly low (11) while bilirubin is elevated 3.2 total (2.8 on admission).  No jaundice or neurological status change.  Reassess after repeat TAVR.   HOCM: 9/9 TTE report indicated EF 60 to 65%, features suggestive of hypertrophic cardiomyopathy, including severely increased wall thickness of LV, with LVOT at rest-peak gradient 61 mmHg.  HTN: Home meds include Norvasc, continue   CAD: Patient has CAD, is s/p PCI x 1 in the LAD.  Heart cath also showed 50% stenosis of RCA, 70% stenosis of OM 2.  On home Lipitor, aspirin, Brilinta  NIDDM2: Patient on home metformin.  Holding metformin 9/12 through 9/14 per Dr. Arguello.  POCT glucose checks 4 times daily  BPH: patient has history of BPH, recent history of gross hematuria noted on prior admission, colposcopy deferred by patient, will follow with urology out-patient   Systolic HF: Prior diagnosis based on echo June 2024 which proceeded first TAVR procedure, will reassess upon follow-up with Dr. Arguello after repeat TAVR  History of Severe aortic stenosis s/p TAVR 8/15/24:  patient follows with Dr Arguello; previous TTE June 2024 showed severe aortic stenosis with AV mean gradient 27 mm Hg. 8/15/24 Transcatheter AV replacement with an Zapata

## 2024-09-13 NOTE — CARE COORDINATION
Case Management Assessment Initial Evaluation    Date/Time of Evaluation: 2024 8:52 AM  Assessment Completed by: Heaven Irvin RN    If patient is discharged prior to next notation, then this note serves as note for discharge by case management.    Patient Name: Raymond Hummel                   YOB: 1940  Diagnosis: Aortic stenosis, severe [I35.0]                   Date / Time: 2024 11:05 AM  Location: [address] [place] [address]     Patient Admission Status: Inpatient   Readmission Risk Low 0-14, Mod 15-19), High > 20: Readmission Risk Score: 11.1    Current PCP: Duran Dukes MD  Health Care Decision Makers:   Primary Decision Maker: Gela Estephanie-Cell - Spouse - [phone]    Secondary Decision Maker: Heriberto Hummel - Child - [phone]    Secondary Decision Maker: EstephanieJuan Carlos PONICOLETTE - Child - [phone]    Additional Case Management Notes: Direct admit from home to ICU yesterday. IVF started. Intubated early this morning in anticipation of surgery. TAVR done this morning. Returned to ICU post-procedure on vent. Echo ordered.     Remains on vent w/ETT on AC/PC, peep 6, FIO2 100%, sats 100%. Tmax 99. NSR. Telemetry, OG clamped, SCDs. IVF, levo @ 5 mcg/min, asa, lipitor, proscar, flomax, brilinta. IL fld bolus ordered. NT Pro-bnp 1117 - now 1069, ast 62, bili 2.8 - now 3.2, hgb 10.4 -now 10.9, plt 110 - now 120.     Procedures:    TAVR    Imagin/13 CXR: ET tube 4 cm above apolonia. Enteric tube seen entering stomach.     Patient Goals/Plan/Treatment Preferences: Home with wife. Denies needs, declines HH.      24 1344   Service Assessment   Patient Orientation Alert and Oriented   Cognition Alert   History Provided By Patient;Spouse   Primary Caregiver Self   Accompanied By/Relationship wife and son   Support Systems Spouse/Significant Other;Children;Family Members   Patient's Healthcare Decision Maker is: Legal Next of Kin  (States has HCPOA paperwork; HCPOA is son Raymond MCMILLAN

## 2024-09-13 NOTE — PROGRESS NOTES
Patient has been successfully weaned from Mechanical Ventilation. Patient extubated and placed on 2 liters/min via nasal cannula.  Post extubation SpO2 is 99% with HR  84 bpm and RR 18 breaths/min.  Patient had moderate cough that was non-productive.  Extubation Well tolerated by patient..

## 2024-09-13 NOTE — PROGRESS NOTES
0600: Family at bedside, spent time with patient since 0530 and now instructed to wait in 2E lobby, all questions/concerns addressed. Family very agreeable.     0605: Dr. Villanueva, RT and charge RN Pankaj at bedside. Preparing to intubate for planned procedure this AM with Dr. Arguello. Consents signed and in chart. Time out performed.     0610: 4 Versed given, 50 Fentanyl given,     0611: 2 versed given    0612: 50 propofol given, spO2 99%,     0613: 25 propofol given, spO2 100%, attempting intubation, unsuccessful=bagging.    0614: 25 propofol given, re-attempting intubation- no color change, spO2 90%- resumed bagging.     0617: Re-attempting intubation= positive color change, bilateral breath sounds auscultated, 7.5 ETT 23 at lips      0619: stat cxr ordered    0620 14 fr OG placed at 60cm.     0626: cxr at bedside ETT and OG placement confirmed by Dr. Villanueva

## 2024-09-13 NOTE — H&P
[facility]  Sedation/Analgesia History & Physical    Pt Name: Raymond Hummel  Account number: [id number]  MRN: [id number]  YOB: 1940  Provider Performing Procedure: Erick Arguello MD MD  Referring Provider: Duran Ordonez MD   Primary Care Physician: Duran Dukes MD  Date: 9/12/2024    PRE-PROCEDURE    Code Status: FULL CODE  Brief History/Pre-Procedure Diagnosis:   Severe PVL  Hemolytic anemia  S/p TAVR    Consent: : I have discussed with the patient risks, benefits, and alternatives (and relevant risks, benefits, and side effects related to alternatives or not receiving care), and likelihood of the success.   The patient and/or representative appear to understand and agree to proceed.  The discussion encompasses risks, benefits, and side effects related to the alternatives and the risks related to not receiving the proposed care, treatment, and services.     The indication, risks and benefits of the procedure and possible therapeutic consequences and alternatives were discussed with the patient. The patient was given the opportunity to ask questions and to have them answered to his/her satisfaction. Risks of the procedure include but are not limited to the following: Bleeding, hematoma including retroperitoneal hemmorhage, infection, pain and discomfort, injury to the aorta and other blood vessels, rhythm disturbance, low blood pressure, myocardial infarction, stroke, kidney damage/failure, myocardial perforation, allergic reactions to sedatives/contrast material, loss of pulse/vascular compromise requiring surgery, aneurysm/pseudoaneurysm formation, possible loss of a limb/hand/leg, needing blood transfusion, requiring emergent open heart surgery or emergent coronary intervention, the need for intubation/respiratory support, the requirement for defibrillation/cardioversion, and death. Alternatives to and omission of the suggested procedure were discussed. The patient had no

## 2024-09-13 NOTE — PROCEDURES
PROCEDURE NOTE  Date: 9/13/2024   Name: Raymond Hummel  YOB: 1940    Procedures    12 lead EKG completed. Results handed to Marilynn DESHPANDE

## 2024-09-14 ENCOUNTER — APPOINTMENT (OUTPATIENT)
Age: 84
DRG: 267 | End: 2024-09-14
Attending: INTERNAL MEDICINE
Payer: MEDICARE

## 2024-09-14 LAB
ALBUMIN SERPL BCG-MCNC: 3.7 G/DL (ref 3.5–5.1)
ALP SERPL-CCNC: 41 U/L (ref 38–126)
ALT SERPL W/O P-5'-P-CCNC: 12 U/L (ref 11–66)
ANION GAP SERPL CALC-SCNC: 12 MEQ/L (ref 8–16)
ANION GAP SERPL CALC-SCNC: 13 MEQ/L (ref 8–16)
AST SERPL-CCNC: 32 U/L (ref 5–40)
BASOPHILS ABSOLUTE: 0 THOU/MM3 (ref 0–0.1)
BASOPHILS NFR BLD AUTO: 0.1 %
BILIRUB CONJ SERPL-MCNC: 0.4 MG/DL (ref 0.1–13.8)
BILIRUB SERPL-MCNC: 1.2 MG/DL (ref 0.3–1.2)
BUN SERPL-MCNC: 19 MG/DL (ref 7–22)
BUN SERPL-MCNC: 22 MG/DL (ref 7–22)
CA-I BLD ISE-SCNC: 1.17 MMOL/L (ref 1.12–1.32)
CALCIUM SERPL-MCNC: 8.3 MG/DL (ref 8.5–10.5)
CALCIUM SERPL-MCNC: 8.5 MG/DL (ref 8.5–10.5)
CHLORIDE SERPL-SCNC: 107 MEQ/L (ref 98–111)
CHLORIDE SERPL-SCNC: 107 MEQ/L (ref 98–111)
CO2 SERPL-SCNC: 20 MEQ/L (ref 23–33)
CO2 SERPL-SCNC: 21 MEQ/L (ref 23–33)
CREAT SERPL-MCNC: 0.8 MG/DL (ref 0.4–1.2)
CREAT SERPL-MCNC: 0.9 MG/DL (ref 0.4–1.2)
DEPRECATED RDW RBC AUTO: 51.4 FL (ref 35–45)
ECHO BSA: 2.1 M2
EOSINOPHIL NFR BLD AUTO: 0.5 %
EOSINOPHILS ABSOLUTE: 0 THOU/MM3 (ref 0–0.4)
ERYTHROCYTE [DISTWIDTH] IN BLOOD BY AUTOMATED COUNT: 15.7 % (ref 11.5–14.5)
GFR SERPL CREATININE-BSD FRML MDRD: 84 ML/MIN/1.73M2
GFR SERPL CREATININE-BSD FRML MDRD: 87 ML/MIN/1.73M2
GLUCOSE BLD STRIP.AUTO-MCNC: 140 MG/DL (ref 70–108)
GLUCOSE BLD STRIP.AUTO-MCNC: 151 MG/DL (ref 70–108)
GLUCOSE BLD STRIP.AUTO-MCNC: 168 MG/DL (ref 70–108)
GLUCOSE BLD STRIP.AUTO-MCNC: 181 MG/DL (ref 70–108)
GLUCOSE SERPL-MCNC: 161 MG/DL (ref 70–108)
GLUCOSE SERPL-MCNC: 197 MG/DL (ref 70–108)
HCT VFR BLD AUTO: 26.6 % (ref 42–52)
HGB BLD-MCNC: 8.6 GM/DL (ref 14–18)
IMM GRANULOCYTES # BLD AUTO: 0.04 THOU/MM3 (ref 0–0.07)
IMM GRANULOCYTES NFR BLD AUTO: 0.5 %
LYMPHOCYTES ABSOLUTE: 0.8 THOU/MM3 (ref 1–4.8)
LYMPHOCYTES NFR BLD AUTO: 9.9 %
MAGNESIUM SERPL-MCNC: 1.9 MG/DL (ref 1.6–2.4)
MAGNESIUM SERPL-MCNC: 2 MG/DL (ref 1.6–2.4)
MCH RBC QN AUTO: 29.8 PG (ref 26–33)
MCHC RBC AUTO-ENTMCNC: 32.3 GM/DL (ref 32.2–35.5)
MCV RBC AUTO: 92 FL (ref 80–94)
MONOCYTES ABSOLUTE: 0.9 THOU/MM3 (ref 0.4–1.3)
MONOCYTES NFR BLD AUTO: 11.2 %
NEUTROPHILS ABSOLUTE: 6.1 THOU/MM3 (ref 1.8–7.7)
NEUTROPHILS NFR BLD AUTO: 77.8 %
NRBC BLD AUTO-RTO: 0 /100 WBC
PHOSPHATE SERPL-MCNC: 3.6 MG/DL (ref 2.4–4.7)
PLATELET # BLD AUTO: 100 THOU/MM3 (ref 130–400)
PMV BLD AUTO: ABNORMAL FL (ref 9.4–12.4)
POTASSIUM SERPL-SCNC: 3.9 MEQ/L (ref 3.5–5.2)
POTASSIUM SERPL-SCNC: 3.9 MEQ/L (ref 3.5–5.2)
PROT SERPL-MCNC: 5.7 G/DL (ref 6.1–8)
RBC # BLD AUTO: 2.89 MILL/MM3 (ref 4.7–6.1)
SODIUM SERPL-SCNC: 139 MEQ/L (ref 135–145)
SODIUM SERPL-SCNC: 141 MEQ/L (ref 135–145)
WBC # BLD AUTO: 7.9 THOU/MM3 (ref 4.8–10.8)

## 2024-09-14 PROCEDURE — 99233 SBSQ HOSP IP/OBS HIGH 50: CPT | Performed by: INTERNAL MEDICINE

## 2024-09-14 PROCEDURE — 85025 COMPLETE CBC W/AUTO DIFF WBC: CPT

## 2024-09-14 PROCEDURE — 2500000003 HC RX 250 WO HCPCS: Performed by: NURSE PRACTITIONER

## 2024-09-14 PROCEDURE — P9047 ALBUMIN (HUMAN), 25%, 50ML: HCPCS

## 2024-09-14 PROCEDURE — 36415 COLL VENOUS BLD VENIPUNCTURE: CPT

## 2024-09-14 PROCEDURE — 82248 BILIRUBIN DIRECT: CPT

## 2024-09-14 PROCEDURE — 93306 TTE W/DOPPLER COMPLETE: CPT | Performed by: INTERNAL MEDICINE

## 2024-09-14 PROCEDURE — 83735 ASSAY OF MAGNESIUM: CPT

## 2024-09-14 PROCEDURE — 82330 ASSAY OF CALCIUM: CPT

## 2024-09-14 PROCEDURE — 80053 COMPREHEN METABOLIC PANEL: CPT

## 2024-09-14 PROCEDURE — 82948 REAGENT STRIP/BLOOD GLUCOSE: CPT

## 2024-09-14 PROCEDURE — 2580000003 HC RX 258: Performed by: PHYSICIAN ASSISTANT

## 2024-09-14 PROCEDURE — 84100 ASSAY OF PHOSPHORUS: CPT

## 2024-09-14 PROCEDURE — 93306 TTE W/DOPPLER COMPLETE: CPT

## 2024-09-14 PROCEDURE — 6360000002 HC RX W HCPCS

## 2024-09-14 PROCEDURE — 2000000000 HC ICU R&B

## 2024-09-14 PROCEDURE — 6370000000 HC RX 637 (ALT 250 FOR IP): Performed by: NURSE PRACTITIONER

## 2024-09-14 RX ORDER — HYDRALAZINE HYDROCHLORIDE 20 MG/ML
10 INJECTION INTRAMUSCULAR; INTRAVENOUS ONCE
Status: COMPLETED | OUTPATIENT
Start: 2024-09-14 | End: 2024-09-15

## 2024-09-14 RX ORDER — ALBUMIN (HUMAN) 12.5 G/50ML
25 SOLUTION INTRAVENOUS ONCE
Status: COMPLETED | OUTPATIENT
Start: 2024-09-14 | End: 2024-09-14

## 2024-09-14 RX ADMIN — ALBUMIN (HUMAN) 25 G: 0.25 INJECTION, SOLUTION INTRAVENOUS at 10:32

## 2024-09-14 RX ADMIN — FINASTERIDE 5 MG: 5 TABLET, FILM COATED ORAL at 09:14

## 2024-09-14 RX ADMIN — TAMSULOSIN HYDROCHLORIDE 0.4 MG: 0.4 CAPSULE ORAL at 09:14

## 2024-09-14 RX ADMIN — SODIUM CHLORIDE, PRESERVATIVE FREE 10 ML: 5 INJECTION INTRAVENOUS at 19:49

## 2024-09-14 RX ADMIN — Medication 0.4 MCG/KG/HR: at 06:50

## 2024-09-14 RX ADMIN — ATORVASTATIN CALCIUM 40 MG: 40 TABLET, FILM COATED ORAL at 09:14

## 2024-09-14 RX ADMIN — SODIUM CHLORIDE, PRESERVATIVE FREE 10 ML: 5 INJECTION INTRAVENOUS at 09:16

## 2024-09-14 RX ADMIN — TICAGRELOR 90 MG: 90 TABLET ORAL at 19:48

## 2024-09-14 RX ADMIN — ASPIRIN 81 MG: 81 TABLET, COATED ORAL at 09:14

## 2024-09-14 RX ADMIN — SODIUM CHLORIDE, PRESERVATIVE FREE 10 ML: 5 INJECTION INTRAVENOUS at 19:48

## 2024-09-14 RX ADMIN — TICAGRELOR 90 MG: 90 TABLET ORAL at 09:14

## 2024-09-14 NOTE — PLAN OF CARE
Problem: Discharge Planning  Goal: Discharge to home or other facility with appropriate resources  Outcome: Progressing  Flowsheets (Taken 9/13/2024 1428 by Agustina Liu RN)  Discharge to home or other facility with appropriate resources: Identify barriers to discharge with patient and caregiver     Problem: Safety - Adult  Goal: Free from fall injury  Outcome: Progressing  Flowsheets (Taken 9/13/2024 1428 by Agustina Liu RN)  Free From Fall Injury: Instruct family/caregiver on patient safety     Problem: ABCDS Injury Assessment  Goal: Absence of physical injury  Outcome: Progressing  Flowsheets (Taken 9/13/2024 1428 by Agustina Liu RN)  Absence of Physical Injury: Implement safety measures based on patient assessment     Problem: Pain  Goal: Verbalizes/displays adequate comfort level or baseline comfort level  Outcome: Progressing  Flowsheets (Taken 9/13/2024 1428 by Agustina Liu RN)  Verbalizes/displays adequate comfort level or baseline comfort level:   Consider cultural and social influences on pain and pain management   Encourage patient to monitor pain and request assistance   Administer analgesics based on type and severity of pain and evaluate response   Assess pain using appropriate pain scale   Implement non-pharmacological measures as appropriate and evaluate response   Notify Licensed Independent Practitioner if interventions unsuccessful or patient reports new pain    Care plan reviewed with patient and wife and son.  Patient and wife and son verbalize understanding of the plan of care and contribute to goal setting.

## 2024-09-14 NOTE — PROGRESS NOTES
CRITICAL CARE PROGRESS NOTE    Patient:  Raymond Hummel    Unit/Bed:4D-10/010-A  YOB: 1940  MRN: [id number]   PCP: Duran Dukes MD  Date of Admission: 9/12/2024  Chief Complaint:- elective TAVR    Assessment and Plan:    Perivalvular leak around Zapata Resilia prosthesis, resolved: TTE noted perivalvular leak around AV prosthesis which was placed 8/15/2024 by Dr. Arguello and Dr. Giang as described in severe aortic stenosis section.  Status post \"flare TAVR\" 9/13 in ventricle.  TAVR placement walking AV node, TVP placed.  Electrophysiology consulted by structural heart team.  Continue current management with pending pacemaker placement. Strict bedrest and avoid TVP removal.  May need to consider anticoagulation given foreign body burden.  Hyperbilirubinemia 2/2 hemolysis due to perivalvular leak, improving: Hemoglobin stable but mildly low (11) while bilirubin is elevated 3.2 total (2.8 on admission).  Mild jaundice which is improving.  No neurological status change.  Total bilirubin downtrending.  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 Electrophysiology consulted by structural heart team for potential permanent pacemaker.  Patient is pacemaker dependent.  HOCM: 9/9 TTE report indicated EF 60 to 65%, features suggestive of hypertrophic cardiomyopathy, including severely increased wall thickness of LV, with LVOT at rest-peak gradient 61 mmHg.  Hypotension ISO HTN: Holding home antihypertensives in the setting of hypotension.  Phenylephrine preferred pressor support  CAD: Patient has CAD, is s/p PCI x 1 in the LAD.  Heart cath also showed 50% stenosis of RCA, 70% stenosis of OM 2.  On home Lipitor, aspirin, Brilinta  NIDDM2: Patient on home metformin.  Holding metformin 9/12 through 9/14 per Dr. Arguello.  POCT glucose checks 4 times daily  BPH: patient has history of BPH, recent history of gross hematuria noted on prior admission, colposcopy deferred

## 2024-09-14 NOTE — PROGRESS NOTES
Cardiovascular Surgery Progress Note    Stable, comfortable on NC, no inotropes  Remains in 3rd degree HB, pacer dependent  Cannulation sites no hematoma  Labs noted  TTE today no AI, mild MR, normal EF  Probable need for PPM  Keep in ICU given pacer dependence

## 2024-09-14 NOTE — PROGRESS NOTES
09/14/24 1243   Encounter Summary   Encounter Overview/Reason Attempted Encounter   Service Provided For Patient   Referral/Consult From Rounding   Support System Spouse   Last Encounter  09/14/24  (N/R)   Complexity of Encounter Low   Begin Time 1238   End Time  1243   Total Time Calculated 5 min   Assessment/Intervention/Outcome   Assessment Unable to assess   Intervention Prayer (assurance of)/[place]     In my encounter with the 84  yr old patient, I attempted to see the patient in ICU, but the patient was unresponsive at this time. No family was present in the room.  I offered a prayer at the pt's side. A  will attempt to see the patient at a later time as a follow up.

## 2024-09-15 LAB
ALBUMIN SERPL BCG-MCNC: 3.8 G/DL (ref 3.5–5.1)
ALP SERPL-CCNC: 41 U/L (ref 38–126)
ALT SERPL W/O P-5'-P-CCNC: 11 U/L (ref 11–66)
ANION GAP SERPL CALC-SCNC: 12 MEQ/L (ref 8–16)
AST SERPL-CCNC: 27 U/L (ref 5–40)
BACTERIA UR CULT: NORMAL
BASOPHILS ABSOLUTE: 0 THOU/MM3 (ref 0–0.1)
BASOPHILS NFR BLD AUTO: 0.4 %
BILIRUB CONJ SERPL-MCNC: 0.6 MG/DL (ref 0.1–13.8)
BILIRUB SERPL-MCNC: 1.7 MG/DL (ref 0.3–1.2)
BUN SERPL-MCNC: 16 MG/DL (ref 7–22)
CA-I BLD ISE-SCNC: 1.15 MMOL/L (ref 1.12–1.32)
CALCIUM SERPL-MCNC: 8.4 MG/DL (ref 8.5–10.5)
CHLORIDE SERPL-SCNC: 110 MEQ/L (ref 98–111)
CO2 SERPL-SCNC: 21 MEQ/L (ref 23–33)
CREAT SERPL-MCNC: 0.6 MG/DL (ref 0.4–1.2)
DEPRECATED RDW RBC AUTO: 53.3 FL (ref 35–45)
EOSINOPHIL NFR BLD AUTO: 0.6 %
EOSINOPHILS ABSOLUTE: 0 THOU/MM3 (ref 0–0.4)
ERYTHROCYTE [DISTWIDTH] IN BLOOD BY AUTOMATED COUNT: 15.7 % (ref 11.5–14.5)
GFR SERPL CREATININE-BSD FRML MDRD: > 90 ML/MIN/1.73M2
GLUCOSE BLD STRIP.AUTO-MCNC: 137 MG/DL (ref 70–108)
GLUCOSE BLD STRIP.AUTO-MCNC: 162 MG/DL (ref 70–108)
GLUCOSE SERPL-MCNC: 131 MG/DL (ref 70–108)
HCT VFR BLD AUTO: 27.6 % (ref 42–52)
HGB BLD-MCNC: 8.8 GM/DL (ref 14–18)
IMM GRANULOCYTES # BLD AUTO: 0.02 THOU/MM3 (ref 0–0.07)
IMM GRANULOCYTES NFR BLD AUTO: 0.2 %
LYMPHOCYTES ABSOLUTE: 0.8 THOU/MM3 (ref 1–4.8)
LYMPHOCYTES NFR BLD AUTO: 10.6 %
MAGNESIUM SERPL-MCNC: 2 MG/DL (ref 1.6–2.4)
MCH RBC QN AUTO: 30.2 PG (ref 26–33)
MCHC RBC AUTO-ENTMCNC: 31.9 GM/DL (ref 32.2–35.5)
MCV RBC AUTO: 94.8 FL (ref 80–94)
MONOCYTES ABSOLUTE: 0.8 THOU/MM3 (ref 0.4–1.3)
MONOCYTES NFR BLD AUTO: 10.6 %
NEUTROPHILS ABSOLUTE: 6.2 THOU/MM3 (ref 1.8–7.7)
NEUTROPHILS NFR BLD AUTO: 77.6 %
NRBC BLD AUTO-RTO: 0 /100 WBC
PLATELET # BLD AUTO: 98 THOU/MM3 (ref 130–400)
PLATELET BLD QL SMEAR: ABNORMAL
PMV BLD AUTO: ABNORMAL FL (ref 9.4–12.4)
POIKILOCYTES: ABNORMAL
POLYCHROMASIA BLD QL SMEAR: ABNORMAL
POTASSIUM SERPL-SCNC: 3 MEQ/L (ref 3.5–5.2)
POTASSIUM SERPL-SCNC: 3.5 MEQ/L (ref 3.5–5.2)
PROT SERPL-MCNC: 5.9 G/DL (ref 6.1–8)
RBC # BLD AUTO: 2.91 MILL/MM3 (ref 4.7–6.1)
SCAN OF BLOOD SMEAR: NORMAL
SCHISTOCYTES BLD QL SMEAR: ABNORMAL
SODIUM SERPL-SCNC: 143 MEQ/L (ref 135–145)
WBC # BLD AUTO: 8 THOU/MM3 (ref 4.8–10.8)

## 2024-09-15 PROCEDURE — 2000000000 HC ICU R&B

## 2024-09-15 PROCEDURE — 94761 N-INVAS EAR/PLS OXIMETRY MLT: CPT

## 2024-09-15 PROCEDURE — 6370000000 HC RX 637 (ALT 250 FOR IP)

## 2024-09-15 PROCEDURE — 6360000002 HC RX W HCPCS

## 2024-09-15 PROCEDURE — 82248 BILIRUBIN DIRECT: CPT

## 2024-09-15 PROCEDURE — 36415 COLL VENOUS BLD VENIPUNCTURE: CPT

## 2024-09-15 PROCEDURE — 2580000003 HC RX 258: Performed by: PHYSICIAN ASSISTANT

## 2024-09-15 PROCEDURE — 80053 COMPREHEN METABOLIC PANEL: CPT

## 2024-09-15 PROCEDURE — 92953 TEMPORARY EXTERNAL PACING: CPT

## 2024-09-15 PROCEDURE — 82330 ASSAY OF CALCIUM: CPT

## 2024-09-15 PROCEDURE — 6370000000 HC RX 637 (ALT 250 FOR IP): Performed by: NURSE PRACTITIONER

## 2024-09-15 PROCEDURE — 99233 SBSQ HOSP IP/OBS HIGH 50: CPT | Performed by: INTERNAL MEDICINE

## 2024-09-15 PROCEDURE — 85025 COMPLETE CBC W/AUTO DIFF WBC: CPT

## 2024-09-15 PROCEDURE — 82948 REAGENT STRIP/BLOOD GLUCOSE: CPT

## 2024-09-15 PROCEDURE — 83735 ASSAY OF MAGNESIUM: CPT

## 2024-09-15 PROCEDURE — 84132 ASSAY OF SERUM POTASSIUM: CPT

## 2024-09-15 RX ORDER — LOSARTAN POTASSIUM 50 MG/1
50 TABLET ORAL DAILY
Status: DISCONTINUED | OUTPATIENT
Start: 2024-09-16 | End: 2024-09-24 | Stop reason: HOSPADM

## 2024-09-15 RX ORDER — HYDRALAZINE HYDROCHLORIDE 20 MG/ML
10 INJECTION INTRAMUSCULAR; INTRAVENOUS EVERY 6 HOURS PRN
Status: DISCONTINUED | OUTPATIENT
Start: 2024-09-15 | End: 2024-09-24 | Stop reason: HOSPADM

## 2024-09-15 RX ORDER — POTASSIUM CHLORIDE 29.8 MG/ML
20 INJECTION INTRAVENOUS
Status: COMPLETED | OUTPATIENT
Start: 2024-09-15 | End: 2024-09-15

## 2024-09-15 RX ORDER — ALPRAZOLAM 0.5 MG
0.25 TABLET ORAL 3 TIMES DAILY PRN
Status: DISCONTINUED | OUTPATIENT
Start: 2024-09-15 | End: 2024-09-24 | Stop reason: HOSPADM

## 2024-09-15 RX ORDER — TRAZODONE HYDROCHLORIDE 50 MG/1
50 TABLET, FILM COATED ORAL NIGHTLY
Status: DISCONTINUED | OUTPATIENT
Start: 2024-09-15 | End: 2024-09-16

## 2024-09-15 RX ADMIN — HYDRALAZINE HYDROCHLORIDE 10 MG: 20 INJECTION INTRAMUSCULAR; INTRAVENOUS at 22:19

## 2024-09-15 RX ADMIN — POTASSIUM CHLORIDE 20 MEQ: 29.8 INJECTION, SOLUTION INTRAVENOUS at 06:44

## 2024-09-15 RX ADMIN — SODIUM CHLORIDE: 9 INJECTION, SOLUTION INTRAVENOUS at 05:36

## 2024-09-15 RX ADMIN — HYDRALAZINE HYDROCHLORIDE 10 MG: 20 INJECTION INTRAMUSCULAR; INTRAVENOUS at 00:00

## 2024-09-15 RX ADMIN — TICAGRELOR 90 MG: 90 TABLET ORAL at 19:55

## 2024-09-15 RX ADMIN — ALPRAZOLAM 0.25 MG: 0.5 TABLET ORAL at 22:20

## 2024-09-15 RX ADMIN — ATORVASTATIN CALCIUM 40 MG: 40 TABLET, FILM COATED ORAL at 08:20

## 2024-09-15 RX ADMIN — TAMSULOSIN HYDROCHLORIDE 0.4 MG: 0.4 CAPSULE ORAL at 08:20

## 2024-09-15 RX ADMIN — SODIUM CHLORIDE, PRESERVATIVE FREE 10 ML: 5 INJECTION INTRAVENOUS at 19:58

## 2024-09-15 RX ADMIN — AMLODIPINE BESYLATE 5 MG: 5 TABLET ORAL at 08:20

## 2024-09-15 RX ADMIN — AMLODIPINE BESYLATE 5 MG: 5 TABLET ORAL at 19:57

## 2024-09-15 RX ADMIN — POTASSIUM BICARBONATE 40 MEQ: 782 TABLET, EFFERVESCENT ORAL at 05:39

## 2024-09-15 RX ADMIN — ALPRAZOLAM 0.25 MG: 0.5 TABLET ORAL at 16:01

## 2024-09-15 RX ADMIN — SODIUM CHLORIDE, PRESERVATIVE FREE 10 ML: 5 INJECTION INTRAVENOUS at 19:57

## 2024-09-15 RX ADMIN — SODIUM CHLORIDE, PRESERVATIVE FREE 10 ML: 5 INJECTION INTRAVENOUS at 08:22

## 2024-09-15 RX ADMIN — FINASTERIDE 5 MG: 5 TABLET, FILM COATED ORAL at 08:20

## 2024-09-15 RX ADMIN — POTASSIUM CHLORIDE 20 MEQ: 29.8 INJECTION, SOLUTION INTRAVENOUS at 05:40

## 2024-09-15 RX ADMIN — TICAGRELOR 90 MG: 90 TABLET ORAL at 08:20

## 2024-09-15 RX ADMIN — LOSARTAN POTASSIUM 25 MG: 25 TABLET, FILM COATED ORAL at 08:20

## 2024-09-15 RX ADMIN — TRAZODONE HYDROCHLORIDE 50 MG: 50 TABLET ORAL at 22:20

## 2024-09-15 RX ADMIN — ASPIRIN 81 MG: 81 TABLET, COATED ORAL at 08:20

## 2024-09-15 NOTE — PLAN OF CARE
Problem: Respiratory - Adult  Goal: Achieves optimal ventilation and oxygenation  Outcome: Progressing  Patient mutually agrees upon goal.

## 2024-09-15 NOTE — PLAN OF CARE
Problem: Discharge Planning  Goal: Discharge to home or other facility with appropriate resources  9/14/2024 2239 by Sheryl Ulrich RN  Outcome: Progressing  Flowsheets (Taken 9/14/2024 2000)  Discharge to home or other facility with appropriate resources:   Identify barriers to discharge with patient and caregiver   Arrange for needed discharge resources and transportation as appropriate   Identify discharge learning needs (meds, wound care, etc)   Arrange for interpreters to assist at discharge as needed   Refer to discharge planning if patient needs post-hospital services based on physician order or complex needs related to functional status, cognitive ability or social support system     Problem: Safety - Adult  Goal: Free from fall injury  9/14/2024 2239 by Sheryl Ulrich RN  Outcome: Progressing  Flowsheets (Taken 9/13/2024 1428 by Agustina Liu RN)  Free From Fall Injury: Instruct family/caregiver on patient safety     Problem: ABCDS Injury Assessment  Goal: Absence of physical injury  9/14/2024 2239 by Sheryl Ulrich RN  Outcome: Progressing  Flowsheets (Taken 9/13/2024 1428 by Agustina Liu RN)  Absence of Physical Injury: Implement safety measures based on patient assessment     Problem: Pain  Goal: Verbalizes/displays adequate comfort level or baseline comfort level  9/14/2024 2239 by Sheryl Ulrich RN  Outcome: Progressing  Flowsheets (Taken 9/13/2024 1428 by Agustina Liu RN)  Verbalizes/displays adequate comfort level or baseline comfort level:   Consider cultural and social influences on pain and pain management   Encourage patient to monitor pain and request assistance   Administer analgesics based on type and severity of pain and evaluate response   Assess pain using appropriate pain scale   Implement non-pharmacological measures as appropriate and evaluate response   Notify Licensed Independent Practitioner if interventions unsuccessful or patient reports new pain     Care

## 2024-09-15 NOTE — PROGRESS NOTES
CRITICAL CARE PROGRESS NOTE    Patient:  Raymond Hummel    Unit/Bed:4D-10/010-A  YOB: 1940  MRN: [id number]   PCP: Duran Dukes MD  Date of Admission: 9/12/2024  Chief Complaint:- elective TAVR    Assessment and Plan:    Perivalvular leak around Zapata Resilia prosthesis, resolved: TTE noted perivalvular leak around AV prosthesis which was placed 8/15/2024 by Dr. Arguello and Dr. Giang as described in severe aortic stenosis section.  Status post \"flare TAVR\" 9/13 in ventricle.  TAVR placement obstructing AV node, TVP placed, per cardiology patient is completely dependent on pacer.  Electrophysiology consulted by structural heart team.    Continue current management with pending pacemaker placement.   Strict bedrest and avoid TVP removal.    May need to consider anticoagulation given foreign body burden.  Hyperbilirubinemia 2/2 hemolysis due to perivalvular leak, improving: Hemoglobin stable, bilirubin had been downtrending up between 9/14 and 9/15 increased from 1.2 to 1.7.  Jaundice improved.  No neurological status change.   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 Electrophysiology consulted by structural heart team for potential permanent pacemaker.  Patient is pacemaker dependent.  HOCM: 9/9 TTE report indicated EF 60 to 65%, features suggestive of hypertrophic cardiomyopathy, including severely increased wall thickness of LV, with LVOT at rest-peak gradient 61 mmHg.  Hypotension ISO HTN: Holding home antihypertensives in the setting of hypotension.  Phenylephrine preferred pressor support if necessary.  CAD: Patient has CAD, is s/p PCI x 1 in the LAD.  Heart cath also showed 50% stenosis of RCA, 70% stenosis of OM 2.  On home Lipitor, aspirin, Brilinta  NIDDM2: Patient on home metformin.  Holding metformin 9/12 through 9/14 per Dr. Arguello.  POCT glucose checks 4 times daily  BPH: patient has history of BPH, recent history of gross hematuria  16   CREATININE 0.9 0.8  --  0.6   GLUCOSE 197* 161*  --  131*   MG 1.9 2.0  --  2.0   PHOS  --   --  3.6  --    CALCIUM 8.5 8.3*  --  8.4*     Hepatic:   Recent Labs     09/13/24  0324 09/14/24  0515 09/15/24  0321   AST 62* 32 27   ALT 18 12 11   BILITOT 3.2* 1.2 1.7*   ALKPHOS 53 41 41     Cardiac Enzymes: No results for input(s): \"CKTOTAL\", \"CKMB\", \"TROPONINI\" in the last 72 hours.  BNP: No results for input(s): \"BNP\" in the last 72 hours.  INR:   Recent Labs     09/13/24 0324   INR 1.11     POC   Recent Labs     09/14/24  2212 09/15/24  0801 09/15/24  1138   POCGLU 181* 137* 162*     No results for input(s): \"LACTA\" in the last 72 hours.     sodium chloride 20 mL/hr at 09/15/24 0536    sodium chloride          [START ON 9/16/2024] losartan  50 mg Oral Daily    sodium chloride flush  5-40 mL IntraVENous 2 times per day    sodium chloride flush  5-40 mL IntraVENous 2 times per day    chlorhexidine gluconate   Topical Once    amLODIPine  5 mg Oral BID    aspirin  81 mg Oral Daily    finasteride  5 mg Oral Daily    spironolactone  25 mg Oral Daily    tamsulosin  0.4 mg Oral Daily    ticagrelor  90 mg Oral BID    atorvastatin  40 mg Oral Daily       24HR INTAKE/OUTPUT:    Intake/Output Summary (Last 24 hours) at 9/15/2024 1159  Last data filed at 9/15/2024 0835  Gross per 24 hour   Intake 604.3 ml   Output 1325 ml   Net -720.7 ml     DVT prophylaxis reviewed.  SCDs to both lower extremities.     Electronically signed by   Madison Castellanos MD on 9/15/2024 at 11:58 AM

## 2024-09-15 NOTE — PROGRESS NOTES
Patient refused to wear NIV. Machine is at the bedside if needed. Patient on room air with an SpO2 of 97% with easy unlabored respirations.

## 2024-09-15 NOTE — PROGRESS NOTES
Cardiovascular Surgery Progress Note    Comfortable on RA  Native ventricular rate now mid 60s, still A-V dissociated  Otherwise stable, somewhat hypertensive  Labs noted, K replacement in progress  Echo yesterday shows no AI, only mild MR, no residual TYSHAWN, normal LV function  -Temporary pacer to VVI 60  -Increase cozaar to 50  -D/c morrissey  -Ok to get up out of bed  -Anticipate PPM early next week

## 2024-09-16 PROBLEM — I44.2 CHB (COMPLETE HEART BLOCK) (HCC): Status: ACTIVE | Noted: 2024-09-16

## 2024-09-16 LAB
% INHIBITION AA: 89.3 %
% INHIBITION AA: 93.8 %
% INHIBITION ADP: 41.1 %
% INHIBITION ADP: 69 %
AA % AGGREGATION: 10.7 %
AA % AGGREGATION: 6.2 %
ADP PERCENT AGGREGATION: 31 %
ADP PERCENT AGGREGATION: 58.9 %
ALBUMIN SERPL BCG-MCNC: 3.9 G/DL (ref 3.5–5.1)
ALP SERPL-CCNC: 47 U/L (ref 38–126)
ALT SERPL W/O P-5'-P-CCNC: 13 U/L (ref 11–66)
ANION GAP SERPL CALC-SCNC: 12 MEQ/L (ref 8–16)
AST SERPL-CCNC: 25 U/L (ref 5–40)
BASOPHILS ABSOLUTE: 0 THOU/MM3 (ref 0–0.1)
BASOPHILS NFR BLD AUTO: 0.2 %
BILIRUB CONJ SERPL-MCNC: 0.6 MG/DL (ref 0.1–13.8)
BILIRUB SERPL-MCNC: 1.8 MG/DL (ref 0.3–1.2)
BUN SERPL-MCNC: 13 MG/DL (ref 7–22)
CALCIUM SERPL-MCNC: 8.6 MG/DL (ref 8.5–10.5)
CHLORIDE SERPL-SCNC: 111 MEQ/L (ref 98–111)
CO2 SERPL-SCNC: 23 MEQ/L (ref 23–33)
CREAT SERPL-MCNC: 0.5 MG/DL (ref 0.4–1.2)
DEPRECATED RDW RBC AUTO: 52.2 FL (ref 35–45)
ECHO AO ASC DIAM: 3.9 CM
ECHO AO ASCENDING AORTA INDEX: 1.89 CM/M2
ECHO AV ACCELERATION TIME: 102 MS
ECHO AV MEAN GRADIENT: 12 MMHG
ECHO BSA: 2.1 M2
ECHO EST RA PRESSURE: 10 MMHG
ECHO LA AREA 2C: 25.8 CM2
ECHO LA AREA 4C: 26 CM2
ECHO LA DIAMETER INDEX: 2.28 CM/M2
ECHO LA DIAMETER: 4.7 CM
ECHO LA MAJOR AXIS: 6.5 CM
ECHO LA MINOR AXIS: 7 CM
ECHO LA VOL BP: 86 ML (ref 18–58)
ECHO LA VOL MOD A2C: 81 ML (ref 18–58)
ECHO LA VOL MOD A4C: 86 ML (ref 18–58)
ECHO LA VOL/BSA BIPLANE: 42 ML/M2 (ref 16–34)
ECHO LA VOLUME INDEX MOD A2C: 39 ML/M2 (ref 16–34)
ECHO LA VOLUME INDEX MOD A4C: 42 ML/M2 (ref 16–34)
ECHO LV EJECTION FRACTION BIPLANE: 55 % (ref 55–100)
ECHO LV FRACTIONAL SHORTENING: 28 % (ref 28–44)
ECHO LV INTERNAL DIMENSION DIASTOLE INDEX: 1.89 CM/M2
ECHO LV INTERNAL DIMENSION DIASTOLIC: 3.9 CM (ref 4.2–5.9)
ECHO LV INTERNAL DIMENSION SYSTOLIC INDEX: 1.36 CM/M2
ECHO LV INTERNAL DIMENSION SYSTOLIC: 2.8 CM
ECHO LV IVSD: 1.8 CM (ref 0.6–1)
ECHO LV MASS 2D: 249 G (ref 88–224)
ECHO LV MASS INDEX 2D: 120.9 G/M2 (ref 49–115)
ECHO LV POSTERIOR WALL DIASTOLIC: 1.4 CM (ref 0.6–1)
ECHO LV RELATIVE WALL THICKNESS RATIO: 0.72
ECHO LVOT AREA: 3.5 CM2
ECHO LVOT DIAM: 2.1 CM
ECHO LVOT MEAN GRADIENT: 6 MMHG
ECHO LVOT PEAK GRADIENT: 12 MMHG
ECHO LVOT PEAK VELOCITY: 1.7 M/S
ECHO LVOT STROKE VOLUME INDEX: 54.8 ML/M2
ECHO LVOT SV: 112.9 ML
ECHO LVOT VTI: 32.6 CM
ECHO MV A VELOCITY: 1.84 M/S
ECHO MV AREA VTI: 2.4 CM2
ECHO MV E DECELERATION TIME (DT): 218 MS
ECHO MV E VELOCITY: 1.25 M/S
ECHO MV E/A RATIO: 0.68
ECHO MV LVOT VTI INDEX: 1.44
ECHO MV MAX VELOCITY: 2 M/S
ECHO MV MEAN GRADIENT: 6 MMHG
ECHO MV MEAN VELOCITY: 1.1 M/S
ECHO MV PEAK GRADIENT: 16 MMHG
ECHO MV REGURGITANT PEAK GRADIENT: 108 MMHG
ECHO MV REGURGITANT PEAK VELOCITY: 5.2 M/S
ECHO MV VTI: 46.9 CM
ECHO PULMONARY ARTERY END DIASTOLIC PRESSURE: 5 MMHG
ECHO PV MAX VELOCITY: 0.6 M/S
ECHO PV PEAK GRADIENT: 2 MMHG
ECHO PV REGURGITANT MAX VELOCITY: 1.2 M/S
ECHO RIGHT VENTRICULAR SYSTOLIC PRESSURE (RVSP): 37 MMHG
ECHO RV INTERNAL DIMENSION: 4 CM
ECHO RV TAPSE: 2.2 CM (ref 1.7–?)
ECHO TV E WAVE: 0.5 M/S
ECHO TV REGURGITANT MAX VELOCITY: 2.61 M/S
ECHO TV REGURGITANT PEAK GRADIENT: 27 MMHG
EKG ATRIAL RATE: 63 BPM
EKG P AXIS: 57 DEGREES
EKG P-R INTERVAL: 304 MS
EKG Q-T INTERVAL: 504 MS
EKG QRS DURATION: 174 MS
EKG QTC CALCULATION (BAZETT): 515 MS
EKG R AXIS: -65 DEGREES
EKG T AXIS: 60 DEGREES
EKG VENTRICULAR RATE: 63 BPM
EOSINOPHIL NFR BLD AUTO: 2 %
EOSINOPHILS ABSOLUTE: 0.2 THOU/MM3 (ref 0–0.4)
ERYTHROCYTE [DISTWIDTH] IN BLOOD BY AUTOMATED COUNT: 15.4 % (ref 11.5–14.5)
GFR SERPL CREATININE-BSD FRML MDRD: > 90 ML/MIN/1.73M2
GLUCOSE BLD STRIP.AUTO-MCNC: 153 MG/DL (ref 70–108)
GLUCOSE BLD STRIP.AUTO-MCNC: 164 MG/DL (ref 70–108)
GLUCOSE BLD STRIP.AUTO-MCNC: 234 MG/DL (ref 70–108)
GLUCOSE BLD STRIP.AUTO-MCNC: 257 MG/DL (ref 70–108)
GLUCOSE SERPL-MCNC: 140 MG/DL (ref 70–108)
HCT VFR BLD AUTO: 30 % (ref 42–52)
HGB BLD-MCNC: 9.6 GM/DL (ref 14–18)
IMM GRANULOCYTES # BLD AUTO: 0.04 THOU/MM3 (ref 0–0.07)
IMM GRANULOCYTES NFR BLD AUTO: 0.5 %
LYMPHOCYTES ABSOLUTE: 1.1 THOU/MM3 (ref 1–4.8)
LYMPHOCYTES NFR BLD AUTO: 13.6 %
MA AA BLD RES TEG: 12.1 MM (ref 51–71)
MA AA BLD RES TEG: 15.9 MM (ref 51–71)
MA ACTF BLD RES TEG: 10.4 MM (ref 2–19)
MA ACTF BLD RES TEG: 8.9 MM (ref 2–19)
MA ADP BLD RES TEG: 25 MM (ref 45–69)
MA ADP BLD RES TEG: 40.6 MM (ref 45–69)
MA KAOLIN P HPASE BLD RES TEG: 60.9 MM (ref 53–68)
MA KAOLIN P HPASE BLD RES TEG: 61.7 MM (ref 53–68)
MAGNESIUM SERPL-MCNC: 2.1 MG/DL (ref 1.6–2.4)
MCH RBC QN AUTO: 29.7 PG (ref 26–33)
MCHC RBC AUTO-ENTMCNC: 32 GM/DL (ref 32.2–35.5)
MCV RBC AUTO: 92.9 FL (ref 80–94)
MONOCYTES ABSOLUTE: 0.9 THOU/MM3 (ref 0.4–1.3)
MONOCYTES NFR BLD AUTO: 10.5 %
NEUTROPHILS ABSOLUTE: 6.1 THOU/MM3 (ref 1.8–7.7)
NEUTROPHILS NFR BLD AUTO: 73.2 %
NRBC BLD AUTO-RTO: 0 /100 WBC
P2Y12 ASSAY: 178 PRU (ref 180–376)
PLATELET # BLD AUTO: 112 THOU/MM3 (ref 130–400)
PMV BLD AUTO: 12.8 FL (ref 9.4–12.4)
POTASSIUM SERPL-SCNC: 3.4 MEQ/L (ref 3.5–5.2)
POTASSIUM SERPL-SCNC: 4 MEQ/L (ref 3.5–5.2)
PROT SERPL-MCNC: 6.3 G/DL (ref 6.1–8)
RBC # BLD AUTO: 3.23 MILL/MM3 (ref 4.7–6.1)
SODIUM SERPL-SCNC: 146 MEQ/L (ref 135–145)
WBC # BLD AUTO: 8.4 THOU/MM3 (ref 4.8–10.8)

## 2024-09-16 PROCEDURE — 80053 COMPREHEN METABOLIC PANEL: CPT

## 2024-09-16 PROCEDURE — 6360000002 HC RX W HCPCS

## 2024-09-16 PROCEDURE — 85576 BLOOD PLATELET AGGREGATION: CPT

## 2024-09-16 PROCEDURE — 84132 ASSAY OF SERUM POTASSIUM: CPT

## 2024-09-16 PROCEDURE — 6370000000 HC RX 637 (ALT 250 FOR IP)

## 2024-09-16 PROCEDURE — 6370000000 HC RX 637 (ALT 250 FOR IP): Performed by: THORACIC SURGERY (CARDIOTHORACIC VASCULAR SURGERY)

## 2024-09-16 PROCEDURE — 51798 US URINE CAPACITY MEASURE: CPT

## 2024-09-16 PROCEDURE — 82948 REAGENT STRIP/BLOOD GLUCOSE: CPT

## 2024-09-16 PROCEDURE — 6370000000 HC RX 637 (ALT 250 FOR IP): Performed by: NURSE PRACTITIONER

## 2024-09-16 PROCEDURE — 36415 COLL VENOUS BLD VENIPUNCTURE: CPT

## 2024-09-16 PROCEDURE — 85025 COMPLETE CBC W/AUTO DIFF WBC: CPT

## 2024-09-16 PROCEDURE — 82248 BILIRUBIN DIRECT: CPT

## 2024-09-16 PROCEDURE — 93005 ELECTROCARDIOGRAM TRACING: CPT | Performed by: INTERNAL MEDICINE

## 2024-09-16 PROCEDURE — 99291 CRITICAL CARE FIRST HOUR: CPT | Performed by: INTERNAL MEDICINE

## 2024-09-16 PROCEDURE — 83735 ASSAY OF MAGNESIUM: CPT

## 2024-09-16 PROCEDURE — 2000000000 HC ICU R&B

## 2024-09-16 PROCEDURE — 2580000003 HC RX 258

## 2024-09-16 PROCEDURE — 93010 ELECTROCARDIOGRAM REPORT: CPT | Performed by: INTERNAL MEDICINE

## 2024-09-16 PROCEDURE — 2580000003 HC RX 258: Performed by: PHYSICIAN ASSISTANT

## 2024-09-16 PROCEDURE — APPSS30 APP SPLIT SHARED TIME 16-30 MINUTES: Performed by: PHYSICIAN ASSISTANT

## 2024-09-16 RX ORDER — LANOLIN ALCOHOL/MO/W.PET/CERES
6 CREAM (GRAM) TOPICAL NIGHTLY PRN
Status: DISCONTINUED | OUTPATIENT
Start: 2024-09-16 | End: 2024-09-24 | Stop reason: HOSPADM

## 2024-09-16 RX ORDER — SODIUM CHLORIDE, SODIUM LACTATE, POTASSIUM CHLORIDE, CALCIUM CHLORIDE 600; 310; 30; 20 MG/100ML; MG/100ML; MG/100ML; MG/100ML
INJECTION, SOLUTION INTRAVENOUS CONTINUOUS
Status: ACTIVE | OUTPATIENT
Start: 2024-09-16 | End: 2024-09-16

## 2024-09-16 RX ORDER — ATROPINE SULFATE 0.1 MG/ML
1 INJECTION INTRAVENOUS ONCE
Status: COMPLETED | OUTPATIENT
Start: 2024-09-16 | End: 2024-09-16

## 2024-09-16 RX ORDER — ATROPINE SULFATE 0.1 MG/ML
INJECTION INTRAVENOUS
Status: COMPLETED
Start: 2024-09-16 | End: 2024-09-16

## 2024-09-16 RX ORDER — POTASSIUM CHLORIDE 29.8 MG/ML
20 INJECTION INTRAVENOUS
Status: COMPLETED | OUTPATIENT
Start: 2024-09-16 | End: 2024-09-16

## 2024-09-16 RX ADMIN — TAMSULOSIN HYDROCHLORIDE 0.4 MG: 0.4 CAPSULE ORAL at 08:39

## 2024-09-16 RX ADMIN — TICAGRELOR 90 MG: 90 TABLET ORAL at 08:40

## 2024-09-16 RX ADMIN — FINASTERIDE 5 MG: 5 TABLET, FILM COATED ORAL at 08:39

## 2024-09-16 RX ADMIN — ASPIRIN 81 MG: 81 TABLET, COATED ORAL at 08:39

## 2024-09-16 RX ADMIN — ATROPINE SULFATE 1 MG: 0.1 INJECTION, SOLUTION ENDOTRACHEAL; INTRAMUSCULAR; INTRAVENOUS; SUBCUTANEOUS at 13:07

## 2024-09-16 RX ADMIN — Medication 6 MG: at 20:10

## 2024-09-16 RX ADMIN — AMLODIPINE BESYLATE 5 MG: 5 TABLET ORAL at 08:39

## 2024-09-16 RX ADMIN — ATROPINE SULFATE 1 MG: 0.1 INJECTION INTRAVENOUS at 13:07

## 2024-09-16 RX ADMIN — SODIUM CHLORIDE, PRESERVATIVE FREE 10 ML: 5 INJECTION INTRAVENOUS at 08:33

## 2024-09-16 RX ADMIN — SODIUM CHLORIDE, PRESERVATIVE FREE 10 ML: 5 INJECTION INTRAVENOUS at 20:10

## 2024-09-16 RX ADMIN — SODIUM CHLORIDE, POTASSIUM CHLORIDE, SODIUM LACTATE AND CALCIUM CHLORIDE: 600; 310; 30; 20 INJECTION, SOLUTION INTRAVENOUS at 15:10

## 2024-09-16 RX ADMIN — LOSARTAN POTASSIUM 50 MG: 25 TABLET, FILM COATED ORAL at 08:39

## 2024-09-16 RX ADMIN — SODIUM CHLORIDE, PRESERVATIVE FREE 10 ML: 5 INJECTION INTRAVENOUS at 08:34

## 2024-09-16 RX ADMIN — ATORVASTATIN CALCIUM 40 MG: 40 TABLET, FILM COATED ORAL at 08:39

## 2024-09-16 RX ADMIN — POTASSIUM CHLORIDE 20 MEQ: 29.8 INJECTION, SOLUTION INTRAVENOUS at 06:20

## 2024-09-16 RX ADMIN — SPIRONOLACTONE 25 MG: 25 TABLET ORAL at 08:39

## 2024-09-16 RX ADMIN — POTASSIUM CHLORIDE 20 MEQ: 29.8 INJECTION, SOLUTION INTRAVENOUS at 08:29

## 2024-09-16 NOTE — FLOWSHEET NOTE
1300  Dr Arguello in to see pt. Turned pacer down and heartrate down to 30s ventricular paced.  Rate back to 50 on pacer.    1307  Dr Arguello remains at bedside and gave atropine 1mg IVP. After about 20 secs he decreased pacemaker down and pts intrinsic beats came in. At first an AV dissociation to a Sbrady with a long 1st degree AVB. To a rate of 62/min of possible SR vs an AVB. Order for EKG to be done. Dr Arguello left the pacer ventricular rate at 50/min.    1318  EKG done at bedside. Pt asymptomatic.    1330  EKG shown to Liudmila Huang CNP. She sent image of EKG to Dr Arguello. Rhythm looks to be AV dissociation. 2:1.

## 2024-09-16 NOTE — PROCEDURES
PROCEDURE NOTE  Date: 9/16/2024   Name: Raymond Hummel  YOB: 1940    Procedures  EKG completed, given to Piper DESHPANDE

## 2024-09-16 NOTE — PLAN OF CARE
Problem: Discharge Planning  Goal: Discharge to home or other facility with appropriate resources  Outcome: Progressing  Flowsheets (Taken 9/15/2024 2000)  Discharge to home or other facility with appropriate resources:   Identify barriers to discharge with patient and caregiver   Arrange for needed discharge resources and transportation as appropriate   Identify discharge learning needs (meds, wound care, etc)   Refer to discharge planning if patient needs post-hospital services based on physician order or complex needs related to functional status, cognitive ability or social support system   Arrange for interpreters to assist at discharge as needed     Problem: Safety - Adult  Goal: Free from fall injury  Outcome: Progressing     Problem: Pain  Goal: Verbalizes/displays adequate comfort level or baseline comfort level  Outcome: Progressing     Problem: Respiratory - Adult  Goal: Achieves optimal ventilation and oxygenation  Outcome: Progressing  Flowsheets (Taken 9/15/2024 2000)  Achieves optimal ventilation and oxygenation:   Assess for changes in respiratory status   Assess for changes in mentation and behavior   Position to facilitate oxygenation and minimize respiratory effort   Oxygen supplementation based on oxygen saturation or arterial blood gases   Encourage broncho-pulmonary hygiene including cough, deep breathe, incentive spirometry   Initiate smoking cessation protocol as indicated   Assess the need for suctioning and aspirate as needed     Care plan reviewed with patient.  Patient verbalizes understanding of the plan of care and contribute to goal setting.

## 2024-09-16 NOTE — PROGRESS NOTES
CRITICAL CARE PROGRESS NOTE    Patient:  Raymond Hummel    Unit/Bed:4D-10/010-A  YOB: 1940  MRN: [id number]   PCP: Duran Dukes MD  Date of Admission: 9/12/2024  Chief Complaint:- elective TAVR    Assessment and Plan:    Perivalvular leak around Zapata Resilia prosthesis, resolved: TTE noted perivalvular leak around AV prosthesis which was placed 8/15/2024 by Dr. Arguello and Dr. Giang as described in severe aortic stenosis section.  Status post \"flare TAVR\" 9/13 in ventricle.  TAVR placement obstructing AV node, TVP placed, per cardiology patient is completely dependent on pacer.  Electrophysiology consulted by structural heart team.    Continue current management with pending pacemaker placement.   Avoid TVP removal.    Hyperbilirubinemia 2/2 hemolysis due to perivalvular leak, improving: Hemoglobin stable.  Jaundice improved.   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 Electrophysiology consulted by structural heart team for potential permanent pacemaker.  Patient is pacemaker dependent.  HOCM: 9/9 TTE report indicated EF 60 to 65%, features suggestive of hypertrophic cardiomyopathy, including severely increased wall thickness of LV, with LVOT at rest-peak gradient 61 mmHg.  Hypotension ISO HTN: Holding home antihypertensives in the setting of hypotension.  Phenylephrine preferred pressor support if necessary.  CAD: Patient has CAD, is s/p PCI x 1 in the LAD.  Heart cath also showed 50% stenosis of RCA, 70% stenosis of OM 2.  On home Lipitor, aspirin, Brilinta  NIDDM2: Patient on home metformin.  Holding metformin 9/12 through 9/14 per Dr. Arguello.  POCT glucose checks 4 times daily  BPH: patient has history of BPH, recent history of gross hematuria noted on prior admission, colposcopy deferred by patient, will follow with urology out-patient   Systolic HF, improved: Prior diagnosis based on echo June 2024 which proceeded first TAVR  supraclavicular adenopathy.  Neurologic:  No focal deficit. No seizures.    Data: (All radiographs, tracings, PFTs, and imaging are personally viewed and interpreted unless otherwise noted).   Labs: Sodium 146, potassium 3.4, chloride 111, bicarb 23, resistance bands 13, creatinine 0.5, AG 12, EGFR above 90, magnesium 2.1, glucose 143, calcium 8.6, total protein 6.3, albumin 3.9, alk phos 47, ALT 13, AST 25, total bilirubin 1.8  WBC 8.4, hemoglobin 9.6, hematocrit 30, platelets 112  Telemetry shows sinus rhythm       Seen with multidisciplinary ICU team.  Meets Continued ICU Level Care Criteria:    [x] Yes   [] No - Transfer Planned to listed location:  [] HOSPITALIST CONTACTED-      Case and plan discussed with Dr. Ordonez   Electronically signed by   Iggy Gray MD PGY-1 on 9/16/2024 at 5:17 AM    Patient seen by me including key components of medical care.  Case discussed with resident physician.  Continues with TVP pending PPM.  Italicized bold font, if present,  represents changes to the note made by me.  CC time 35 minutes.  Time was discontiguous. Time does not include procedure. Time does include my direct assessment of the patient and coordination of care.  Time represents more than 50% of the time involved with patient care by the CC team.  Electronically signed by Duran Ordonez MD.

## 2024-09-16 NOTE — PROGRESS NOTES
CT/CV Surgery Consult Note    2024 8:41 AM  Surgeon: Dr. Giang    Reason for Consult: CHB    HPI:    Mr. Hummel  is a 84 year old with hx of severe AS leading to TAVR 8/15/24 and Pilo TAVR on 24.  He developed A-V dissociation following his procedure.  He has temporary pacer wire with setting VVI at 60bpm.  He denies any CP, SOB, fever, chills or sweats.  He had an Echo with the results still pending, but preliminary shows preserved EF, no AI, mild MR and no residual TYSHAWN.    Vital Signs: BP (!) 120/58   Pulse 60   Temp 97.8 °F (36.6 °C) (Oral)   Resp 20   Ht 1.753 m (5' 9.02\")   Wt 79.2 kg (174 lb 9.7 oz)   SpO2 99%   BMI 25.77 kg/m²    Temp (24hrs), Av.1 °F (36.7 °C), Min:97.8 °F (36.6 °C), Max:98.8 °F (37.1 °C)    Labs:   CBC:  Recent Labs     24  0515 09/15/24  0321 24  0410   WBC 7.9 8.0 8.4   HGB 8.6* 8.8* 9.6*   HCT 26.6* 27.6* 30.0*   MCV 92.0 94.8* 92.9   * 98* 112*     BMP:   Recent Labs     24  0515 24  0725 24  1018 24  1144 09/15/24  0321 09/15/24  0801 09/15/24  1138 09/15/24  1140 24  0410     --   --   --  143  --   --   --  146*   K 3.9  --   --   --  3.0*  --   --  3.5 3.4*     --   --   --  110  --   --   --  111   CO2 21*  --   --   --  21*  --   --   --  23   PHOS  --   --  3.6  --   --   --   --   --   --    BUN 22  --   --   --  16  --   --   --  13   CREATININE 0.8  --   --   --  0.6  --   --   --  0.5   MG 2.0  --   --   --  2.0  --   --   --  2.1   POCGLU  --    < >  --    < >  --    < > 162*  --   --     < > = values in this interval not displayed.     Imaging:  See [facility]      Intake/Output Summary (Last 24 hours) at 2024 0836  Last data filed at 2024 0330  Gross per 24 hour   Intake 390 ml   Output 1100 ml   Net -710 ml     Scheduled Meds:    losartan  50 mg Oral Daily    traZODone  50 mg Oral Nightly    sodium chloride flush  5-40 mL IntraVENous 2 times per day    sodium chloride flush  5-40 mL

## 2024-09-16 NOTE — FLOWSHEET NOTE
1040  Attempted to do a threshold on transvenous pacer. Decreased pacer rate to 45/min and pt continued with CPM. No intrinsic beats kicked in. Returned pacer rate to 60/min as previously set per Dr Gerber.

## 2024-09-17 ENCOUNTER — APPOINTMENT (OUTPATIENT)
Dept: GENERAL RADIOLOGY | Age: 84
DRG: 267 | End: 2024-09-17
Attending: INTERNAL MEDICINE
Payer: MEDICARE

## 2024-09-17 ENCOUNTER — ANESTHESIA EVENT (OUTPATIENT)
Age: 84
End: 2024-09-17
Payer: MEDICARE

## 2024-09-17 ENCOUNTER — ANESTHESIA (OUTPATIENT)
Age: 84
End: 2024-09-17
Payer: MEDICARE

## 2024-09-17 PROBLEM — Z95.0 PACEMAKER: Status: ACTIVE | Noted: 2024-09-17

## 2024-09-17 LAB
ALBUMIN SERPL BCG-MCNC: 3.6 G/DL (ref 3.5–5.1)
ALP SERPL-CCNC: 44 U/L (ref 38–126)
ALT SERPL W/O P-5'-P-CCNC: 17 U/L (ref 11–66)
ANION GAP SERPL CALC-SCNC: 12 MEQ/L (ref 8–16)
AST SERPL-CCNC: 25 U/L (ref 5–40)
BILIRUB CONJ SERPL-MCNC: 0.4 MG/DL (ref 0.1–13.8)
BILIRUB SERPL-MCNC: 1.2 MG/DL (ref 0.3–1.2)
BUN SERPL-MCNC: 15 MG/DL (ref 7–22)
CALCIUM SERPL-MCNC: 8.5 MG/DL (ref 8.5–10.5)
CHLORIDE SERPL-SCNC: 104 MEQ/L (ref 98–111)
CO2 SERPL-SCNC: 23 MEQ/L (ref 23–33)
CREAT SERPL-MCNC: 0.7 MG/DL (ref 0.4–1.2)
DEPRECATED RDW RBC AUTO: 50.9 FL (ref 35–45)
ECHO BSA: 2.1 M2
ERYTHROCYTE [DISTWIDTH] IN BLOOD BY AUTOMATED COUNT: 15.2 % (ref 11.5–14.5)
GFR SERPL CREATININE-BSD FRML MDRD: > 90 ML/MIN/1.73M2
GLUCOSE SERPL-MCNC: 150 MG/DL (ref 70–108)
HCT VFR BLD AUTO: 27.4 % (ref 42–52)
HGB BLD-MCNC: 8.8 GM/DL (ref 14–18)
MAGNESIUM SERPL-MCNC: 2.1 MG/DL (ref 1.6–2.4)
MCH RBC QN AUTO: 29.8 PG (ref 26–33)
MCHC RBC AUTO-ENTMCNC: 32.1 GM/DL (ref 32.2–35.5)
MCV RBC AUTO: 92.9 FL (ref 80–94)
PHOSPHATE SERPL-MCNC: 3.1 MG/DL (ref 2.4–4.7)
PLATELET # BLD AUTO: 117 THOU/MM3 (ref 130–400)
PMV BLD AUTO: 12.4 FL (ref 9.4–12.4)
POTASSIUM SERPL-SCNC: 3.7 MEQ/L (ref 3.5–5.2)
PROT SERPL-MCNC: 5.9 G/DL (ref 6.1–8)
RBC # BLD AUTO: 2.95 MILL/MM3 (ref 4.7–6.1)
SODIUM SERPL-SCNC: 139 MEQ/L (ref 135–145)
WBC # BLD AUTO: 6.8 THOU/MM3 (ref 4.8–10.8)

## 2024-09-17 PROCEDURE — 3700000001 HC ADD 15 MINUTES (ANESTHESIA): Performed by: THORACIC SURGERY (CARDIOTHORACIC VASCULAR SURGERY)

## 2024-09-17 PROCEDURE — 85027 COMPLETE CBC AUTOMATED: CPT

## 2024-09-17 PROCEDURE — 02HK3JZ INSERTION OF PACEMAKER LEAD INTO RIGHT VENTRICLE, PERCUTANEOUS APPROACH: ICD-10-PCS | Performed by: THORACIC SURGERY (CARDIOTHORACIC VASCULAR SURGERY)

## 2024-09-17 PROCEDURE — C1892 INTRO/SHEATH,FIXED,PEEL-AWAY: HCPCS | Performed by: THORACIC SURGERY (CARDIOTHORACIC VASCULAR SURGERY)

## 2024-09-17 PROCEDURE — 82248 BILIRUBIN DIRECT: CPT

## 2024-09-17 PROCEDURE — 80053 COMPREHEN METABOLIC PANEL: CPT

## 2024-09-17 PROCEDURE — 6370000000 HC RX 637 (ALT 250 FOR IP)

## 2024-09-17 PROCEDURE — C1785 PMKR, DUAL, RATE-RESP: HCPCS | Performed by: THORACIC SURGERY (CARDIOTHORACIC VASCULAR SURGERY)

## 2024-09-17 PROCEDURE — 33215 REPOSITION PACING-DEFIB LEAD: CPT | Performed by: THORACIC SURGERY (CARDIOTHORACIC VASCULAR SURGERY)

## 2024-09-17 PROCEDURE — 2500000003 HC RX 250 WO HCPCS: Performed by: THORACIC SURGERY (CARDIOTHORACIC VASCULAR SURGERY)

## 2024-09-17 PROCEDURE — 02H63JZ INSERTION OF PACEMAKER LEAD INTO RIGHT ATRIUM, PERCUTANEOUS APPROACH: ICD-10-PCS | Performed by: THORACIC SURGERY (CARDIOTHORACIC VASCULAR SURGERY)

## 2024-09-17 PROCEDURE — 2060000000 HC ICU INTERMEDIATE R&B

## 2024-09-17 PROCEDURE — 97163 PT EVAL HIGH COMPLEX 45 MIN: CPT

## 2024-09-17 PROCEDURE — 0JH606Z INSERTION OF PACEMAKER, DUAL CHAMBER INTO CHEST SUBCUTANEOUS TISSUE AND FASCIA, OPEN APPROACH: ICD-10-PCS | Performed by: THORACIC SURGERY (CARDIOTHORACIC VASCULAR SURGERY)

## 2024-09-17 PROCEDURE — 2500000003 HC RX 250 WO HCPCS: Performed by: NURSE ANESTHETIST, CERTIFIED REGISTERED

## 2024-09-17 PROCEDURE — 83735 ASSAY OF MAGNESIUM: CPT

## 2024-09-17 PROCEDURE — 97166 OT EVAL MOD COMPLEX 45 MIN: CPT

## 2024-09-17 PROCEDURE — 2709999900 HC NON-CHARGEABLE SUPPLY: Performed by: THORACIC SURGERY (CARDIOTHORACIC VASCULAR SURGERY)

## 2024-09-17 PROCEDURE — 3700000000 HC ANESTHESIA ATTENDED CARE: Performed by: THORACIC SURGERY (CARDIOTHORACIC VASCULAR SURGERY)

## 2024-09-17 PROCEDURE — 97110 THERAPEUTIC EXERCISES: CPT

## 2024-09-17 PROCEDURE — 97116 GAIT TRAINING THERAPY: CPT

## 2024-09-17 PROCEDURE — 6370000000 HC RX 637 (ALT 250 FOR IP): Performed by: THORACIC SURGERY (CARDIOTHORACIC VASCULAR SURGERY)

## 2024-09-17 PROCEDURE — 2580000003 HC RX 258: Performed by: PHYSICIAN ASSISTANT

## 2024-09-17 PROCEDURE — 6360000002 HC RX W HCPCS: Performed by: NURSE ANESTHETIST, CERTIFIED REGISTERED

## 2024-09-17 PROCEDURE — 99222 1ST HOSP IP/OBS MODERATE 55: CPT | Performed by: NURSE PRACTITIONER

## 2024-09-17 PROCEDURE — 99291 CRITICAL CARE FIRST HOUR: CPT | Performed by: INTERNAL MEDICINE

## 2024-09-17 PROCEDURE — 71045 X-RAY EXAM CHEST 1 VIEW: CPT

## 2024-09-17 PROCEDURE — 2500000003 HC RX 250 WO HCPCS: Performed by: PHYSICIAN ASSISTANT

## 2024-09-17 PROCEDURE — 6360000002 HC RX W HCPCS: Performed by: PHYSICIAN ASSISTANT

## 2024-09-17 PROCEDURE — 33208 INSRT HEART PM ATRIAL & VENT: CPT | Performed by: THORACIC SURGERY (CARDIOTHORACIC VASCULAR SURGERY)

## 2024-09-17 PROCEDURE — 6370000000 HC RX 637 (ALT 250 FOR IP): Performed by: NURSE PRACTITIONER

## 2024-09-17 PROCEDURE — C1898 LEAD, PMKR, OTHER THAN TRANS: HCPCS | Performed by: THORACIC SURGERY (CARDIOTHORACIC VASCULAR SURGERY)

## 2024-09-17 PROCEDURE — 36415 COLL VENOUS BLD VENIPUNCTURE: CPT

## 2024-09-17 PROCEDURE — 84100 ASSAY OF PHOSPHORUS: CPT

## 2024-09-17 DEVICE — IPG W1DR01 AZURE XT DR MRI USA
Type: IMPLANTABLE DEVICE | Status: FUNCTIONAL
Brand: AZURE™ XT DR MRI SURESCAN™

## 2024-09-17 DEVICE — LEAD 5076-52 MRI US RCMCRD
Type: IMPLANTABLE DEVICE | Status: FUNCTIONAL
Brand: CAPSUREFIX NOVUS MRI™ SURESCAN®

## 2024-09-17 DEVICE — LEAD PACE AD 6FR L58CM VENT SIL PLAT INSUL BPLR PLATINIZED 507658] MEDTRONIC CRM]: Type: IMPLANTABLE DEVICE | Status: FUNCTIONAL

## 2024-09-17 RX ORDER — HYDRALAZINE HYDROCHLORIDE 20 MG/ML
10 INJECTION INTRAMUSCULAR; INTRAVENOUS ONCE
Status: DISCONTINUED | OUTPATIENT
Start: 2024-09-17 | End: 2024-09-24 | Stop reason: HOSPADM

## 2024-09-17 RX ORDER — EPHEDRINE SULFATE/0.9% NACL/PF 25 MG/5 ML
SYRINGE (ML) INTRAVENOUS
Status: DISCONTINUED | OUTPATIENT
Start: 2024-09-17 | End: 2024-09-17 | Stop reason: SDUPTHER

## 2024-09-17 RX ORDER — POLYETHYLENE GLYCOL 3350 17 G/17G
17 POWDER, FOR SOLUTION ORAL DAILY
Status: DISCONTINUED | OUTPATIENT
Start: 2024-09-17 | End: 2024-09-24 | Stop reason: HOSPADM

## 2024-09-17 RX ORDER — PROPOFOL 10 MG/ML
INJECTION, EMULSION INTRAVENOUS
Status: DISCONTINUED | OUTPATIENT
Start: 2024-09-17 | End: 2024-09-17 | Stop reason: SDUPTHER

## 2024-09-17 RX ORDER — ENOXAPARIN SODIUM 100 MG/ML
40 INJECTION SUBCUTANEOUS DAILY
Status: DISCONTINUED | OUTPATIENT
Start: 2024-09-18 | End: 2024-09-24 | Stop reason: HOSPADM

## 2024-09-17 RX ORDER — SENNOSIDES A AND B 8.6 MG/1
1 TABLET, FILM COATED ORAL NIGHTLY
Status: DISCONTINUED | OUTPATIENT
Start: 2024-09-17 | End: 2024-09-24 | Stop reason: HOSPADM

## 2024-09-17 RX ORDER — SODIUM CHLORIDE 0.9 % (FLUSH) 0.9 %
5-40 SYRINGE (ML) INJECTION PRN
Status: DISCONTINUED | OUTPATIENT
Start: 2024-09-17 | End: 2024-09-24 | Stop reason: HOSPADM

## 2024-09-17 RX ORDER — LIDOCAINE HYDROCHLORIDE 20 MG/ML
INJECTION, SOLUTION INFILTRATION; PERINEURAL
Status: DISCONTINUED | OUTPATIENT
Start: 2024-09-17 | End: 2024-09-17 | Stop reason: SDUPTHER

## 2024-09-17 RX ORDER — TRAMADOL HYDROCHLORIDE 50 MG/1
50 TABLET ORAL EVERY 6 HOURS PRN
Status: DISCONTINUED | OUTPATIENT
Start: 2024-09-17 | End: 2024-09-24 | Stop reason: HOSPADM

## 2024-09-17 RX ORDER — TRAMADOL HYDROCHLORIDE 50 MG/1
100 TABLET ORAL EVERY 6 HOURS PRN
Status: DISCONTINUED | OUTPATIENT
Start: 2024-09-17 | End: 2024-09-24 | Stop reason: HOSPADM

## 2024-09-17 RX ORDER — ONDANSETRON 2 MG/ML
INJECTION INTRAMUSCULAR; INTRAVENOUS
Status: DISCONTINUED | OUTPATIENT
Start: 2024-09-17 | End: 2024-09-17 | Stop reason: SDUPTHER

## 2024-09-17 RX ORDER — FENTANYL CITRATE 50 UG/ML
INJECTION, SOLUTION INTRAMUSCULAR; INTRAVENOUS
Status: DISCONTINUED | OUTPATIENT
Start: 2024-09-17 | End: 2024-09-17 | Stop reason: SDUPTHER

## 2024-09-17 RX ORDER — SODIUM CHLORIDE 9 MG/ML
INJECTION, SOLUTION INTRAVENOUS PRN
Status: DISCONTINUED | OUTPATIENT
Start: 2024-09-17 | End: 2024-09-24 | Stop reason: HOSPADM

## 2024-09-17 RX ORDER — SODIUM CHLORIDE 0.9 % (FLUSH) 0.9 %
5-40 SYRINGE (ML) INJECTION EVERY 12 HOURS SCHEDULED
Status: DISCONTINUED | OUTPATIENT
Start: 2024-09-17 | End: 2024-09-24 | Stop reason: HOSPADM

## 2024-09-17 RX ADMIN — EPHEDRINE SULFATE 5 MG: 5 INJECTION INTRAVENOUS at 07:49

## 2024-09-17 RX ADMIN — ASPIRIN 81 MG: 81 TABLET, COATED ORAL at 09:13

## 2024-09-17 RX ADMIN — SODIUM CHLORIDE, PRESERVATIVE FREE 10 ML: 5 INJECTION INTRAVENOUS at 09:16

## 2024-09-17 RX ADMIN — PHENYLEPHRINE HYDROCHLORIDE 200 MCG: 10 INJECTION INTRAVENOUS at 07:59

## 2024-09-17 RX ADMIN — PROPOFOL 150 MCG/KG/MIN: 10 INJECTION, EMULSION INTRAVENOUS at 07:29

## 2024-09-17 RX ADMIN — EPHEDRINE SULFATE 10 MG: 5 INJECTION INTRAVENOUS at 07:52

## 2024-09-17 RX ADMIN — FENTANYL CITRATE 25 MCG: 50 INJECTION, SOLUTION INTRAMUSCULAR; INTRAVENOUS at 07:54

## 2024-09-17 RX ADMIN — EPHEDRINE SULFATE 5 MG: 5 INJECTION INTRAVENOUS at 08:03

## 2024-09-17 RX ADMIN — LIDOCAINE HYDROCHLORIDE 80 MG: 20 INJECTION, SOLUTION INFILTRATION; PERINEURAL at 07:29

## 2024-09-17 RX ADMIN — ATORVASTATIN CALCIUM 40 MG: 40 TABLET, FILM COATED ORAL at 09:13

## 2024-09-17 RX ADMIN — EPHEDRINE SULFATE 10 MG: 5 INJECTION INTRAVENOUS at 07:46

## 2024-09-17 RX ADMIN — PHENYLEPHRINE HYDROCHLORIDE 200 MCG: 10 INJECTION INTRAVENOUS at 08:02

## 2024-09-17 RX ADMIN — SPIRONOLACTONE 25 MG: 25 TABLET ORAL at 09:13

## 2024-09-17 RX ADMIN — FENTANYL CITRATE 25 MCG: 50 INJECTION, SOLUTION INTRAMUSCULAR; INTRAVENOUS at 07:28

## 2024-09-17 RX ADMIN — FINASTERIDE 5 MG: 5 TABLET, FILM COATED ORAL at 09:10

## 2024-09-17 RX ADMIN — AMLODIPINE BESYLATE 5 MG: 5 TABLET ORAL at 09:16

## 2024-09-17 RX ADMIN — EPHEDRINE SULFATE 10 MG: 5 INJECTION INTRAVENOUS at 08:06

## 2024-09-17 RX ADMIN — SODIUM CHLORIDE, PRESERVATIVE FREE 10 ML: 5 INJECTION INTRAVENOUS at 09:15

## 2024-09-17 RX ADMIN — TAMSULOSIN HYDROCHLORIDE 0.4 MG: 0.4 CAPSULE ORAL at 09:13

## 2024-09-17 RX ADMIN — FENTANYL CITRATE 25 MCG: 50 INJECTION, SOLUTION INTRAMUSCULAR; INTRAVENOUS at 07:45

## 2024-09-17 RX ADMIN — POLYETHYLENE GLYCOL 3350 17 G: 17 POWDER, FOR SOLUTION ORAL at 19:35

## 2024-09-17 RX ADMIN — SODIUM CHLORIDE: 9 INJECTION, SOLUTION INTRAVENOUS at 07:26

## 2024-09-17 RX ADMIN — FENTANYL CITRATE 25 MCG: 50 INJECTION, SOLUTION INTRAMUSCULAR; INTRAVENOUS at 08:03

## 2024-09-17 RX ADMIN — SODIUM CHLORIDE, PRESERVATIVE FREE 10 ML: 5 INJECTION INTRAVENOUS at 19:35

## 2024-09-17 RX ADMIN — AMLODIPINE BESYLATE 5 MG: 5 TABLET ORAL at 19:34

## 2024-09-17 RX ADMIN — WATER 2000 MG: 1 INJECTION INTRAMUSCULAR; INTRAVENOUS; SUBCUTANEOUS at 06:50

## 2024-09-17 RX ADMIN — PHENYLEPHRINE HYDROCHLORIDE 200 MCG: 10 INJECTION INTRAVENOUS at 07:52

## 2024-09-17 RX ADMIN — LOSARTAN POTASSIUM 50 MG: 25 TABLET, FILM COATED ORAL at 09:13

## 2024-09-17 RX ADMIN — ONDANSETRON 4 MG: 2 INJECTION INTRAMUSCULAR; INTRAVENOUS at 08:11

## 2024-09-17 RX ADMIN — EPHEDRINE SULFATE 10 MG: 5 INJECTION INTRAVENOUS at 08:09

## 2024-09-17 RX ADMIN — PHENYLEPHRINE HYDROCHLORIDE 200 MCG: 10 INJECTION INTRAVENOUS at 07:50

## 2024-09-17 RX ADMIN — SENNOSIDES 8.6 MG: 8.6 TABLET, FILM COATED ORAL at 19:34

## 2024-09-17 RX ADMIN — PHENYLEPHRINE HYDROCHLORIDE 200 MCG: 10 INJECTION INTRAVENOUS at 08:07

## 2024-09-17 RX ADMIN — Medication 250 MG: at 09:07

## 2024-09-17 ASSESSMENT — PAIN SCALES - GENERAL
PAINLEVEL_OUTOF10: 0

## 2024-09-17 NOTE — PROGRESS NOTES
[facility]  INPATIENT PHYSICAL THERAPY  EVALUATION  [facility] ICU 4D - 4D-10/010-A    Discharge Recommendations: Continue to assess pending progress, Patient would benefit from continued therapy after discharge (pt would benefit from and inpt therapy stay)  Other: cont to assess needs      Time In: 1005  Time Out: 1030  Timed Code Treatment Minutes: 12 Minutes  Minutes: 25          Date: 2024  Patient Name: Raymond Hummel,  Gender:  male        MRN: [id number]  : 1940  (84 y.o.)      Referring Practitioner: NICOLETTE Huang CNP  Diagnosis: aortic stenosis, severe  Additional Pertinent Hx: 84-year-old male with PMH notable for CAD s/p PCI LAD x 1, aortic stenosis s/p TAVR, HLD, diverticulosis, NIDDM 2, HLD, LVH, BPH.  Patient previously underwent TAVR procedure with Dr. Arguello and Dr. Giang on 8/15 in order to rectify severe aortic stenosis.  Patient had previous TTE demonstrating severe aortic stenosis with LVH, which necessitated valve replacement.  TAVR was done with Zapata Resilia prosthesis by Dr. Arguello and Dr. Giang via transfemoral approach, completed successfully.      Recent TTE showed perivalvular leak around AV prosthesis, will necessitate replacement via repeat TAVR, will be done by Dr. Arguello on . s/p TAVR 24. s/p pacemaker placement 24.     Restrictions/Precautions:  Restrictions/Precautions: Fall Risk, Surgical Protocols  Position Activity Restriction  Other position/activity restrictions: s/p pacemaker on     Subjective:  Chart Reviewed: Yes  Patient assessed for rehabilitation services?: Yes  Subjective: pleasant and cooperative, wife present and supportive.    General:        Hearing: Within functional limits       Pain: no pain per pt    Vitals: Vitals not assessed per clinical judgement, see nursing flowsheet    Social/Functional History:    Lives With: Spouse  Type of Home: Condo  Home Layout: One level  Home Access: Stairs to enter with rails (8)     Bathroom

## 2024-09-17 NOTE — PROGRESS NOTES
Full consult done yesterday by KARMEN Delarosa  Patient with A-V dissociation status post multiple TAVR; requires permanent pacemaker  Discussed procedure with wife and patient this morning. The risks, benefits and alternatives were discussed in detail with the patient and family.  The risks include, but are not limited to: Death, stroke, bleeding requiring reoperation, infection, Heart attack with graft failure, cardiac arrhythmias, thromboembolism, renal failure requiring dialysis, pneumonia with respiratory failure requiring tracheostomy. The patient expressed understanding of these issues, confirmed that all questions were answered, and desires to proceed.  Major concern is bleeding since the patient has been on Brilinta and dual platelet therapy with and has abnormal coagulation profile.

## 2024-09-17 NOTE — SIGNIFICANT EVENT
Patient will be transferred out of ICU to stepdown, 4k3. Sign out given to Dr Tam for allocation to hospitalist team.     Per cardiology, post-procedure medication instructions are as follows:    Start anticoagulation with Coumadin on Thursday.   Hold brilinta until 9/18 morning - if pacer pocket shows no bleeding, ok to re-start brilinta at that point.   Continue with aspirin and brilinta until INR rises above 2; after that stop aspirin and continue with coumadin and brilinta.

## 2024-09-17 NOTE — CONSULTS
Physical Medicine & Rehabilitation   Consult Note      Admitting Physician: Duran Ordonez MD    Primary Care Provider: Duran Dukes MD     Reason for Consult:  s/p TAVR. S/p PPM. Rehab needs    History of Present Illness:  Raymond Hummel is a 84 y.o. male admitted to [facility] on 9/12/2024. Patient  has a past medical history of Diverticulosis, Heart murmur, Hematuria, High triglycerides, History of colonic polyps, History of transcatheter aortic valve replacement (TAVR), Hypertension, Insulin resistance, LVH (left ventricular hypertrophy), and [employer] dual pacer.  Patient presented to [facility] for planned procedure. Patient previously underwent TAVR procedure with Dr. Arguello and Dr. Giang on 8/15 in order to rectify severe aortic stenosis. Patient had previous TTE demonstrating severe aortic stenosis with LVH, which necessitated valve replacement. TAVR was done with Zapata Resilia prosthesis by Dr. Arguello and Dr. Giang via transfemoral approach, completed successfully.  Patient was discharged home with wife.  Recent TTE showed perivalvular leak around AV prosthesis, will necessitate replacement via repeat TAVR.  TAVR was completed on 9/13/24 with Dr Arguello.  Postop patient was noted to have complete heart block, initially requiring temporary transvenous pacemaker.  Permanent pacemaker was placed on 9/17/2024 by Dr. Giang.     9/17/24: Patient seen today in consult.  Patient sitting up in chair.  Patient initially alone as family had stepped out to move their car.  Discussed with family about current therapy needs, recent surgeries, home layout.  Discussed with patient about an inpatient rehab stay.  Patient politely declined this option as he plans to go home to regain his life.  Throughout conversation it was noted that patient with some word finding issues and difficulty remembering timelines.  Asked patient about whether he felt like this was new or if he noticed this at home, patient states  0.4 0.1 - 13.8 mg/dL    Alkaline Phosphatase 44 38 - 126 U/L    AST 25 5 - 40 U/L    ALT 17 11 - 66 U/L    Total Protein 5.9 (L) 6.1 - 8.0 g/dL   Electrophysiology procedure    Collection Time: 09/17/24  8:55 AM   Result Value Ref Range    Body Surface Area 2.1 m2       XR CHEST PORTABLE  Result Date: 9/17/2024   1. Kyphotic positioning of the patient. Poor inflation of the lungs. Mild cardiomegaly. Aortic stent/valve. Permanent dual-chamber pacemaker. 2. Mild bibasilar atelectasis/pneumonia. No effusion.       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  Patient voiding small amounts.  History of BPH.  SLP evaluation for ongoing memory issues.   At this time patient is insistent that he will be returning home in order to \"get back to my life\".  However he was min assist with many activities with therapy today and is not able to return home with his wife, as she is petite. Patient remains resistant after further conversation.  Son reports he was agreeable to IPR earlier today, will round back on patient tomorrow after therapies to see if patient would be agreeable to IPR and pre-CERT.     It was my pleasure to evaluate Raymond Hummel today.  Please call with questions.    Rossy Mahajan, APRN - CNP

## 2024-09-17 NOTE — PROGRESS NOTES
CRITICAL CARE PROGRESS NOTE    Patient:  Raymond Hummel    Unit/Bed:4D-10/010-A  YOB: 1940  MRN: [id number]   PCP: Duran Dukes MD  Date of Admission: 9/12/2024  Chief Complaint:- elective TAVR    Assessment and Plan:    Perivalvular leak around Zapata Resilia prosthesis, resolved: TTE noted perivalvular leak around AV prosthesis which was placed 8/15/2024 by Dr. Arguello and Dr. Giang as described in severe aortic stenosis section.  Status post \"flare TAVR\" 9/13 in ventricle.  TAVR placement obstructing AV node, TVP placed, per cardiology patient is completely dependent on pacer.  Electrophysiology consulted by structural heart team.    Continue current management with pending pacemaker placement.   Avoid TVP removal.    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 Electrophysiology consulted by structural heart team for potential permanent pacemaker.  Patient is pacemaker dependent.  Biventricular pacemaker placement to occur 9/17  HOCM: 9/9 TTE report indicated EF 60 to 65%, features suggestive of hypertrophic cardiomyopathy, including severely increased wall thickness of LV, with LVOT at rest-peak gradient 61 mmHg.  Hyperbilirubinemia 2/2 hemolysis due to perivalvular leak, improving: Hemoglobin stable.  Jaundice improved.   Hypotension ISO HTN: Holding home antihypertensives in the setting of hypotension.  Phenylephrine preferred pressor support if necessary.  CAD: Patient has CAD, is s/p PCI x 1 in the LAD.  Heart cath also showed 50% stenosis of RCA, 70% stenosis of OM 2.  On home Lipitor, aspirin, Brilinta  NIDDM2: Patient on home metformin.  Holding metformin 9/12 through 9/14 per Dr. Arguello.  POCT glucose checks 4 times daily  BPH: patient has history of BPH, recent history of gross hematuria noted on prior admission, colposcopy deferred by patient, will follow with urology out-patient   Systolic HF, improved: Prior diagnosis based

## 2024-09-17 NOTE — FLOWSHEET NOTE
1700  wife voicing concern that Juan Carlos is very weak and she wont be able to care for him at home the way he is. Pt is very weak when up. Definite moderate assist just to go to commode. Wife states they have about a dozen steps to get inside their condo. Wife tearful as what to do. Reassured her that social workers and  will work with her as to what's available. Also that concerns would be passed on to physicians that pt will need physical therapy eval before discharge. Wife appreciative of information and feels some relief. Encouraged her to let staff know of her concerns.

## 2024-09-17 NOTE — PLAN OF CARE
Pt was seen and evaluated at bedside with family member in the room. Has no complains. No hematoma at access site. Denies cp, sob, palpitation.    PMH notable for CAD s/p PCI LAD x 1, aortic stenosis s/p TAVR, HLD, diverticulosis, NIDDM 2, HLD, LVH, BPH.    Pt had TAVR with TVP on 9/13 after TTE showed perivalvular leak around AV prosthesis, will necessitate replacement via repeat TAVR. Pt was found to be pacer dependent.   On 9/17 pt had TVP removed and PPM dual inserted.      Pt currently on ASA. If no bleeding re-start brilinta on 9/18. Cont ASA and Brilinta until INR rises above 2, then stop ASA and start coumadin.     Will cont to monitor.

## 2024-09-17 NOTE — PROGRESS NOTES
[facility]  INPATIENT OCCUPATIONAL THERAPY  [facility] ICU 4D  EVALUATION      Discharge Recommendations: IP Rehab  Equipment Recommendations: Equipment Needed: Yes  Other: monitor need for RW & shower chair       Time In: 1140  Time Out: 1157  Timed Code Treatment Minutes: 9 Minutes  Minutes: 17          Date: 2024  Patient Name: Raymond Hummel,   Gender: male      MRN: [id number]  : 1940  (84 y.o.)  Referring Practitioner: Liudmila Huang, JEAN - CNP  Diagnosis: aortic stenosis severe  Additional Pertinent Hx: 84-year-old male with PMH notable for CAD s/p PCI LAD x 1, aortic stenosis s/p TAVR, HLD, diverticulosis, NIDDM 2, HLD, LVH, BPH.  Patient previously underwent TAVR procedure with Dr. Arguello and Dr. Giang on 8/15 in order to rectify severe aortic stenosis.  Patient had previous TTE demonstrating severe aortic stenosis with LVH, which necessitated valve replacement.  TAVR was done with Zapata Resilia prosthesis by Dr. Arguello and Dr. Giang via transfemoral approach, completed successfully.      Recent TTE showed perivalvular leak around AV prosthesis, will necessitate replacement via repeat TAVR, will be done by Dr. Arguello on . s/p TAVR 24. s/p pacemaker placement 24.    Restrictions/Precautions:  Restrictions/Precautions: Fall Risk, Surgical Protocols  Position Activity Restriction  Other position/activity restrictions: s/p pacemaker on     Subjective  Chart Reviewed: Orders, Progress Notes, History and Physical, Yes  Patient assessed for rehabilitation services?: Yes  Family / Caregiver Present: Yes (wife)    Subjective: Pt up in recliner upon arrival of therapist    Pain:  No c/o pain during session/10:      Vitals:  monitored & WNL during session    Social/Functional History:  Lives With: Spouse  Type of Home: Condo  Home Layout: One level  Home Access: Stairs to enter with rails (8)   Bathroom Shower/Tub: Walk-in shower  Bathroom Toilet: Handicap height  Bathroom  Equipment: Grab bars in shower       ADL Assistance: Independent  Homemaking Responsibilities: No  Ambulation Assistance: Independent  Transfer Assistance: Independent    Active : Yes (only short distances)  Patient's  Info: Son     Additional Comments: Pt reports fully indep PLOF.    VISION:WFL    HEARING:  WFL    COGNITION: Slow Processing, Decreased Problem Solving, and Decreased Safety Awareness    RANGE OF MOTION:  RUE WFL: LUE NT d/t PPM just placed this am    STRENGTH:  RUE 4/5; LUE NT    SENSATION:   WFL    ADL:   Footwear Management: Minimal Assistance.  For adjusting slipper socks. Educated Pt on safe technique with pacemaker precautions  .    IADL:   Not Tested    BALANCE:  Standing Balance: Contact Guard Assistance. -min A    BED MOBILITY:  Not Tested    TRANSFERS:  Sit to Stand:  Contact Guard Assistance.    Stand to Sit: Contact Guard Assistance.      FUNCTIONAL MOBILITY:  Assistive Device: None  Assist Level:  Minimal Assistance.   Distance:  3ft   2 steps forward & back     AM-[facility] Inpatient Daily Activity Raw Score: 16  AM-PAC Inpatient ADL T-Scale Score : 35.96  ADL Inpatient CMS 0-100% Score: 53.32    Activity Tolerance:  Patient tolerance of  treatment: Fair treatment tolerance       Modified [place]:  Premorbid Functional Status: Not Applicable  Current Functional Status:  Not Applicable    Assessment:  Assessment: Pt demo decreased ADL & functional mobility status over PLOF s/p admission for TAVR & s/p PPM. Continued OT recommended to faciliate improvements in the above & educate on adaptive strategies to increase indep & safety upon returning home with wife.  Performance deficits / Impairments: Decreased functional mobility , Decreased ADL status, Decreased balance, Decreased safe awareness, Decreased endurance  Prognosis: Fair  REQUIRES OT FOLLOW-UP: Yes  Decision Making: Medium Complexity    Treatment Initiated: Treatment and education initiated within context of evaluation.

## 2024-09-17 NOTE — PLAN OF CARE
Problem: Discharge Planning  Goal: Discharge to home or other facility with appropriate resources  Outcome: Progressing  Flowsheets (Taken 9/16/2024 2000)  Discharge to home or other facility with appropriate resources:   Identify barriers to discharge with patient and caregiver   Arrange for needed discharge resources and transportation as appropriate   Identify discharge learning needs (meds, wound care, etc)   Arrange for interpreters to assist at discharge as needed   Refer to discharge planning if patient needs post-hospital services based on physician order or complex needs related to functional status, cognitive ability or social support system     Problem: Safety - Adult  Goal: Free from fall injury  Outcome: Progressing  Flowsheets (Taken 9/13/2024 1428 by Agustina Liu, RN)  Free From Fall Injury: Instruct family/caregiver on patient safety     Problem: ABCDS Injury Assessment  Goal: Absence of physical injury  Outcome: Progressing  Flowsheets (Taken 9/13/2024 1428 by Agustina Liu, RN)  Absence of Physical Injury: Implement safety measures based on patient assessment     Problem: Pain  Goal: Verbalizes/displays adequate comfort level or baseline comfort level  Outcome: Progressing  Flowsheets (Taken 9/16/2024 2000)  Verbalizes/displays adequate comfort level or baseline comfort level:   Encourage patient to monitor pain and request assistance   Assess pain using appropriate pain scale   Administer analgesics based on type and severity of pain and evaluate response   Implement non-pharmacological measures as appropriate and evaluate response   Consider cultural and social influences on pain and pain management     Problem: Respiratory - Adult  Goal: Achieves optimal ventilation and oxygenation  Outcome: Progressing  Flowsheets (Taken 9/16/2024 2000)  Achieves optimal ventilation and oxygenation:   Assess for changes in respiratory status   Assess for changes in mentation and behavior   Initiate

## 2024-09-17 NOTE — CARE COORDINATION
9/17/24, 3:05 PM EDT    DISCHARGE ON GOING EVALUATION    Raymond Hummel       [facility] day: 5  Location: -03/003-A Reason for admit: Aortic stenosis, severe [I35.0]     Procedures:   9/13 TAVR   9/13 Intubated - 9/13 Extubated  9/17 TVP removed  9/17 Insert PPM dual     Imaging since last note:   9/17 CXR: 1. Kyphotic positioning of the patient. Poor inflation of the lungs. Mild  cardiomegaly. Aortic stent/valve. Permanent dual-chamber pacemaker.  2. Mild bibasilar atelectasis/pneumonia. No effusion.    Barriers to Discharge: Extubated on 9/13. Remained pacer dependent with underlying CHB. CVS consulted. Low UO yesterday; started on IVF. Pt taken today for PPM and TVP removal with Dr Giang. Physiatry consulted for possible IPR if accepted. Transferred to [facility] this afternoon.     Afebrile. Vpaced. On room air. Ox4. Follows commands. PT/OT. Hospitalist and Cardiology following. Telemetry, sling LUE, SCDs. IVF, prn xanax, norvasc, Asa, lipitor, proscar, prn IV hydralazine, cozaar, aldactone, flomax. Plan to restart brilinta tomorrow morning; Coumadin to start on Thursday. Hgb 8.8, plt 117.     PCP: Duran Dukes MD  Readmission Risk Score: 13.5    Patient Goals/Plan/Treatment Preferences: From home w/wife. Plan for IPR if accepted; will require precert. Physiatry to eval.     Pt transferred to 4K03. Handoff report given to JUD Bell CM.

## 2024-09-17 NOTE — PROGRESS NOTES
[facility]  INPATIENT REHABILITATION  ADMISSIONS COORDINATOR CONSULT    Referral Type: internal    Patient Name: Raymond Hummel      MRN: [id number]    : 1940  (84 y.o.)  Gender: male   Race:White (non-)     Payor Source: Payor: [facility] MEDICARE / Plan: ANTHEM MEDIBLUE ESSENTIAL/PLUS / Product Type: *No Product type* /   Secondary Payor Source:      Isolation Status: No active isolations    Lives With: Spouse  Type of Home: Condo  Home Layout: One level  Home Access: Stairs to enter with rails (8)        Additional Comments: Pt reports fully indep PLOF.    What is treatment plan?  Disciplines Required upon Admission to Inpatient Rehabilitation: Physical Therapy and Occupational Therapy  Post operative: Yes  Fall: No  Dialysis: No  Diet: ADULT DIET; Regular; Low Fat/Low Chol/High Fiber/KAY; 2000 ml  Discussed patient with  and PM&R provider: Await medical work up completion, await OT eval and PMR consult for recommendations

## 2024-09-17 NOTE — PLAN OF CARE
Problem: Discharge Planning  Goal: Discharge to home or other facility with appropriate resources  Outcome: Progressing  Flowsheets (Taken 9/17/2024 1209)  Discharge to home or other facility with appropriate resources:   Identify barriers to discharge with patient and caregiver   Arrange for needed discharge resources and transportation as appropriate   Identify discharge learning needs (meds, wound care, etc)     Problem: Safety - Adult  Goal: Free from fall injury  Outcome: Progressing  Flowsheets (Taken 9/17/2024 1209)  Free From Fall Injury:   Instruct family/caregiver on patient safety   Based on caregiver fall risk screen, instruct family/caregiver to ask for assistance with transferring infant if caregiver noted to have fall risk factors     Problem: ABCDS Injury Assessment  Goal: Absence of physical injury  Outcome: Progressing  Flowsheets (Taken 9/17/2024 1209)  Absence of Physical Injury: Implement safety measures based on patient assessment     Problem: Pain  Goal: Verbalizes/displays adequate comfort level or baseline comfort level  Outcome: Progressing  Flowsheets (Taken 9/17/2024 1209)  Verbalizes/displays adequate comfort level or baseline comfort level:   Assess pain using appropriate pain scale   Administer analgesics based on type and severity of pain and evaluate response   Encourage patient to monitor pain and request assistance   Implement non-pharmacological measures as appropriate and evaluate response     Problem: Respiratory - Adult  Goal: Achieves optimal ventilation and oxygenation  Outcome: Progressing  Flowsheets (Taken 9/17/2024 1209)  Achieves optimal ventilation and oxygenation:   Position to facilitate oxygenation and minimize respiratory effort   Assess for changes in mentation and behavior   Oxygen supplementation based on oxygen saturation or arterial blood gases   Assess for changes in respiratory status       Care plan reviewed with patient.  Patient verbalizes understanding  of the plan of care and contributed to goal setting.

## 2024-09-17 NOTE — OP NOTE
Operative Note      Patient: Raymond Hummel  YOB: 1940  MRN: [id number]    Date of Procedure: 9/17/2024    Pre-Op Diagnosis Codes:      * Temporary transvenous cardiac pacemaker present [Z95.0]  Complete heart block status post TAVR valve was    Post-Op Diagnosis: Same       Procedure(s):  Insert PPM dual    Surgeon(s):  Lydia Giang MD    Assistant:   * No surgical staff found *Lorenzo Paris    Anesthesia: General    Estimated Blood Loss (mL): Minimal    Complications: None    Specimens:   * No specimens in log *    Implants:  Implant Name Type Inv. Item Serial No.  Lot No. LRB No. Used Action   LEAD PACE AD 6FR L58CM VENT PAULINE PLAT INSUL BPLR PLATINIZED [id number]] [employer] CRM] - [id number] Cardiac Lead LEAD PACE AD 6FR L58CM VENT PAULINE PLAT INSUL BPLR PLATINIZED [id number]] [employer] CRM]  [employer] CARDIAC RTHYM [id number]  N/A 1 Implanted   LEAD PACE 6FR L52CM RNG ELECTRD DIA2MM PLATINIZED HELIX PAULINE - [id number] Cardiac Lead LEAD PACE 6FR L52CM RNG ELECTRD DIA2MM PLATINIZED HELIX PAULINE [id number] [employer]-WD  N/A 1 Implanted   PACEMAKER CARD 22.5GM W50.8XH46.6MM D7.4MM TI POLYUR PAULINE - [id number] Cardiac PPM/CRT-P PACEMAKER CARD 22.5GM W50.8XH46.6MM D7.4MM TI POLYUR PAULINE [id number] [employer] CARDIAC RTHYM MGT-WD  N/A 1 Implanted         Drains:   [REMOVED] NG/OG/NJ/NE Tube Orogastric 14 fr Center mouth (Removed)   Surrounding Skin Clean, dry & intact 09/13/24 1205   Securement device Tape 09/13/24 1205   Status Clamped 09/13/24 1205   Placement Verified External Catheter Length;Gastric Contents 09/13/24 1205   NG/OG/NJ/NE External Measurement (cm) 60 cm 09/13/24 1205   Drainage Appearance Bloody 09/13/24 1205   Output (mL) 25 ml 09/13/24 1205       [REMOVED] Urinary Catheter 08/15/24 Coude (Removed)   $ Urethral catheter insertion $ Not inserted for procedure 08/15/24 3461   Catheter Indications Urinary retention (acute or chronic), continuous bladder irrigation or bladder outlet obstruction

## 2024-09-18 ENCOUNTER — APPOINTMENT (OUTPATIENT)
Dept: GENERAL RADIOLOGY | Age: 84
DRG: 267 | End: 2024-09-18
Attending: INTERNAL MEDICINE
Payer: MEDICARE

## 2024-09-18 LAB
ANION GAP SERPL CALC-SCNC: 10 MEQ/L (ref 8–16)
BUN SERPL-MCNC: 14 MG/DL (ref 7–22)
CALCIUM SERPL-MCNC: 8.4 MG/DL (ref 8.5–10.5)
CHLORIDE SERPL-SCNC: 106 MEQ/L (ref 98–111)
CO2 SERPL-SCNC: 22 MEQ/L (ref 23–33)
CREAT SERPL-MCNC: 0.6 MG/DL (ref 0.4–1.2)
DEPRECATED RDW RBC AUTO: 51 FL (ref 35–45)
ERYTHROCYTE [DISTWIDTH] IN BLOOD BY AUTOMATED COUNT: 14.9 % (ref 11.5–14.5)
GFR SERPL CREATININE-BSD FRML MDRD: > 90 ML/MIN/1.73M2
GLUCOSE BLD STRIP.AUTO-MCNC: 133 MG/DL (ref 70–108)
GLUCOSE BLD STRIP.AUTO-MCNC: 141 MG/DL (ref 70–108)
GLUCOSE BLD STRIP.AUTO-MCNC: 202 MG/DL (ref 70–108)
GLUCOSE BLD STRIP.AUTO-MCNC: 222 MG/DL (ref 70–108)
GLUCOSE SERPL-MCNC: 124 MG/DL (ref 70–108)
HCT VFR BLD AUTO: 27 % (ref 42–52)
HGB BLD-MCNC: 8.6 GM/DL (ref 14–18)
MAGNESIUM SERPL-MCNC: 2 MG/DL (ref 1.6–2.4)
MCH RBC QN AUTO: 29.8 PG (ref 26–33)
MCHC RBC AUTO-ENTMCNC: 31.9 GM/DL (ref 32.2–35.5)
MCV RBC AUTO: 93.4 FL (ref 80–94)
PHOSPHATE SERPL-MCNC: 3.2 MG/DL (ref 2.4–4.7)
PLATELET # BLD AUTO: 132 THOU/MM3 (ref 130–400)
PMV BLD AUTO: 12.9 FL (ref 9.4–12.4)
POTASSIUM SERPL-SCNC: 3.8 MEQ/L (ref 3.5–5.2)
RBC # BLD AUTO: 2.89 MILL/MM3 (ref 4.7–6.1)
SODIUM SERPL-SCNC: 138 MEQ/L (ref 135–145)
WBC # BLD AUTO: 5.1 THOU/MM3 (ref 4.8–10.8)

## 2024-09-18 PROCEDURE — 83735 ASSAY OF MAGNESIUM: CPT

## 2024-09-18 PROCEDURE — APPSS30 APP SPLIT SHARED TIME 16-30 MINUTES: Performed by: PHYSICIAN ASSISTANT

## 2024-09-18 PROCEDURE — 6370000000 HC RX 637 (ALT 250 FOR IP)

## 2024-09-18 PROCEDURE — 36415 COLL VENOUS BLD VENIPUNCTURE: CPT

## 2024-09-18 PROCEDURE — 92523 SPEECH SOUND LANG COMPREHEN: CPT

## 2024-09-18 PROCEDURE — 97530 THERAPEUTIC ACTIVITIES: CPT

## 2024-09-18 PROCEDURE — 99231 SBSQ HOSP IP/OBS SF/LOW 25: CPT | Performed by: NURSE PRACTITIONER

## 2024-09-18 PROCEDURE — 97535 SELF CARE MNGMENT TRAINING: CPT

## 2024-09-18 PROCEDURE — 6370000000 HC RX 637 (ALT 250 FOR IP): Performed by: THORACIC SURGERY (CARDIOTHORACIC VASCULAR SURGERY)

## 2024-09-18 PROCEDURE — 97116 GAIT TRAINING THERAPY: CPT

## 2024-09-18 PROCEDURE — 6370000000 HC RX 637 (ALT 250 FOR IP): Performed by: NURSE PRACTITIONER

## 2024-09-18 PROCEDURE — 84100 ASSAY OF PHOSPHORUS: CPT

## 2024-09-18 PROCEDURE — 82948 REAGENT STRIP/BLOOD GLUCOSE: CPT

## 2024-09-18 PROCEDURE — 97129 THER IVNTJ 1ST 15 MIN: CPT

## 2024-09-18 PROCEDURE — 80048 BASIC METABOLIC PNL TOTAL CA: CPT

## 2024-09-18 PROCEDURE — 85027 COMPLETE CBC AUTOMATED: CPT

## 2024-09-18 PROCEDURE — 99232 SBSQ HOSP IP/OBS MODERATE 35: CPT | Performed by: STUDENT IN AN ORGANIZED HEALTH CARE EDUCATION/TRAINING PROGRAM

## 2024-09-18 PROCEDURE — 6360000002 HC RX W HCPCS: Performed by: PHYSICIAN ASSISTANT

## 2024-09-18 PROCEDURE — 2580000003 HC RX 258: Performed by: PHYSICIAN ASSISTANT

## 2024-09-18 PROCEDURE — 1200000003 HC TELEMETRY R&B

## 2024-09-18 PROCEDURE — 71045 X-RAY EXAM CHEST 1 VIEW: CPT

## 2024-09-18 RX ORDER — WARFARIN SODIUM 5 MG/1
5 TABLET ORAL
Status: COMPLETED | OUTPATIENT
Start: 2024-09-18 | End: 2024-09-18

## 2024-09-18 RX ADMIN — FINASTERIDE 5 MG: 5 TABLET, FILM COATED ORAL at 11:28

## 2024-09-18 RX ADMIN — SODIUM CHLORIDE, PRESERVATIVE FREE 10 ML: 5 INJECTION INTRAVENOUS at 20:42

## 2024-09-18 RX ADMIN — AMLODIPINE BESYLATE 5 MG: 5 TABLET ORAL at 11:28

## 2024-09-18 RX ADMIN — AMLODIPINE BESYLATE 5 MG: 5 TABLET ORAL at 20:41

## 2024-09-18 RX ADMIN — TAMSULOSIN HYDROCHLORIDE 0.4 MG: 0.4 CAPSULE ORAL at 11:28

## 2024-09-18 RX ADMIN — LOSARTAN POTASSIUM 50 MG: 25 TABLET, FILM COATED ORAL at 11:28

## 2024-09-18 RX ADMIN — ATORVASTATIN CALCIUM 40 MG: 40 TABLET, FILM COATED ORAL at 11:26

## 2024-09-18 RX ADMIN — SPIRONOLACTONE 25 MG: 25 TABLET ORAL at 11:28

## 2024-09-18 RX ADMIN — SODIUM CHLORIDE, PRESERVATIVE FREE 10 ML: 5 INJECTION INTRAVENOUS at 20:41

## 2024-09-18 RX ADMIN — TICAGRELOR 90 MG: 90 TABLET ORAL at 11:26

## 2024-09-18 RX ADMIN — ASPIRIN 81 MG: 81 TABLET, COATED ORAL at 11:26

## 2024-09-18 RX ADMIN — SODIUM CHLORIDE, PRESERVATIVE FREE 10 ML: 5 INJECTION INTRAVENOUS at 11:35

## 2024-09-18 RX ADMIN — WARFARIN SODIUM 5 MG: 5 TABLET ORAL at 18:29

## 2024-09-18 RX ADMIN — ENOXAPARIN SODIUM 40 MG: 100 INJECTION SUBCUTANEOUS at 11:28

## 2024-09-18 RX ADMIN — TICAGRELOR 90 MG: 90 TABLET ORAL at 20:41

## 2024-09-18 ASSESSMENT — PAIN SCALES - GENERAL
PAINLEVEL_OUTOF10: 0

## 2024-09-18 NOTE — PROGRESS NOTES
Patient now agreeable to IPR admission.  Precert started on [facility] website. The authorization request [id number] has been submitted. The authorization is pending for clinical review.

## 2024-09-18 NOTE — PROGRESS NOTES
[facility]  INPATIENT PHYSICAL THERAPY  DAILY NOTE  STRZ ICU STEPDOWN TELEMETRY 4K - 4K-03/003-A      Discharge Recommendations:  pt would benefit from an IP therapy stay if agreeable.   Equipment Recommendations:Other: cont to assess needs, may need a cane      Time In: 815  Time Out: 842  Timed Code Treatment Minutes: 27 Minutes  Minutes: 27          Date: 2024  Patient Name: Raymond Hummel,  Gender:  male        MRN: [id number]  : 1940  (84 y.o.)     Referring Practitioner: NICOLETTE Huang CNP  Diagnosis: aortic stenosis, severe  Additional Pertinent Hx: 84-year-old male with PMH notable for CAD s/p PCI LAD x 1, aortic stenosis s/p TAVR, HLD, diverticulosis, NIDDM 2, HLD, LVH, BPH.  Patient previously underwent TAVR procedure with Dr. Arguello and Dr. Giang on 8/15 in order to rectify severe aortic stenosis.  Patient had previous TTE demonstrating severe aortic stenosis with LVH, which necessitated valve replacement.  TAVR was done with Zapata Resilia prosthesis by Dr. Arguello and Dr. Giang via transfemoral approach, completed successfully.      Recent TTE showed perivalvular leak around AV prosthesis, will necessitate replacement via repeat TAVR, will be done by Dr. Arguello on . s/p TAVR 24. s/p pacemaker placement 24.     Prior Level of Function:  Lives With: Spouse  Type of Home: Condo  Home Layout: One level  Home Access: Stairs to enter with rails (8)   Bathroom Shower/Tub: Walk-in shower  Bathroom Toilet: Handicap height  Bathroom Equipment: Grab bars in shower    ADL Assistance: Independent  Homemaking Responsibilities: No  Ambulation Assistance: Independent  Transfer Assistance: Independent  Active : Yes (only short distances)  Additional Comments: Pt reports fully indep PLOF.    Restrictions/Precautions:  Restrictions/Precautions: Fall Risk, Surgical Protocols  Position Activity Restriction  Other position/activity restrictions: s/p pacemaker on  Do not elevate

## 2024-09-18 NOTE — PROCEDURES
PROCEDURE NOTE  Date: 9/18/2024   Name: Raymond Hummel  YOB: 1940    Procedures  Pacer Interrogate completed..tubed report to floor

## 2024-09-18 NOTE — CARE COORDINATION
9/18/24, 2:21 PM EDT    DISCHARGE ON GOING EVALUATION    Raymond Hummel       [facility] day: 6  Location: [facility]03/003-A Reason for admit: Aortic stenosis, severe [I35.0]     Procedures:   9/13 TAVR  9/17 PPM  9/17 TVP Removal  9/18 PPM Interrogation  9/18 MOCA Cognition Score 18/30    Barriers to Discharge:   From ICU (ventilator there)  Aortic Stenosis/TAVR Leak  History: TAVR 8/15, DM, PCI  H 8.6; monitor  Await Cultures  Await IPR Re-Eval as patient agreeable  PCP: Duran Dukes MD  Readmission Risk Score: 13.9    Patient Goals/Plan/Treatment Preferences: from home w spouse Gela, current CHF Clinic, new Coumadin Clinic, confused at times, now agreeable to IPR (precert); ambulates 40 feet w therapy, backup plan HH (nsg, PT/OT/ST, aide); SW, Physiatry following; await their recommendations

## 2024-09-18 NOTE — PROGRESS NOTES
Warfarin Pharmacy Consult Note    Raymond Hummel is a 84 y.o. male for whom pharmacy has been asked to manage warfarin therapy.     Consulting Provider: Lavern PENA-CNP  Indication: Bioprosthetic aortic valve  Target INR: 2.0-3.0   Warfarin dose prior to admission: New Start  Outpatient warfarin provider: Establish with [facility]    Recent Labs     09/16/24  0410 09/17/24  0338 09/18/24  0406   HGB 9.6* 8.8* 8.6*   * 117* 132     Concurrent anticoagulants/antiplatelets: aspirin, Brilinta, enoxaparin  Significant warfarin drug-drug interactions: n/a    Date INR Warfarin Dose   9/13/24 1.11 -   9/18/24 - 5 mg                              INR will be monitored routinely until therapeutic INR is achieved.    Pharmacy will continue to follow. Thank you for the consult,   Ramya Tran, PharmD  9/18/2024 at 11:50 AM

## 2024-09-18 NOTE — PROGRESS NOTES
Discussed anti-coagulation plan with Dr. Arguello and CTS. Patient to continue ASA 81 mg QD, Brilinta 90 mg QD (restarted) and begin coumadin. INR goal 2.0 - 3.0. Once INR >/= 2.0 will stop ASA. Pharmacy consulted to dose Coumadin (Currently on Lovenox). Will need Coumadin clinic to follow at discharge.  Please call with questions or concerns.   Lavern Noonan, APRN - CNP

## 2024-09-18 NOTE — PROGRESS NOTES
10.8 thou/mm3    RBC 2.89 (L) 4.70 - 6.10 mill/mm3    Hemoglobin 8.6 (L) 14.0 - 18.0 gm/dl    Hematocrit 27.0 (L) 42.0 - 52.0 %    MCV 93.4 80.0 - 94.0 fL    MCH 29.8 26.0 - 33.0 pg    MCHC 31.9 (L) 32.2 - 35.5 gm/dl    RDW-CV 14.9 (H) 11.5 - 14.5 %    RDW-SD 51.0 (H) 35.0 - 45.0 fL    Platelets 132 130 - 400 thou/mm3    MPV 12.9 (H) 9.4 - 12.4 fL   Anion Gap    Collection Time: 09/18/24  4:06 AM   Result Value Ref Range    Anion Gap 10.0 8.0 - 16.0 meq/L   Glomerular Filtration Rate, Estimated    Collection Time: 09/18/24  4:06 AM   Result Value Ref Range    Est, Glom Filt Rate > 90 >60 ml/min/1.73m2   POCT glucose    Collection Time: 09/18/24  8:04 AM   Result Value Ref Range    POC Glucose 133 (H) 70 - 108 mg/dl       XR CHEST PORTABLE  Result Date: 9/17/2024   1. Kyphotic positioning of the patient. Poor inflation of the lungs. Mild cardiomegaly. Aortic stent/valve. Permanent dual-chamber pacemaker. 2. Mild bibasilar atelectasis/pneumonia. No effusion.       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  Patient voiding small amounts.  History of BPH.  SLP evaluation revealed Bullock of 16 out of 30  Patient continues to be adamant that he will not stay for further therapy.  He did improved to contact-guard assist with PT this morning.  Patient required redirection due to use of left upper extremity and furniture walking.  Planning trial with a cane soon.  At this time we would recommend further therapy for patient prior to return home.  With improvements today with PT, it may be difficult to get his  attempting to reach out and grab items         1430: Family present and patient is now agreeable to stay for more therapy. This provider discussed decent improvement with PT but still in need of aggressive therapy in SLP and OT, which can qualify patient. Will start precert today.     Electronically signed by JEAN Neal CNP on 9/18/2024 at 2:49 PM

## 2024-09-18 NOTE — PROGRESS NOTES
Hospitalist Progress Note  Internal Medicine Resident      Patient: Raymond Hummel 84 y.o. male      Unit/Bed: -03/003-A    Admit Date: 9/12/2024      ASSESSMENT AND PLAN  Active Problems  Perivalvular leak around Zapata Resilia prosthesis, resolved: Perivalvular leak around AV (noted on JARAD 8/15/24 after flare TAVR 9/13.  Which caused AV node blockade, leading to TVP placement.  Patient completely became pacer dependent.  Biventricular pacemaker placed on 9/17.  Per cardiology patient was only ASA, with plan of restarting Brilinta and Coumadin depending on lack of leakage/bleeding and INR goal of 2.  Cardiology evaluated patient on 9/18 okayed aspirin antiplatelet and Coumadin.  Will monitor for Coumadin INR goal.  Third degree AV cleo block: Noted after TAVR.  Initially TVP was placed.  Then biventricular pacemaker placed on 9/17.  History of aortic stenosis s/p TAVR 8/15/24: Patient follows with Dr. Arguello.  Previously TTE June 2024 showed severe aortic stenosis with AV mean gradient of 27 mmHg.  On 8/15 TAVR replacement with Zapata Resilia prosthesis was performed by Dr. Arguello and medical.  HOCM: JARAD on 9/9 showed ejection fraction of 65.  Severely increased wall thickness of LV.  Hyperbilirubinemia secondary to hemolysis due to perivalvular leak, improved: Was noted to have jaundice that improved. hemoglobin stable  CAD: History of CAD s/p PCI x 1 in the LAD.  LHC showed 50 stenosis of RCA, 70% stenosis of OM 2.  On Lipitor, aspirin, brilinta at home  NIDDM 2: On metformin at home.  Held for procedure as described above  BPH: History of BPH.  Reported gross hematuria recently.  On Flomax. deferred cystoscopy.  Outpatient outpatient urology follow-up.  Systolic heart failure, improved: Diagnosed in June 2024, had first TAVR procedure. follows Dr. Arguello.  On Aldactone, losartan,    LDA: []CVC / []PICC / []Midline / []Beatty / []Drains / []Mediport / [x]None  Antibiotics: none  Steroids: none  Labs (still

## 2024-09-18 NOTE — PLAN OF CARE
Problem: Discharge Planning  Goal: Discharge to home or other facility with appropriate resources  9/17/2024 2152 by Stella Aragon RN  Outcome: Progressing  Flowsheets (Taken 9/17/2024 2152)  Discharge to home or other facility with appropriate resources:   Identify barriers to discharge with patient and caregiver   Arrange for needed discharge resources and transportation as appropriate   Identify discharge learning needs (meds, wound care, etc)   Refer to discharge planning if patient needs post-hospital services based on physician order or complex needs related to functional status, cognitive ability or social support system     Problem: Safety - Adult  Goal: Free from fall injury  9/17/2024 2152 by Stella Aragon RN  Outcome: Progressing  Flowsheets (Taken 9/17/2024 2152)  Free From Fall Injury: Instruct family/caregiver on patient safety     Problem: ABCDS Injury Assessment  Goal: Absence of physical injury  9/17/2024 2152 by Stella Aragon RN  Outcome: Progressing  Flowsheets (Taken 9/17/2024 2152)  Absence of Physical Injury: Implement safety measures based on patient assessment     Problem: Pain  Goal: Verbalizes/displays adequate comfort level or baseline comfort level  9/17/2024 2152 by Stella Aragon RN  Outcome: Progressing  Flowsheets (Taken 9/17/2024 2152)  Verbalizes/displays adequate comfort level or baseline comfort level:   Encourage patient to monitor pain and request assistance   Assess pain using appropriate pain scale   Administer analgesics based on type and severity of pain and evaluate response   Consider cultural and social influences on pain and pain management   Implement non-pharmacological measures as appropriate and evaluate response   Notify Licensed Independent Practitioner if interventions unsuccessful or patient reports new pain     Problem: Respiratory - Adult  Goal: Achieves optimal ventilation and oxygenation  9/17/2024 2152 by Stella Aragon RN  Outcome:

## 2024-09-18 NOTE — PROGRESS NOTES
[facility]  SPEECH THERAPY  STRZ ICU STEPDOWN TELEMETRY 4K  Speech - Language - Cognitive Evaluation + Cognitive Treatment    Discharge Recommendations: Outpatient Speech Therapy, Inpatient Rehabilitation, Home Health, and Continue to Assess Pending Progress    SLP Individual Minutes  Time In: 0943  Time Out: 1018  Minutes: 35  Timed Code Treatment Minutes: 15 Minutes   SLCE: 20 minutes  Cognitive Treatment: 15 minutes    Date: 2024  Patient Name: Raymond Hummel      CSN: [id number]   : 1940  (84 y.o.)  Gender: male   Referring Physician:  Rossy Mahajan APRN - CNP   Diagnosis: Aortic stenosis, severe   Precautions: Fall Risk  History of Present Illness/Injury: Patient admitted to [facility] with above diagnosis; please see physician H&P for full report. Per chart review, \"Raymond Hummel is an 84-year-old male with PMH notable for CAD s/p PCI LAD x 1, aortic stenosis s/p TAVR, HLD, diverticulosis, NIDDM 2, HLD, LVH, BPH.  Patient previously underwent TAVR procedure with Dr. Arguello and Dr. Giang on 8/15 in order to rectify severe aortic stenosis.  Patient had previous TTE demonstrating severe aortic stenosis with LVH, which necessitated valve replacement.  TAVR was done with Zapata Resilia prosthesis by Dr. Arguello and Dr. Giang via transfemoral approach, completed successfully.\"    ST consulted to further evaluate cognitive function with implementation of goals/POC as clinically indicated.     Past Medical History:   Diagnosis Date    Diverticulosis     Heart murmur     Hematuria 08/15/2024    during admission where pt got TAVR 08/15/2024    High triglycerides     History of colonic polyps     History of transcatheter aortic valve replacement (TAVR) 08/15/2024    w/ Dr. Arguello    Hypertension     Insulin resistance     LVH (left ventricular hypertrophy)     Medtronic dual pacer 2024       Pain: No pain reported.    Subjective:  Patient seen with JERAMY Corrigan permission. Patient  0

## 2024-09-18 NOTE — PROGRESS NOTES
[facility]  STRZ ICU STEPDOWN TELEMETRY 4K  Occupational Therapy  Daily Note    Discharge Recommendations: 24 hour assistance or supervision and Not safe to return home at this time IPR vs SNF dependent on patient agreeableness  Equipment Recommendations: Equipment Needed: Yes  Other: monitor need for RW & shower chair      Time In: 1047  Time Out: 1126  Timed Code Treatment Minutes: 39 Minutes  Minutes: 39          Date: 2024  Patient Name: Raymond Hummel,   Gender: male      [facility]  MRN: [id number]  : 1940  (84 y.o.)  Referring Practitioner: Liudmila Huang, APRN - CNP  Diagnosis: aortic stenosis severe  Additional Pertinent Hx: 84-year-old male with PMH notable for CAD s/p PCI LAD x 1, aortic stenosis s/p TAVR, HLD, diverticulosis, NIDDM 2, HLD, LVH, BPH.  Patient previously underwent TAVR procedure with Dr. Arguello and Dr. Giang on 8/15 in order to rectify severe aortic stenosis.  Patient had previous TTE demonstrating severe aortic stenosis with LVH, which necessitated valve replacement.  TAVR was done with Zapata Resilia prosthesis by Dr. Arguello and Dr. Giang via transfemoral approach, completed successfully.      Recent TTE showed perivalvular leak around AV prosthesis, will necessitate replacement via repeat TAVR, will be done by Dr. Arguello on . s/p TAVR 24. s/p pacemaker placement 24.    Restrictions/Precautions:  Restrictions/Precautions: Fall Risk, Surgical Protocols  Position Activity Restriction  Other position/activity restrictions: s/p pacemaker on  Do not elevate affected arm above shoulder for 30 days, Instruct patient to keep wound clean and dry and no showering for one week `    Social/Functional History:  Lives With: Spouse  Type of Home: Condo  Home Layout: One level  Home Access: Stairs to enter with rails (8)   Bathroom Shower/Tub: Walk-in shower  Bathroom Toilet: Handicap height  Bathroom Equipment: Grab bars in shower       ADL Assistance:

## 2024-09-18 NOTE — DISCHARGE INSTRUCTIONS
Follow up pacer clinic 1 week    Leave bandage on for 48 hours, keep steri strips intact if present  No shower for 7 days unless sponge bathe or use \"press and seal\"   Do not raise arm above shoulder or lift anything >5 lb x 1 month  No driving for 2 weeks.  Keep incision dry      Going home Instructions for your Pacemaker       Congratulations on your new pacemaker. We, as your dedicated physicians, understand that your new pacemakercan be a bit intimidating. We understand that you may have many questions and it is hard to remember to ask all those questions while in the hospital. As your physicians, we are dedicated to your care. We would like to assist you with your new pacemaker. Here are some tips and advice on how to take care of your new pacemaker; when you need to be concerned and when you need to call us.     You may notice a bruise over your pacemaker. This is not unusual. If you develop fever, bleeding, swelling or drainage from the incision, please notify our office immediately ([phone]).   You may have steri-strips over your incision. Do not try to remove them. Allow them to come off by themselves.  You may have staples securing your incision. When you visit us in the office, we will remove them- usually in 7-10 days time.  You will have a dressing over you pacemaker incision. Please keep this dry and clean. When you see us back in the office, this will be removed and you will receive further care instructions for this site.  Do not shower until your pacemaker  incision is healed. This normally takes seven (7) days.   You should not lift your arm above the level of your shoulder for two weeks. This will allow the pacemaker wires to heal in place. It is important to move your arms as normal as possible (without going above the shoulder) to prevent the development of a frozen shoulder.   You should not engage in sports or lift objects heavier than 10 lbs. for the first two weeks after pacemaker

## 2024-09-18 NOTE — PROGRESS NOTES
CT/CV Surgery Progress Note    2024 8:24 AM  Surgeon:  Dr. Giang    Procedure: PPM Dual Chamber   POD #1         Hx of TAVR and Shira     Subjective:  Mr. Hummel is resting comfortably in chair on RA, alert, and in no acute distress. Pt denies chest pressure, SOB, fever,chills, N/V/D.     Intake/Output Summary (Last 24 hours) at 2024 0824  Last data filed at 2024 0320  Gross per 24 hour   Intake 460 ml   Output 10 ml   Net 450 ml     Vital Signs: /81   Pulse 87   Temp 97.2 °F (36.2 °C) (Oral)   Resp 20   Ht 1.753 m (5' 9.02\")   Wt 79.2 kg (174 lb 9.7 oz)   SpO2 100%   BMI 25.77 kg/m²    Temp (24hrs), Av.8 °F (36.6 °C), Min:97.2 °F (36.2 °C), Max:98.2 °F (36.8 °C)    Labs:   CBC:  Recent Labs     24  0410 24  0338 24  0406   WBC 8.4 6.8 5.1   HGB 9.6* 8.8* 8.6*   HCT 30.0* 27.4* 27.0*   MCV 92.9 92.9 93.4   * 117* 132     BMP:   Recent Labs     24  0410 24  0832 24  1221 24  1223 24  0338 24  0406 24  0804   *  --   --   --  139 138  --    K 3.4*  --  4.0  --  3.7 3.8  --      --   --   --  104 106  --    CO2 23  --   --   --  23 22*  --    PHOS  --   --   --   --  3.1 3.2  --    BUN 13  --   --   --  15 14  --    CREATININE 0.5  --   --   --  0.7 0.6  --    MG 2.1  --   --   --  2.1 2.0  --    POCGLU  --    < >  --    < >  --   --  133*    < > = values in this interval not displayed.     Imaging:  CXR: 2024  1. No pneumothorax.  2. Mild bibasilar atelectasis.        Scheduled Meds:    hydrALAZINE  10 mg IntraVENous Once    sodium chloride flush  5-40 mL IntraVENous 2 times per day    enoxaparin  40 mg SubCUTAneous Daily    polyethylene glycol  17 g Oral Daily    senna  1 tablet Oral Nightly    losartan  50 mg Oral Daily    sodium chloride flush  5-40 mL IntraVENous 2 times per day    chlorhexidine gluconate   Topical Once    amLODIPine  5 mg Oral BID    aspirin  81 mg Oral Daily    finasteride  5 mg

## 2024-09-19 LAB
ANION GAP SERPL CALC-SCNC: 12 MEQ/L (ref 8–16)
BUN SERPL-MCNC: 11 MG/DL (ref 7–22)
CALCIUM SERPL-MCNC: 8.8 MG/DL (ref 8.5–10.5)
CHLORIDE SERPL-SCNC: 106 MEQ/L (ref 98–111)
CO2 SERPL-SCNC: 25 MEQ/L (ref 23–33)
CREAT SERPL-MCNC: 0.6 MG/DL (ref 0.4–1.2)
DEPRECATED RDW RBC AUTO: 49.1 FL (ref 35–45)
ERYTHROCYTE [DISTWIDTH] IN BLOOD BY AUTOMATED COUNT: 14.7 % (ref 11.5–14.5)
GFR SERPL CREATININE-BSD FRML MDRD: > 90 ML/MIN/1.73M2
GLUCOSE BLD STRIP.AUTO-MCNC: 133 MG/DL (ref 70–108)
GLUCOSE BLD STRIP.AUTO-MCNC: 182 MG/DL (ref 70–108)
GLUCOSE BLD STRIP.AUTO-MCNC: 227 MG/DL (ref 70–108)
GLUCOSE BLD STRIP.AUTO-MCNC: 232 MG/DL (ref 70–108)
GLUCOSE SERPL-MCNC: 128 MG/DL (ref 70–108)
HCT VFR BLD AUTO: 28.9 % (ref 42–52)
HGB BLD-MCNC: 9.2 GM/DL (ref 14–18)
INR PPP: 1.23 (ref 0.85–1.13)
MAGNESIUM SERPL-MCNC: 2.1 MG/DL (ref 1.6–2.4)
MCH RBC QN AUTO: 29.5 PG (ref 26–33)
MCHC RBC AUTO-ENTMCNC: 31.8 GM/DL (ref 32.2–35.5)
MCV RBC AUTO: 92.6 FL (ref 80–94)
PHOSPHATE SERPL-MCNC: 3 MG/DL (ref 2.4–4.7)
PLATELET # BLD AUTO: 143 THOU/MM3 (ref 130–400)
PMV BLD AUTO: 12.5 FL (ref 9.4–12.4)
POTASSIUM SERPL-SCNC: 4.3 MEQ/L (ref 3.5–5.2)
RBC # BLD AUTO: 3.12 MILL/MM3 (ref 4.7–6.1)
SODIUM SERPL-SCNC: 143 MEQ/L (ref 135–145)
WBC # BLD AUTO: 6 THOU/MM3 (ref 4.8–10.8)

## 2024-09-19 PROCEDURE — 82948 REAGENT STRIP/BLOOD GLUCOSE: CPT

## 2024-09-19 PROCEDURE — 97530 THERAPEUTIC ACTIVITIES: CPT

## 2024-09-19 PROCEDURE — 97110 THERAPEUTIC EXERCISES: CPT

## 2024-09-19 PROCEDURE — 97535 SELF CARE MNGMENT TRAINING: CPT

## 2024-09-19 PROCEDURE — 6370000000 HC RX 637 (ALT 250 FOR IP): Performed by: PHARMACIST

## 2024-09-19 PROCEDURE — 97116 GAIT TRAINING THERAPY: CPT

## 2024-09-19 PROCEDURE — 85610 PROTHROMBIN TIME: CPT

## 2024-09-19 PROCEDURE — 80048 BASIC METABOLIC PNL TOTAL CA: CPT

## 2024-09-19 PROCEDURE — 6370000000 HC RX 637 (ALT 250 FOR IP): Performed by: NURSE PRACTITIONER

## 2024-09-19 PROCEDURE — 1200000003 HC TELEMETRY R&B

## 2024-09-19 PROCEDURE — 2580000003 HC RX 258: Performed by: PHYSICIAN ASSISTANT

## 2024-09-19 PROCEDURE — 83735 ASSAY OF MAGNESIUM: CPT

## 2024-09-19 PROCEDURE — 6370000000 HC RX 637 (ALT 250 FOR IP)

## 2024-09-19 PROCEDURE — 6370000000 HC RX 637 (ALT 250 FOR IP): Performed by: THORACIC SURGERY (CARDIOTHORACIC VASCULAR SURGERY)

## 2024-09-19 PROCEDURE — 84100 ASSAY OF PHOSPHORUS: CPT

## 2024-09-19 PROCEDURE — 6360000002 HC RX W HCPCS: Performed by: PHYSICIAN ASSISTANT

## 2024-09-19 PROCEDURE — 36415 COLL VENOUS BLD VENIPUNCTURE: CPT

## 2024-09-19 PROCEDURE — 85027 COMPLETE CBC AUTOMATED: CPT

## 2024-09-19 PROCEDURE — 99232 SBSQ HOSP IP/OBS MODERATE 35: CPT | Performed by: STUDENT IN AN ORGANIZED HEALTH CARE EDUCATION/TRAINING PROGRAM

## 2024-09-19 RX ORDER — WARFARIN SODIUM 3 MG/1
3 TABLET ORAL ONCE
Status: COMPLETED | OUTPATIENT
Start: 2024-09-19 | End: 2024-09-19

## 2024-09-19 RX ORDER — INSULIN LISPRO 100 [IU]/ML
0-4 INJECTION, SOLUTION INTRAVENOUS; SUBCUTANEOUS NIGHTLY
Status: DISCONTINUED | OUTPATIENT
Start: 2024-09-19 | End: 2024-09-24 | Stop reason: HOSPADM

## 2024-09-19 RX ORDER — METOPROLOL SUCCINATE 25 MG/1
12.5 TABLET, EXTENDED RELEASE ORAL DAILY
Status: DISCONTINUED | OUTPATIENT
Start: 2024-09-19 | End: 2024-09-20

## 2024-09-19 RX ORDER — DEXTROSE MONOHYDRATE 100 MG/ML
INJECTION, SOLUTION INTRAVENOUS CONTINUOUS PRN
Status: DISCONTINUED | OUTPATIENT
Start: 2024-09-19 | End: 2024-09-24 | Stop reason: HOSPADM

## 2024-09-19 RX ORDER — GLUCAGON 1 MG/ML
1 KIT INJECTION PRN
Status: DISCONTINUED | OUTPATIENT
Start: 2024-09-19 | End: 2024-09-24 | Stop reason: HOSPADM

## 2024-09-19 RX ORDER — INSULIN LISPRO 100 [IU]/ML
0-4 INJECTION, SOLUTION INTRAVENOUS; SUBCUTANEOUS
Status: DISCONTINUED | OUTPATIENT
Start: 2024-09-19 | End: 2024-09-24 | Stop reason: HOSPADM

## 2024-09-19 RX ADMIN — AMLODIPINE BESYLATE 5 MG: 5 TABLET ORAL at 21:17

## 2024-09-19 RX ADMIN — METOPROLOL SUCCINATE 12.5 MG: 25 TABLET, EXTENDED RELEASE ORAL at 11:41

## 2024-09-19 RX ADMIN — FINASTERIDE 5 MG: 5 TABLET, FILM COATED ORAL at 08:11

## 2024-09-19 RX ADMIN — TICAGRELOR 90 MG: 90 TABLET ORAL at 08:11

## 2024-09-19 RX ADMIN — SODIUM CHLORIDE, PRESERVATIVE FREE 10 ML: 5 INJECTION INTRAVENOUS at 08:13

## 2024-09-19 RX ADMIN — POLYETHYLENE GLYCOL 3350 17 G: 17 POWDER, FOR SOLUTION ORAL at 08:11

## 2024-09-19 RX ADMIN — TICAGRELOR 90 MG: 90 TABLET ORAL at 21:17

## 2024-09-19 RX ADMIN — WARFARIN SODIUM 3 MG: 3 TABLET ORAL at 16:42

## 2024-09-19 RX ADMIN — ASPIRIN 81 MG: 81 TABLET, COATED ORAL at 08:11

## 2024-09-19 RX ADMIN — ENOXAPARIN SODIUM 40 MG: 100 INJECTION SUBCUTANEOUS at 08:10

## 2024-09-19 RX ADMIN — AMLODIPINE BESYLATE 5 MG: 5 TABLET ORAL at 08:11

## 2024-09-19 RX ADMIN — TAMSULOSIN HYDROCHLORIDE 0.4 MG: 0.4 CAPSULE ORAL at 08:11

## 2024-09-19 RX ADMIN — LOSARTAN POTASSIUM 50 MG: 25 TABLET, FILM COATED ORAL at 08:11

## 2024-09-19 RX ADMIN — SODIUM CHLORIDE, PRESERVATIVE FREE 10 ML: 5 INJECTION INTRAVENOUS at 21:17

## 2024-09-19 RX ADMIN — SODIUM CHLORIDE, PRESERVATIVE FREE 10 ML: 5 INJECTION INTRAVENOUS at 08:10

## 2024-09-19 RX ADMIN — SPIRONOLACTONE 25 MG: 25 TABLET ORAL at 08:11

## 2024-09-19 RX ADMIN — INSULIN LISPRO 1 UNITS: 100 INJECTION, SOLUTION INTRAVENOUS; SUBCUTANEOUS at 09:17

## 2024-09-19 RX ADMIN — ATORVASTATIN CALCIUM 40 MG: 40 TABLET, FILM COATED ORAL at 08:11

## 2024-09-19 ASSESSMENT — PAIN SCALES - GENERAL
PAINLEVEL_OUTOF10: 0

## 2024-09-19 ASSESSMENT — PAIN DESCRIPTION - LOCATION: LOCATION: GENERALIZED

## 2024-09-19 NOTE — PLAN OF CARE
Problem: Discharge Planning  Goal: Discharge to home or other facility with appropriate resources  Outcome: Progressing  Flowsheets (Taken 9/18/2024 2152)  Discharge to home or other facility with appropriate resources:   Identify barriers to discharge with patient and caregiver   Arrange for needed discharge resources and transportation as appropriate   Identify discharge learning needs (meds, wound care, etc)   Refer to discharge planning if patient needs post-hospital services based on physician order or complex needs related to functional status, cognitive ability or social support system     Problem: Safety - Adult  Goal: Free from fall injury  Outcome: Progressing  Flowsheets (Taken 9/18/2024 2152)  Free From Fall Injury: Instruct family/caregiver on patient safety     Problem: ABCDS Injury Assessment  Goal: Absence of physical injury  Outcome: Progressing  Flowsheets (Taken 9/18/2024 2152)  Absence of Physical Injury: Implement safety measures based on patient assessment     Problem: Pain  Goal: Verbalizes/displays adequate comfort level or baseline comfort level  Outcome: Progressing  Flowsheets (Taken 9/18/2024 2152)  Verbalizes/displays adequate comfort level or baseline comfort level:   Encourage patient to monitor pain and request assistance   Assess pain using appropriate pain scale   Administer analgesics based on type and severity of pain and evaluate response   Implement non-pharmacological measures as appropriate and evaluate response   Consider cultural and social influences on pain and pain management   Notify Licensed Independent Practitioner if interventions unsuccessful or patient reports new pain     Problem: Respiratory - Adult  Goal: Achieves optimal ventilation and oxygenation  Outcome: Progressing  Flowsheets (Taken 9/18/2024 2152)  Achieves optimal ventilation and oxygenation:   Assess for changes in respiratory status   Assess for changes in mentation and behavior   Position to  facilitate oxygenation and minimize respiratory effort   Respiratory therapy support as indicated     Problem: Skin/Tissue Integrity  Goal: Absence of new skin breakdown  Description: 1.  Monitor for areas of redness and/or skin breakdown  2.  Assess vascular access sites hourly  3.  Every 4-6 hours minimum:  Change oxygen saturation probe site  4.  Every 4-6 hours:  If on nasal continuous positive airway pressure, respiratory therapy assess nares and determine need for appliance change or resting period.  Outcome: Progressing  Note: No new skin breakdown noted. Patient agreeable to plan of care.     Problem: Cardiovascular - Adult  Goal: Maintains optimal cardiac output and hemodynamic stability  Outcome: Progressing  Flowsheets (Taken 9/18/2024 2152)  Maintains optimal cardiac output and hemodynamic stability:   Monitor blood pressure and heart rate   Monitor urine output and notify Licensed Independent Practitioner for values outside of normal range   Assess for signs of decreased cardiac output     Problem: Cardiovascular - Adult  Goal: Absence of cardiac dysrhythmias or at baseline  Outcome: Progressing  Flowsheets (Taken 9/18/2024 2152)  Absence of cardiac dysrhythmias or at baseline:   Monitor cardiac rate and rhythm   Assess for signs of decreased cardiac output     Problem: Cardiovascular - Adult  Goal: Absence of cardiac dysrhythmias or at baseline  Outcome: Progressing  Flowsheets (Taken 9/18/2024 2152)  Absence of cardiac dysrhythmias or at baseline:   Monitor cardiac rate and rhythm   Assess for signs of decreased cardiac output     Problem: Skin/Tissue Integrity - Adult  Goal: Skin integrity remains intact  Outcome: Progressing  Flowsheets (Taken 9/18/2024 2152)  Skin Integrity Remains Intact: Monitor for areas of redness and/or skin breakdown     Problem: Skin/Tissue Integrity - Adult  Goal: Incisions, wounds, or drain sites healing without S/S of infection  Outcome: Progressing  Flowsheets (Taken

## 2024-09-19 NOTE — PROGRESS NOTES
Pt is an 84y.o. male, sitting in easychair resting following therapy walkaround, in 4K-03. Juan Carlos is pleasant, sharing that he is feeling better today and that during his recent health challenges over time (other hospitalizations here), that he experienced very little pain overall; which he is thankful for.  provided active listening, conversation on his progress made this admission and prayer-met with gratitude by Juan Carlos.     09/19/24 1404   Encounter Summary   Encounter Overview/Reason Spiritual/Emotional Needs   Service Provided For Patient   Referral/Consult From [facility]   Support System Spouse;Family members   Last Encounter  09/19/24   Complexity of Encounter Low   Begin Time 1404   End Time  1409   Total Time Calculated 5 min   Spiritual/Emotional needs   Type Spiritual Support   Assessment/Intervention/Outcome   Assessment Calm;Coping;Hopeful;Peaceful   Intervention Active listening;Prayer (assurance of)/[place];Explored/Affirmed feelings, thoughts, concerns;Discussed illness injury and it’s impact   Outcome Expressed feelings, needs, and concerns;Engaged in conversation;Expressed Gratitude;Receptive

## 2024-09-19 NOTE — CARE COORDINATION
9/19/24, 12:43 PM EDT    DISCHARGE PLANNING EVALUATION       Consult deferred at this time.  Patient is an IPR pre-cert.  If pre-cert denied will follow up for home health preference.

## 2024-09-19 NOTE — PROGRESS NOTES
Structural Heart/Cardiology Progress Note    2024 1:00 PM    Procedure:  Pilo TAVR        POD #:  6 (POD #2 dual chamber PM insertion)    Subjective:  The pt is resting comfortably in chair at bedside with spouse present.  Bright, alert, and in no acute distress. Hemodynamically stable. Paced rhythm. HR initially ~ 100 at rest this AM with HR into 120s with activity - BB remained on hold. BB restarted at Toprol XL 12.5 mg home dose this AM with HR upon evaluation at this time in the 70's. The pt denies chest pressure, SOB, palpitations, fever, chills, N/V/D. Mild soreness L anterior chest at PM insertion site as expected.     Vital Signs: /60   Pulse 84   Temp 97.9 °F (36.6 °C) (Oral)   Resp 16   Ht 1.753 m (5' 9.02\")   Wt 79.2 kg (174 lb 9.7 oz)   SpO2 97%   BMI 25.77 kg/m²    Temp (24hrs), Av.2 °F (36.8 °C), Min:97.7 °F (36.5 °C), Max:98.7 °F (37.1 °C)      Weight - Scale: 79.2 kg (174 lb 9.7 oz)       PULSE OXIMETRY RANGE: SpO2  Av %  Min: 97 %  Max: 100 %  SUPPLEMENTAL O2: O2 Flow Rate (L/min): 1 L/min     Labs:   CBC:     Recent Labs     24  0338 24  0406 24  0539   WBC 6.8 5.1 6.0   HGB 8.8* 8.6* 9.2*   HCT 27.4* 27.0* 28.9*   MCV 92.9 93.4 92.6   * 132 143     BMP:  Recent Labs     24  0338 24  0406 24  0539    138 143   K 3.7 3.8 4.3    106 106   CO2 23 22* 25   PHOS 3.1 3.2 3.0   BUN 15 14 11   CREATININE 0.7 0.6 0.6   MG 2.1 2.0 2.1     Last HgA1C:    Lab Results   Component Value Date    LABA1C 6.2 2023     PT/INR:   Recent Labs     24  0539   INR 1.23*     APTT: No results for input(s): \"APTT\" in the last 72 hours.      Intake/Output Summary (Last 24 hours) at 2024 1300  Last data filed at 2024 0410  Gross per 24 hour   Intake 440 ml   Output 0 ml   Net 440 ml       Scheduled Meds:    insulin lispro  0-4 Units SubCUTAneous TID WC    insulin lispro  0-4 Units SubCUTAneous Nightly    metoprolol  findings on cardiac catheterization    Status post angioplasty with stent    Nonrheumatic aortic valve stenosis    Aortic stenosis, severe    History of transcatheter aortic valve replacement (TAVR)    Severe aortic insufficiency    CHB (complete heart block) (HCC)    [employer] dual pacer     S/p Pilo TAVR 9/13/2024   - 24 hour post Pilo TAVR echo: Aortic Valve: Appropriately positioned transcatheter bioprosthetic valve that is well-seated. AV mean gradient is 12 mmHg. No regurgitation. No paravalvular regurgitation. No stenosis. Normal prosthetic gradient. AV mean gradient is 12 mmHg.    - coumadin with goal INR 2.0 - 3.0 - pharmacy dosing    - stop ASA when INR >/= 2.0   - coumadin clinic at discharge  Complete heart block post Pilo TAVR; PPM 9/17/2024   - paced rhythm; insertion site with mild ecchymosis; no hematoma or induration, no drainage  HFpEF 55 - 60% per echo 9/14/2024 - 24 hours post Pilo TAVR  CAD with recent LAD SUSIE   - continue Brilinta and ASA    - recheck P2Y12 inhibition in 7 - 10 days  Physical deconditioning    Plan: 9/19/24  Restart Toprol XL at 12.5 mg and continue to monitor - continue tele  Continue anticoagulation as above  Post PM insertion care and restrictions  PT/OT  IPR at discharge highly recommended; patient on triple therapy for anticoagulation - high risk of bleeding/serious-life threatening injury if fall  Routine post Pilo TAVR instructions and restrictions at discharge  Recheck P2Y12 inhibition in 7 - 10 days    The plan of care was discussed in detail with Dr. Arguello    Will follow.     Lavern Noonan, APRN - CNP

## 2024-09-19 NOTE — PROGRESS NOTES
Hospitalist Progress Note  Internal Medicine Resident      Patient: Raymond Hummel 84 y.o. male      Unit/Bed: -03/003-A    Admit Date: 9/12/2024      ASSESSMENT AND PLAN  Active Problems  Perivalvular leak around Zapata Resilia prosthesis, resolved: Perivalvular leak around AV (noted on JARAD 8/15/24 after flare TAVR 9/13.  Which caused AV node blockade, leading to TVP placement.  Patient completely became pacer dependent.  Biventricular pacemaker placed on 9/17. On ASA, brilinta, and coumadin. Stop ASA when INR >/= 2.0.   Third degree AV cleo block: Noted after TAVR.  Initially TVP was placed.  Then biventricular pacemaker placed on 9/17.  History of aortic stenosis s/p TAVR 8/15/24: Patient follows with Dr. Arguello.  Previously TTE June 2024 showed severe aortic stenosis with AV mean gradient of 27 mmHg.  On 8/15 TAVR replacement with Zapata Resilia prosthesis was performed by Dr. Arguello.  HOCM: JARAD on 9/9 showed ejection fraction of 65.  Severely increased wall thickness of LV.  Hyperbilirubinemia secondary to hemolysis due to perivalvular leak, improved: Was noted to have jaundice that improved. hemoglobin stable  CAD: History of CAD s/p PCI x 1 in the LAD.  LHC showed 50 stenosis of RCA, 70% stenosis of OM 2. On Lipitor, aspirin, brilinta at home. Stopping ASA when INR >/= 2.0  HTN: on Toprol at home. Was on hold. Will resume as of 9/19.  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  Held for procedure as described above  BPH: History of BPH.  Reported gross hematuria recently. On Flomax. deferred cystoscopy. Outpatient outpatient urology follow-up.  Systolic heart failure, improved: Diagnosed in June 2024, had first TAVR procedure. follows Dr. Arguello. On Aldactone, losartan,    LDA: []CVC / []PICC / []Midline / []Beatty / []Drains / []Mediport / [x]None  Antibiotics: none  Steroids: none  Labs (still needed?): [x]Yes / []No  IVF (still needed?): []Yes / [x]No    Level of care: [x]Step Down / []Med-Surg  Bed Status:

## 2024-09-19 NOTE — PROGRESS NOTES
[facility]  OCCUPATIONAL THERAPY MISSED TREATMENT NOTE  STRZ ICU STEPDOWN TELEMETRY 4K  4K-003-A      Date: 2024  Patient Name: Raymond Hummel        CSN: [id number]   : 1940  (84 y.o.)  Gender: male   Referring Practitioner: Liudmila Huang APRN - CNP  Diagnosis: aortic stenosis severe         REASON FOR MISSED TREATMENT: Patient Unavailable with PT at this time, will attempt later today.

## 2024-09-19 NOTE — PROGRESS NOTES
Warfarin Pharmacy Consult Note    Warfarin Indication: Bioprosthetic aortic valve - davina TAVR  Target INR: 2.0-3.0  Dose prior to admission: new start    Recent Labs     09/17/24  0338 09/18/24  0406 09/19/24  0539   HGB 8.8* 8.6* 9.2*   * 132 143     Recent Labs     09/19/24  0539   INR 1.23*     Concurrent anticoagulants/antiplatelets: aspirin, Brilinta, enoxaparin  Significant warfarin drug-drug interactions: n/a     Date INR Warfarin Dose   9/13/24 1.11 -   9/18/24 - 5 mg   9/20/24   1.23  3 mg                                      Monitoring:                   INR will be monitored daily until therapeutic INR is achieved.    **Please contact pharmacy for discharge instructions when indicated**    Corbin FunkD, BCPS   9/19/2024  3:07 PM

## 2024-09-19 NOTE — CARE COORDINATION
9/19/24, 1:13 PM EDT    DISCHARGE ON GOING EVALUATION    Raymond Hummel       [facility] day: 7  Location: [facility]03/003-A Reason for admit: Aortic stenosis, severe [I35.0]     Procedures:   9/13 TAVR  9/17 PPM  9/17 TVP Removal  9/18 PPM Interrogation  9/18 MOCA Cognition Score 18/30    Imaging since last note: none    Barriers to Discharge: Hospitalist following. Continues with PT/OT. Aldactone. Brilinta. Metoprolol. Cozaar.     PCP: Duran Dukes MD  Readmission Risk Score: 14.1    Patient Goals/Plan/Treatment Preferences: Await IPR precert, started on 9/19. Prior to admission, from home w spouse Gela, current CHF Clinic, new Coumadin Clinic,

## 2024-09-19 NOTE — PROGRESS NOTES
clean and dry and no showering for one week     SUBJECTIVE: Pt in bathroom/commode upon arrival, and agrees to therapy, RN approved session, Pt A&O X 4/4, Pt pleasant and cooperative     PAIN: no c/o pain    Vitals: Vitals not assessed per clinical judgement, see nursing flowsheet    OBJECTIVE:  Bed Mobility:  Not Tested    Transfers:  Sit to Stand: Contact Guard Assistance, cues for hand placement  Stand to Sit:Contact Guard Assistance, cues for hand placement Provided cues for safe alignment to surface and safe use of AD    Ambulation:  Contact Guard Assistance, Minimal Assistance, with verbal cues , with increased time for completion  Distance: 35ft  Surface: Level Tile  Device: Rolling Walker  Gait Deviations: Forward Flexed Posture, Slow Lovely, Decreased Step Length Bilaterally, Decreased Gait Speed, Decreased Heel Strike Bilaterally, Unsteady Gait, and Increased reliance on assistive device, cues for proximity to AD and posture  Did attempt to ambulate without AD however pt furniture walking and unsteady- pt did agree he was unsteady therefore used RW    Stairs:  Not Tested    Balance:  Pt completed functional tasks at toilet and at bathroom sink with assist for stability and safety. Various functional tasks completed with and without UE support in sitting and standing, pt stood for ~5mins, No LOBs, extra time to complete    Exercise:  Patient was guided in 1 set(s) 10 reps of exercises to both upper & lower extremities: Upper trunk rotations, Shoulder horizontal abduction/adduction, Shoulder rolls, Seated marches, Seated hamstring curls, Seated heel/toe raises, Long arc quads, Seated isometric hip adduction, Seated abduction/adduction and Scapular retraction,.  Exercises were completed for increased independence with functional mobility.    Functional Outcome Measures:  [facility] (6 CLICK) BASIC MOBILITY  AM-PAC Inpatient Mobility Raw Score : 18  AM-PAC Inpatient T-Scale Score : 43.63        Modified [place]

## 2024-09-19 NOTE — PLAN OF CARE
Problem: Discharge Planning  Goal: Discharge to home or other facility with appropriate resources  9/19/2024 1047 by Nara Fletcher, RN  Outcome: Progressing  Flowsheets (Taken 9/19/2024 0900)  Discharge to home or other facility with appropriate resources:   Identify barriers to discharge with patient and caregiver   Arrange for needed discharge resources and transportation as appropriate   Identify discharge learning needs (meds, wound care, etc)   Refer to discharge planning if patient needs post-hospital services based on physician order or complex needs related to functional status, cognitive ability or social support system  9/18/2024 2152 by Stella Aragon RN  Outcome: Progressing  Flowsheets (Taken 9/18/2024 2152)  Discharge to home or other facility with appropriate resources:   Identify barriers to discharge with patient and caregiver   Arrange for needed discharge resources and transportation as appropriate   Identify discharge learning needs (meds, wound care, etc)   Refer to discharge planning if patient needs post-hospital services based on physician order or complex needs related to functional status, cognitive ability or social support system     Problem: Safety - Adult  Goal: Free from fall injury  9/19/2024 1047 by Nara Fletcher RN  Outcome: Progressing  9/18/2024 2152 by Stella Aragon RN  Outcome: Progressing  Flowsheets (Taken 9/18/2024 2152)  Free From Fall Injury: Instruct family/caregiver on patient safety     Problem: ABCDS Injury Assessment  Goal: Absence of physical injury  9/19/2024 1047 by Nara Fletcher, RN  Outcome: Progressing  9/18/2024 2152 by Stella Aragon RN  Outcome: Progressing  Flowsheets (Taken 9/18/2024 2152)  Absence of Physical Injury: Implement safety measures based on patient assessment     Problem: Pain  Goal: Verbalizes/displays adequate comfort level or baseline comfort level  9/19/2024 1047 by Nara Fletcher, RN  Outcome:  ordered   Monitor response to interventions for patient's volume status, including labs, urine output, blood pressure (other measures as available)   Encourage oral intake as appropriate  Goal: Glucose maintained within prescribed range  9/19/2024 1047 by Nara Fletcher, RN  Outcome: Progressing  Flowsheets (Taken 9/19/2024 0900)  Glucose maintained within prescribed range: Monitor blood glucose as ordered  9/18/2024 2152 by Stella Aragon, RN  Outcome: Progressing  Flowsheets (Taken 9/18/2024 2152)  Glucose maintained within prescribed range:   Monitor blood glucose as ordered   Assess for signs and symptoms of hyperglycemia and hypoglycemia   Instruct patient on self management of diabetes and initiate consult as needed    Care plan reviewed with patient.  Patient verbalize understanding of the plan of care and contribute to goal setting.

## 2024-09-20 PROBLEM — R53.81 PHYSICAL DECONDITIONING: Status: ACTIVE | Noted: 2024-09-20

## 2024-09-20 LAB
ANION GAP SERPL CALC-SCNC: 10 MEQ/L (ref 8–16)
BUN SERPL-MCNC: 11 MG/DL (ref 7–22)
CALCIUM SERPL-MCNC: 8.8 MG/DL (ref 8.5–10.5)
CHLORIDE SERPL-SCNC: 104 MEQ/L (ref 98–111)
CO2 SERPL-SCNC: 25 MEQ/L (ref 23–33)
CREAT SERPL-MCNC: 0.5 MG/DL (ref 0.4–1.2)
DEPRECATED RDW RBC AUTO: 49.3 FL (ref 35–45)
ERYTHROCYTE [DISTWIDTH] IN BLOOD BY AUTOMATED COUNT: 14.6 % (ref 11.5–14.5)
GFR SERPL CREATININE-BSD FRML MDRD: > 90 ML/MIN/1.73M2
GLUCOSE BLD STRIP.AUTO-MCNC: 195 MG/DL (ref 70–108)
GLUCOSE BLD STRIP.AUTO-MCNC: 197 MG/DL (ref 70–108)
GLUCOSE BLD STRIP.AUTO-MCNC: 207 MG/DL (ref 70–108)
GLUCOSE BLD STRIP.AUTO-MCNC: 259 MG/DL (ref 70–108)
GLUCOSE SERPL-MCNC: 114 MG/DL (ref 70–108)
HCT VFR BLD AUTO: 31.2 % (ref 42–52)
HGB BLD-MCNC: 9.8 GM/DL (ref 14–18)
INR PPP: 1.29 (ref 0.85–1.13)
MCH RBC QN AUTO: 29.2 PG (ref 26–33)
MCHC RBC AUTO-ENTMCNC: 31.4 GM/DL (ref 32.2–35.5)
MCV RBC AUTO: 92.9 FL (ref 80–94)
PLATELET # BLD AUTO: 169 THOU/MM3 (ref 130–400)
PMV BLD AUTO: 12.3 FL (ref 9.4–12.4)
POTASSIUM SERPL-SCNC: 3.9 MEQ/L (ref 3.5–5.2)
RBC # BLD AUTO: 3.36 MILL/MM3 (ref 4.7–6.1)
SODIUM SERPL-SCNC: 139 MEQ/L (ref 135–145)
WBC # BLD AUTO: 5.9 THOU/MM3 (ref 4.8–10.8)

## 2024-09-20 PROCEDURE — 99232 SBSQ HOSP IP/OBS MODERATE 35: CPT | Performed by: STUDENT IN AN ORGANIZED HEALTH CARE EDUCATION/TRAINING PROGRAM

## 2024-09-20 PROCEDURE — 97110 THERAPEUTIC EXERCISES: CPT

## 2024-09-20 PROCEDURE — 6370000000 HC RX 637 (ALT 250 FOR IP)

## 2024-09-20 PROCEDURE — 2580000003 HC RX 258: Performed by: PHYSICIAN ASSISTANT

## 2024-09-20 PROCEDURE — 85027 COMPLETE CBC AUTOMATED: CPT

## 2024-09-20 PROCEDURE — 85610 PROTHROMBIN TIME: CPT

## 2024-09-20 PROCEDURE — 97116 GAIT TRAINING THERAPY: CPT

## 2024-09-20 PROCEDURE — 6370000000 HC RX 637 (ALT 250 FOR IP): Performed by: NURSE PRACTITIONER

## 2024-09-20 PROCEDURE — 82948 REAGENT STRIP/BLOOD GLUCOSE: CPT

## 2024-09-20 PROCEDURE — 1200000003 HC TELEMETRY R&B

## 2024-09-20 PROCEDURE — 6370000000 HC RX 637 (ALT 250 FOR IP): Performed by: THORACIC SURGERY (CARDIOTHORACIC VASCULAR SURGERY)

## 2024-09-20 PROCEDURE — 6360000002 HC RX W HCPCS: Performed by: PHYSICIAN ASSISTANT

## 2024-09-20 PROCEDURE — 80048 BASIC METABOLIC PNL TOTAL CA: CPT

## 2024-09-20 PROCEDURE — 97535 SELF CARE MNGMENT TRAINING: CPT

## 2024-09-20 PROCEDURE — 36415 COLL VENOUS BLD VENIPUNCTURE: CPT

## 2024-09-20 RX ORDER — METOPROLOL SUCCINATE 25 MG/1
25 TABLET, EXTENDED RELEASE ORAL DAILY
Status: DISCONTINUED | OUTPATIENT
Start: 2024-09-21 | End: 2024-09-24 | Stop reason: HOSPADM

## 2024-09-20 RX ORDER — METOPROLOL SUCCINATE 25 MG/1
12.5 TABLET, EXTENDED RELEASE ORAL ONCE
Status: COMPLETED | OUTPATIENT
Start: 2024-09-20 | End: 2024-09-20

## 2024-09-20 RX ORDER — WARFARIN SODIUM 5 MG/1
5 TABLET ORAL
Status: COMPLETED | OUTPATIENT
Start: 2024-09-20 | End: 2024-09-20

## 2024-09-20 RX ADMIN — TICAGRELOR 90 MG: 90 TABLET ORAL at 08:58

## 2024-09-20 RX ADMIN — ATORVASTATIN CALCIUM 40 MG: 40 TABLET, FILM COATED ORAL at 08:56

## 2024-09-20 RX ADMIN — AMLODIPINE BESYLATE 5 MG: 5 TABLET ORAL at 08:53

## 2024-09-20 RX ADMIN — Medication 6 MG: at 22:16

## 2024-09-20 RX ADMIN — INSULIN LISPRO 1 UNITS: 100 INJECTION, SOLUTION INTRAVENOUS; SUBCUTANEOUS at 17:27

## 2024-09-20 RX ADMIN — TICAGRELOR 90 MG: 90 TABLET ORAL at 19:40

## 2024-09-20 RX ADMIN — TAMSULOSIN HYDROCHLORIDE 0.4 MG: 0.4 CAPSULE ORAL at 08:57

## 2024-09-20 RX ADMIN — ASPIRIN 81 MG: 81 TABLET, COATED ORAL at 08:56

## 2024-09-20 RX ADMIN — INSULIN LISPRO 2 UNITS: 100 INJECTION, SOLUTION INTRAVENOUS; SUBCUTANEOUS at 13:00

## 2024-09-20 RX ADMIN — AMLODIPINE BESYLATE 5 MG: 5 TABLET ORAL at 19:40

## 2024-09-20 RX ADMIN — WARFARIN SODIUM 5 MG: 5 TABLET ORAL at 17:28

## 2024-09-20 RX ADMIN — LOSARTAN POTASSIUM 50 MG: 25 TABLET, FILM COATED ORAL at 08:57

## 2024-09-20 RX ADMIN — SPIRONOLACTONE 25 MG: 25 TABLET ORAL at 08:57

## 2024-09-20 RX ADMIN — SODIUM CHLORIDE, PRESERVATIVE FREE 10 ML: 5 INJECTION INTRAVENOUS at 09:02

## 2024-09-20 RX ADMIN — FINASTERIDE 5 MG: 5 TABLET, FILM COATED ORAL at 08:56

## 2024-09-20 RX ADMIN — ENOXAPARIN SODIUM 40 MG: 100 INJECTION SUBCUTANEOUS at 08:56

## 2024-09-20 RX ADMIN — METOPROLOL SUCCINATE 12.5 MG: 25 TABLET, EXTENDED RELEASE ORAL at 08:57

## 2024-09-20 RX ADMIN — SODIUM CHLORIDE, PRESERVATIVE FREE 10 ML: 5 INJECTION INTRAVENOUS at 19:40

## 2024-09-20 RX ADMIN — METOPROLOL SUCCINATE 12.5 MG: 25 TABLET, FILM COATED, EXTENDED RELEASE ORAL at 13:33

## 2024-09-20 ASSESSMENT — PAIN SCALES - GENERAL
PAINLEVEL_OUTOF10: 0

## 2024-09-20 NOTE — CARE COORDINATION
9/20/24, 10:37 AM EDT    DISCHARGE ON GOING EVALUATION    Raymond Hummel       [facility] day: 8  Location: [facility]03/003-A Reason for admit: Aortic stenosis, severe [I35.0]     Procedures:   9/13 TAVR  9/17 PPM  9/17 TVP Removal  9/18 PPM Interrogation  9/18 MOCA Cognition Score 18/30  Barriers to Discharge:   From ICU (ventilator there)  Aortic Stenosis/TAVR Leak  History: TAVR 8/15, DM, PCI  INR 1.29; monitor  Await IPR Precert (day 2)  Client w 8A Transfer: Hand-Off given to SP Nur CM  PCP: Duran Dukes MD  Readmission Risk Score: 14.1  Patient Goals/Plan/Treatment Preferences: from home w spouse Gela, current CHF Clinic, new Coumadin Clinic, confused at times, prefers new IPR (precert); ambulates 40 feet w therapy, backup plan HH (nsg, PT/OT/ST, aide)

## 2024-09-20 NOTE — PROGRESS NOTES
09/20/24 0956   Encounter Summary   Encounter Overview/Reason Spiritual/Emotional Needs   Service Provided For Patient   Referral/Consult From Rounding   Support System Spouse;Family members   Last Encounter  09/20/24   Complexity of Encounter Low   Begin Time 0947   End Time  0956   Total Time Calculated 9 min   Spiritual/Emotional needs   Type Spiritual Support   Assessment/Intervention/Outcome   Assessment Coping   Intervention Nurtured Hope;Prayer (assurance of)/[place];Sustaining Presence/Ministry of presence   Outcome Comfort     Assessment:  In my encounter with the 84 yr old patient, while rounding the unit 4K,  I provided spiritual care to patient through conversation, I also came to assess the patient's spiritual needs present. The pt was admitted due to aortic stenosis.     Interventions:  I provided prayer, emotional support and words of comfort.  provided a listening presence and encouraged pt to share their beliefs and how I can support them during their hospitalization.     Outcomes:  The patient was encouraged and didn't share any further spiritual needs at this time.     Plan:  Chaplains will follow-up at a later time for assessment of any spiritual care needs present.

## 2024-09-20 NOTE — PROGRESS NOTES
HR 70's at rest with still some higher rates to ~ 120 with activity. Overall better controlled with restart of Toprol XL 12.5 mg daily. Will give additional 12.5 mg Toprol XL now and then begin daily dose of 25 mg starting tomorrow. Will continue to monitor.   Lavern Noonan, APRN - CNP

## 2024-09-20 NOTE — PROGRESS NOTES
[facility]  INPATIENT PHYSICAL THERAPY  DAILY NOTE  STRZ ICU STEPDOWN TELEMETRY 4K - 4K-03/003-A      Discharge Recommendations: Inpatient Rehabilitation  Equipment Recommendations:Other: cont to assess needs      Time In: 738  Time Out: 807  Timed Code Treatment Minutes: 29 Minutes  Minutes: 29          Date: 2024  Patient Name: Raymond Hummel,  Gender:  male        MRN: [id number]  : 1940  (84 y.o.)     Referring Practitioner: NICOLETTE Huang CNP  Diagnosis: aortic stenosis, severe  Additional Pertinent Hx: 84-year-old male with PMH notable for CAD s/p PCI LAD x 1, aortic stenosis s/p TAVR, HLD, diverticulosis, NIDDM 2, HLD, LVH, BPH.  Patient previously underwent TAVR procedure with Dr. Arguello and Dr. Giang on 8/15 in order to rectify severe aortic stenosis.  Patient had previous TTE demonstrating severe aortic stenosis with LVH, which necessitated valve replacement.  TAVR was done with Zapata Resilia prosthesis by Dr. Arguello and Dr. Giang via transfemoral approach, completed successfully.      Recent TTE showed perivalvular leak around AV prosthesis, will necessitate replacement via repeat TAVR, will be done by Dr. Arguello on . s/p TAVR 24. s/p pacemaker placement 24.     Prior Level of Function:  Lives With: Spouse  Type of Home: Condo  Home Layout: One level  Home Access: Stairs to enter with rails (8)   Bathroom Shower/Tub: Walk-in shower  Bathroom Toilet: Handicap height  Bathroom Equipment: Grab bars in shower    ADL Assistance: Independent  Homemaking Responsibilities: No  Ambulation Assistance: Independent  Transfer Assistance: Independent  Active : Yes (only short distances)  Additional Comments: Pt reports fully indep PLOF.    Restrictions/Precautions:  Restrictions/Precautions: Fall Risk, Surgical Protocols  Position Activity Restriction  Other position/activity restrictions: s/p pacemaker on  Do not elevate affected arm above shoulder for 30 days, Instruct  Recommendations: Strengthening, Balance training, Endurance training, Functional mobility training, Transfer training, Gait training, Stair training, Home exercise program, Equipment evaluation, education, & procurement, Patient/Caregiver education & training, Therapeutic activities, Safety education & training  General Plan:  (5X GM)    Education:  Learners: Patient  Patient Education: Plan of Care, Precautions/Restrictions, Education Related to Diagnosis, Transfers, Gait, Verbal Exercise Instruction, d/c planning, IPR    Goals:  Patient Goals : not stated  Short Term Goals  Time Frame for Short Term Goals: by discharge  Short Term Goal 1: bed mobility with CGA to get in/out of bed  Short Term Goal 2: transfer with SBA to get in/out of chairs  Short Term Goal 3: amb 25'x1 without AD and CGA to walk safely in room  Short Term Goal 4: negotiate flight of steps with HR and CGA to enter condo safely  Long Term Goals  Time Frame for Long Term Goals : no LTGs set secondary to short ELOS    Following session, patient left in safe position with all fall risk precautions in place.

## 2024-09-20 NOTE — PROGRESS NOTES
[facility]  STRZ ICU STEPDOWN TELEMETRY 4K  Occupational Therapy  Daily Note    Discharge Recommendations: Continue to assess pending progress and Inpatient Rehabilitation Pt may benefit from IP Rehab to address higher level ADLs and IADLs.   Equipment Recommendations: Equipment Needed: Yes  Other: monitor need for RW & shower chair    Time In: 1108  Time Out: 1125  Timed Code Treatment Minutes: 17 Minutes  Minutes: 17          Date: 2024  Patient Name: Raymond Hummel,   Gender: male      Room: [facility]  MRN: [id number]  : 1940  (84 y.o.)  Referring Practitioner: Liudmila Huang, JEAN - CNP  Diagnosis: aortic stenosis severe  Additional Pertinent Hx: 84-year-old male with PMH notable for CAD s/p PCI LAD x 1, aortic stenosis s/p TAVR, HLD, diverticulosis, NIDDM 2, HLD, LVH, BPH.  Patient previously underwent TAVR procedure with Dr. Arguello and Dr. Giang on 8/15 in order to rectify severe aortic stenosis.  Patient had previous TTE demonstrating severe aortic stenosis with LVH, which necessitated valve replacement.  TAVR was done with Zapata Resilia prosthesis by Dr. Arguello and Dr. Giang via transfemoral approach, completed successfully.      Recent TTE showed perivalvular leak around AV prosthesis, will necessitate replacement via repeat TAVR, will be done by Dr. Arguello on . s/p TAVR 24. s/p pacemaker placement 24.    Restrictions/Precautions:  Restrictions/Precautions: Fall Risk, Surgical Protocols  Position Activity Restriction  Other position/activity restrictions: s/p pacemaker on  Do not elevate affected arm above shoulder for 30 days, Instruct patient to keep wound clean and dry and no showering for one week     Social/Functional History:  Lives With: Spouse  Type of Home: Condo  Home Layout: One level  Home Access: Stairs to enter with rails (8)   Bathroom Shower/Tub: Walk-in shower  Bathroom Toilet: Handicap height  Bathroom Equipment: Grab bars in shower       ADL

## 2024-09-20 NOTE — PROGRESS NOTES
Warfarin Pharmacy Consult Note    Warfarin Indication: Bioprosthetic aortic valve - davina TAVR  Target INR: 2.0-3.0  Dose prior to admission: new start    Recent Labs     09/18/24  0406 09/19/24  0539 09/20/24  0407   HGB 8.6* 9.2* 9.8*    143 169     Recent Labs     09/19/24  0539 09/20/24  0407   INR 1.23* 1.29*     Concurrent anticoagulants/antiplatelets: aspirin, Brilinta, enoxaparin  Significant warfarin drug-drug interactions: n/a     Date INR Warfarin Dose   9/18/24 - 5 mg   9/19/24   1.23  3 mg     9/20/24  1.29 5 mg                              Monitoring:                   INR will be monitored daily until therapeutic INR is achieved.    **Please contact pharmacy for discharge instructions when indicated**    Rmaya Tran, PharmD  9/20/2024 at 3:32 PM

## 2024-09-20 NOTE — PROGRESS NOTES
Hospitalist Progress Note  Internal Medicine Resident      Patient: Raymond Hummel 84 y.o. male      Unit/Bed: -03/003-A    Admit Date: 9/12/2024      ASSESSMENT AND PLAN  Active Problems  Perivalvular leak around Zapata Resilia prosthesis, resolved.  Repeat flare TAVR which caused AV node blockade, leading to TVP placement.  Patient completely became pacer dependent.  Biventricular pacemaker placed on 9/17. On ASA, brilinta, and coumadin. INR still subtherapeutic Stop ASA when INR >2.0.   Physical deconditioning: awaiting precert for IPR. Continuing to work with PT/OT.   Third degree AV cleo block: resolved. Noted after TAVR.  S/p ppm 9/17.   History of aortic stenosis s/p TAVR 8/15/24: Patient follows with Dr. Arguello.  Previously TTE June 2024 showed severe aortic stenosis with AV mean gradient of 27 mmHg.  On 8/15 TAVR replacement with Zapata Resilia prosthesis was performed by Dr. Arguello.  HOCM: JARAD on 9/9 showed ejection fraction of 65.  Severely increased wall thickness of LV.  Hyperbilirubinemia secondary to hemolysis due to perivalvular leak, improved: Was noted to have jaundice that improved. hemoglobin stable  CAD: History of CAD s/p PCI x 1 in the LAD.  LHC showed 50 stenosis of RCA, 70% stenosis of OM 2. On Lipitor 40mg, aspirin, brilinta at home. Stopping ASA when INR >/= 2.0  HTN: on Toprol at home. Increase dose to 25 mg per cardiology.   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  Held while IP.  POCT ACHS, LDSSI.  Hypoglycemia protocol in place.  BPH: History of BPH.  Reported gross hematuria recently. On Flomax. deferred cystoscopy. Outpatient outpatient urology follow-up.  Systolic heart failure, improved: Diagnosed in June 2024, had first TAVR procedure. follows Dr. Arguello. On Aldactone, losartan. Would benefit from SGLT2 given diabetes and HF.     LDA: []CVC / []PICC / []Midline / []Beatty / []Drains / []Mediport / [x]None  Antibiotics: none  Steroids: none  Labs (still needed?): []Yes / [x]No  IVF

## 2024-09-20 NOTE — PLAN OF CARE
oxygenation: Assess for changes in respiratory status     Problem: Cardiovascular - Adult  Goal: Maintains optimal cardiac output and hemodynamic stability  Outcome: Progressing  Flowsheets (Taken 9/19/2024 0900 by Nara Fletcher RN)  Maintains optimal cardiac output and hemodynamic stability: Monitor blood pressure and heart rate  Goal: Absence of cardiac dysrhythmias or at baseline  Outcome: Progressing  Flowsheets (Taken 9/19/2024 0900 by Nara Fletcher RN)  Absence of cardiac dysrhythmias or at baseline: Monitor cardiac rate and rhythm     Problem: Skin/Tissue Integrity - Adult  Goal: Skin integrity remains intact  Outcome: Progressing  Flowsheets (Taken 9/19/2024 0900 by Nara Fletcher RN)  Skin Integrity Remains Intact: Monitor for areas of redness and/or skin breakdown  Goal: Incisions, wounds, or drain sites healing without S/S of infection  Outcome: Progressing  Flowsheets (Taken 9/19/2024 0900 by Nara Fletcher RN)  Incisions, Wounds, or Drain Sites Healing Without Sign and Symptoms of Infection: ADMISSION and DAILY: Assess and document risk factors for pressure ulcer development  Goal: Oral mucous membranes remain intact  Outcome: Progressing  Flowsheets (Taken 9/19/2024 0900 by Nara Fletcher RN)  Oral Mucous Membranes Remain Intact: Assess oral mucosa and hygiene practices     Problem: Metabolic/Fluid and Electrolytes - Adult  Goal: Electrolytes maintained within normal limits  Outcome: Progressing  Flowsheets (Taken 9/19/2024 0900 by Nara Fletcher RN)  Electrolytes maintained within normal limits: Monitor labs and assess patient for signs and symptoms of electrolyte imbalances  Goal: Hemodynamic stability and optimal renal function maintained  Outcome: Progressing  Flowsheets (Taken 9/19/2024 0900 by Nara Fletcher RN)  Hemodynamic stability and optimal renal function maintained: Monitor labs and assess for signs and symptoms of volume excess or deficit  Goal:

## 2024-09-20 NOTE — PROGRESS NOTES
[facility]  OCCUPATIONAL THERAPY MISSED TREATMENT NOTE  STRZ ICU STEPDOWN TELEMETRY 4K  4K-[id number]-A      Date: 2024  Patient Name: Raymond Hummel        CSN: [id number]   : 1940  (84 y.o.)  Gender: male   Referring Practitioner: Liudmila Huang APRN - CNP  Diagnosis: aortic stenosis severe         REASON FOR MISSED TREATMENT: Patient Unavailable Pt working with physical therapy at 7:47A.

## 2024-09-20 NOTE — PLAN OF CARE
Care plan reviewed with patient and patient and verbalize understanding of the plan of care and contribute to goal setting.     Problem: Discharge Planning  Goal: Discharge to home or other facility with appropriate resources  9/20/2024 1146 by Zay Fish, RN  Outcome: Not Progressing  9/20/2024 1139 by Nara Vasquez, RN  Outcome: Progressing  Flowsheets (Taken 9/19/2024 0900 by Nara Fletcher RN)  Discharge to home or other facility with appropriate resources:   Identify barriers to discharge with patient and caregiver   Arrange for needed discharge resources and transportation as appropriate   Identify discharge learning needs (meds, wound care, etc)   Refer to discharge planning if patient needs post-hospital services based on physician order or complex needs related to functional status, cognitive ability or social support system

## 2024-09-20 NOTE — PROGRESS NOTES
0798 Report taken from nurse Abdalla.    Head to Toe Assessment    [facility] Student Nurse  Head to Toe Assessment Performed at 0830  Skin  General skin appearance / Description: cool, dry, intact  Incisions / Wounds: Left groin incision,Left upper chest pacemaker incision    Neuro/Head  LOC: A+O x 4  Speech: clear  Eyes PERRLA 3 mm  Symmetry of face / tongue: yes  JVD of neck: no    Chest  Heart sounds / Apical Pulse: regular  Dyspnea: no  Lung sounds: clear  Cough: no    Abdomen/ Pelvis  Bowel sounds: active  Passing flatus: yes  Palpation / Inspection: soft, round, non tender  Last BM: 9/19  Stool assessment: david  Any GI issues: no  Urinary issues: no  Continence: yes  Urine color / turbidity: straw    Extremities  Edema: yes, non pitting above ankles  Altered Sensation: no  Upper extremity push / pulls: moderate, moderate  Left Arm Radial Pulse: +3   Left Upper extremity Capillary Refill: < 3sec  Right Arm Radial Pulse: +3   Right Upper extremity Capillary Refill:3 < sec  Lower extremity pedal push / pulls: moderate,moderate  Left pedal pulse: +1   Left posterior tibialis pulse: +1   Left lower extremity capillary refill: < 3 sec  Right pedal pulse: +1   Right posterior tibialis pulse: +1   Right lower extremity capillary refill: < 3 sec  Drift of extremities: no   Pain: 0    Other issues: none    1100 Reassessment done  1427 Reported off to primary RNCastillo    1437 Signature Awilda Roa  CATHY / SN

## 2024-09-21 LAB
GLUCOSE BLD STRIP.AUTO-MCNC: 165 MG/DL (ref 70–108)
GLUCOSE BLD STRIP.AUTO-MCNC: 174 MG/DL (ref 70–108)
GLUCOSE BLD STRIP.AUTO-MCNC: 180 MG/DL (ref 70–108)
GLUCOSE BLD STRIP.AUTO-MCNC: 204 MG/DL (ref 70–108)
INR PPP: 1.38 (ref 0.85–1.13)

## 2024-09-21 PROCEDURE — 1200000003 HC TELEMETRY R&B

## 2024-09-21 PROCEDURE — 6370000000 HC RX 637 (ALT 250 FOR IP)

## 2024-09-21 PROCEDURE — 36415 COLL VENOUS BLD VENIPUNCTURE: CPT

## 2024-09-21 PROCEDURE — 2580000003 HC RX 258: Performed by: PHYSICIAN ASSISTANT

## 2024-09-21 PROCEDURE — 99232 SBSQ HOSP IP/OBS MODERATE 35: CPT | Performed by: STUDENT IN AN ORGANIZED HEALTH CARE EDUCATION/TRAINING PROGRAM

## 2024-09-21 PROCEDURE — 6370000000 HC RX 637 (ALT 250 FOR IP): Performed by: NURSE PRACTITIONER

## 2024-09-21 PROCEDURE — 82948 REAGENT STRIP/BLOOD GLUCOSE: CPT

## 2024-09-21 PROCEDURE — 6360000002 HC RX W HCPCS: Performed by: PHYSICIAN ASSISTANT

## 2024-09-21 PROCEDURE — 85610 PROTHROMBIN TIME: CPT

## 2024-09-21 PROCEDURE — 6370000000 HC RX 637 (ALT 250 FOR IP): Performed by: THORACIC SURGERY (CARDIOTHORACIC VASCULAR SURGERY)

## 2024-09-21 RX ORDER — WARFARIN SODIUM 7.5 MG/1
7.5 TABLET ORAL
Status: COMPLETED | OUTPATIENT
Start: 2024-09-21 | End: 2024-09-21

## 2024-09-21 RX ADMIN — LOSARTAN POTASSIUM 50 MG: 25 TABLET, FILM COATED ORAL at 09:41

## 2024-09-21 RX ADMIN — ENOXAPARIN SODIUM 40 MG: 100 INJECTION SUBCUTANEOUS at 09:48

## 2024-09-21 RX ADMIN — FINASTERIDE 5 MG: 5 TABLET, FILM COATED ORAL at 09:42

## 2024-09-21 RX ADMIN — WARFARIN SODIUM 7.5 MG: 7.5 TABLET ORAL at 18:39

## 2024-09-21 RX ADMIN — SODIUM CHLORIDE, PRESERVATIVE FREE 10 ML: 5 INJECTION INTRAVENOUS at 20:35

## 2024-09-21 RX ADMIN — INSULIN LISPRO 1 UNITS: 100 INJECTION, SOLUTION INTRAVENOUS; SUBCUTANEOUS at 08:17

## 2024-09-21 RX ADMIN — ATORVASTATIN CALCIUM 40 MG: 40 TABLET, FILM COATED ORAL at 09:43

## 2024-09-21 RX ADMIN — SODIUM CHLORIDE, PRESERVATIVE FREE 10 ML: 5 INJECTION INTRAVENOUS at 10:26

## 2024-09-21 RX ADMIN — ASPIRIN 81 MG: 81 TABLET, COATED ORAL at 09:45

## 2024-09-21 RX ADMIN — AMLODIPINE BESYLATE 5 MG: 5 TABLET ORAL at 09:46

## 2024-09-21 RX ADMIN — SPIRONOLACTONE 25 MG: 25 TABLET ORAL at 09:40

## 2024-09-21 RX ADMIN — AMLODIPINE BESYLATE 5 MG: 5 TABLET ORAL at 20:35

## 2024-09-21 RX ADMIN — Medication 6 MG: at 20:35

## 2024-09-21 RX ADMIN — METOPROLOL SUCCINATE 25 MG: 25 TABLET, FILM COATED, EXTENDED RELEASE ORAL at 09:36

## 2024-09-21 RX ADMIN — POLYETHYLENE GLYCOL 3350 17 G: 17 POWDER, FOR SOLUTION ORAL at 09:47

## 2024-09-21 RX ADMIN — TICAGRELOR 90 MG: 90 TABLET ORAL at 09:37

## 2024-09-21 RX ADMIN — TICAGRELOR 90 MG: 90 TABLET ORAL at 20:35

## 2024-09-21 RX ADMIN — TAMSULOSIN HYDROCHLORIDE 0.4 MG: 0.4 CAPSULE ORAL at 09:39

## 2024-09-21 ASSESSMENT — PAIN SCALES - GENERAL
PAINLEVEL_OUTOF10: 0

## 2024-09-21 NOTE — PROGRESS NOTES
Patient is resting comfortably in chair watching TV. Call light within reach.  SN Frandy/SIGIFREDO.

## 2024-09-21 NOTE — PLAN OF CARE
Attempted to call for peer to peer call per case management note.  Office closed today. attempt tomorrow, may be closed until Monday

## 2024-09-21 NOTE — PROGRESS NOTES
Hospitalist Progress Note  Internal Medicine Resident      Patient: Raymond Hummel 84 y.o. male      Unit/Bed: -03/003-A    Admit Date: 9/12/2024      ASSESSMENT AND PLAN  Active Problems  Perivalvular leak around Zapata Resilia prosthesis, resolved.  Repeat flare TAVR which caused AV node blockade, leading to TVP placement.  Patient completely became pacer dependent.  Biventricular pacemaker placed on 9/17. On ASA, brilinta, and coumadin. INR still subtherapeutic Stop ASA when INR >2.0.   Physical deconditioning: awaiting precert for IPR. Continuing to work with PT/OT.   Third degree AV cleo block: resolved. Noted after TAVR.  S/p ppm 9/17.   History of aortic stenosis s/p TAVR 8/15/24: Patient follows with Dr. Arguello.  Previously TTE June 2024 showed severe aortic stenosis with AV mean gradient of 27 mmHg.  On 8/15 TAVR replacement with Zapata Resilia prosthesis was performed by Dr. Arguello.  HOCM: JARDA on 9/9 showed ejection fraction of 65.  Severely increased wall thickness of LV.  Hyperbilirubinemia secondary to hemolysis due to perivalvular leak, improved: Was noted to have jaundice that improved. hemoglobin stable  CAD: History of CAD s/p PCI x 1 in the LAD.  LHC showed 50 stenosis of RCA, 70% stenosis of OM 2. On Lipitor 40mg, aspirin, brilinta at home. Stopping ASA when INR >/= 2.0  HTN: on Toprol at home. Increase dose to 25 mg per cardiology.   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  Held while IP.  POCT ACHS, LDSSI.  Hypoglycemia protocol in place.  BPH: History of BPH.  Reported gross hematuria recently. On Flomax. deferred cystoscopy. Outpatient outpatient urology follow-up.  Systolic heart failure, improved: Diagnosed in June 2024, had first TAVR procedure. follows Dr. Arguello. On Aldactone, losartan. Would benefit from SGLT2 given diabetes and HF.     LDA: []CVC / []PICC / []Midline / []Beatty / []Drains / []Mediport / [x]None  Antibiotics: none  Steroids: none  Labs (still needed?): []Yes / [x]No  IVF

## 2024-09-21 NOTE — PROGRESS NOTES
Warfarin Pharmacy Consult Note    Warfarin Indication: Bioprosthetic aortic valve - davina TAVR  Target INR: 2.0-3.0  Dose prior to admission: new start    Recent Labs     09/19/24  0539 09/20/24  0407   HGB 9.2* 9.8*    169     Recent Labs     09/19/24  0539 09/20/24  0407 09/21/24  0354   INR 1.23* 1.29* 1.38*     Concurrent anticoagulants/antiplatelets: aspirin, Brilinta, enoxaparin  Significant warfarin drug-drug interactions: n/a     Date INR Warfarin Dose   9/18/24 - 5 mg   9/19/24   1.23  3 mg     9/20/24  1.29 5 mg     9/21/24  1.38  7.5 mg                     Monitoring:                   INR will be monitored daily until therapeutic INR is achieved.    **Please contact pharmacy for discharge instructions when indicated**    Ramya Tran, PharmD  9/21/2024 at 3:52 PM

## 2024-09-21 NOTE — PLAN OF CARE
Problem: Discharge Planning  Goal: Discharge to home or other facility with appropriate resources  9/20/2024 2205 by Stella Aragon RN  Outcome: Progressing  Flowsheets (Taken 9/20/2024 2205)  Discharge to home or other facility with appropriate resources:   Identify barriers to discharge with patient and caregiver   Arrange for needed discharge resources and transportation as appropriate   Identify discharge learning needs (meds, wound care, etc)   Refer to discharge planning if patient needs post-hospital services based on physician order or complex needs related to functional status, cognitive ability or social support system     Problem: Safety - Adult  Goal: Free from fall injury  9/20/2024 2205 by Stella Aragon RN  Outcome: Progressing  Flowsheets (Taken 9/20/2024 2205)  Free From Fall Injury: Instruct family/caregiver on patient safety     Problem: ABCDS Injury Assessment  Goal: Absence of physical injury  9/20/2024 2205 by Stella Aragon RN  Outcome: Progressing  Flowsheets (Taken 9/20/2024 2205)  Absence of Physical Injury: Implement safety measures based on patient assessment     Problem: Pain  Goal: Verbalizes/displays adequate comfort level or baseline comfort level  9/20/2024 2205 by Stella Aragon RN  Outcome: Progressing  Flowsheets (Taken 9/20/2024 2205)  Verbalizes/displays adequate comfort level or baseline comfort level:   Encourage patient to monitor pain and request assistance   Assess pain using appropriate pain scale   Administer analgesics based on type and severity of pain and evaluate response   Implement non-pharmacological measures as appropriate and evaluate response   Consider cultural and social influences on pain and pain management   Notify Licensed Independent Practitioner if interventions unsuccessful or patient reports new pain     Problem: Respiratory - Adult  Goal: Achieves optimal ventilation and oxygenation  9/20/2024 2205 by Stella Aragon RN  Outcome:  needed   Patient agreeable to plan of care.   Recovery room -> Home

## 2024-09-21 NOTE — CARE COORDINATION
CM Note  [id number]  1705 intent to deny IPR, P2P option available; please call [phone] w Subscriber Name: LEIGH ANN FRANKLIN Subscriber : 1940 Subscriber ID: [id number] Reference Number [id number]; messaged Attending  Electronically signed by Jovanny Cooney RN on 2024 at 7:16 AM

## 2024-09-22 LAB
ANION GAP SERPL CALC-SCNC: 12 MEQ/L (ref 8–16)
BUN SERPL-MCNC: 17 MG/DL (ref 7–22)
CALCIUM SERPL-MCNC: 8.7 MG/DL (ref 8.5–10.5)
CHLORIDE SERPL-SCNC: 105 MEQ/L (ref 98–111)
CO2 SERPL-SCNC: 24 MEQ/L (ref 23–33)
CREAT SERPL-MCNC: 0.7 MG/DL (ref 0.4–1.2)
DEPRECATED RDW RBC AUTO: 49.2 FL (ref 35–45)
ERYTHROCYTE [DISTWIDTH] IN BLOOD BY AUTOMATED COUNT: 14.6 % (ref 11.5–14.5)
GFR SERPL CREATININE-BSD FRML MDRD: > 90 ML/MIN/1.73M2
GLUCOSE BLD STRIP.AUTO-MCNC: 160 MG/DL (ref 70–108)
GLUCOSE BLD STRIP.AUTO-MCNC: 209 MG/DL (ref 70–108)
GLUCOSE BLD STRIP.AUTO-MCNC: 218 MG/DL (ref 70–108)
GLUCOSE BLD STRIP.AUTO-MCNC: 245 MG/DL (ref 70–108)
GLUCOSE SERPL-MCNC: 152 MG/DL (ref 70–108)
HCT VFR BLD AUTO: 29.2 % (ref 42–52)
HGB BLD-MCNC: 9.2 GM/DL (ref 14–18)
INR PPP: 1.69 (ref 0.85–1.13)
MCH RBC QN AUTO: 29.1 PG (ref 26–33)
MCHC RBC AUTO-ENTMCNC: 31.5 GM/DL (ref 32.2–35.5)
MCV RBC AUTO: 92.4 FL (ref 80–94)
PLATELET # BLD AUTO: 172 THOU/MM3 (ref 130–400)
PMV BLD AUTO: 11.3 FL (ref 9.4–12.4)
POTASSIUM SERPL-SCNC: 3.8 MEQ/L (ref 3.5–5.2)
RBC # BLD AUTO: 3.16 MILL/MM3 (ref 4.7–6.1)
SODIUM SERPL-SCNC: 141 MEQ/L (ref 135–145)
WBC # BLD AUTO: 5 THOU/MM3 (ref 4.8–10.8)

## 2024-09-22 PROCEDURE — 82948 REAGENT STRIP/BLOOD GLUCOSE: CPT

## 2024-09-22 PROCEDURE — 6370000000 HC RX 637 (ALT 250 FOR IP): Performed by: NURSE PRACTITIONER

## 2024-09-22 PROCEDURE — 6370000000 HC RX 637 (ALT 250 FOR IP)

## 2024-09-22 PROCEDURE — 36415 COLL VENOUS BLD VENIPUNCTURE: CPT

## 2024-09-22 PROCEDURE — 1200000003 HC TELEMETRY R&B

## 2024-09-22 PROCEDURE — 85027 COMPLETE CBC AUTOMATED: CPT

## 2024-09-22 PROCEDURE — 80048 BASIC METABOLIC PNL TOTAL CA: CPT

## 2024-09-22 PROCEDURE — 6360000002 HC RX W HCPCS: Performed by: PHYSICIAN ASSISTANT

## 2024-09-22 PROCEDURE — 6370000000 HC RX 637 (ALT 250 FOR IP): Performed by: THORACIC SURGERY (CARDIOTHORACIC VASCULAR SURGERY)

## 2024-09-22 PROCEDURE — 99232 SBSQ HOSP IP/OBS MODERATE 35: CPT | Performed by: STUDENT IN AN ORGANIZED HEALTH CARE EDUCATION/TRAINING PROGRAM

## 2024-09-22 PROCEDURE — 2580000003 HC RX 258: Performed by: PHYSICIAN ASSISTANT

## 2024-09-22 PROCEDURE — 85610 PROTHROMBIN TIME: CPT

## 2024-09-22 RX ORDER — WARFARIN SODIUM 3 MG/1
3 TABLET ORAL
Status: COMPLETED | OUTPATIENT
Start: 2024-09-22 | End: 2024-09-22

## 2024-09-22 RX ADMIN — Medication 6 MG: at 20:10

## 2024-09-22 RX ADMIN — INSULIN LISPRO 1 UNITS: 100 INJECTION, SOLUTION INTRAVENOUS; SUBCUTANEOUS at 08:50

## 2024-09-22 RX ADMIN — TICAGRELOR 90 MG: 90 TABLET ORAL at 20:10

## 2024-09-22 RX ADMIN — SODIUM CHLORIDE, PRESERVATIVE FREE 10 ML: 5 INJECTION INTRAVENOUS at 10:49

## 2024-09-22 RX ADMIN — SODIUM CHLORIDE, PRESERVATIVE FREE 10 ML: 5 INJECTION INTRAVENOUS at 20:10

## 2024-09-22 RX ADMIN — ASPIRIN 81 MG: 81 TABLET, COATED ORAL at 08:50

## 2024-09-22 RX ADMIN — ENOXAPARIN SODIUM 40 MG: 100 INJECTION SUBCUTANEOUS at 08:50

## 2024-09-22 RX ADMIN — WARFARIN SODIUM 3 MG: 3 TABLET ORAL at 18:01

## 2024-09-22 RX ADMIN — LOSARTAN POTASSIUM 50 MG: 25 TABLET, FILM COATED ORAL at 08:50

## 2024-09-22 RX ADMIN — AMLODIPINE BESYLATE 5 MG: 5 TABLET ORAL at 20:10

## 2024-09-22 RX ADMIN — ATORVASTATIN CALCIUM 40 MG: 40 TABLET, FILM COATED ORAL at 08:50

## 2024-09-22 RX ADMIN — TAMSULOSIN HYDROCHLORIDE 0.4 MG: 0.4 CAPSULE ORAL at 08:50

## 2024-09-22 RX ADMIN — AMLODIPINE BESYLATE 5 MG: 5 TABLET ORAL at 08:50

## 2024-09-22 RX ADMIN — INSULIN LISPRO 1 UNITS: 100 INJECTION, SOLUTION INTRAVENOUS; SUBCUTANEOUS at 18:01

## 2024-09-22 RX ADMIN — TICAGRELOR 90 MG: 90 TABLET ORAL at 08:50

## 2024-09-22 RX ADMIN — METOPROLOL SUCCINATE 25 MG: 25 TABLET, FILM COATED, EXTENDED RELEASE ORAL at 08:50

## 2024-09-22 RX ADMIN — FINASTERIDE 5 MG: 5 TABLET, FILM COATED ORAL at 08:51

## 2024-09-22 RX ADMIN — SPIRONOLACTONE 25 MG: 25 TABLET ORAL at 08:50

## 2024-09-22 ASSESSMENT — PAIN SCALES - GENERAL
PAINLEVEL_OUTOF10: 0
PAINLEVEL_OUTOF10: 0

## 2024-09-22 NOTE — PROGRESS NOTES
Warfarin Pharmacy Consult Note    Warfarin Indication: Bioprosthetic aortic valve - davina TAVR  Target INR: 2.0-3.0  Dose prior to admission: new start    Recent Labs     09/20/24  0407 09/22/24  0328   HGB 9.8* 9.2*    172     Recent Labs     09/20/24  0407 09/21/24  0354 09/22/24  0328   INR 1.29* 1.38* 1.69*     Concurrent anticoagulants/antiplatelets: aspirin, Brilinta, enoxaparin  Significant warfarin drug-drug interactions: n/a     Date INR Warfarin Dose   9/18/24 - 5 mg   9/19/24   1.23  3 mg     9/20/24  1.29 5 mg     9/21/24  1.38  7.5 mg    9/22/24   1.69  3 mg             Monitoring:                   INR will be monitored daily until therapeutic INR is achieved.    **Please contact pharmacy for discharge instructions when indicated**    Ramya Tran, PharmD  9/22/2024 at 12:39 PM

## 2024-09-22 NOTE — PROGRESS NOTES
Reinforce fall prevention education to patient, chair and bed alarm always on for safety, patient with grippy socks and fall risk wrist band and fall risk door signage. Patient has been getting up in the chair and bed despite of multiple reinforcement of calling the staff to help patient move around. Patient alert, moves steady but still encourage to call staff to help. Frequent visual check performed and reinforcement of fall prevention to the patient.

## 2024-09-23 LAB
GLUCOSE BLD STRIP.AUTO-MCNC: 171 MG/DL (ref 70–108)
GLUCOSE BLD STRIP.AUTO-MCNC: 201 MG/DL (ref 70–108)
GLUCOSE BLD STRIP.AUTO-MCNC: 209 MG/DL (ref 70–108)
GLUCOSE BLD STRIP.AUTO-MCNC: 243 MG/DL (ref 70–108)
INR PPP: 2.31 (ref 0.85–1.13)

## 2024-09-23 PROCEDURE — 6370000000 HC RX 637 (ALT 250 FOR IP)

## 2024-09-23 PROCEDURE — 6370000000 HC RX 637 (ALT 250 FOR IP): Performed by: NURSE PRACTITIONER

## 2024-09-23 PROCEDURE — 2580000003 HC RX 258: Performed by: PHYSICIAN ASSISTANT

## 2024-09-23 PROCEDURE — 36415 COLL VENOUS BLD VENIPUNCTURE: CPT

## 2024-09-23 PROCEDURE — 85610 PROTHROMBIN TIME: CPT

## 2024-09-23 PROCEDURE — 6370000000 HC RX 637 (ALT 250 FOR IP): Performed by: PHYSICIAN ASSISTANT

## 2024-09-23 PROCEDURE — 6360000002 HC RX W HCPCS: Performed by: PHYSICIAN ASSISTANT

## 2024-09-23 PROCEDURE — 99233 SBSQ HOSP IP/OBS HIGH 50: CPT | Performed by: STUDENT IN AN ORGANIZED HEALTH CARE EDUCATION/TRAINING PROGRAM

## 2024-09-23 PROCEDURE — 6370000000 HC RX 637 (ALT 250 FOR IP): Performed by: THORACIC SURGERY (CARDIOTHORACIC VASCULAR SURGERY)

## 2024-09-23 PROCEDURE — 6360000002 HC RX W HCPCS

## 2024-09-23 PROCEDURE — 1200000003 HC TELEMETRY R&B

## 2024-09-23 PROCEDURE — 82948 REAGENT STRIP/BLOOD GLUCOSE: CPT

## 2024-09-23 RX ORDER — FUROSEMIDE 10 MG/ML
40 INJECTION INTRAMUSCULAR; INTRAVENOUS ONCE
Status: COMPLETED | OUTPATIENT
Start: 2024-09-23 | End: 2024-09-23

## 2024-09-23 RX ORDER — WARFARIN SODIUM 3 MG/1
3 TABLET ORAL
Status: COMPLETED | OUTPATIENT
Start: 2024-09-23 | End: 2024-09-23

## 2024-09-23 RX ADMIN — POLYETHYLENE GLYCOL 3350 17 G: 17 POWDER, FOR SOLUTION ORAL at 09:32

## 2024-09-23 RX ADMIN — TRAMADOL HYDROCHLORIDE 50 MG: 50 TABLET ORAL at 04:23

## 2024-09-23 RX ADMIN — SODIUM CHLORIDE, PRESERVATIVE FREE 10 ML: 5 INJECTION INTRAVENOUS at 09:17

## 2024-09-23 RX ADMIN — INSULIN LISPRO 1 UNITS: 100 INJECTION, SOLUTION INTRAVENOUS; SUBCUTANEOUS at 13:01

## 2024-09-23 RX ADMIN — TRAMADOL HYDROCHLORIDE 50 MG: 50 TABLET ORAL at 19:50

## 2024-09-23 RX ADMIN — Medication 6 MG: at 21:14

## 2024-09-23 RX ADMIN — SODIUM CHLORIDE, PRESERVATIVE FREE 10 ML: 5 INJECTION INTRAVENOUS at 21:18

## 2024-09-23 RX ADMIN — INSULIN LISPRO 1 UNITS: 100 INJECTION, SOLUTION INTRAVENOUS; SUBCUTANEOUS at 09:36

## 2024-09-23 RX ADMIN — LOSARTAN POTASSIUM 50 MG: 25 TABLET, FILM COATED ORAL at 09:17

## 2024-09-23 RX ADMIN — TICAGRELOR 90 MG: 90 TABLET ORAL at 21:14

## 2024-09-23 RX ADMIN — WARFARIN SODIUM 3 MG: 3 TABLET ORAL at 18:09

## 2024-09-23 RX ADMIN — AMLODIPINE BESYLATE 5 MG: 5 TABLET ORAL at 09:16

## 2024-09-23 RX ADMIN — SODIUM CHLORIDE, PRESERVATIVE FREE 10 ML: 5 INJECTION INTRAVENOUS at 21:17

## 2024-09-23 RX ADMIN — METOPROLOL SUCCINATE 25 MG: 25 TABLET, FILM COATED, EXTENDED RELEASE ORAL at 09:17

## 2024-09-23 RX ADMIN — ATORVASTATIN CALCIUM 40 MG: 40 TABLET, FILM COATED ORAL at 09:17

## 2024-09-23 RX ADMIN — SENNOSIDES 8.6 MG: 8.6 TABLET, FILM COATED ORAL at 21:14

## 2024-09-23 RX ADMIN — ENOXAPARIN SODIUM 40 MG: 100 INJECTION SUBCUTANEOUS at 09:17

## 2024-09-23 RX ADMIN — TICAGRELOR 90 MG: 90 TABLET ORAL at 09:17

## 2024-09-23 RX ADMIN — FUROSEMIDE 40 MG: 10 INJECTION, SOLUTION INTRAMUSCULAR; INTRAVENOUS at 09:32

## 2024-09-23 RX ADMIN — AMLODIPINE BESYLATE 5 MG: 5 TABLET ORAL at 21:14

## 2024-09-23 RX ADMIN — SPIRONOLACTONE 25 MG: 25 TABLET ORAL at 09:17

## 2024-09-23 RX ADMIN — FINASTERIDE 5 MG: 5 TABLET, FILM COATED ORAL at 09:17

## 2024-09-23 RX ADMIN — TAMSULOSIN HYDROCHLORIDE 0.4 MG: 0.4 CAPSULE ORAL at 09:17

## 2024-09-23 RX ADMIN — ALPRAZOLAM 0.25 MG: 0.5 TABLET ORAL at 21:14

## 2024-09-23 ASSESSMENT — PAIN DESCRIPTION - ONSET: ONSET: ON-GOING

## 2024-09-23 ASSESSMENT — PAIN SCALES - WONG BAKER
WONGBAKER_NUMERICALRESPONSE: NO HURT

## 2024-09-23 ASSESSMENT — PAIN SCALES - GENERAL
PAINLEVEL_OUTOF10: 0
PAINLEVEL_OUTOF10: 4
PAINLEVEL_OUTOF10: 0
PAINLEVEL_OUTOF10: 4

## 2024-09-23 ASSESSMENT — PAIN DESCRIPTION - ORIENTATION: ORIENTATION: RIGHT

## 2024-09-23 ASSESSMENT — PAIN DESCRIPTION - FREQUENCY: FREQUENCY: INTERMITTENT

## 2024-09-23 ASSESSMENT — PAIN DESCRIPTION - LOCATION: LOCATION: FOOT

## 2024-09-23 ASSESSMENT — PAIN DESCRIPTION - PAIN TYPE: TYPE: ACUTE PAIN

## 2024-09-23 ASSESSMENT — PAIN DESCRIPTION - DESCRIPTORS: DESCRIPTORS: ACHING;DULL

## 2024-09-23 NOTE — MANAGEMENT PLAN
Asked to advise patient and family by multiple sources.  Discussion in room with Patient, wife, son Heriberto in person, and son Juan Carlos on the phone.  Explained my role and prior clinical background.  Talked with patient about family and care team concerns.  Talked with patient about my observations in the chart and probed about home environment and current functional status.      Patient very superficial in descriptions of home environment and current functional capacity.  Question if he truly is understanding the risks and benefits of his choices.  I did try and evaluate his current understanding of his functional status and home challenges and he does not demonstrate an ability to speak at that depth.      Reviewed his chart and noted are his deficits exhibited on the MOCA performed by  on 9/18.  Physically he has improved, though is still using a walker with SBA-CGA.  No updated therapy notes since Friday, as patient declined OT services today.    Patient adamant to go home.  Tried to talk with him about my recommendations and the need to reconcile with family on the plan.  Patient not receptive.  Did explain to family that patients who are competent have the right to make less than optimal decisions, though at this time I am not sure of competence.  I question his executive skills given our conversation and chart review.      Discussed with patient that given the concerns of competence he will not be able to discharge tonight.  Talked with therapy team and we will have PT/OT work with him between 8-10am tomorrow.  Talked with Dr. Waller, who has graciously agreed to perform a competency evaluation tomorrow, hopefully at 10am pending his availability.      Discussed with primary team, case management, family (both sons), and ethics consultant.  Team will work with patient tomorrow and plan of care to be determined thereafter.     Victor Manuel Brown MD

## 2024-09-23 NOTE — PROGRESS NOTES
Warfarin Pharmacy Consult Note    Warfarin Indication: Bioprosthetic aortic valve - davina TAVR  Target INR: 2.0-3.0  Dose prior to admission: new start    Recent Labs     09/22/24  0328   HGB 9.2*        Recent Labs     09/21/24  0354 09/22/24  0328 09/23/24  0324   INR 1.38* 1.69* 2.31*     Concurrent anticoagulants/antiplatelets: Brilinta, enoxaparin  Significant warfarin drug-drug interactions: n/a     Date INR Warfarin Dose   9/18/24 - 5 mg   9/19/24  1.23  3 mg     9/20/24 1.29 5 mg     9/21/24 1.38  7.5 mg    9/22/24 1.69  3 mg    9/23/24 2.31  3 mg      Monitoring:                   INR will be monitored daily until therapeutic INR is achieved.    **Please contact pharmacy for discharge instructions when indicated**    Ramya Tran, PharmD  9/23/2024 at 1:09 PM

## 2024-09-23 NOTE — PLAN OF CARE
Problem: Discharge Planning  Goal: Discharge to home or other facility with appropriate resources  9/23/2024 1016 by Amalia Tobar, RN  Outcome: Progressing  Flowsheets (Taken 9/23/2024 1016)  Discharge to home or other facility with appropriate resources: Identify barriers to discharge with patient and caregiver  9/23/2024 0052 by Jovanny Hinkle RN  Outcome: Progressing  Flowsheets (Taken 9/23/2024 0052)  Discharge to home or other facility with appropriate resources:   Identify barriers to discharge with patient and caregiver   Arrange for needed discharge resources and transportation as appropriate   Identify discharge learning needs (meds, wound care, etc)   Refer to discharge planning if patient needs post-hospital services based on physician order or complex needs related to functional status, cognitive ability or social support system     Problem: Safety - Adult  Goal: Free from fall injury  9/23/2024 1016 by Amalia Tobar RN  Outcome: Progressing  Flowsheets (Taken 9/23/2024 1016)  Free From Fall Injury: Instruct family/caregiver on patient safety  9/23/2024 0052 by Jovanny Hinkle RN  Outcome: Progressing  Flowsheets (Taken 9/23/2024 0052)  Free From Fall Injury: Instruct family/caregiver on patient safety     Problem: ABCDS Injury Assessment  Goal: Absence of physical injury  9/23/2024 1016 by Amalia Tobar RN  Outcome: Progressing  Flowsheets (Taken 9/23/2024 0052 by Jovanny Hinkle, RN)  Absence of Physical Injury: Implement safety measures based on patient assessment  9/23/2024 0052 by Jovanny Hinkle RN  Outcome: Progressing  Flowsheets (Taken 9/23/2024 0052)  Absence of Physical Injury: Implement safety measures based on patient assessment     Problem: Pain  Goal: Verbalizes/displays adequate comfort level or baseline comfort level  9/23/2024 1016 by Amalia Tobar RN  Outcome: Progressing  Flowsheets (Taken 9/23/2024 1016)  Verbalizes/displays adequate comfort level or baseline  assess patient for signs and symptoms of electrolyte imbalances   Administer electrolyte replacement as ordered  Goal: Hemodynamic stability and optimal renal function maintained  9/23/2024 1016 by Amalia Tobar RN  Outcome: Progressing  Flowsheets (Taken 9/23/2024 1016)  Hemodynamic stability and optimal renal function maintained: Monitor labs and assess for signs and symptoms of volume excess or deficit  9/23/2024 0052 by Jovanny Hinkle RN  Outcome: Progressing  Flowsheets (Taken 9/23/2024 0052)  Hemodynamic stability and optimal renal function maintained:   Monitor labs and assess for signs and symptoms of volume excess or deficit   Monitor intake, output and patient weight  Goal: Glucose maintained within prescribed range  9/23/2024 1016 by Amalia Tobar RN  Outcome: Progressing  Flowsheets (Taken 9/23/2024 1016)  Glucose maintained within prescribed range: Monitor blood glucose as ordered  9/23/2024 0052 by Jovanny Hinkle RN  Outcome: Progressing  Flowsheets (Taken 9/23/2024 0052)  Glucose maintained within prescribed range:   Monitor blood glucose as ordered   Assess for signs and symptoms of hyperglycemia and hypoglycemia   Administer ordered medications to maintain glucose within target range     Problem: Chronic Conditions and Co-morbidities  Goal: Patient's chronic conditions and co-morbidity symptoms are monitored and maintained or improved  9/23/2024 1016 by Amalia Tobar RN  Outcome: Progressing  Flowsheets (Taken 9/23/2024 1016)  Care Plan - Patient's Chronic Conditions and Co-Morbidity Symptoms are Monitored and Maintained or Improved: Monitor and assess patient's chronic conditions and comorbid symptoms for stability, deterioration, or improvement  9/23/2024 0052 by Jovanny Hinkle RN  Outcome: Progressing  Flowsheets (Taken 9/23/2024 0052)  Care Plan - Patient's Chronic Conditions and Co-Morbidity Symptoms are Monitored and Maintained or Improved:   Monitor and assess patient's

## 2024-09-23 NOTE — PROGRESS NOTES
[facility]  OCCUPATIONAL THERAPY MISSED TREATMENT NOTE  STRZ ICU STEPDOWN TELEMETRY 4K  4K-003-A      Date: 2024  Patient Name: Raymond Hummel        CSN: [id number]   : 1940  (84 y.o.)  Gender: male   Referring Practitioner: Liudmila Huang APRN - CNP  Diagnosis: aortic stenosis severe         REASON FOR MISSED TREATMENT: Patient Refused.  Patient on phone upon arrival; patient politely declines stating \"I am going home today\".  This writer met wife in hallway with wife expressing concerns with she feels patient is unsteady, requires steps to get in to house and will await son's arrival.  Education regarding level of assistance patient currently requires from and OT standpoint as well as PT can address stairs if patient is wanting to d/c, verbalizing understanding.  Will attempt next available time

## 2024-09-23 NOTE — CARE COORDINATION
9/23/24, 3:02 PM EDT  DISCHARGE PLANNING EVALUATION    SW was notified family is wanting an ECF list.     SW provided ECF list to patient, spouse and son Heriberto at bedside. Son Juan Carlos, who lives in [place], was present via phone as well. Juan Carlos asked about SW recommendations for ECFs. SW explained that SW was unable to make any recommendations. Family still considering their options.     Post-acute (PAC) provider list was provided to patient. Patient was informed of their freedom to choose PAC provider. Discussed and offered to show the patient the relevant PAC Providers quality and resource use measures on Medicare Compare web site via computer based on patient's goals of care and treatment preferences. Questions regarding selection process were answered.

## 2024-09-23 NOTE — PLAN OF CARE
Problem: Discharge Planning  Goal: Discharge to home or other facility with appropriate resources  Outcome: Progressing  Flowsheets (Taken 9/23/2024 0052)  Discharge to home or other facility with appropriate resources:   Identify barriers to discharge with patient and caregiver   Arrange for needed discharge resources and transportation as appropriate   Identify discharge learning needs (meds, wound care, etc)   Refer to discharge planning if patient needs post-hospital services based on physician order or complex needs related to functional status, cognitive ability or social support system     Problem: Safety - Adult  Goal: Free from fall injury  Outcome: Progressing  Flowsheets (Taken 9/23/2024 0052)  Free From Fall Injury: Instruct family/caregiver on patient safety     Problem: ABCDS Injury Assessment  Goal: Absence of physical injury  Outcome: Progressing  Flowsheets (Taken 9/23/2024 0052)  Absence of Physical Injury: Implement safety measures based on patient assessment     Problem: Pain  Goal: Verbalizes/displays adequate comfort level or baseline comfort level  Outcome: Progressing  Flowsheets (Taken 9/23/2024 0052)  Verbalizes/displays adequate comfort level or baseline comfort level:   Encourage patient to monitor pain and request assistance   Assess pain using appropriate pain scale   Administer analgesics based on type and severity of pain and evaluate response   Implement non-pharmacological measures as appropriate and evaluate response     Problem: Respiratory - Adult  Goal: Achieves optimal ventilation and oxygenation  Outcome: Progressing  Flowsheets (Taken 9/23/2024 0052)  Achieves optimal ventilation and oxygenation:   Assess for changes in respiratory status   Assess for changes in mentation and behavior   Position to facilitate oxygenation and minimize respiratory effort   Oxygen supplementation based on oxygen saturation or arterial blood gases   Encourage broncho-pulmonary hygiene including

## 2024-09-23 NOTE — PLAN OF CARE
Discussion was had between Mr. Hummel, his wife, Dr. Hernández, and myself about patient's denial by insurance for inpatient rehab.  We had asked multiple times if the patient would like his family to be called prior to being informed of the plan moving forward and decision by insurance.  He had denied wanting other family members to be present either in person or over the phone for this conversation.  Patient's wife, Gela, expressed her concerns to the patient about him going home.  Patient was very steadfast in his decision about going home with home health.  He was very adamant about not being interested in going to a skilled nursing facility for further rehab.  He did accept working at home with therapies.  We encouraged the patient to include his family, including his sons, in the decision to discharge to home.  Following conversation, Kirstin had attempted to contact son, Heriberto, who did not answer.  Will continue discussion and likely discharge to home this afternoon.

## 2024-09-23 NOTE — CARE COORDINATION
CM update    Patient's peer to peer eval for IPR acceptance was ongoing this morning. CM coordinated family meeting for results of peer to peer per family request. Peer to peer was denied. See Dr Stoddard's notes for family meeting details.    Patient expressed to medical team he would like to discharge home. He is medically cleared for discharge today.     Patient's son Heriberto approached the nurses station inquiring about a plan following the meeting. CM updated Heriberto who was understanding of information, and asked CM to speak with son Juan Carlos.     CM updated son Juan Carlos via phone with Heriberto present. Juan Carlos expressed concerns about his father's safety returning home. Reviewed IPR referral process and subsequent denial. Reviewed therapy notes and physician's notes with Juan Carlos regarding pt's mobility. Juan Carlos asked about discharging to a SNF. CM shared that patient would likely not qualify for a SNF per insurance's requirements for payment due to his performance with staff, but that we would pursue a SNF referral if patient wanted one, and patient states he does not want one. Staff reports patient has been walking the halls with standby assist and a walker yesterday and today.     Patient's son Juan Carlos asked what options were. CM shared that without SNF or IPR qualifications through insurance, that discharge options are home with home health (explained in detail what HH nursing, PT/OT and aide visits would look like as far as hours in the home); option for additional private assistance (gave private pay list); option for respite care in an AL or SNF (discussed price range differences) with in-house therapy there; and private pay long term at AL or SNF. Shared that regardless of options, that because doctors have determined patient is medically competent at this time, that patient would need to be agreeable to plan.     Juan Carlos expressed concerns about patient's steps at home, shag carpet and lack of assistance. CM verbalized understanding and  and family dynamic, and shared that we just want to do what is right- both ethically and by law.     1700 Dr Brown is at the bedside meeting with patient and family to discuss options. Possible capacity eval to come.     Met with Dr Brown and Dr Stoddard s/p meeting. Plans for PT/OT in the morning for updated assessment of patient's abilities. Plans capacity evaluation with Dr Waller.     CM to f/u tomorrow morning. After therapy eval, consider following up with patient and family about considering a private pay IPR stay as patient was agreeable to IPR initially.

## 2024-09-23 NOTE — PROGRESS NOTES
Hospitalist Progress Note  Internal Medicine Resident      Patient: Raymond Hummel 84 y.o. male      Unit/Bed: 4-03/003-A    Admit Date: 9/12/2024      ASSESSMENT AND PLAN  Active Problems  Dispo: There has been some question of patient's capacity to make medical decisions.  Please see multiple plan of care notes by case management, social work, and Dr. Victor Manuel Brown, and myself.  Patient is adamant about discharging to home with HH, family feels as though he would be safer for a short stay at an ECF.  Given the question of medical capacity, psychiatry, Dr. Waller, has agreed to assist in this evaluation.  Patient is placed on a medical hold at this time.  Perivalvular leak around Zapata Resilia prosthesis, resolved.  Repeat flare TAVR which caused AV node blockade, leading to TVP placement.  Patient completely became pacer dependent.  Biventricular pacemaker placed on 9/17. On ASA, brilinta, and coumadin. INR still subtherapeutic Stop ASA when INR >2.0.   Physical deconditioning: IPR precert denied. Upheld after [id number]. SNF vs ECF in dispo as above.  Third degree AV cleo block: resolved. Noted after TAVR.  S/p ppm 9/17.   History of aortic stenosis s/p TAVR 8/15/24: Patient follows with Dr. Arguello.  Previously TTE June 2024 showed severe aortic stenosis with AV mean gradient of 27 mmHg.  On 8/15 TAVR replacement with Zapata Resilia prosthesis was performed by Dr. Arguello.  HOCM: JARAD on 9/9 showed ejection fraction of 65.  Severely increased wall thickness of LV.  Hyperbilirubinemia secondary to hemolysis due to perivalvular leak, improved: Was noted to have jaundice that improved. hemoglobin stable  CAD: History of CAD s/p PCI x 1 in the LAD.  LHC showed 50 stenosis of RCA, 70% stenosis of OM 2. On Lipitor 40mg, aspirin, brilinta at home. Asa discontinued. Continue brilinta, coumadin.   HTN: on Toprol at home. Increase dose to 25 mg per cardiology.   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  Held while IP.   POCT ACHS, LDSSI.  Hypoglycemia protocol in place.  BPH: History of BPH.  Reported gross hematuria recently. On Flomax. deferred cystoscopy. Outpatient outpatient urology follow-up.  Systolic heart failure, improved: Diagnosed in June 2024, had first TAVR procedure. follows Dr. Arguello. On Aldactone, losartan. Would benefit from SGLT2 given diabetes and HF. Given one time lasix dose 40 mg.     LDA: []CVC / []PICC / []Midline / []Beatty / []Drains / []Mediport / [x]None  Antibiotics: none  Steroids: none  Labs (still needed?): []Yes / [x]No  IVF (still needed?): []Yes / [x]No    Level of care: [x]Step Down / []Med-Surg, medsurg transfer bed requested  Bed Status: [x]Inpatient / []Observation  Telemetry: [x]Yes / []No  PT/OT: [x]Yes / []No    DVT Prophylaxis: [] Lovenox / [] Heparin / [] SCDs / [x] Already on Systemic Anticoagulation / [] None     Expected discharge date:  tbd  Disposition: IPR     Code status: Full Code     ===================================================================    Chief Complaint:   Subjective (past 24 hours):    Patient was seen and evaluated this morning.  Mildly increased lower extremity edema today one-time Lasix dose.  Pre-CERT denial was upheld.  Dispo planning as above.  Patient adamant about going home.  Family not in agreement with this plan.  Continue working with ethics, psychiatry consulted for capacity evaluation.  Patient overall feeling good otherwise, feels as though he is getting around better, denies any dizziness or lightheadedness, chest pain or shortness of breath.    HPI / Hospital Course:  Raymond Hummel is an 84-year-old male with PMH notable for CAD s/p PCI LAD x 1, aortic stenosis s/p TAVR, HLD, diverticulosis, NIDDM 2, HLD, LVH, BPH.  Patient previously underwent TAVR procedure with Dr. Arguello and Dr. Giang on 8/15 in order to rectify severe aortic stenosis.  Patient had previous TTE demonstrating severe aortic stenosis with LVH, which necessitated valve

## 2024-09-23 NOTE — PROGRESS NOTES
Informed by EDMOND Patel that peer to peer denied, spoke with patient and wife after Dr Hernández informed them of denial for IPR.  Patient insistent on going home. Discussed appeal process with wife who stated patient unlikely to be convinced to wait for appeal.

## 2024-09-23 NOTE — PROGRESS NOTES
Comprehensive Nutrition Assessment    Type and Reason for Visit: Initial, RD Nutrition Re-Screen/LOS    Nutrition Recommendations/Plan:   Continue diet as ordered.   Initiate Ensure Plus BID.   Recommend MVI.      Malnutrition Assessment:  Malnutrition Status: At risk for malnutrition (Comment)  Context: Acute Illness       Findings of the 6 clinical characteristics of malnutrition:  Energy Intake:  Mild decrease in energy intake (Comment) (does not like food here, but PTA was eating 3 m/d)  Weight Loss:  No significant weight loss     Body Fat Loss:  No significant body fat loss     Muscle Mass Loss:  No significant muscle mass loss    Fluid Accumulation:  Mild Extremities   Strength:  Not Performed    Nutrition Assessment:    Pt. nutritionally compromised AEB wounds. At risk for further nutrition compromise r/t increased nutrient needs for wound healing, Recent TAVR on 8/15 with Dr. Arguello and Dr. Giang. Had PPM placed on 9/17.  Now waiting on IPR pre-cert. Noted underlying medical condition (PMHx diverticulosis, hematuria during admission in 8/2024, TAVR 8/15/2024, HTN, insulin resistance, LVH).     Nutrition Related Findings:    Pt. Report/Treatments/Miscellaneous: Patient seen, sitting up in chair. Per patient he does not like the food here. He reports that he has been eating most of his meals even though he does not like the food. He denies any recent nausea/ vomiting/ diarrhea/ constipation. He reports that PTA he would eat 3 meals per day and 1-2 bottles of Boost. Patient is agreeable to Ensure.   GI Status: Last BM 9/22  Wound: Surgical Incision (TAVR on 9/15 and PPM on 9/17)   Edema:  +1 pitting b/l LE edema, per flowsheet   Pertinent Labs:   Lab Results   Component Value Date    LABA1C 6.2 06/28/2023     Recent Labs     09/22/24  0328      K 3.8      GLUCOSE 152*   BUN 17   CREATININE 0.7     Pertinent Medications: metoprolol, insulin, senna, losartan, finasteride, statin     Current

## 2024-09-24 ENCOUNTER — TELEPHONE (OUTPATIENT)
Dept: CARDIOLOGY CLINIC | Age: 84
End: 2024-09-24

## 2024-09-24 VITALS
OXYGEN SATURATION: 100 % | SYSTOLIC BLOOD PRESSURE: 102 MMHG | DIASTOLIC BLOOD PRESSURE: 58 MMHG | WEIGHT: 198.41 LBS | HEIGHT: 69 IN | RESPIRATION RATE: 17 BRPM | TEMPERATURE: 97.5 F | HEART RATE: 67 BPM | BODY MASS INDEX: 29.39 KG/M2

## 2024-09-24 DIAGNOSIS — Z95.2 S/P TAVR (TRANSCATHETER AORTIC VALVE REPLACEMENT): Primary | ICD-10-CM

## 2024-09-24 LAB
ANION GAP SERPL CALC-SCNC: 12 MEQ/L (ref 8–16)
BUN SERPL-MCNC: 22 MG/DL (ref 7–22)
CALCIUM SERPL-MCNC: 9 MG/DL (ref 8.5–10.5)
CHLORIDE SERPL-SCNC: 104 MEQ/L (ref 98–111)
CO2 SERPL-SCNC: 26 MEQ/L (ref 23–33)
CREAT SERPL-MCNC: 1 MG/DL (ref 0.4–1.2)
GFR SERPL CREATININE-BSD FRML MDRD: 74 ML/MIN/1.73M2
GLUCOSE BLD STRIP.AUTO-MCNC: 227 MG/DL (ref 70–108)
GLUCOSE BLD STRIP.AUTO-MCNC: 243 MG/DL (ref 70–108)
GLUCOSE SERPL-MCNC: 130 MG/DL (ref 70–108)
INR PPP: 1.9 (ref 0.85–1.13)
POTASSIUM SERPL-SCNC: 3.9 MEQ/L (ref 3.5–5.2)
SODIUM SERPL-SCNC: 142 MEQ/L (ref 135–145)

## 2024-09-24 PROCEDURE — 6370000000 HC RX 637 (ALT 250 FOR IP)

## 2024-09-24 PROCEDURE — 99239 HOSP IP/OBS DSCHRG MGMT >30: CPT | Performed by: STUDENT IN AN ORGANIZED HEALTH CARE EDUCATION/TRAINING PROGRAM

## 2024-09-24 PROCEDURE — 97116 GAIT TRAINING THERAPY: CPT

## 2024-09-24 PROCEDURE — 2580000003 HC RX 258: Performed by: PHYSICIAN ASSISTANT

## 2024-09-24 PROCEDURE — 85610 PROTHROMBIN TIME: CPT

## 2024-09-24 PROCEDURE — 6370000000 HC RX 637 (ALT 250 FOR IP): Performed by: NURSE PRACTITIONER

## 2024-09-24 PROCEDURE — 6370000000 HC RX 637 (ALT 250 FOR IP): Performed by: THORACIC SURGERY (CARDIOTHORACIC VASCULAR SURGERY)

## 2024-09-24 PROCEDURE — 97530 THERAPEUTIC ACTIVITIES: CPT

## 2024-09-24 PROCEDURE — 6370000000 HC RX 637 (ALT 250 FOR IP): Performed by: PHYSICIAN ASSISTANT

## 2024-09-24 PROCEDURE — 80048 BASIC METABOLIC PNL TOTAL CA: CPT

## 2024-09-24 PROCEDURE — 6360000002 HC RX W HCPCS: Performed by: PHYSICIAN ASSISTANT

## 2024-09-24 PROCEDURE — 97535 SELF CARE MNGMENT TRAINING: CPT

## 2024-09-24 PROCEDURE — 36415 COLL VENOUS BLD VENIPUNCTURE: CPT

## 2024-09-24 PROCEDURE — 82948 REAGENT STRIP/BLOOD GLUCOSE: CPT

## 2024-09-24 PROCEDURE — 99223 1ST HOSP IP/OBS HIGH 75: CPT | Performed by: PSYCHIATRY & NEUROLOGY

## 2024-09-24 RX ORDER — METOPROLOL SUCCINATE 25 MG/1
25 TABLET, EXTENDED RELEASE ORAL DAILY
Qty: 30 TABLET | Refills: 3 | Status: SHIPPED | OUTPATIENT
Start: 2024-09-25

## 2024-09-24 RX ORDER — WARFARIN SODIUM 5 MG/1
TABLET ORAL
Qty: 30 TABLET | Refills: 0 | Status: SHIPPED | OUTPATIENT
Start: 2024-09-24

## 2024-09-24 RX ORDER — AMLODIPINE BESYLATE 5 MG/1
5 TABLET ORAL 2 TIMES DAILY
Qty: 30 TABLET | Refills: 3 | Status: SHIPPED | OUTPATIENT
Start: 2024-09-24

## 2024-09-24 RX ORDER — WARFARIN SODIUM 5 MG/1
5 TABLET ORAL ONCE
Status: COMPLETED | OUTPATIENT
Start: 2024-09-24 | End: 2024-09-24

## 2024-09-24 RX ORDER — WARFARIN SODIUM 5 MG/1
5 TABLET ORAL
Status: DISCONTINUED | OUTPATIENT
Start: 2024-09-24 | End: 2024-09-24

## 2024-09-24 RX ORDER — BUMETANIDE 0.5 MG/1
0.5 TABLET ORAL DAILY PRN
Qty: 30 TABLET | Refills: 0 | Status: SHIPPED | OUTPATIENT
Start: 2024-09-30

## 2024-09-24 RX ORDER — LOSARTAN POTASSIUM 50 MG/1
50 TABLET ORAL DAILY
Qty: 30 TABLET | Refills: 3 | Status: SHIPPED | OUTPATIENT
Start: 2024-09-25

## 2024-09-24 RX ORDER — FUROSEMIDE 20 MG
20 TABLET ORAL DAILY
Qty: 3 TABLET | Refills: 0 | Status: SHIPPED | OUTPATIENT
Start: 2024-09-24 | End: 2024-09-27

## 2024-09-24 RX ADMIN — SPIRONOLACTONE 25 MG: 25 TABLET ORAL at 07:55

## 2024-09-24 RX ADMIN — ENOXAPARIN SODIUM 40 MG: 100 INJECTION SUBCUTANEOUS at 07:55

## 2024-09-24 RX ADMIN — TRAMADOL HYDROCHLORIDE 100 MG: 50 TABLET ORAL at 03:20

## 2024-09-24 RX ADMIN — METOPROLOL SUCCINATE 25 MG: 25 TABLET, FILM COATED, EXTENDED RELEASE ORAL at 07:55

## 2024-09-24 RX ADMIN — TICAGRELOR 90 MG: 90 TABLET ORAL at 07:55

## 2024-09-24 RX ADMIN — FINASTERIDE 5 MG: 5 TABLET, FILM COATED ORAL at 07:56

## 2024-09-24 RX ADMIN — POLYETHYLENE GLYCOL 3350 17 G: 17 POWDER, FOR SOLUTION ORAL at 07:55

## 2024-09-24 RX ADMIN — TAMSULOSIN HYDROCHLORIDE 0.4 MG: 0.4 CAPSULE ORAL at 07:55

## 2024-09-24 RX ADMIN — LOSARTAN POTASSIUM 50 MG: 25 TABLET, FILM COATED ORAL at 07:55

## 2024-09-24 RX ADMIN — WARFARIN SODIUM 5 MG: 5 TABLET ORAL at 14:17

## 2024-09-24 RX ADMIN — SODIUM CHLORIDE, PRESERVATIVE FREE 10 ML: 5 INJECTION INTRAVENOUS at 07:55

## 2024-09-24 RX ADMIN — ATORVASTATIN CALCIUM 40 MG: 40 TABLET, FILM COATED ORAL at 07:55

## 2024-09-24 RX ADMIN — INSULIN LISPRO 1 UNITS: 100 INJECTION, SOLUTION INTRAVENOUS; SUBCUTANEOUS at 12:05

## 2024-09-24 RX ADMIN — AMLODIPINE BESYLATE 5 MG: 5 TABLET ORAL at 07:55

## 2024-09-24 ASSESSMENT — PAIN DESCRIPTION - LOCATION: LOCATION: FOOT

## 2024-09-24 ASSESSMENT — PAIN SCALES - GENERAL
PAINLEVEL_OUTOF10: 0

## 2024-09-24 NOTE — PLAN OF CARE
Raymond Hummel was evaluated today and a DME order was entered for a wheeled walker because he requires this to successfully complete daily living tasks of ambulating.  A wheeled walker is necessary due to the patient's unsteady gait, upper body weakness, and inability to  an ambulation device; and he can ambulate only by pushing a walker instead of a lesser assistive device such as a cane, crutch, or standard walker.  The need for this equipment was discussed with the patient and he understands and is in agreement.             Electronically signed by Rebecca Pisano MD on 9/24/24 at 12:12 PM EDT

## 2024-09-24 NOTE — PROGRESS NOTES
[facility]  INPATIENT PHYSICAL THERAPY  DAILY NOTE  STRZ ICU STEPDOWN TELEMETRY 4K - 4K-03/003-A      Discharge Recommendations: Continue to assess pending progress, 24 hour assistance or supervision, Home with Home Health PT, and Patient would benefit from continued PT at discharge  Equipment Recommendations:    cont to assess needs            Time In: 934  Time Out: 957  Timed Code Treatment Minutes: 23 Minutes  Minutes: 23          Date: 2024  Patient Name: Raymond Hummel,  Gender:  male        MRN: [id number]  : 1940  (84 y.o.)     Referring Practitioner: NICOLETTE Huang CNP  Diagnosis: aortic stenosis, severe  Additional Pertinent Hx: 84-year-old male with PMH notable for CAD s/p PCI LAD x 1, aortic stenosis s/p TAVR, HLD, diverticulosis, NIDDM 2, HLD, LVH, BPH.  Patient previously underwent TAVR procedure with Dr. Arguello and Dr. Giang on 8/15 in order to rectify severe aortic stenosis.  Patient had previous TTE demonstrating severe aortic stenosis with LVH, which necessitated valve replacement.  TAVR was done with Zapata Resilia prosthesis by Dr. Arguello and Dr. Giang via transfemoral approach, completed successfully.      Recent TTE showed perivalvular leak around AV prosthesis, will necessitate replacement via repeat TAVR, will be done by Dr. Arguello on . s/p TAVR 24. s/p pacemaker placement 24.     Prior Level of Function:  Lives With: Spouse  Type of Home: Condo  Home Layout: One level  Home Access: Stairs to enter with rails (8)   Bathroom Shower/Tub: Walk-in shower  Bathroom Toilet: Handicap height  Bathroom Equipment: Grab bars in shower    ADL Assistance: Independent  Homemaking Responsibilities: No  Ambulation Assistance: Independent  Transfer Assistance: Independent  Active : Yes (only short distances)  Additional Comments: Pt reports fully indep PLOF.    Restrictions/Precautions:  Restrictions/Precautions: Fall Risk, Surgical Protocols  Position Activity

## 2024-09-24 NOTE — TELEPHONE ENCOUNTER
Pt discharged to home with [facility].   Orders received from Dr. Arguello for 2 week post [place] TAVR JARAD.  Scheduled in OR with [facility].  Order faxed.      Orders also received for 3 month ECHO and OV with Dr. Arguello.      ECHO:  11/13/24 at 1015  OV:  11/13/24 at 0900    Dr. Arguello, please agree.

## 2024-09-24 NOTE — PROGRESS NOTES
Clinical Pharmacy Note                                               Warfarin Discharge Recommendations    Recommendations provided in the event that patient is discharged today 9/23/24.     Warfarin indication: Bioprosthetic aortic valve - davina TAVR   INR goal during admission: 2.0-3.0  Previous home warfarin regimen: New Start  Interacting medications at discharge: Brilinta, enoxaparin  Coumadin 3 mg tabs    Hospital Warfarin Doses & INR Results    Concurrent anticoagulants/antiplatelets: Brilinta, enoxaparin  Significant warfarin drug-drug interactions: n/a     Date INR Warfarin Dose   9/18/24 - 5 mg   9/19/24  1.23  3 mg     9/20/24 1.29 5 mg     9/21/24 1.38  7.5 mg    9/22/24 1.69  3 mg    9/23/24 2.31  3 mg    9/24/24 1.90 5 mg     Recent INRs:  Recent Labs     09/23/24  0324   INR 2.31*     Warfarin Discharge Instructions:   Date Warfarin Dose   9/25 (tomorrow) 5 mg   9/26 Get your INR checked     Provider dosing warfarin: [facility]  Next INR date: Thursday, 9/26/24 3 pm

## 2024-09-24 NOTE — DISCHARGE SUMMARY
Resident Discharge Summary (Hospitalist)      Patient: Raymond Hummel 84 y.o. male  : 1940  MRN: [id number]   Account: [id number]   Patient's PCP: Duran Dukes MD    Admit Date: 2024   Discharge Date: 2024      Admitting Physician: Duran Ordonez MD  Discharge Physician: Sarabjit Stoddard,        Discharge Diagnoses:    Perivalvular leak around Zapata Resilia prosthesis, resolved.  Repeat flare TAVR which caused AV node blockade, leading to TVP placement.  Patient completely became pacer dependent.  Biventricular pacemaker placed on . On brilinta, coumadin. To follow with coumadin clinic .     Physical deconditioning: IPR precert denied. Upheld after [id number]. Discharged with Home health PT,OT, nursing, and aide. Cane and walker ordered. Patient has seat in a walk-in shower at home.     Third degree AV cleo block: resolved. Noted after TAVR.  S/p ppm .     HOCM: JARAD on  showed ejection fraction of 65.  Severely increased wall thickness of LV.    Hyperbilirubinemia secondary to hemolysis due to perivalvular leak: resolved.  Was noted to have jaundice that improved. hemoglobin stable    CAD: History of CAD s/p PCI x 1 to LAD.  LHC showed 50% stenosis of RCA, 70% stenosis of OM 2.  Continue brilinta, coumadin, toprol, losartan, lipitor 40 mg.     HTN: discharged on toprol 25 mg daily, losartan 50 mg daily, amlodipine 5 mg BID as this is how he was taking it previously. Aldactone 25 mg daily.     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  Held while IP.  POCT ACHS, LDSSI.  Hypoglycemia protocol in place.    BPH: History of BPH.  Reported gross hematuria recently. On Flomax, proscar. . deferred cystoscopy. Outpatient  urology follow-up.    Systolic heart failure, improved: Diagnosed in 2024, had first TAVR procedure. follows Dr. Arguello. On toprol 25, Aldactone, losartan.  Would benefit from SGLT2 given diabetes and HF. Discharged on lasix 20 mg x 3 days due to mild pedal edema.      START taking these medications      furosemide 20 MG tablet  Commonly known as: Lasix  Take 1 tablet by mouth daily for 3 days     warfarin 5 MG tablet  Commonly known as: COUMADIN  Take as directed by [facility]. 30 days supply.            CHANGE how you take these medications      amLODIPine 5 MG tablet  Commonly known as: NORVASC  Take 1 tablet by mouth in the morning and at bedtime  What changed:   medication strength  how much to take  when to take this     bumetanide 0.5 MG tablet  Commonly known as: BUMEX  Take 1 tablet by mouth daily as needed (weight gain/swelling/SOB)  Start taking on: September 30, 2024  What changed: These instructions start on September 30, 2024. If you are unsure what to do until then, ask your doctor or other care provider.     losartan 50 MG tablet  Commonly known as: COZAAR  Take 1 tablet by mouth daily  Start taking on: September 25, 2024  What changed:   medication strength  how much to take     metoprolol succinate 25 MG extended release tablet  Commonly known as: Toprol XL  Take 1 tablet by mouth daily  Start taking on: September 25, 2024  What changed: how much to take            CONTINUE taking these medications      atorvastatin 40 MG tablet  Commonly known as: LIPITOR     CO Q 10 PO     finasteride 5 MG tablet  Commonly known as: PROSCAR  Take 1 tablet by mouth daily     Fish Oil 1200 MG Caps     metFORMIN 500 MG extended release tablet  Commonly known as: GLUCOPHAGE-XR  Take 1 tablet by mouth in the morning and at bedtime Restart 6-14-24 am dose     PROBIOTIC DAILY PO     spironolactone 25 MG tablet  Commonly known as: Aldactone  Take 1 tablet by mouth daily     tamsulosin 0.4 MG capsule  Commonly known as: FLOMAX  Take 1 capsule by mouth daily     ticagrelor 90 MG Tabs tablet  Commonly known as: BRILINTA  Take 1 tablet by mouth 2 times daily     Vitamin D3 50 MCG (2000 UT) Tabs            STOP taking these medications      aspirin 81 MG EC tablet

## 2024-09-24 NOTE — PLAN OF CARE
Ramyond Hummel requires a cane due to impaired ambulation and for increased stability in order to participate in or complete daily living tasks of: ambulating.  The patient is able to safely use the cane and the functional mobility deficit can be sufficiently resolved.         Electronically signed by Rebecca Pisano MD on 9/24/24 at 12:52 PM EDT

## 2024-09-24 NOTE — CONSULTS
Clinical Ethics Consultation Note    History and Context:  Consult received from hospitalist and case management team; they report that there is conflict between family and the patient regarding discharge planning and safety concerns at home were referenced. Patient lives at his residence with his spouse; he also has two sons: Heriberto and Juan Carlos who along with their mother are the patient's main support and caregivers.     Principal Problem:    Aortic stenosis, severe  Active Problems:    Severe aortic insufficiency    Complete heart block (HCC)    Physical deconditioning  Resolved Problems:    * No resolved hospital problems. *    Age: 84 y.o.  Gender: male  Gender Identity: male  Admitted Date: 9/12/2024  Consult Date: 09/24/24  MRN: [id number]  Valid Advanced Medical Directive: No, not on file. Patient's son reports that the patient has advance directives. Patient's son will follow-up and submit for recording in the medical record.    Process:  Spoke with Dr. Hernández (Attending Physician), Alayna DESHPANDE (patient's nurse), and our Chief Clinical Officer yesterday evening after Dr. Brown met with the patient. Additionally, spoke with patient's son, Juan Carlos and listened to the family's concerns yesterday evening. Dr. Brown shared that he has questions regarding the patient's decisional capacity; the patient's physician team stated that the patient has a medical hold until the psychiatric evaluation today.    Ethical Question(s) and Concern(s):  Medical team, the patient, and family want the patient to be safe and well at home; family requests a plan that both honors patient's autonomy and a plan that can realistically achieve these goals.    Analysis:  In my visit with the patient this morning at 7:30am, he did not recall the conversation with Dr. Brown last evening and did not recall a plan for today being devised. The patient also demonstrated limited recall about his recent health challenges and hospital stays. When

## 2024-09-24 NOTE — PLAN OF CARE
Raymond Hummel was evaluated today and a DME order was entered for a wheeled walker because he requires this to successfully complete daily living tasks of ambulating.  A wheeled walker is necessary due to the patient's unsteady gait, upper body weakness, and inability to  an ambulation device; and he can ambulate only by pushing a walker instead of a lesser assistive device such as a cane, crutch, or standard walker.  The need for this equipment was discussed with the patient and he understands and is in agreement.                   Electronically signed by Rebecca Pisano MD on 9/24/24 at 12:59 PM EDT

## 2024-09-24 NOTE — PROGRESS NOTES
Warfarin Pharmacy Consult Note    Warfarin Indication: Bioprosthetic aortic valve - davina TAVR  Target INR: 2.0-3.0  Dose prior to admission: new start    Recent Labs     09/22/24  0328   HGB 9.2*        Recent Labs     09/22/24  0328 09/23/24  0324 09/24/24  0337   INR 1.69* 2.31* 1.90*     Concurrent anticoagulants/antiplatelets: Brilinta, enoxaparin  Significant warfarin drug-drug interactions: n/a     Date INR Warfarin Dose   9/18/24 - 5 mg   9/19/24  1.23  3 mg     9/20/24 1.29 5 mg     9/21/24 1.38  7.5 mg    9/22/24 1.69  3 mg    9/23/24 2.31  3 mg    9/24/24 1.90 5 mg     Monitoring:                   INR will be monitored daily until therapeutic INR is achieved.    **Please contact pharmacy for discharge instructions when indicated**    Ramya Tran, PharmD  9/24/2024 at 12:41 PM

## 2024-09-24 NOTE — CARE COORDINATION
9/24/24, 1:26 PM EDT    Patient goals/plan/ treatment preferences discussed by  and .  Patient goals/plan/ treatment preferences reviewed with patient/ family.  Patient/ family verbalize understanding of discharge plan and are in agreement with goal/plan/treatment preferences.  Understanding was demonstrated using the teach back method.  AVS provided by RN at time of discharge, which includes all necessary medical information pertaining to the patients current course of illness, treatment, post-discharge goals of care, and treatment preferences.     Services At/After Discharge: Home Health [facility]    [facility] discharge today[facility]. Patient and family are agreeable to discharge plan of returning home with . Patient and spouse agreeable to [facility] for medical equipment.     SW called Shona with [facility] and made referral.     RN made aware.

## 2024-09-24 NOTE — PROGRESS NOTES
[facility]  STRZ ICU STEPDOWN TELEMETRY 4K  Occupational Therapy  Daily Note    Discharge Recommendations: 24 hour assistance or supervision and Home with Home Health OT  Equipment Recommendations: Yes monitor need for RW & shower chair      Time In: 820  Time Out: 846  Timed Code Treatment Minutes: 26 Minutes  Minutes: 26          Date: 2024  Patient Name: Raymond Hummel,   Gender: male      Room: [facility]/003-  MRN: [id number]  : 1940  (84 y.o.)  Referring Practitioner: Liudmila Huang, APRN - CNP  Diagnosis: aortic stenosis severe  Additional Pertinent Hx: 84-year-old male with PMH notable for CAD s/p PCI LAD x 1, aortic stenosis s/p TAVR, HLD, diverticulosis, NIDDM 2, HLD, LVH, BPH.  Patient previously underwent TAVR procedure with Dr. Arguello and Dr. Giang on 8/15 in order to rectify severe aortic stenosis.  Patient had previous TTE demonstrating severe aortic stenosis with LVH, which necessitated valve replacement.  TAVR was done with Zapata Resilia prosthesis by Dr. Arguello and Dr. Giang via transfemoral approach, completed successfully.      Recent TTE showed perivalvular leak around AV prosthesis, will necessitate replacement via repeat TAVR, will be done by Dr. Arguello on . s/p TAVR 24. s/p pacemaker placement 24.    Restrictions/Precautions:  Restrictions/Precautions: Fall Risk, Surgical Protocols  Position Activity Restriction  Other position/activity restrictions: s/p pacemaker on  Do not elevate affected arm above shoulder for 30 days, Instruct patient to keep wound clean and dry and no showering for one week     Social/Functional History:  Lives With: Spouse  Type of Home: Condo  Home Layout: One level  Home Access: Stairs to enter with rails (8)   Bathroom Shower/Tub: Walk-in shower  Bathroom Toilet: Handicap height  Bathroom Equipment: Grab bars in shower       ADL Assistance: Independent  Homemaking Responsibilities: No  Ambulation Assistance:

## 2024-09-24 NOTE — PROGRESS NOTES
Discharge teaching and instructions for diagnosis/procedure of aortic stenosis completed with patient using teachback method. AVS reviewed. Printed prescriptions given to patient. Patient voiced understanding regarding prescriptions, follow up appointments, and care of self at home. Discharged in a wheelchair to home with support per family.

## 2024-09-24 NOTE — CARE COORDINATION
CM Note  Client prefers new SR HME after choices offered after choices offered for cane, walker per SHELLEY Chadwick; collaborated w SR MATTHIAS Kitchen, SHELLEY Chadwick, Melina RN who will let family know  Electronically signed by Jovanny Cooney RN on 9/24/2024 at 1:26 PM    Update  Patient refused walker, wants quad cane; collaborated antonio Kitchen [facility]  Electronically signed by Jovanny Cooney RN on 9/24/2024 at 1:51 PM    Raymond Hummel requires a quad cane due to impaired ambulation and for increased stability in order to participate in or complete daily living tasks of: ambulating.  The patient is able to safely use the quad cane and the functional mobility deficit can be sufficiently resolved.     Update  Family refused quad cane; they prefer cane they used w therapy, call to therapy; await therapy call back  Electronically signed by Jovanny Cooney RN on 9/24/2024 at 2:08 PM     Update  Therapy reports Hurricane quad cane was used; collaborated w SR RIZZO; will be room delivery; collaborated antonio Summers RN  Electronically signed by Jovanny Cooney RN on 9/24/2024 at 2:41 PM

## 2024-09-24 NOTE — PROGRESS NOTES
Evaluated patient for [facility]. Patient does not qualify due to being over the income limit for the program.  Jessica Kaur [facility] - Prescription Assistance ([phone]) 9/24/2024,1:20 PM

## 2024-09-24 NOTE — TELEPHONE ENCOUNTER
Pt seemed very down and depressed after I&D. Pt was refusing pain medication because \"he is tired of taking things.\" Pt was also talking about all the negative things that have or are happening in his life, including but not limited to his father passing, MS dx, \"being tired of losing body parts.\" Writer asked if pt had thoughts or intention of hurting himself to which he denied. Pt is already seeing Mckenna with rehab psych, but maybe pt needs to see general psych. He said he likes talking to Mckenna, but saw a covering psychologist for Mckenna, that hew did not get along with.   agree

## 2024-09-24 NOTE — CONSULTS
Department of Psychiatry   Psychiatric Assessment      Thank you very much for allowing us to participate in the care of this patient.      Reason for Consult:  capacity; specifically capacity to refuse SNF.    HISTORY OF PRESENT ILLNESS:    Raymond Hummel is an 84-year-old male with PMH notable for CAD s/p PCI LAD x 1, aortic stenosis s/p TAVR, HLD, diverticulosis, NIDDM 2, HLD, LVH, BPH.  Patient previously underwent TAVR procedure with Dr. Arguello and Dr. Giang on 8/15 in order to rectify severe aortic stenosis.  Patient had previous TTE demonstrating severe aortic stenosis with LVH, which necessitated valve replacement.  TAVR was done with Zapata Resilia prosthesis by Dr. Arguello and Dr. Giang via transfemoral approach, completed successfully.  Had PPM placement Tuesday 9/17.    Psychiatry was consulted for assistance with decisional capacity evaluation at the request of Dr. Brown, primary team and ethics. Information was obtained from chart, primary team, Dr. Brown, patient and family. Conflict revolves around family being concerned about patient returning home. Insurance denied IPR but patient refused SNF. In speaking with primary team (Dr. Hernández and Dr. Stoddard) they report patient has expresses a consistent choice to return home and has expressed understanding of risks of going home. However, documentation from both Dr. Brown and Ethics expressed some concern for lack of insight into ongoing limitations and risks if returning home. Prior to meeting with patient PT and OT completed evaluations. I spoke with both teams. Both reported patient did well. PT reported patient was able to navigate stairs well. They worked on using a cane and patient was ambulating well. OT reported similar     Patient seen at bedside. Wife and son (pato) present during evaluation. Introduced myself and explained reason for consult. Patient was receptive to information. Patient was able to tell me he is in the hospital 2/2 cardiac

## 2024-09-24 NOTE — DISCHARGE INSTR - MEDS
Warfarin Discharge Instructions:   Date Warfarin Dose   9/25 (tomorrow) 5 mg   9/26 Get your INR checked     Provider dosing warfarin: [facility]  Next INR date: Thursday, 9/26/24 3 pm

## 2024-09-25 NOTE — OP NOTE
DATE: 09/25/24    PATIENT: Raymond Hummel    MRN: [id number]     Acct: [id number]    PREOP DIAGNOSIS:   Severe prosthetic aortic insufficiency post-TAVR    Postop diagnosis:  Severe prosthetic aortic insufficiency post-TAVR    Procedure:  Transcatheter aortic valve replacement with a Medtronic 34 mm Evolut FX prosthesis    OPERATORS:  Erick Arguello MD; Paolo Gerber MD    ESTIMATED BLOOD LOSS: 50 ml    A pre-procedure discussion was conducted with the patient to confirm/exclude candidacy for open surgical salvage in case of procedure failure. The patient is an 84 year-old male recently s/p TAVR with a Zapata 26 mm Resilia prosthesis who returns with severe paravalvular aortic insufficiency.     After informed consent was obtained from the patient, the patient was brought to the hybrid operating room and prepped in sterile fashion. Infiltration of the bilateral inguinal regions was accomplished with 2% lidocaine. Using micropuncture and modified Seldinger technique, a 6 Fr sheath was inserted into the right internal jugular vein. A 5 Fr pacemaker was inserted into position under fluoroscopic guidance into the right ventricle, with verification of appropriate pacing thresholds.    Using the same technique under fluoroscopic guidance, a 5 Fr sheath was inserted into the left common femoral artery.  Similarly, a 4 Fr sheath, followed after contrast verification by a 5 Fr sheath was inserted into the right common femoral artery. This was replaced with a 6 Fr J4 catheter over a guidewire, and a Supra Core wire was used to traverse the aortic arch.  We then performed standard preclose technique by deploying a Perclose in orthogonal fashion and leaving it incomplete. A 14 Fr sheath was inserted over the wire under guidance. The patient was heparinized to an ACT above 250 seconds.     A straight pigtail was placed via the left femoral artery and aortography was performed in a coplanar view to ensure alignment.  The aortic

## 2024-09-26 ENCOUNTER — TELEPHONE (OUTPATIENT)
Dept: CARDIOLOGY CLINIC | Age: 84
End: 2024-09-26

## 2024-09-26 ENCOUNTER — ANTI-COAG VISIT (OUTPATIENT)
Age: 84
End: 2024-09-26
Payer: MEDICARE

## 2024-09-26 DIAGNOSIS — Z95.2 S/P TAVR (TRANSCATHETER AORTIC VALVE REPLACEMENT): ICD-10-CM

## 2024-09-26 DIAGNOSIS — Z95.2 HEART VALVE REPLACED: Primary | ICD-10-CM

## 2024-09-26 DIAGNOSIS — Z95.2 S/P TAVR (TRANSCATHETER AORTIC VALVE REPLACEMENT): Primary | ICD-10-CM

## 2024-09-26 DIAGNOSIS — Z51.81 ENCOUNTER FOR THERAPEUTIC DRUG MONITORING: ICD-10-CM

## 2024-09-26 DIAGNOSIS — Z79.01 LONG TERM (CURRENT) USE OF ANTICOAGULANTS: ICD-10-CM

## 2024-09-26 DIAGNOSIS — I35.0 AORTIC STENOSIS, SEVERE: Primary | ICD-10-CM

## 2024-09-26 PROBLEM — I35.1 AORTIC INSUFFICIENCY: Status: ACTIVE | Noted: 2024-09-26

## 2024-09-26 LAB — POC INR: 2.2 (ref 0.8–1.2)

## 2024-09-26 PROCEDURE — 99213 OFFICE O/P EST LOW 20 MIN: CPT

## 2024-09-26 PROCEDURE — 85610 PROTHROMBIN TIME: CPT

## 2024-09-26 NOTE — TELEPHONE ENCOUNTER
Orders received from SARAH Puckett for P2Y12 inhibition level to be added to labs with JARAD next week.     Discussed with pt's son Heriberto.  Heriberto also states that Raymond has significant swelling of his bilateral feet and has not been taking medications as prescribed.  Disused bringing Raymond into structural clinic for follow-up and discussion.  Appt made with Ly 10/1.

## 2024-09-27 ENCOUNTER — NURSE ONLY (OUTPATIENT)
Dept: CARDIOLOGY CLINIC | Age: 84
End: 2024-09-27

## 2024-09-27 ENCOUNTER — TELEPHONE (OUTPATIENT)
Dept: CARDIOLOGY CLINIC | Age: 84
End: 2024-09-27

## 2024-09-27 DIAGNOSIS — Z95.2 S/P TAVR (TRANSCATHETER AORTIC VALVE REPLACEMENT): Primary | ICD-10-CM

## 2024-09-27 DIAGNOSIS — Z95.0 PACEMAKER: Primary | ICD-10-CM

## 2024-09-27 NOTE — TELEPHONE ENCOUNTER
----- Message from Jacquelyn Banegas RPH sent at 9/26/2024  4:56 PM EDT -----  Regarding: referral  Hello,    Patient was recently discharged from the hospital on Coumadin. He was referred to the Coumadin Clinic by hospitalist.  He is a patient of Dr. Arguello.  If appropriate, can we please have a referral from Dr. Arguello?    Thank you,  [facility]  [facility]  [phone] (phone)  [phone] (fax)

## 2024-10-01 ENCOUNTER — OFFICE VISIT (OUTPATIENT)
Dept: CARDIOLOGY CLINIC | Age: 84
End: 2024-10-01
Payer: MEDICARE

## 2024-10-01 ENCOUNTER — ANTI-COAG VISIT (OUTPATIENT)
Age: 84
End: 2024-10-01
Payer: MEDICARE

## 2024-10-01 ENCOUNTER — TELEPHONE (OUTPATIENT)
Dept: CARDIOLOGY CLINIC | Age: 84
End: 2024-10-01

## 2024-10-01 ENCOUNTER — HOSPITAL ENCOUNTER (OUTPATIENT)
Age: 84
Discharge: HOME | End: 2024-10-01
Payer: MEDICARE

## 2024-10-01 VITALS
DIASTOLIC BLOOD PRESSURE: 62 MMHG | HEIGHT: 69 IN | SYSTOLIC BLOOD PRESSURE: 112 MMHG | OXYGEN SATURATION: 99 % | HEART RATE: 63 BPM | WEIGHT: 190.2 LBS | BODY MASS INDEX: 28.17 KG/M2

## 2024-10-01 DIAGNOSIS — I35.1 AORTIC VALVE INSUFFICIENCY, ETIOLOGY OF CARDIAC VALVE DISEASE UNSPECIFIED: ICD-10-CM

## 2024-10-01 DIAGNOSIS — Z95.2 S/P TAVR (TRANSCATHETER AORTIC VALVE REPLACEMENT): Primary | ICD-10-CM

## 2024-10-01 DIAGNOSIS — I42.2 HYPERTROPHIC CARDIOMYOPATHY (HCC): ICD-10-CM

## 2024-10-01 DIAGNOSIS — D59.9 ACQUIRED HEMOLYTIC ANEMIA, UNSPECIFIED (HCC): ICD-10-CM

## 2024-10-01 DIAGNOSIS — Z95.2 S/P TAVR (TRANSCATHETER AORTIC VALVE REPLACEMENT): ICD-10-CM

## 2024-10-01 DIAGNOSIS — I35.0 AORTIC STENOSIS, SEVERE: Primary | ICD-10-CM

## 2024-10-01 DIAGNOSIS — R60.0 EDEMA OF BOTH LOWER LEGS: ICD-10-CM

## 2024-10-01 DIAGNOSIS — I50.32 CHRONIC DIASTOLIC CONGESTIVE HEART FAILURE, NYHA CLASS 3 (HCC): ICD-10-CM

## 2024-10-01 DIAGNOSIS — Z79.01 LONG TERM (CURRENT) USE OF ANTICOAGULANTS: ICD-10-CM

## 2024-10-01 DIAGNOSIS — Z51.81 ENCOUNTER FOR THERAPEUTIC DRUG MONITORING: ICD-10-CM

## 2024-10-01 LAB
DEPRECATED RDW RBC AUTO: 48.8 FL (ref 35–45)
ERYTHROCYTE [DISTWIDTH] IN BLOOD BY AUTOMATED COUNT: 14.6 % (ref 11.5–14.5)
HCT VFR BLD AUTO: 35.8 % (ref 42–52)
HGB BLD-MCNC: 11.4 GM/DL (ref 14–18)
MCH RBC QN AUTO: 29.1 PG (ref 26–33)
MCHC RBC AUTO-ENTMCNC: 31.8 GM/DL (ref 32.2–35.5)
MCV RBC AUTO: 91.3 FL (ref 80–94)
P2Y12 ASSAY: 71 PRU (ref 180–376)
PLATELET # BLD AUTO: 220 THOU/MM3 (ref 130–400)
PMV BLD AUTO: 11.4 FL (ref 9.4–12.4)
POC INR: 2.2 (ref 0.8–1.2)
RBC # BLD AUTO: 3.92 MILL/MM3 (ref 4.7–6.1)
WBC # BLD AUTO: 6.5 THOU/MM3 (ref 4.8–10.8)

## 2024-10-01 PROCEDURE — 85027 COMPLETE CBC AUTOMATED: CPT

## 2024-10-01 PROCEDURE — 36415 COLL VENOUS BLD VENIPUNCTURE: CPT

## 2024-10-01 PROCEDURE — 1123F ACP DISCUSS/DSCN MKR DOCD: CPT | Performed by: NURSE PRACTITIONER

## 2024-10-01 PROCEDURE — 99211 OFF/OP EST MAY X REQ PHY/QHP: CPT | Performed by: PHARMACIST

## 2024-10-01 PROCEDURE — 85610 PROTHROMBIN TIME: CPT | Performed by: PHARMACIST

## 2024-10-01 PROCEDURE — 85576 BLOOD PLATELET AGGREGATION: CPT

## 2024-10-01 PROCEDURE — 3078F DIAST BP <80 MM HG: CPT | Performed by: NURSE PRACTITIONER

## 2024-10-01 PROCEDURE — 99214 OFFICE O/P EST MOD 30 MIN: CPT | Performed by: NURSE PRACTITIONER

## 2024-10-01 PROCEDURE — 3074F SYST BP LT 130 MM HG: CPT | Performed by: NURSE PRACTITIONER

## 2024-10-01 ASSESSMENT — ENCOUNTER SYMPTOMS
VOMITING: 0
SHORTNESS OF BREATH: 0
ABDOMINAL DISTENTION: 0
COUGH: 0
NAUSEA: 0

## 2024-10-01 NOTE — TELEPHONE ENCOUNTER
Dr. Arguello has ordered to cancel the JARAD scheduled for 10/4/2024 and keep the TTE on 10/15/2024.    Surgery scheduling notified and Dr. Arguello's schedule updated.

## 2024-10-01 NOTE — PROGRESS NOTES
Medication Management Clinic  [facility]  Anticoagulation Clinic  [phone] (phone)  [phone] (fax)    Mr. Raymond Hummel is a 84 y.o.  male with history of  s/p TAVR  who presents today for anticoagulation monitoring and adjustment.    Here with son and wife.  Son verifies tablet strength.  Daughter-in-law does pill box. Son states that they follow the printed instructions.   No missed or extra doses.  Patient denies s/s bleeding/bruising/swelling/SOB  No blood in urine or stool.  No dietary changes.  No changes in medication/OTC agents/Herbals.  No change in alcohol use or tobacco use.  No change in activity level.  Patient denies falls.  No vomiting/diarrhea or acute illness.   No Procedures scheduled in the future at this time.    Assessment:   Lab Results   Component Value Date    INR 2.20 (H) 10/01/2024    INR 2.20 (H) 09/26/2024    INR 1.90 (H) 09/24/2024     INR therapeutic   Recent Labs     10/01/24  0937   INR 2.20*     Will need refill at next visit.     Plan:  Coumadin 2.5mg MF and 5mg TWThSaS .  Recheck INR in 1 week(s).  Patient reminded to call the Anticoagulation Clinic with any signs or symptoms of bleeding or with any medication changes.  Patient given instructions utilizing the teach back method.        After visit summary printed and reviewed with patient.      Discharged in  in no apparent distress.    For Pharmacy Admin Tracking Only    Intervention Detail: Dose Adjustment: 1, reason: Therapy Optimization  Total # of Interventions Recommended: 1  Total # of Interventions Accepted: 1  Time Spent (min): 20

## 2024-10-01 NOTE — PROGRESS NOTES
1. S/P Pilo TAVR (transcatheter aortic valve replacement)        2. Chronic diastolic congestive heart failure, NYHA class 3, stage C (HCC)        3. Acquired hemolytic anemia, unspecified (HCC)        4. Hypertrophic cardiomyopathy (HCC)        5. Edema of both lower legs              Continue:  GDMT:   ACE/ARB/ARNI - losartan 50mg daily    BB - toprol 25 daily    Diuretic - Bumex 0.5mg PRN - taking it daily   AA - Aldactone 25 daily   SGLT2 -    Vasodilator - hydralazine stopped   Other - norvasc 5 BID, coumadin/Brilinta      HFrEF 55-60% 2024 (42% on CMR 7/2024) (60-65% 2024)  Severe LV concentric hypertrophy w/ LVOT obstruction at rest w/ TYSHAWN - HOCM - Camzyos is contraindicated at this time d/t EF and gradient is under 50. Will continue to monitor and refer back to [facility] as needed  Severe AS - s/p Pilo TAVR 9/2024  HTN - stable on current medications   Acquired hemolytic anemia - improved     Stable, continued trace lower leg swelling on exam today. Urinating well. Notes some right foot pain and plans to follow up with his PCP. Also notes more unsteadiness since becoming less active. Plans on cardiac rehab. BMP in 4 weeks w/ 8 week f/u. JARAD on Friday       Lab reviewed - K 3.9 Cr 1.0 hgb 11.4    ECHO 2024 post Pilo TAVR: no atrial dilation, no paravalvular regurgitation, mild MR with man gradient of 6, RVSP 37, dilated IVC    CATH 6/2024: s/p PCI to LAD, 50% of RCA, 70% of OM 2  RHC 2024: wedge of 15, RA 12, PAD 19, CO 3.8 CI 1.9    No medication changes today     Blood work in 4 weeks    Continue diet/fluid adherence  Continue daily wts.  F/U w/ Cardiology  F/U in clinic 8 weeks      Tolerating above noted HF meds, no ill side effects noted. Will continue to monitor kidney function and electrolytes. Will optimize as tolerated.   Pt is compliant w/ medications.    Total visit time of 25 minutes has been spent with patient on education of symptoms, management, medication, and plan of care; as well as review of

## 2024-10-01 NOTE — PATIENT INSTRUCTIONS
You may receive a survey regarding the care you received during your visit.  Your input is valuable to us.  We encourage you to complete and return your survey.  We hope you will choose us in the future for your healthcare needs.    Your nurses today were Kylie.  Office hours:   Mon-Thurs 8-4:30  Friday 8-12  Phone: [phone]    Continue:  Continue current medications  Daily weights and record  Fluid restriction of 2 Liters per day  Limit sodium in diet to around 8942-6982 mg/day  Monitor BP  Activity as tolerated     Call the Heart Failure Clinic for any of the following symptoms:   Weight gain of -3 pounds in 1 day or 5 pounds in 1 week  Increased shortness of breath  Shortness of breath while laying down  Cough  Chest pain  Swelling in feet, ankles or legs  Bloating in abdomen  Fatigue           Statement Selected

## 2024-10-02 NOTE — TELEPHONE ENCOUNTER
Spoke with the patient's son Heriberto about canceling the JARAD and keeping the echo, no questions at this time   going directly to opt [address]

## 2024-10-08 ENCOUNTER — OFFICE VISIT (OUTPATIENT)
Dept: UROLOGY | Age: 84
End: 2024-10-08

## 2024-10-08 ENCOUNTER — ANTI-COAG VISIT (OUTPATIENT)
Age: 84
End: 2024-10-08
Payer: MEDICARE

## 2024-10-08 VITALS — WEIGHT: 190 LBS | HEIGHT: 69 IN | BODY MASS INDEX: 28.14 KG/M2

## 2024-10-08 DIAGNOSIS — Z95.2 S/P TAVR (TRANSCATHETER AORTIC VALVE REPLACEMENT): ICD-10-CM

## 2024-10-08 DIAGNOSIS — I35.0 AORTIC STENOSIS, SEVERE: Primary | ICD-10-CM

## 2024-10-08 DIAGNOSIS — R31.9 HEMATURIA, UNSPECIFIED TYPE: ICD-10-CM

## 2024-10-08 DIAGNOSIS — Z79.01 LONG TERM (CURRENT) USE OF ANTICOAGULANTS: ICD-10-CM

## 2024-10-08 DIAGNOSIS — R33.9 URINARY RETENTION: Primary | ICD-10-CM

## 2024-10-08 DIAGNOSIS — I35.1 AORTIC VALVE INSUFFICIENCY, ETIOLOGY OF CARDIAC VALVE DISEASE UNSPECIFIED: ICD-10-CM

## 2024-10-08 DIAGNOSIS — Z51.81 ENCOUNTER FOR THERAPEUTIC DRUG MONITORING: ICD-10-CM

## 2024-10-08 DIAGNOSIS — R39.12 BENIGN PROSTATIC HYPERPLASIA WITH WEAK URINARY STREAM: ICD-10-CM

## 2024-10-08 DIAGNOSIS — Z79.01 ANTICOAGULATED ON COUMADIN: ICD-10-CM

## 2024-10-08 DIAGNOSIS — N40.1 BENIGN PROSTATIC HYPERPLASIA WITH WEAK URINARY STREAM: ICD-10-CM

## 2024-10-08 LAB
POC INR: 1.6 (ref 0.8–1.2)
POST VOID RESIDUAL (PVR): 84 ML

## 2024-10-08 PROCEDURE — 85610 PROTHROMBIN TIME: CPT

## 2024-10-08 PROCEDURE — 99213 OFFICE O/P EST LOW 20 MIN: CPT

## 2024-10-08 RX ORDER — TAMSULOSIN HYDROCHLORIDE 0.4 MG/1
0.4 CAPSULE ORAL DAILY
Qty: 90 CAPSULE | Refills: 3 | Status: SHIPPED | OUTPATIENT
Start: 2024-10-08

## 2024-10-08 RX ORDER — WARFARIN SODIUM 5 MG/1
TABLET ORAL
Qty: 30 TABLET | Refills: 3 | Status: SHIPPED | OUTPATIENT
Start: 2024-10-08

## 2024-10-08 NOTE — PROGRESS NOTES
Medication Management Clinic  [facility]  Anticoagulation Clinic  [phone] (phone)  [phone] (fax)    Mr. Raymond Hummel is a 84 y.o.  male with history of TAVR/AI/severe AS, per Dr. Erick Arguello's referral, who presents today for Warfarin monitoring and adjustment (1 week visit).    Son verifies current tablet strength. His wife sets up pill box.  Has had today's dose.  No missed or extra doses.  Patient denies bleeding/SOB. States still has some bruises on abdomen.  Noted small light purple ones back of left hand. (He states all bruises are from recent hospital stay.) Has usual swelling of legs/ankles.  No blood in urine or stool.  No dietary changes.  No changes in medication/OTC agents/herbals.  No change in alcohol use or tobacco use.  No change in activity level.  Patient denies falls.  No vomiting/diarrhea or acute illness.   No procedures scheduled in the future at this time.  Saw urologist today.    Assessment:   Lab Results   Component Value Date    INR 1.60 (H) 10/08/2024    INR 2.20 (H) 10/01/2024    INR 2.20 (H) 09/26/2024     INR subtherapeutic - goal 2-3.  Recent Labs     10/08/24  1458   INR 1.60*        Plan:  POCT INR performed/result reviewed.  7.5 mg today total, T, then continue PO Coumadin 2.5 mg MF, 5 mg TWThSS.  Recheck INR next week. (Report given - orders entered by MAURA Haley RPh., PharmD., who electronically renewed prescription.)  Patient reminded to call the Anticoagulation Clinic with any signs or symptoms of bleeding or with any medication changes.  Patient given instructions utilizing the teach back method.     Wrote on instructions to take 1/2 tablet more to=7.5 mg today.  After visit summary printed and reviewed with son.      Discharged ambulatory in no apparent distress, with cane, wife, and son.     For Pharmacy Admin Tracking Only    Intervention Detail: Dose Adjustment: 1, reason: Therapy Optimization and Refill(s) Provided  Total # of Interventions

## 2024-10-08 NOTE — PROGRESS NOTES
[facility] [place] [facility]  [facility] UROLOGY  [address]  [address]  [facility] [address]  Dept: [phone]  Loc: [phone]  Visit Date: 10/8/2024      HPI  Raymond Hummel is a 84 y.o. male that presents to the urology clinic for hospital follow-up.    Urinary retention in the setting of UTI and gross hematuria. Started on Finasteride while IP due to \"Flomax allergy.\" Successful VT at discharge. Urine has remained clear since coming out of the hospital. Symptoms stable vs last visit. IPSS of 14/3.    Brilinta and ASA. TAVR in June. First episode of gross hematuria. No smoking history.    Most Recent PSA: 0.14 (09/04/24)    Results for orders placed or performed in visit on 10/08/24   poct post void residual   Result Value Ref Range    post void residual 84 ml     UA: Unable to void.      Last BUN and Creatinine:  Lab Results   Component Value Date    BUN 22 09/24/2024     Lab Results   Component Value Date    CREATININE 1.0 09/24/2024         PAST MEDICAL, FAMILY AND SOCIAL HISTORY UPDATE:  Past Medical History:   Diagnosis Date    Diverticulosis     Heart murmur     Hematuria 08/15/2024    during admission where pt got TAVR 08/15/2024    High triglycerides     History of colonic polyps     History of transcatheter aortic valve replacement (TAVR) 08/15/2024    w/ Dr. Arguello    Hypertension     Insulin resistance     LVH (left ventricular hypertrophy)     Medtronic dual pacer 09/17/2024     Past Surgical History:   Procedure Laterality Date    CARDIAC PROCEDURE Bilateral 6/12/2024    Left and right heart cath / coronary angiography performed by Erick Arguello MD at [facility] CARDIAC CATH LAB    CARDIAC PROCEDURE N/A 6/12/2024    Percutaneous coronary intervention performed by Erick Arguello MD at [facility] CARDIAC CATH LAB    CARDIAC PROCEDURE N/A 8/15/2024    Transcatheter aortic valve replacement performed by Erick Arguello MD at [facility] CARDIAC CATH LAB    CARDIAC PROCEDURE N/A

## 2024-10-10 LAB — ECHO BSA: 2.1 M2

## 2024-10-15 ENCOUNTER — HOSPITAL ENCOUNTER (OUTPATIENT)
Age: 84
Discharge: HOME | End: 2024-10-17
Attending: INTERNAL MEDICINE
Payer: MEDICARE

## 2024-10-15 VITALS
SYSTOLIC BLOOD PRESSURE: 112 MMHG | WEIGHT: 190 LBS | BODY MASS INDEX: 28.14 KG/M2 | DIASTOLIC BLOOD PRESSURE: 62 MMHG | HEIGHT: 69 IN

## 2024-10-15 DIAGNOSIS — T82.03XA PARAVALVULAR LEAK OF PROSTHETIC HEART VALVE, INITIAL ENCOUNTER: ICD-10-CM

## 2024-10-15 DIAGNOSIS — Z95.2 S/P TAVR (TRANSCATHETER AORTIC VALVE REPLACEMENT): ICD-10-CM

## 2024-10-15 LAB
ECHO AO ASC DIAM: 4.3 CM
ECHO AO ASCENDING AORTA INDEX: 2.13 CM/M2
ECHO AV ACCELERATION TIME: 151 MS
ECHO AV AREA PEAK VELOCITY: 1.8 CM2
ECHO AV AREA VTI: 2 CM2
ECHO AV AREA/BSA PEAK VELOCITY: 0.9 CM2/M2
ECHO AV AREA/BSA VTI: 1 CM2/M2
ECHO AV MEAN GRADIENT: 10 MMHG
ECHO AV MEAN VELOCITY: 1.5 M/S
ECHO AV PEAK GRADIENT: 15 MMHG
ECHO AV PEAK VELOCITY: 1.9 M/S
ECHO AV VELOCITY RATIO: 0.58
ECHO AV VTI: 34.6 CM
ECHO BSA: 2.05 M2
ECHO EST RA PRESSURE: 5 MMHG
ECHO LA AREA 2C: 25.9 CM2
ECHO LA AREA 4C: 22.4 CM2
ECHO LA DIAMETER INDEX: 2.48 CM/M2
ECHO LA DIAMETER: 5 CM
ECHO LA MAJOR AXIS: 5.6 CM
ECHO LA MINOR AXIS: 5.8 CM
ECHO LA VOL BP: 83 ML (ref 18–58)
ECHO LA VOL MOD A2C: 91 ML (ref 18–58)
ECHO LA VOL MOD A4C: 75 ML (ref 18–58)
ECHO LA VOL/BSA BIPLANE: 41 ML/M2 (ref 16–34)
ECHO LA VOLUME INDEX MOD A2C: 45 ML/M2 (ref 16–34)
ECHO LA VOLUME INDEX MOD A4C: 37 ML/M2 (ref 16–34)
ECHO LV EJECTION FRACTION BIPLANE: 60 % (ref 55–100)
ECHO LV FRACTIONAL SHORTENING: 32 % (ref 28–44)
ECHO LV INTERNAL DIMENSION DIASTOLE INDEX: 2.03 CM/M2
ECHO LV INTERNAL DIMENSION DIASTOLIC: 4.1 CM (ref 4.2–5.9)
ECHO LV INTERNAL DIMENSION SYSTOLIC INDEX: 1.39 CM/M2
ECHO LV INTERNAL DIMENSION SYSTOLIC: 2.8 CM
ECHO LV IVSD: 1.5 CM (ref 0.6–1)
ECHO LV MASS 2D: 193.5 G (ref 88–224)
ECHO LV MASS INDEX 2D: 95.8 G/M2 (ref 49–115)
ECHO LV POSTERIOR WALL DIASTOLIC: 1.1 CM (ref 0.6–1)
ECHO LV RELATIVE WALL THICKNESS RATIO: 0.54
ECHO LVOT AREA: 3.1 CM2
ECHO LVOT AV VTI INDEX: 0.62
ECHO LVOT DIAM: 2 CM
ECHO LVOT MEAN GRADIENT: 3 MMHG
ECHO LVOT PEAK GRADIENT: 5 MMHG
ECHO LVOT PEAK VELOCITY: 1.1 M/S
ECHO LVOT STROKE VOLUME INDEX: 33.6 ML/M2
ECHO LVOT SV: 67.8 ML
ECHO LVOT VTI: 21.6 CM
ECHO MV A VELOCITY: 1.63 M/S
ECHO MV E DECELERATION TIME (DT): 345 MS
ECHO MV E VELOCITY: 0.8 M/S
ECHO MV E/A RATIO: 0.49
ECHO MV REGURGITANT PEAK GRADIENT: 92 MMHG
ECHO MV REGURGITANT PEAK VELOCITY: 4.8 M/S
ECHO PULMONARY ARTERY END DIASTOLIC PRESSURE: 7 MMHG
ECHO PV MAX VELOCITY: 0.6 M/S
ECHO PV MAX VELOCITY: 1.3 M/S
ECHO PV PEAK GRADIENT: 1 MMHG
ECHO RIGHT VENTRICULAR SYSTOLIC PRESSURE (RVSP): 30 MMHG
ECHO RV INTERNAL DIMENSION: 4.8 CM
ECHO RV TAPSE: 2 CM (ref 1.7–?)
ECHO TV E WAVE: 0.3 M/S
ECHO TV REGURGITANT MAX VELOCITY: 2.48 M/S
ECHO TV REGURGITANT PEAK GRADIENT: 25 MMHG

## 2024-10-15 PROCEDURE — 93306 TTE W/DOPPLER COMPLETE: CPT

## 2024-10-15 PROCEDURE — 93306 TTE W/DOPPLER COMPLETE: CPT | Performed by: INTERNAL MEDICINE

## 2024-10-16 ENCOUNTER — TELEPHONE (OUTPATIENT)
Dept: CARDIOLOGY CLINIC | Age: 84
End: 2024-10-16

## 2024-10-17 ENCOUNTER — ANTI-COAG VISIT (OUTPATIENT)
Age: 84
End: 2024-10-17
Payer: MEDICARE

## 2024-10-17 DIAGNOSIS — I35.1 AORTIC VALVE INSUFFICIENCY, ETIOLOGY OF CARDIAC VALVE DISEASE UNSPECIFIED: ICD-10-CM

## 2024-10-17 DIAGNOSIS — I35.0 AORTIC STENOSIS, SEVERE: Primary | ICD-10-CM

## 2024-10-17 DIAGNOSIS — Z95.2 S/P TAVR (TRANSCATHETER AORTIC VALVE REPLACEMENT): ICD-10-CM

## 2024-10-17 DIAGNOSIS — Z51.81 ENCOUNTER FOR THERAPEUTIC DRUG MONITORING: ICD-10-CM

## 2024-10-17 DIAGNOSIS — Z79.01 ANTICOAGULATED ON COUMADIN: ICD-10-CM

## 2024-10-17 LAB — POC INR: 2.1 (ref 0.8–1.2)

## 2024-10-17 PROCEDURE — 85610 PROTHROMBIN TIME: CPT

## 2024-10-17 PROCEDURE — 99211 OFF/OP EST MAY X REQ PHY/QHP: CPT

## 2024-10-17 NOTE — PROGRESS NOTES
Medication Management Clinic  [facility]  Anticoagulation Clinic  [phone] (phone)  [phone] (fax)    Mr. Raymond Hummel is a 84 y.o.  male with history of  TAVR/AI/severe AS  who presents today for anticoagulation monitoring and adjustment.    Patient verifies current dosing regimen and tablet strength. Patient reports that his wife sets up his pill box using the calendar.   No missed or extra doses.  Patient denies s/s bleeding/bruising/swelling/SOB  No blood in urine or stool.  No dietary changes.  No changes in medication/OTC agents/Herbals.  No change in alcohol use or tobacco use.  No change in activity level.  Patient denies falls.  No vomiting/diarrhea or acute illness.   No Procedures scheduled in the future at this time.    Assessment:   Lab Results   Component Value Date    INR 2.10 (H) 10/17/2024    INR 1.60 (H) 10/08/2024    INR 2.20 (H) 10/01/2024     INR therapeutic   Recent Labs     10/17/24  1455   INR 2.10*      Plan:  Continue Coumadin 2.5 mg MF and 5 mg all other days of the week. Recheck INR in 2 week(s). Patient reminded to call the Anticoagulation Clinic with any signs or symptoms of bleeding or with any medication changes.  Patient given instructions utilizing the teach back method.     After visit summary printed and reviewed with patient.      Discharged ambulatory in no apparent distress.  Chayito Reilly PharmD 10/17/2024 3:30 PM     For Pharmacy Admin Tracking Only    Time Spent (min): 20

## 2024-10-18 DIAGNOSIS — I50.32 CHRONIC DIASTOLIC CONGESTIVE HEART FAILURE, NYHA CLASS 3 (HCC): ICD-10-CM

## 2024-10-18 DIAGNOSIS — R60.0 EDEMA OF BOTH LOWER LEGS: ICD-10-CM

## 2024-10-18 RX ORDER — ATORVASTATIN CALCIUM 40 MG/1
40 TABLET, FILM COATED ORAL DAILY
Qty: 30 TABLET | Refills: 0 | Status: SHIPPED | OUTPATIENT
Start: 2024-10-18

## 2024-10-18 RX ORDER — LOSARTAN POTASSIUM 50 MG/1
50 TABLET ORAL DAILY
Qty: 30 TABLET | Refills: 0 | Status: SHIPPED | OUTPATIENT
Start: 2024-10-18

## 2024-10-18 RX ORDER — BUMETANIDE 0.5 MG/1
0.5 TABLET ORAL DAILY PRN
Qty: 30 TABLET | Refills: 0 | Status: SHIPPED | OUTPATIENT
Start: 2024-10-18

## 2024-10-20 ENCOUNTER — PATIENT MESSAGE (OUTPATIENT)
Dept: CARDIOLOGY CLINIC | Age: 84
End: 2024-10-20

## 2024-10-21 ENCOUNTER — TELEPHONE (OUTPATIENT)
Dept: CARDIOLOGY CLINIC | Age: 84
End: 2024-10-21

## 2024-10-21 NOTE — TELEPHONE ENCOUNTER
Dr. Arguello,     My brother and I will be accompanying my father at his appointment with you on Tuesday. We want you to be aware of a couple of things.  1) He is engaging in almost no physical activity. He moves from his bed to his chair and watches TV all day.  Will he be starting cardiac rehab soon?  2) He wanted to stop taking 4 prescriptions that needed to be refilled.  Of course, we did not allow him to do that. But, please impress upon him the importance of taking all meds as prescribed.  Thank you for the excellent care you are providing.

## 2024-10-22 ENCOUNTER — TELEPHONE (OUTPATIENT)
Dept: CARDIOLOGY CLINIC | Age: 84
End: 2024-10-22

## 2024-10-22 ENCOUNTER — OFFICE VISIT (OUTPATIENT)
Dept: CARDIOLOGY CLINIC | Age: 84
End: 2024-10-22
Payer: MEDICARE

## 2024-10-22 VITALS
HEART RATE: 84 BPM | SYSTOLIC BLOOD PRESSURE: 102 MMHG | WEIGHT: 197 LBS | HEIGHT: 69 IN | BODY MASS INDEX: 29.18 KG/M2 | DIASTOLIC BLOOD PRESSURE: 68 MMHG

## 2024-10-22 DIAGNOSIS — Z95.2 S/P TAVR (TRANSCATHETER AORTIC VALVE REPLACEMENT): Primary | ICD-10-CM

## 2024-10-22 DIAGNOSIS — Z95.0 PACEMAKER: ICD-10-CM

## 2024-10-22 DIAGNOSIS — I42.2 HYPERTROPHIC CARDIOMYOPATHY (HCC): ICD-10-CM

## 2024-10-22 PROCEDURE — 93000 ELECTROCARDIOGRAM COMPLETE: CPT | Performed by: INTERNAL MEDICINE

## 2024-10-22 PROCEDURE — 99213 OFFICE O/P EST LOW 20 MIN: CPT | Performed by: INTERNAL MEDICINE

## 2024-10-22 PROCEDURE — 3074F SYST BP LT 130 MM HG: CPT | Performed by: INTERNAL MEDICINE

## 2024-10-22 PROCEDURE — 1123F ACP DISCUSS/DSCN MKR DOCD: CPT | Performed by: INTERNAL MEDICINE

## 2024-10-22 PROCEDURE — 3078F DIAST BP <80 MM HG: CPT | Performed by: INTERNAL MEDICINE

## 2024-10-22 RX ORDER — FINASTERIDE 5 MG/1
5 TABLET, FILM COATED ORAL DAILY
Qty: 30 TABLET | Refills: 3 | Status: SHIPPED | OUTPATIENT
Start: 2024-10-22

## 2024-10-22 NOTE — PROGRESS NOTES
30 day post Pilo TAVR    EKG completed today in office.     Denies chest pain, SOB, palpitations  Reports swelling in left ankle.

## 2024-10-22 NOTE — PROGRESS NOTES
[facility] [place] [facility]  [facility] CARDIOLOGY  [address]  [address]  [facility] [address]  Dept: [phone]  Dept Fax: [phone]  Loc: [phone]    Visit Date: 10/22/2024    Mr. Hummel is a 84 y.o. male  who presented for:  Chief Complaint   Patient presents with    Follow-up     30 day post Pilo TAVR       HPI:   HPI   83 yo M with Pilo TAVR, HOCM, s/p PM who presents for follow-up.  Has LAD PCI as well.  He is on Coumadin due to Pilo TAVR.  EF 55-60%.  AP-.  No other symptoms, other than fatigue.  He is going to do cardiac rehab.  Has some right foot pain, improved with chiropracter.  HR 80s.  No other major issues.  Taking all other meds.  No chest pain, angina, DOS SANTOS, orthopnea, PND, sob at rest, palpitations, LE edema, or syncope.          ECG (if obtained today):  Paced      Current Outpatient Medications:     atorvastatin (LIPITOR) 40 MG tablet, Take 1 tablet by mouth daily, Disp: 30 tablet, Rfl: 0    bumetanide (BUMEX) 0.5 MG tablet, Take 1 tablet by mouth daily as needed (weight gain/swelling/SOB), Disp: 30 tablet, Rfl: 0    losartan (COZAAR) 50 MG tablet, Take 1 tablet by mouth daily, Disp: 30 tablet, Rfl: 0    tamsulosin (FLOMAX) 0.4 MG capsule, Take 1 capsule by mouth daily, Disp: 90 capsule, Rfl: 3    warfarin (COUMADIN) 5 MG tablet, Take as directed by Medication Management Clinic. 30 days supply., Disp: 30 tablet, Rfl: 3    amLODIPine (NORVASC) 5 MG tablet, Take 1 tablet by mouth in the morning and at bedtime, Disp: 30 tablet, Rfl: 3    metoprolol succinate (TOPROL XL) 25 MG extended release tablet, Take 1 tablet by mouth daily, Disp: 30 tablet, Rfl: 3    metFORMIN (GLUCOPHAGE-XR) 500 MG extended release tablet, Take 1 tablet by mouth in the morning and at bedtime Restart 6-14-24 am dose, Disp: 30 tablet, Rfl: 3    finasteride (PROSCAR) 5 MG tablet, Take 1 tablet by mouth daily, Disp: 30 tablet, Rfl: 3    ticagrelor (BRILINTA) 90 MG TABS tablet, Take 1 tablet

## 2024-10-22 NOTE — TELEPHONE ENCOUNTER
30 day post TAVR KCQ12 completed and scanned into chart.    Eliezer Villafuerte was seen and treated in our emergency department on 5/2/2022. He may return to work on 05/03/2022. If you have any questions or concerns, please don't hesitate to call.       Alex Radford PA-C

## 2024-10-29 ENCOUNTER — HOSPITAL ENCOUNTER (OUTPATIENT)
Age: 84
Discharge: HOME | End: 2024-10-29
Payer: MEDICARE

## 2024-10-29 ENCOUNTER — HOSPITAL ENCOUNTER (OUTPATIENT)
Dept: CARDIAC REHAB | Age: 84
Setting detail: THERAPIES SERIES
Discharge: HOME | End: 2024-10-29
Payer: MEDICARE

## 2024-10-29 VITALS — HEIGHT: 69 IN | WEIGHT: 196.4 LBS | BODY MASS INDEX: 29.09 KG/M2

## 2024-10-29 DIAGNOSIS — I50.32 CHRONIC DIASTOLIC CONGESTIVE HEART FAILURE, NYHA CLASS 3 (HCC): ICD-10-CM

## 2024-10-29 LAB
ANION GAP SERPL CALC-SCNC: 18 MEQ/L (ref 8–16)
BUN SERPL-MCNC: 17 MG/DL (ref 7–22)
CALCIUM SERPL-MCNC: 9.1 MG/DL (ref 8.5–10.5)
CHLORIDE SERPL-SCNC: 98 MEQ/L (ref 98–111)
CO2 SERPL-SCNC: 23 MEQ/L (ref 23–33)
CREAT SERPL-MCNC: 0.8 MG/DL (ref 0.4–1.2)
GFR SERPL CREATININE-BSD FRML MDRD: 87 ML/MIN/1.73M2
GLUCOSE SERPL-MCNC: 201 MG/DL (ref 70–108)
POTASSIUM SERPL-SCNC: 4 MEQ/L (ref 3.5–5.2)
SODIUM SERPL-SCNC: 139 MEQ/L (ref 135–145)

## 2024-10-29 PROCEDURE — G0422 INTENS CARDIAC REHAB W/EXERC: HCPCS

## 2024-10-29 PROCEDURE — 80048 BASIC METABOLIC PNL TOTAL CA: CPT

## 2024-10-29 PROCEDURE — 36415 COLL VENOUS BLD VENIPUNCTURE: CPT

## 2024-10-29 ASSESSMENT — PATIENT HEALTH QUESTIONNAIRE - PHQ9
3. TROUBLE FALLING OR STAYING ASLEEP: NOT AT ALL
10. IF YOU CHECKED OFF ANY PROBLEMS, HOW DIFFICULT HAVE THESE PROBLEMS MADE IT FOR YOU TO DO YOUR WORK, TAKE CARE OF THINGS AT HOME, OR GET ALONG WITH OTHER PEOPLE: NOT DIFFICULT AT ALL
6. FEELING BAD ABOUT YOURSELF - OR THAT YOU ARE A FAILURE OR HAVE LET YOURSELF OR YOUR FAMILY DOWN: NOT AT ALL
SUM OF ALL RESPONSES TO PHQ QUESTIONS 1-9: 2
SUM OF ALL RESPONSES TO PHQ9 QUESTIONS 1 & 2: 0
9. THOUGHTS THAT YOU WOULD BE BETTER OFF DEAD, OR OF HURTING YOURSELF: NOT AT ALL
1. LITTLE INTEREST OR PLEASURE IN DOING THINGS: NOT AT ALL
2. FEELING DOWN, DEPRESSED OR HOPELESS: NOT AT ALL
8. MOVING OR SPEAKING SO SLOWLY THAT OTHER PEOPLE COULD HAVE NOTICED. OR THE OPPOSITE, BEING SO FIGETY OR RESTLESS THAT YOU HAVE BEEN MOVING AROUND A LOT MORE THAN USUAL: SEVERAL DAYS
4. FEELING TIRED OR HAVING LITTLE ENERGY: SEVERAL DAYS
5. POOR APPETITE OR OVEREATING: NOT AT ALL
7. TROUBLE CONCENTRATING ON THINGS, SUCH AS READING THE NEWSPAPER OR WATCHING TELEVISION: NOT AT ALL

## 2024-10-29 NOTE — PLAN OF CARE
[facility] Facility-Based Program  Individualized Cardiac Treatment Plan    [x] 72 Sessions   [] 36 Sessions  Patient Name:  Raymond Hummel  :  1940  Age:  84 y.o.  MRN:  [id number]  Diagnosis: PCI, TAVR  Date of Event: 24, 8/15/24, 24   Physician:  Jos Steve Office Visit:  ?  Date Entered Program: 10/29/24  Risk Stratifications: [] Low [x] Intermediate [] High  Allergies: No Known Allergies    [x][facility] Emergency Department Resource Folder & Patient Guidebook, given and explained to Raymond.    Individual Cardiac Treatment Plan -EXERCISE  INITIAL 30 DAY 60 DAY 90  DAY FINAL DAY   EXERCISE  ASSESSMENT/PLAN EXERCISE  REASSESSMENT EXERCISE   REASSESSMENT EXERCISE   REASSESSMENT EXERCISE   REASSESSMENT EXERCISE  DISCHARGE/FOLLOW-UP   Date: 10/29/24 Date: Date: Date: Date: Date:   Session #1 Total Session #   First Exercise Session:   Total Session #  Total Session #  Total Session #  Total Session #   Last Exercise Session:     Stages of Change Stages of Change Stages of Change Stages of Change Stages of Change Stages of Change   [] Pre Contemplation  [x] Contemplation  [] Preparation  [] Action  [] Maintenance  [] Relapse [] Pre Contemplation  [] Contemplation  [] Preparation  [] Action  [] Maintenance  [] Relapse [] Pre Contemplation  [] Contemplation  [] Preparation  [] Action  [] Maintenance  [] Relapse [] Pre Contemplation  [] Contemplation  [] Preparation  [] Action  [] Maintenance  [] Relapse [] Pre Contemplation  [] Contemplation  [] Preparation  [] Action  [] Maintenance  [] Relapse [] Pre Contemplation  [] Contemplation  [] Preparation  [] Action  [] Maintenance  [] Relapse   EXERCISE ASSESSMENT EXERCISE ASSESSMENT EXERCISE ASSESSMENT EXERCISE ASSESSMENT EXERCISE ASSESSMENT EXERCISE ASSESSMENT   6 Min Walk Test  Distance walked:   0.09 miles  475 ft  1.7 METs  Max HR:102 BPM      RPE:  13    THR:  112-121  Rhythm:  paced     6 Min Walk Test  Distance walked:

## 2024-10-30 ENCOUNTER — TELEPHONE (OUTPATIENT)
Dept: CARDIOLOGY CLINIC | Age: 84
End: 2024-10-30

## 2024-10-30 NOTE — TELEPHONE ENCOUNTER
----- Message from JEAN Morales - CNP sent at 10/29/2024  4:54 PM EDT -----  Labs stable - no changes at this time.

## 2024-10-31 ENCOUNTER — ANTI-COAG VISIT (OUTPATIENT)
Age: 84
End: 2024-10-31
Payer: MEDICARE

## 2024-10-31 DIAGNOSIS — I35.0 AORTIC STENOSIS, SEVERE: Primary | ICD-10-CM

## 2024-10-31 DIAGNOSIS — Z95.2 S/P TAVR (TRANSCATHETER AORTIC VALVE REPLACEMENT): ICD-10-CM

## 2024-10-31 DIAGNOSIS — Z51.81 ENCOUNTER FOR THERAPEUTIC DRUG MONITORING: ICD-10-CM

## 2024-10-31 DIAGNOSIS — I35.1 AORTIC VALVE INSUFFICIENCY, ETIOLOGY OF CARDIAC VALVE DISEASE UNSPECIFIED: ICD-10-CM

## 2024-10-31 DIAGNOSIS — Z79.01 ANTICOAGULATED ON COUMADIN: ICD-10-CM

## 2024-10-31 LAB — POC INR: 1.9 (ref 0.8–1.2)

## 2024-10-31 PROCEDURE — 85610 PROTHROMBIN TIME: CPT

## 2024-10-31 PROCEDURE — 99211 OFF/OP EST MAY X REQ PHY/QHP: CPT

## 2024-10-31 NOTE — PROGRESS NOTES
Medication Management Clinic  [facility]  Anticoagulation Clinic  [phone] (phone)  [phone] (fax)    Mr. Raymond Hummel is a 84 y.o.  male with history of  TAVR, AI, Severe AS  who presents today for anticoagulation monitoring and adjustment.    Patient verifies current dosing regimen and tablet strength.  No missed or extra doses.  Patient denies s/s bleeding/bruising/swelling/SOB  No blood in urine or stool.  No dietary changes.  Patient no longer takes losartan and amlodipine.  No change in alcohol use or tobacco use.  Activity level increasing, will be starting full cardiac rehab next week and did some this week.  Patient denies falls.  No vomiting/diarrhea or acute illness.   No Procedures scheduled in the future at this time.    Assessment:   Lab Results   Component Value Date    INR 1.90 (H) 10/31/2024    INR 2.10 (H) 10/17/2024    INR 1.60 (H) 10/08/2024     INR subtherapeutic   Recent Labs     10/31/24  1341   INR 1.90*      Plan:  Coumadin 7.5 mg x 1 dose and then continue Coumadin 2.5 mg MF and 5 mg all other days. Recheck INR in 2.5 week(s).  Patient reminded to call the Anticoagulation Clinic with any signs or symptoms of bleeding or with any medication changes.  Patient given instructions utilizing the teach back method.       After visit summary printed and reviewed with patient.      Discharged ambulatory in no apparent distress.  Chayito Reilly PharmD 10/31/2024 2:04 PM     For Pharmacy Admin Tracking Only    Intervention Detail: Dose Adjustment: 1, reason: Therapy Optimization  Total # of Interventions Recommended: 1  Total # of Interventions Accepted: 1  Time Spent (min): 20

## 2024-11-04 ENCOUNTER — APPOINTMENT (OUTPATIENT)
Dept: CARDIAC REHAB | Age: 84
End: 2024-11-04
Payer: MEDICARE

## 2024-11-06 ENCOUNTER — HOSPITAL ENCOUNTER (OUTPATIENT)
Dept: CARDIAC REHAB | Age: 84
Setting detail: THERAPIES SERIES
Discharge: HOME | End: 2024-11-06
Payer: MEDICARE

## 2024-11-06 PROCEDURE — G0422 INTENS CARDIAC REHAB W/EXERC: HCPCS

## 2024-11-08 ENCOUNTER — HOSPITAL ENCOUNTER (OUTPATIENT)
Dept: CARDIAC REHAB | Age: 84
Setting detail: THERAPIES SERIES
Discharge: HOME | End: 2024-11-08
Payer: MEDICARE

## 2024-11-08 PROCEDURE — G0422 INTENS CARDIAC REHAB W/EXERC: HCPCS

## 2024-11-10 DIAGNOSIS — I50.32 CHRONIC DIASTOLIC CONGESTIVE HEART FAILURE, NYHA CLASS 3 (HCC): ICD-10-CM

## 2024-11-10 DIAGNOSIS — R60.0 EDEMA OF BOTH LOWER LEGS: ICD-10-CM

## 2024-11-11 ENCOUNTER — HOSPITAL ENCOUNTER (OUTPATIENT)
Dept: CARDIAC REHAB | Age: 84
Setting detail: THERAPIES SERIES
End: 2024-11-11
Payer: MEDICARE

## 2024-11-11 RX ORDER — ATORVASTATIN CALCIUM 40 MG/1
40 TABLET, FILM COATED ORAL DAILY
Qty: 90 TABLET | Refills: 0 | Status: SHIPPED | OUTPATIENT
Start: 2024-11-11

## 2024-11-11 RX ORDER — BUMETANIDE 0.5 MG/1
0.5 TABLET ORAL DAILY PRN
Qty: 90 TABLET | Refills: 0 | Status: SHIPPED | OUTPATIENT
Start: 2024-11-11

## 2024-11-13 ENCOUNTER — HOSPITAL ENCOUNTER (OUTPATIENT)
Dept: CARDIAC REHAB | Age: 84
Setting detail: THERAPIES SERIES
Discharge: HOME | End: 2024-11-13
Payer: MEDICARE

## 2024-11-13 ENCOUNTER — HOSPITAL ENCOUNTER (OUTPATIENT)
Age: 84
Discharge: HOME | End: 2024-11-15
Attending: INTERNAL MEDICINE
Payer: MEDICARE

## 2024-11-13 VITALS
BODY MASS INDEX: 29.03 KG/M2 | SYSTOLIC BLOOD PRESSURE: 102 MMHG | HEIGHT: 69 IN | DIASTOLIC BLOOD PRESSURE: 68 MMHG | WEIGHT: 196 LBS

## 2024-11-13 DIAGNOSIS — Z95.2 S/P TAVR (TRANSCATHETER AORTIC VALVE REPLACEMENT): ICD-10-CM

## 2024-11-13 PROCEDURE — G0422 INTENS CARDIAC REHAB W/EXERC: HCPCS

## 2024-11-13 PROCEDURE — 93306 TTE W/DOPPLER COMPLETE: CPT

## 2024-11-14 ENCOUNTER — NURSE ONLY (OUTPATIENT)
Dept: CARDIOLOGY CLINIC | Age: 84
End: 2024-11-14

## 2024-11-14 ENCOUNTER — OFFICE VISIT (OUTPATIENT)
Dept: CARDIOLOGY CLINIC | Age: 84
End: 2024-11-14

## 2024-11-14 ENCOUNTER — TELEPHONE (OUTPATIENT)
Dept: CARDIOLOGY CLINIC | Age: 84
End: 2024-11-14

## 2024-11-14 VITALS
SYSTOLIC BLOOD PRESSURE: 146 MMHG | BODY MASS INDEX: 29.83 KG/M2 | HEIGHT: 69 IN | WEIGHT: 201.4 LBS | DIASTOLIC BLOOD PRESSURE: 84 MMHG | HEART RATE: 93 BPM

## 2024-11-14 DIAGNOSIS — Z95.0 PACEMAKER: Primary | ICD-10-CM

## 2024-11-14 DIAGNOSIS — Z95.2 S/P TAVR (TRANSCATHETER AORTIC VALVE REPLACEMENT): Primary | ICD-10-CM

## 2024-11-14 LAB
ECHO AV MEAN GRADIENT: 8 MMHG
ECHO AV MEAN VELOCITY: 1.3 M/S
ECHO AV PEAK GRADIENT: 12 MMHG
ECHO AV PEAK VELOCITY: 1.7 M/S
ECHO AV VELOCITY RATIO: 0.71
ECHO AV VTI: 35.4 CM
ECHO BSA: 2.08 M2
ECHO EST RA PRESSURE: 10 MMHG
ECHO LA AREA 2C: 24.8 CM2
ECHO LA AREA 4C: 24.5 CM2
ECHO LA MAJOR AXIS: 5.4 CM
ECHO LA MINOR AXIS: 5.6 CM
ECHO LA VOL BP: 91 ML (ref 18–58)
ECHO LA VOL MOD A2C: 93 ML (ref 18–58)
ECHO LA VOL MOD A4C: 89 ML (ref 18–58)
ECHO LA VOL/BSA BIPLANE: 44 ML/M2 (ref 16–34)
ECHO LA VOLUME INDEX MOD A2C: 45 ML/M2 (ref 16–34)
ECHO LA VOLUME INDEX MOD A4C: 43 ML/M2 (ref 16–34)
ECHO LV E' LATERAL VELOCITY: 4.2 CM/S
ECHO LV E' SEPTAL VELOCITY: 3.3 CM/S
ECHO LV EJECTION FRACTION BIPLANE: 59 % (ref 55–100)
ECHO LV FRACTIONAL SHORTENING: 32 % (ref 28–44)
ECHO LV INTERNAL DIMENSION DIASTOLE INDEX: 2.44 CM/M2
ECHO LV INTERNAL DIMENSION DIASTOLIC: 5 CM (ref 4.2–5.9)
ECHO LV INTERNAL DIMENSION SYSTOLIC INDEX: 1.66 CM/M2
ECHO LV INTERNAL DIMENSION SYSTOLIC: 3.4 CM
ECHO LV ISOVOLUMETRIC RELAXATION TIME (IVRT): 137 MS
ECHO LV IVSD: 1.7 CM (ref 0.6–1)
ECHO LV MASS 2D: 338.8 G (ref 88–224)
ECHO LV MASS INDEX 2D: 165.2 G/M2 (ref 49–115)
ECHO LV POSTERIOR WALL DIASTOLIC: 1.4 CM (ref 0.6–1)
ECHO LV RELATIVE WALL THICKNESS RATIO: 0.56
ECHO LVOT AV VTI INDEX: 0.75
ECHO LVOT MEAN GRADIENT: 4 MMHG
ECHO LVOT PEAK GRADIENT: 6 MMHG
ECHO LVOT PEAK VELOCITY: 1.2 M/S
ECHO LVOT VTI: 26.7 CM
ECHO MV A VELOCITY: 1.88 M/S
ECHO MV E DECELERATION TIME (DT): 418 MS
ECHO MV E VELOCITY: 1.04 M/S
ECHO MV E/A RATIO: 0.55
ECHO MV E/E' LATERAL: 24.76
ECHO MV E/E' RATIO (AVERAGED): 28.14
ECHO MV E/E' SEPTAL: 31.52
ECHO MV REGURGITANT PEAK GRADIENT: 92 MMHG
ECHO MV REGURGITANT PEAK VELOCITY: 4.8 M/S
ECHO PULMONARY ARTERY END DIASTOLIC PRESSURE: 5 MMHG
ECHO PV MAX VELOCITY: 0.6 M/S
ECHO PV PEAK GRADIENT: 1 MMHG
ECHO PV REGURGITANT MAX VELOCITY: 1.2 M/S
ECHO RIGHT VENTRICULAR SYSTOLIC PRESSURE (RVSP): 35 MMHG
ECHO RV INTERNAL DIMENSION: 3.3 CM
ECHO RV TAPSE: 2.2 CM (ref 1.7–?)
ECHO TV E WAVE: 0.4 M/S
ECHO TV REGURGITANT MAX VELOCITY: 2.48 M/S
ECHO TV REGURGITANT PEAK GRADIENT: 25 MMHG

## 2024-11-14 RX ORDER — METOPROLOL SUCCINATE 50 MG/1
50 TABLET, EXTENDED RELEASE ORAL DAILY
Qty: 90 TABLET | Refills: 3 | Status: SHIPPED | OUTPATIENT
Start: 2024-11-14

## 2024-11-14 RX ORDER — METOPROLOL SUCCINATE 50 MG/1
50 TABLET, EXTENDED RELEASE ORAL DAILY
COMMUNITY
End: 2024-11-14 | Stop reason: SDUPTHER

## 2024-11-14 NOTE — PROGRESS NOTES
Follow-up Pilo.   Pt has been going to chiropractor, wanting to make sure that is okay.   He is doing cardiac rehab.  He does feel very tired after cardiac rehab.           
the cardiac test below:    ECHO: No results found for this or any previous visit.          Pilo TAVR   HOCM - no further LVOT obstruction  NYHA II  S/p PM   LAD PCI   HTN  BPH with LUTS  Assessment & Plan    The echocardiogram results were normal, with no abnormalities detected. The final report is still pending. His heart valve appears to be in excellent condition, with no murmurs detected. His hemoglobin levels are also on the rise. He was advised to continue with his cardiac rehabilitation and maintain his current medication regimen, which includes Coumadin and Brilinta. He is also following with CHF clinic. Should there be any changes in his condition, he is to contact me immediately. If he experiences any bleeding or other concerning symptoms, he should call me. If there are no such issues, he should continue with his rehabilitation and inform his chiropractor about his potential for bruising.    Follow-up  He will follow up in 1 year.       Disposition:  1 year         Discussed symptoms needing emergency care or those which require further medical attention.   Discussed diet/exercise/BP/weight loss/health lifestyle choices/lipids; the patient understands the goals and will try to comply.    The patient (or guardian, if applicable) and other individuals in attendance with the patient were advised that Artificial Intelligence will be utilized during this visit to record, process the conversation to generate a clinical note, and support improvement of the AI technology. The patient (or guardian, if applicable) and other individuals in attendance at the appointment consented to the use of AI, including the recording.      Electronically signed by Erick Arguello MD   11/14/2024 at 9:22 AM EST

## 2024-11-14 NOTE — PATIENT INSTRUCTIONS
Your nurses today were JERAMY Turcios and JAVIER Desai  Your provider today was Dr. Arguello  Phone number: [phone]

## 2024-11-15 ENCOUNTER — HOSPITAL ENCOUNTER (OUTPATIENT)
Dept: CARDIAC REHAB | Age: 84
Setting detail: THERAPIES SERIES
Discharge: HOME | End: 2024-11-15
Payer: MEDICARE

## 2024-11-15 PROCEDURE — G0422 INTENS CARDIAC REHAB W/EXERC: HCPCS

## 2024-11-18 ENCOUNTER — HOSPITAL ENCOUNTER (OUTPATIENT)
Dept: CARDIAC REHAB | Age: 84
Setting detail: THERAPIES SERIES
Discharge: HOME | End: 2024-11-18
Payer: MEDICARE

## 2024-11-18 ENCOUNTER — ANTI-COAG VISIT (OUTPATIENT)
Age: 84
End: 2024-11-18
Payer: MEDICARE

## 2024-11-18 DIAGNOSIS — Z79.01 ANTICOAGULATED ON COUMADIN: ICD-10-CM

## 2024-11-18 DIAGNOSIS — Z95.2 S/P TAVR (TRANSCATHETER AORTIC VALVE REPLACEMENT): ICD-10-CM

## 2024-11-18 DIAGNOSIS — Z51.81 ENCOUNTER FOR THERAPEUTIC DRUG MONITORING: ICD-10-CM

## 2024-11-18 DIAGNOSIS — I35.1 AORTIC VALVE INSUFFICIENCY, ETIOLOGY OF CARDIAC VALVE DISEASE UNSPECIFIED: ICD-10-CM

## 2024-11-18 DIAGNOSIS — I35.0 AORTIC STENOSIS, SEVERE: Primary | ICD-10-CM

## 2024-11-18 LAB — POC INR: 1.9 (ref 0.8–1.2)

## 2024-11-18 PROCEDURE — G0422 INTENS CARDIAC REHAB W/EXERC: HCPCS

## 2024-11-18 PROCEDURE — 85610 PROTHROMBIN TIME: CPT | Performed by: PHARMACIST

## 2024-11-18 PROCEDURE — 99211 OFF/OP EST MAY X REQ PHY/QHP: CPT | Performed by: PHARMACIST

## 2024-11-18 NOTE — PROGRESS NOTES
Medication Management Clinic  [facility]  Anticoagulation Clinic  [phone] (phone)  [phone] (fax)    Mr. Raymond Hummel is a 84 y.o.  male with history of  TAVR  who presents today for anticoagulation monitoring and adjustment.    Patient verifies current dosing regimen and tablet strength. Son fills pillbox following calendar.    No missed or extra doses.  Patient denies s/s bleeding/bruising/swelling/SOB  No blood in urine or stool.  No dietary changes. Drinking pomegranate juice   No changes in medication/OTC agents/Herbals. Taking Balance of Nature fruits and vegetables daily   No change in alcohol use or tobacco use.  No change in activity level. Cardiac rehab three days a week   Patient denies falls.  No vomiting/diarrhea or acute illness.   No Procedures scheduled in the future at this time.    Balance of Nature fruits and vegetables 1 capsule once daily     Assessment: .  Lab Results   Component Value Date    INR 1.90 (H) 11/18/2024    INR 1.90 (H) 10/31/2024    INR 2.10 (H) 10/17/2024     INR subtherapeutic   Recent Labs     11/18/24  1421   INR 1.90*     Plan:  Coumadin 7.5 mg X 1 today, then increase Coumadin from 2.5 mg MF, 5 mg TWThSaSu to Coumadin 2.5 mg F, 5 mg MTWThSaSu (8.3 % increase).  Recheck INR in 3 week(s).  Patient reminded to call the Anticoagulation Clinic with any signs or symptoms of bleeding or with any medication changes.  Patient given instructions utilizing the teach back method.    After visit summary printed and reviewed with patient.      Discharged ambulatory in no apparent distress.    Larisa Momin PharmD 11/18/2024 2:43 PM

## 2024-11-20 ENCOUNTER — HOSPITAL ENCOUNTER (OUTPATIENT)
Dept: CARDIAC REHAB | Age: 84
Setting detail: THERAPIES SERIES
Discharge: HOME | End: 2024-11-20
Payer: MEDICARE

## 2024-11-20 PROCEDURE — G0422 INTENS CARDIAC REHAB W/EXERC: HCPCS

## 2024-11-21 ENCOUNTER — TELEPHONE (OUTPATIENT)
Dept: CARDIOLOGY CLINIC | Age: 84
End: 2024-11-21

## 2024-11-21 NOTE — TELEPHONE ENCOUNTER
Received unscheduled download from device showing amplitudes are back to 3.5/3.5 despite programming changes on 11-14-24 reduced to 2/2.5. On 11-14-24 during OV check device had 14 years on battery and now states 10 years; RV pacing unchanged. Call to [employer] to see why programming is not reflected on download from 11-21-24; adaptive is on and thresholds are WNL.     Spoke with Mauricio at [employer] who stated device is back to 3.5/3.5 due to still being in the acute phase. Can be re-programmed after acute phase is completed.

## 2024-11-22 ENCOUNTER — HOSPITAL ENCOUNTER (OUTPATIENT)
Dept: CARDIAC REHAB | Age: 84
Setting detail: THERAPIES SERIES
Discharge: HOME | End: 2024-11-22
Payer: MEDICARE

## 2024-11-22 PROCEDURE — G0422 INTENS CARDIAC REHAB W/EXERC: HCPCS

## 2024-11-25 ENCOUNTER — HOSPITAL ENCOUNTER (OUTPATIENT)
Dept: CARDIAC REHAB | Age: 84
Setting detail: THERAPIES SERIES
Discharge: HOME | End: 2024-11-25
Payer: MEDICARE

## 2024-11-25 PROCEDURE — G0422 INTENS CARDIAC REHAB W/EXERC: HCPCS

## 2024-11-27 ENCOUNTER — HOSPITAL ENCOUNTER (OUTPATIENT)
Dept: CARDIAC REHAB | Age: 84
Setting detail: THERAPIES SERIES
Discharge: HOME | End: 2024-11-27
Payer: MEDICARE

## 2024-11-27 PROCEDURE — G0422 INTENS CARDIAC REHAB W/EXERC: HCPCS

## 2024-11-29 ENCOUNTER — APPOINTMENT (OUTPATIENT)
Dept: CARDIAC REHAB | Age: 84
End: 2024-11-29
Payer: MEDICARE

## 2024-12-02 ENCOUNTER — OFFICE VISIT (OUTPATIENT)
Dept: CARDIOLOGY CLINIC | Age: 84
End: 2024-12-02
Payer: MEDICARE

## 2024-12-02 ENCOUNTER — TELEPHONE (OUTPATIENT)
Dept: CARDIOLOGY CLINIC | Age: 84
End: 2024-12-02

## 2024-12-02 ENCOUNTER — HOSPITAL ENCOUNTER (OUTPATIENT)
Dept: CARDIAC REHAB | Age: 84
Setting detail: THERAPIES SERIES
Discharge: HOME | End: 2024-12-02
Payer: MEDICARE

## 2024-12-02 VITALS
WEIGHT: 203 LBS | RESPIRATION RATE: 18 BRPM | OXYGEN SATURATION: 95 % | SYSTOLIC BLOOD PRESSURE: 128 MMHG | DIASTOLIC BLOOD PRESSURE: 88 MMHG | HEIGHT: 69 IN | HEART RATE: 87 BPM | BODY MASS INDEX: 30.07 KG/M2

## 2024-12-02 DIAGNOSIS — R60.0 EDEMA OF BOTH LOWER LEGS: ICD-10-CM

## 2024-12-02 DIAGNOSIS — I50.32 CHRONIC DIASTOLIC CONGESTIVE HEART FAILURE, NYHA CLASS 3 (HCC): Primary | ICD-10-CM

## 2024-12-02 DIAGNOSIS — I50.32 CHRONIC DIASTOLIC CONGESTIVE HEART FAILURE, NYHA CLASS 3 (HCC): ICD-10-CM

## 2024-12-02 DIAGNOSIS — Z95.2 S/P TAVR (TRANSCATHETER AORTIC VALVE REPLACEMENT): ICD-10-CM

## 2024-12-02 PROCEDURE — 1160F RVW MEDS BY RX/DR IN RCRD: CPT | Performed by: NURSE PRACTITIONER

## 2024-12-02 PROCEDURE — 1159F MED LIST DOCD IN RCRD: CPT | Performed by: NURSE PRACTITIONER

## 2024-12-02 PROCEDURE — 3079F DIAST BP 80-89 MM HG: CPT | Performed by: NURSE PRACTITIONER

## 2024-12-02 PROCEDURE — 3074F SYST BP LT 130 MM HG: CPT | Performed by: NURSE PRACTITIONER

## 2024-12-02 PROCEDURE — 1123F ACP DISCUSS/DSCN MKR DOCD: CPT | Performed by: NURSE PRACTITIONER

## 2024-12-02 PROCEDURE — G0422 INTENS CARDIAC REHAB W/EXERC: HCPCS

## 2024-12-02 PROCEDURE — 99214 OFFICE O/P EST MOD 30 MIN: CPT | Performed by: NURSE PRACTITIONER

## 2024-12-02 RX ORDER — BUMETANIDE 0.5 MG/1
0.5 TABLET ORAL DAILY
Qty: 90 TABLET | Refills: 3 | Status: SHIPPED | OUTPATIENT
Start: 2024-12-02 | End: 2024-12-03 | Stop reason: SDUPTHER

## 2024-12-02 ASSESSMENT — ENCOUNTER SYMPTOMS
VOMITING: 0
SHORTNESS OF BREATH: 0
COUGH: 0
NAUSEA: 0
ABDOMINAL DISTENTION: 0

## 2024-12-02 NOTE — PROGRESS NOTES
Heart Failure Clinic       Visit Date: 12/2/2024  Cardiologist:  Dr. Arguello  Primary Care Physician: Duran Tucker MD    Raymond Hummel is a 84 y.o. male who presents today for:  Chief Complaint   Patient presents with    Follow-up       HPI:     TYPE HF: HFrEF 42% on CMR 7/2024 (60-65% 2024)   Cause: ischemic/valvular/HOCM   Device: s/p pacemaker insertion post TAVR  HX: HTN, CAD s/p PCI  Dry Wt:  204 on 7/23/24, 202 on 9/4/24, 190 on 10/11/2024, 203 on 12/2/24      Raymond Hummel is a 84 y.o. male who presents to the office for a f/u patient visit in the heart failure clinic.      Today's visit on 12/2/24: here today for his 8 week f/u. He has been taking his bumex daily with good urination. His weight is back up 13lbs from caloric intake.     Chest Pain: no  SOB: no  Orthopnea/PND: no  GIANNI: no  Edema: yes stable   Fatigue: no   Weakness: yes  Abdominal bloating: no  Cough: no  Appetite: good    Activity: Limited by pt. Since episode of CP in [place]  Diet: Educated - will start looking at food lables    Visit on 10/11/24: here today for his second post TAVR visit. Pt was found to have a large paravalvular leak and had to undergo a Pilo - TAVR. His son had called in c/o lower leg swelling and noncompliance with his diuretics. He notes some more weakness and unsteadiness but also is c/o right foot discomfort - PCP later this week.     Chest Pain: no  SOB: no  Orthopnea/PND: no  GIANNI: no  Edema: yes stable   Fatigue: no   Weakness: yes  Abdominal bloating: no  Cough: no  Appetite: good    Activity: Limited by pt. Since episode of CP in [place]  Diet: Educated - will start looking at food lables    Visit on 9/4/24:  here today for his two week post TAVR.  Pt called in yesterday c/o of a fall, tripped over his rug. He denied hitting his head yesterday but today is stating that he did not hit his head hard. His son states that he did not look well when he arrived to pick him up for an appointment shortly after

## 2024-12-02 NOTE — PATIENT INSTRUCTIONS
You may receive a survey regarding the care you received during your visit.  Your input is valuable to us.  We encourage you to complete and return your survey.  We hope you will choose us in the future for your healthcare needs.    Your nurses today were Kylie.  Office hours:   Mon-Thurs 8-4:30  Friday 8-12  Phone: [phone]    Continue:  Continue current medications  Daily weights and record  Fluid restriction of 2 Liters per day  Limit sodium in diet to around 0799-8602 mg/day  Monitor BP  Activity as tolerated     Call the Heart Failure Clinic for any of the following symptoms:   Weight gain of -3 pounds in 1 day or 5 pounds in 1 week  Increased shortness of breath  Shortness of breath while laying down  Cough  Chest pain  Swelling in feet, ankles or legs  Bloating in abdomen  Fatigue        No medication changes today     Call to verify if taking bumex daily.     Continue diet/fluid adherence  Continue daily wts.  F/U w/ Cardiology  F/U in clinic 3 months

## 2024-12-02 NOTE — TELEPHONE ENCOUNTER
Patient son Heriberto called in. Patient has NOT been taking the bumex 0.5mg- it is PRN and has not been in his pill box    Please call Heriberto back with recommendations

## 2024-12-03 DIAGNOSIS — I50.32 CHRONIC DIASTOLIC CONGESTIVE HEART FAILURE, NYHA CLASS 3 (HCC): ICD-10-CM

## 2024-12-03 DIAGNOSIS — R60.0 EDEMA OF BOTH LOWER LEGS: ICD-10-CM

## 2024-12-03 RX ORDER — BUMETANIDE 0.5 MG/1
0.5 TABLET ORAL DAILY
Qty: 90 TABLET | Refills: 2 | Status: SHIPPED | OUTPATIENT
Start: 2024-12-03

## 2024-12-03 NOTE — TELEPHONE ENCOUNTER
Raymond Hummel called requesting a refill on the following medications:  Requested Prescriptions     Pending Prescriptions Disp Refills    bumetanide (BUMEX) 0.5 MG tablet 90 tablet 3     Sig: Take 1 tablet by mouth daily     Pharmacy verified:      [facility] [address] - P [phone] - F [phone]       Date of last visit:   Date of next visit (if applicable):

## 2024-12-04 ENCOUNTER — HOSPITAL ENCOUNTER (OUTPATIENT)
Dept: CARDIAC REHAB | Age: 84
Setting detail: THERAPIES SERIES
Discharge: HOME | End: 2024-12-04
Payer: MEDICARE

## 2024-12-04 PROCEDURE — G0422 INTENS CARDIAC REHAB W/EXERC: HCPCS

## 2024-12-06 ENCOUNTER — HOSPITAL ENCOUNTER (OUTPATIENT)
Dept: CARDIAC REHAB | Age: 84
Setting detail: THERAPIES SERIES
Discharge: HOME | End: 2024-12-06
Payer: MEDICARE

## 2024-12-06 PROCEDURE — G0422 INTENS CARDIAC REHAB W/EXERC: HCPCS

## 2024-12-09 ENCOUNTER — HOSPITAL ENCOUNTER (OUTPATIENT)
Dept: CARDIAC REHAB | Age: 84
Setting detail: THERAPIES SERIES
Discharge: HOME | End: 2024-12-09
Payer: MEDICARE

## 2024-12-09 ENCOUNTER — ANTI-COAG VISIT (OUTPATIENT)
Age: 84
End: 2024-12-09
Payer: MEDICARE

## 2024-12-09 DIAGNOSIS — I35.0 AORTIC STENOSIS, SEVERE: Primary | ICD-10-CM

## 2024-12-09 DIAGNOSIS — Z95.2 S/P TAVR (TRANSCATHETER AORTIC VALVE REPLACEMENT): ICD-10-CM

## 2024-12-09 DIAGNOSIS — Z51.81 ENCOUNTER FOR THERAPEUTIC DRUG MONITORING: ICD-10-CM

## 2024-12-09 DIAGNOSIS — Z79.01 ANTICOAGULATED ON COUMADIN: ICD-10-CM

## 2024-12-09 DIAGNOSIS — I35.1 AORTIC VALVE INSUFFICIENCY, ETIOLOGY OF CARDIAC VALVE DISEASE UNSPECIFIED: ICD-10-CM

## 2024-12-09 LAB — POC INR: 1.9 (ref 0.8–1.2)

## 2024-12-09 PROCEDURE — 99212 OFFICE O/P EST SF 10 MIN: CPT | Performed by: PHARMACIST

## 2024-12-09 PROCEDURE — G0422 INTENS CARDIAC REHAB W/EXERC: HCPCS

## 2024-12-09 PROCEDURE — 85610 PROTHROMBIN TIME: CPT | Performed by: PHARMACIST

## 2024-12-09 NOTE — PROGRESS NOTES
Medication Management Clinic  [facility]  Anticoagulation Clinic  [phone] (phone)  [phone] (fax)    Mr. Raymond Hummel is a 84 y.o.  male with history of  TAVR  who presents today for anticoagulation monitoring and adjustment.    Patient verifies current dosing regimen and tablet strength.  No missed or extra doses.  Patient denies s/s bleeding/bruising/swelling/SOB  No blood in urine or stool.  No dietary changes.  No changes in medication/OTC agents/Herbals.  No change in alcohol use or tobacco use.  No change in activity level.  Patient denies falls.  No vomiting/diarrhea or acute illness.   No Procedures scheduled in the future at this time.    Assessment:   Lab Results   Component Value Date    INR 1.90 (H) 12/09/2024    INR 1.90 (H) 11/18/2024    INR 1.90 (H) 10/31/2024     INR therapeutic   Recent Labs     12/09/24  1426   INR 1.90*      Plan:  Coumadin 7.5 mg today, then increase Coumadin 5 mg daily (7.7 % increase from 2.5 mg F, 5 mg all other days.  Recheck INR in 3 week(s), patient prefers 4 weeks.  Patient reminded to call the Anticoagulation Clinic with any signs or symptoms of bleeding or with any medication changes.  Patient given instructions utilizing the teach back method.      Patient interview completed and discussed with pharmacist by ANTHONY Lopez PharmD Candidate 2025.    After visit summary printed and reviewed with patient.      Discharged ambulatory in no apparent distress.    Larisa Momin PharmD 12/9/2024 2:56 PM    For Pharmacy Admin Tracking Only    Intervention Detail: Dose Adjustment: 1, reason: Therapy Optimization  Total # of Interventions Recommended: 1  Total # of Interventions Accepted: 1  Time Spent (min): 20

## 2024-12-11 ENCOUNTER — APPOINTMENT (OUTPATIENT)
Dept: CARDIAC REHAB | Age: 84
End: 2024-12-11
Payer: MEDICARE

## 2024-12-13 ENCOUNTER — HOSPITAL ENCOUNTER (OUTPATIENT)
Dept: CARDIAC REHAB | Age: 84
Setting detail: THERAPIES SERIES
Discharge: HOME | End: 2024-12-13
Payer: MEDICARE

## 2024-12-13 PROCEDURE — G0422 INTENS CARDIAC REHAB W/EXERC: HCPCS

## 2024-12-16 ENCOUNTER — HOSPITAL ENCOUNTER (OUTPATIENT)
Age: 84
Discharge: HOME | End: 2024-12-16
Payer: MEDICARE

## 2024-12-16 ENCOUNTER — HOSPITAL ENCOUNTER (OUTPATIENT)
Dept: CARDIAC REHAB | Age: 84
Setting detail: THERAPIES SERIES
Discharge: HOME | End: 2024-12-16
Payer: MEDICARE

## 2024-12-16 DIAGNOSIS — I50.32 CHRONIC DIASTOLIC CONGESTIVE HEART FAILURE, NYHA CLASS 3 (HCC): ICD-10-CM

## 2024-12-16 LAB
ANION GAP SERPL CALC-SCNC: 17 MEQ/L (ref 8–16)
BUN SERPL-MCNC: 28 MG/DL (ref 7–22)
CALCIUM SERPL-MCNC: 9.9 MG/DL (ref 8.5–10.5)
CHLORIDE SERPL-SCNC: 101 MEQ/L (ref 98–111)
CO2 SERPL-SCNC: 22 MEQ/L (ref 23–33)
CREAT SERPL-MCNC: 1 MG/DL (ref 0.4–1.2)
GFR SERPL CREATININE-BSD FRML MDRD: 74 ML/MIN/1.73M2
GLUCOSE SERPL-MCNC: 114 MG/DL (ref 70–108)
POTASSIUM SERPL-SCNC: 4.3 MEQ/L (ref 3.5–5.2)
SODIUM SERPL-SCNC: 140 MEQ/L (ref 135–145)

## 2024-12-16 PROCEDURE — G0422 INTENS CARDIAC REHAB W/EXERC: HCPCS

## 2024-12-16 PROCEDURE — 80048 BASIC METABOLIC PNL TOTAL CA: CPT

## 2024-12-16 PROCEDURE — 36415 COLL VENOUS BLD VENIPUNCTURE: CPT

## 2024-12-18 ENCOUNTER — HOSPITAL ENCOUNTER (OUTPATIENT)
Dept: CARDIAC REHAB | Age: 84
Setting detail: THERAPIES SERIES
Discharge: HOME | End: 2024-12-18
Payer: MEDICARE

## 2024-12-18 PROCEDURE — G0422 INTENS CARDIAC REHAB W/EXERC: HCPCS

## 2024-12-20 ENCOUNTER — HOSPITAL ENCOUNTER (OUTPATIENT)
Dept: CARDIAC REHAB | Age: 84
Setting detail: THERAPIES SERIES
Discharge: HOME | End: 2024-12-20
Payer: MEDICARE

## 2024-12-20 PROCEDURE — G0422 INTENS CARDIAC REHAB W/EXERC: HCPCS

## 2024-12-21 ENCOUNTER — APPOINTMENT (OUTPATIENT)
Dept: CT IMAGING | Age: 84
End: 2024-12-21
Payer: MEDICARE

## 2024-12-21 ENCOUNTER — APPOINTMENT (OUTPATIENT)
Dept: GENERAL RADIOLOGY | Age: 84
End: 2024-12-21
Payer: MEDICARE

## 2024-12-21 ENCOUNTER — HOSPITAL ENCOUNTER (INPATIENT)
Age: 84
LOS: 10 days | Discharge: NURSING, REHAB OR LONG-TERM CARE | End: 2024-12-31
Attending: EMERGENCY MEDICINE
Payer: MEDICARE

## 2024-12-21 DIAGNOSIS — S22.42XD CLOSED FRACTURE OF MULTIPLE RIBS OF LEFT SIDE WITH ROUTINE HEALING, SUBSEQUENT ENCOUNTER: ICD-10-CM

## 2024-12-21 DIAGNOSIS — Z51.5 PALLIATIVE CARE PATIENT: ICD-10-CM

## 2024-12-21 DIAGNOSIS — R90.0 INTRACRANIAL MASS: ICD-10-CM
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
## 2024-12-21 DIAGNOSIS — S22.42XA CLOSED FRACTURE OF MULTIPLE RIBS OF LEFT SIDE, INITIAL ENCOUNTER: ICD-10-CM

## 2024-12-21 DIAGNOSIS — R41.89 COGNITIVE IMPAIRMENT: ICD-10-CM

## 2024-12-21 DIAGNOSIS — R41.0 DELIRIUM: ICD-10-CM

## 2024-12-21 DIAGNOSIS — I50.32 CHRONIC HEART FAILURE WITH PRESERVED EJECTION FRACTION (HCC): ICD-10-CM
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
## 2024-12-21 PROBLEM — J94.2 HEMOTHORAX ON LEFT: Status: ACTIVE | Noted: 2024-12-21

## 2024-12-21 PROBLEM — R79.89 ELEVATED TROPONIN: Status: ACTIVE | Noted: 2024-12-21

## 2024-12-21 LAB
ANION GAP SERPL CALC-SCNC: 18 MEQ/L (ref 8–16)
APTT PPP: 43.1 SECONDS (ref 22–38)
BASOPHILS ABSOLUTE: 0 THOU/MM3 (ref 0–0.1)
BASOPHILS NFR BLD AUTO: 0.3 %
BUN SERPL-MCNC: 25 MG/DL (ref 7–22)
CALCIUM SERPL-MCNC: 9.6 MG/DL (ref 8.5–10.5)
CHLORIDE SERPL-SCNC: 98 MEQ/L (ref 98–111)
CO2 SERPL-SCNC: 22 MEQ/L (ref 23–33)
CREAT SERPL-MCNC: 0.9 MG/DL (ref 0.4–1.2)
DEPRECATED RDW RBC AUTO: 40.6 FL (ref 35–45)
EOSINOPHIL NFR BLD AUTO: 0.3 %
EOSINOPHILS ABSOLUTE: 0 THOU/MM3 (ref 0–0.4)
ERYTHROCYTE [DISTWIDTH] IN BLOOD BY AUTOMATED COUNT: 13.4 % (ref 11.5–14.5)
GFR SERPL CREATININE-BSD FRML MDRD: 84 ML/MIN/1.73M2
GLUCOSE BLD STRIP.AUTO-MCNC: 228 MG/DL (ref 70–108)
GLUCOSE SERPL-MCNC: 197 MG/DL (ref 70–108)
HCT VFR BLD AUTO: 48.3 % (ref 42–52)
HGB BLD-MCNC: 15.5 GM/DL (ref 14–18)
IMM GRANULOCYTES # BLD AUTO: 0.12 THOU/MM3 (ref 0–0.07)
IMM GRANULOCYTES NFR BLD AUTO: 0.8 %
INR PPP: 2.65 (ref 0.85–1.13)
LYMPHOCYTES ABSOLUTE: 0.8 THOU/MM3 (ref 1–4.8)
LYMPHOCYTES NFR BLD AUTO: 5.7 %
MAGNESIUM SERPL-MCNC: 1.6 MG/DL (ref 1.6–2.4)
MCH RBC QN AUTO: 26.8 PG (ref 26–33)
MCHC RBC AUTO-ENTMCNC: 32.1 GM/DL (ref 32.2–35.5)
MCV RBC AUTO: 83.4 FL (ref 80–94)
MONOCYTES ABSOLUTE: 0.7 THOU/MM3 (ref 0.4–1.3)
MONOCYTES NFR BLD AUTO: 5 %
NEUTROPHILS ABSOLUTE: 12.6 THOU/MM3 (ref 1.8–7.7)
NEUTROPHILS NFR BLD AUTO: 87.9 %
NRBC BLD AUTO-RTO: 0 /100 WBC
OSMOLALITY SERPL CALC.SUM OF ELEC: 285.6 MOSMOL/KG (ref 275–300)
PLATELET # BLD AUTO: 172 THOU/MM3 (ref 130–400)
PMV BLD AUTO: 11 FL (ref 9.4–12.4)
POTASSIUM SERPL-SCNC: 3.8 MEQ/L (ref 3.5–5.2)
RBC # BLD AUTO: 5.79 MILL/MM3 (ref 4.7–6.1)
SODIUM SERPL-SCNC: 138 MEQ/L (ref 135–145)
TROPONIN, HIGH SENSITIVITY: 48 NG/L (ref 0–12)
TSH SERPL DL<=0.005 MIU/L-ACNC: 1.95 UIU/ML (ref 0.4–4.2)
WBC # BLD AUTO: 14.3 THOU/MM3 (ref 4.8–10.8)

## 2024-12-21 PROCEDURE — 82948 REAGENT STRIP/BLOOD GLUCOSE: CPT

## 2024-12-21 PROCEDURE — 96374 THER/PROPH/DIAG INJ IV PUSH: CPT

## 2024-12-21 PROCEDURE — 6360000002 HC RX W HCPCS: Performed by: EMERGENCY MEDICINE

## 2024-12-21 PROCEDURE — 2500000003 HC RX 250 WO HCPCS: Performed by: EMERGENCY MEDICINE

## 2024-12-21 PROCEDURE — 85730 THROMBOPLASTIN TIME PARTIAL: CPT

## 2024-12-21 PROCEDURE — 85025 COMPLETE CBC W/AUTO DIFF WBC: CPT

## 2024-12-21 PROCEDURE — 84443 ASSAY THYROID STIM HORMONE: CPT

## 2024-12-21 PROCEDURE — 72170 X-RAY EXAM OF PELVIS: CPT

## 2024-12-21 PROCEDURE — 83735 ASSAY OF MAGNESIUM: CPT

## 2024-12-21 PROCEDURE — 74177 CT ABD & PELVIS W/CONTRAST: CPT

## 2024-12-21 PROCEDURE — 71260 CT THORAX DX C+: CPT

## 2024-12-21 PROCEDURE — 6370000000 HC RX 637 (ALT 250 FOR IP): Performed by: NURSE PRACTITIONER

## 2024-12-21 PROCEDURE — 72125 CT NECK SPINE W/O DYE: CPT

## 2024-12-21 PROCEDURE — 2060000000 HC ICU INTERMEDIATE R&B

## 2024-12-21 PROCEDURE — 36415 COLL VENOUS BLD VENIPUNCTURE: CPT

## 2024-12-21 PROCEDURE — 76376 3D RENDER W/INTRP POSTPROCES: CPT

## 2024-12-21 PROCEDURE — 99285 EMERGENCY DEPT VISIT HI MDM: CPT

## 2024-12-21 PROCEDURE — 6360000004 HC RX CONTRAST MEDICATION: Performed by: EMERGENCY MEDICINE

## 2024-12-21 PROCEDURE — 80048 BASIC METABOLIC PNL TOTAL CA: CPT

## 2024-12-21 PROCEDURE — 71045 X-RAY EXAM CHEST 1 VIEW: CPT

## 2024-12-21 PROCEDURE — 96375 TX/PRO/DX INJ NEW DRUG ADDON: CPT

## 2024-12-21 PROCEDURE — 85610 PROTHROMBIN TIME: CPT

## 2024-12-21 PROCEDURE — 93005 ELECTROCARDIOGRAM TRACING: CPT | Performed by: EMERGENCY MEDICINE

## 2024-12-21 PROCEDURE — 6370000000 HC RX 637 (ALT 250 FOR IP)

## 2024-12-21 PROCEDURE — 94799 UNLISTED PULMONARY SVC/PX: CPT

## 2024-12-21 PROCEDURE — 84484 ASSAY OF TROPONIN QUANT: CPT

## 2024-12-21 PROCEDURE — 94150 VITAL CAPACITY TEST: CPT

## 2024-12-21 PROCEDURE — 70450 CT HEAD/BRAIN W/O DYE: CPT

## 2024-12-21 PROCEDURE — 2500000003 HC RX 250 WO HCPCS: Performed by: NURSE PRACTITIONER

## 2024-12-21 RX ORDER — TAMSULOSIN HYDROCHLORIDE 0.4 MG/1
0.4 CAPSULE ORAL DAILY
Status: DISCONTINUED | OUTPATIENT
Start: 2024-12-21 | End: 2024-12-31 | Stop reason: HOSPADM

## 2024-12-21 RX ORDER — ONDANSETRON 4 MG/1
4 TABLET, ORALLY DISINTEGRATING ORAL EVERY 8 HOURS PRN
Status: DISCONTINUED | OUTPATIENT
Start: 2024-12-21 | End: 2024-12-31 | Stop reason: HOSPADM

## 2024-12-21 RX ORDER — GLUCAGON 1 MG/ML
1 KIT INJECTION PRN
Status: DISCONTINUED | OUTPATIENT
Start: 2024-12-21 | End: 2024-12-31 | Stop reason: HOSPADM

## 2024-12-21 RX ORDER — ADENOSINE 3 MG/ML
6 INJECTION, SOLUTION INTRAVENOUS ONCE
Status: DISCONTINUED | OUTPATIENT
Start: 2024-12-21 | End: 2024-12-21

## 2024-12-21 RX ORDER — LORAZEPAM 2 MG/ML
1 INJECTION INTRAMUSCULAR ONCE
Status: COMPLETED | OUTPATIENT
Start: 2024-12-21 | End: 2024-12-21

## 2024-12-21 RX ORDER — METOPROLOL SUCCINATE 50 MG/1
50 TABLET, EXTENDED RELEASE ORAL DAILY
Status: DISCONTINUED | OUTPATIENT
Start: 2024-12-21 | End: 2024-12-31 | Stop reason: HOSPADM

## 2024-12-21 RX ORDER — SODIUM CHLORIDE 0.9 % (FLUSH) 0.9 %
5-40 SYRINGE (ML) INJECTION PRN
Status: DISCONTINUED | OUTPATIENT
Start: 2024-12-21 | End: 2024-12-31 | Stop reason: HOSPADM

## 2024-12-21 RX ORDER — OXYCODONE HYDROCHLORIDE 5 MG/1
5 TABLET ORAL EVERY 4 HOURS PRN
Status: DISCONTINUED | OUTPATIENT
Start: 2024-12-21 | End: 2024-12-31 | Stop reason: HOSPADM

## 2024-12-21 RX ORDER — OXYCODONE HYDROCHLORIDE 5 MG/1
10 TABLET ORAL EVERY 4 HOURS PRN
Status: DISCONTINUED | OUTPATIENT
Start: 2024-12-21 | End: 2024-12-31 | Stop reason: HOSPADM

## 2024-12-21 RX ORDER — MORPHINE SULFATE 4 MG/ML
4 INJECTION, SOLUTION INTRAMUSCULAR; INTRAVENOUS ONCE
Status: COMPLETED | OUTPATIENT
Start: 2024-12-21 | End: 2024-12-21

## 2024-12-21 RX ORDER — POTASSIUM CHLORIDE 7.45 MG/ML
10 INJECTION INTRAVENOUS PRN
Status: DISCONTINUED | OUTPATIENT
Start: 2024-12-21 | End: 2024-12-31 | Stop reason: HOSPADM

## 2024-12-21 RX ORDER — SPIRONOLACTONE 25 MG/1
25 TABLET ORAL DAILY
Status: DISCONTINUED | OUTPATIENT
Start: 2024-12-22 | End: 2024-12-31 | Stop reason: HOSPADM

## 2024-12-21 RX ORDER — FAMOTIDINE 20 MG/1
20 TABLET, FILM COATED ORAL 2 TIMES DAILY
Status: DISCONTINUED | OUTPATIENT
Start: 2024-12-21 | End: 2024-12-31 | Stop reason: HOSPADM

## 2024-12-21 RX ORDER — ONDANSETRON 2 MG/ML
4 INJECTION INTRAMUSCULAR; INTRAVENOUS EVERY 6 HOURS PRN
Status: DISCONTINUED | OUTPATIENT
Start: 2024-12-21 | End: 2024-12-31 | Stop reason: HOSPADM

## 2024-12-21 RX ORDER — ACETAMINOPHEN 325 MG/1
650 TABLET ORAL EVERY 4 HOURS PRN
Status: DISCONTINUED | OUTPATIENT
Start: 2024-12-21 | End: 2024-12-31 | Stop reason: HOSPADM

## 2024-12-21 RX ORDER — IOPAMIDOL 755 MG/ML
80 INJECTION, SOLUTION INTRAVASCULAR
Status: COMPLETED | OUTPATIENT
Start: 2024-12-21 | End: 2024-12-21

## 2024-12-21 RX ORDER — POLYETHYLENE GLYCOL 3350 17 G/17G
17 POWDER, FOR SOLUTION ORAL DAILY
Status: DISCONTINUED | OUTPATIENT
Start: 2024-12-21 | End: 2024-12-31 | Stop reason: HOSPADM

## 2024-12-21 RX ORDER — BUMETANIDE 0.5 MG/1
0.5 TABLET ORAL DAILY
Status: DISCONTINUED | OUTPATIENT
Start: 2024-12-22 | End: 2024-12-31 | Stop reason: HOSPADM

## 2024-12-21 RX ORDER — DILTIAZEM HYDROCHLORIDE 5 MG/ML
10 INJECTION INTRAVENOUS ONCE
Status: COMPLETED | OUTPATIENT
Start: 2024-12-21 | End: 2024-12-21

## 2024-12-21 RX ORDER — ATORVASTATIN CALCIUM 40 MG/1
40 TABLET, FILM COATED ORAL DAILY
Status: DISCONTINUED | OUTPATIENT
Start: 2024-12-22 | End: 2024-12-31 | Stop reason: HOSPADM

## 2024-12-21 RX ORDER — SODIUM CHLORIDE 0.9 % (FLUSH) 0.9 %
5-40 SYRINGE (ML) INJECTION EVERY 12 HOURS SCHEDULED
Status: DISCONTINUED | OUTPATIENT
Start: 2024-12-21 | End: 2024-12-31 | Stop reason: HOSPADM

## 2024-12-21 RX ORDER — INSULIN LISPRO 100 [IU]/ML
0-4 INJECTION, SOLUTION INTRAVENOUS; SUBCUTANEOUS
Status: DISCONTINUED | OUTPATIENT
Start: 2024-12-21 | End: 2024-12-28

## 2024-12-21 RX ORDER — MAGNESIUM SULFATE IN WATER 40 MG/ML
2000 INJECTION, SOLUTION INTRAVENOUS PRN
Status: DISCONTINUED | OUTPATIENT
Start: 2024-12-21 | End: 2024-12-31 | Stop reason: HOSPADM

## 2024-12-21 RX ORDER — DEXTROSE MONOHYDRATE 100 MG/ML
INJECTION, SOLUTION INTRAVENOUS CONTINUOUS PRN
Status: DISCONTINUED | OUTPATIENT
Start: 2024-12-21 | End: 2024-12-31 | Stop reason: HOSPADM

## 2024-12-21 RX ORDER — SODIUM CHLORIDE 9 MG/ML
INJECTION, SOLUTION INTRAVENOUS PRN
Status: DISCONTINUED | OUTPATIENT
Start: 2024-12-21 | End: 2024-12-31 | Stop reason: HOSPADM

## 2024-12-21 RX ORDER — LIDOCAINE 4 G/G
2 PATCH TOPICAL DAILY
Status: DISCONTINUED | OUTPATIENT
Start: 2024-12-21 | End: 2024-12-31 | Stop reason: HOSPADM

## 2024-12-21 RX ORDER — POTASSIUM CHLORIDE 29.8 MG/ML
20 INJECTION INTRAVENOUS PRN
Status: DISCONTINUED | OUTPATIENT
Start: 2024-12-21 | End: 2024-12-31 | Stop reason: HOSPADM

## 2024-12-21 RX ORDER — FINASTERIDE 5 MG/1
5 TABLET, FILM COATED ORAL DAILY
Status: DISCONTINUED | OUTPATIENT
Start: 2024-12-22 | End: 2024-12-31 | Stop reason: HOSPADM

## 2024-12-21 RX ORDER — METHOCARBAMOL 500 MG/1
1000 TABLET, FILM COATED ORAL 4 TIMES DAILY
Status: DISCONTINUED | OUTPATIENT
Start: 2024-12-21 | End: 2024-12-31 | Stop reason: HOSPADM

## 2024-12-21 RX ADMIN — MORPHINE SULFATE 4 MG: 4 INJECTION, SOLUTION INTRAMUSCULAR; INTRAVENOUS at 19:00

## 2024-12-21 RX ADMIN — INSULIN LISPRO 1 UNITS: 100 INJECTION, SOLUTION INTRAVENOUS; SUBCUTANEOUS at 22:19

## 2024-12-21 RX ADMIN — IOPAMIDOL 80 ML: 755 INJECTION, SOLUTION INTRAVENOUS at 17:34

## 2024-12-21 RX ADMIN — TICAGRELOR 90 MG: 90 TABLET ORAL at 22:13

## 2024-12-21 RX ADMIN — DILTIAZEM HYDROCHLORIDE 10 MG: 5 INJECTION, SOLUTION INTRAVENOUS at 16:55

## 2024-12-21 RX ADMIN — POLYETHYLENE GLYCOL 3350 17 G: 17 POWDER, FOR SOLUTION ORAL at 22:13

## 2024-12-21 RX ADMIN — OXYCODONE 5 MG: 5 TABLET ORAL at 21:21

## 2024-12-21 RX ADMIN — LORAZEPAM 1 MG: 2 INJECTION INTRAMUSCULAR; INTRAVENOUS at 16:58

## 2024-12-21 RX ADMIN — FAMOTIDINE 20 MG: 20 TABLET, FILM COATED ORAL at 21:21

## 2024-12-21 RX ADMIN — SODIUM CHLORIDE, PRESERVATIVE FREE 10 ML: 5 INJECTION INTRAVENOUS at 22:14

## 2024-12-21 RX ADMIN — METHOCARBAMOL 1000 MG: 500 TABLET ORAL at 21:23

## 2024-12-21 ASSESSMENT — PAIN SCALES - GENERAL
PAINLEVEL_OUTOF10: 0
PAINLEVEL_OUTOF10: 0
PAINLEVEL_OUTOF10: 6

## 2024-12-21 ASSESSMENT — PAIN DESCRIPTION - ORIENTATION
ORIENTATION: LEFT

## 2024-12-21 ASSESSMENT — PAIN DESCRIPTION - LOCATION
LOCATION: RIB CAGE
LOCATION: RIB CAGE
LOCATION: ABDOMEN;BACK

## 2024-12-21 ASSESSMENT — PAIN DESCRIPTION - PAIN TYPE: TYPE: ACUTE PAIN

## 2024-12-21 ASSESSMENT — PAIN - FUNCTIONAL ASSESSMENT: PAIN_FUNCTIONAL_ASSESSMENT: ACTIVITIES ARE NOT PREVENTED

## 2024-12-21 ASSESSMENT — PAIN DESCRIPTION - FREQUENCY: FREQUENCY: CONTINUOUS

## 2024-12-21 ASSESSMENT — PAIN DESCRIPTION - DESCRIPTORS: DESCRIPTORS: ACHING;DISCOMFORT

## 2024-12-21 ASSESSMENT — PAIN SCALES - WONG BAKER: WONGBAKER_NUMERICALRESPONSE: NO HURT

## 2024-12-21 NOTE — ED NOTES
Pt medicated per MAR. Pt medicated with ativan to assist with pending anxiety for CT. Pt states he would like to refuse CT. Pt and family educated on benefits of exam. Call light in reach. Pt provided with extra blanket and pillow at this time.

## 2024-12-21 NOTE — ED NOTES
Patient presents to the Emergency Department via self with wife and son. Patient presents with a complaint of fall on coumadin from standing down hitting right side from a height of two concrete stairs. Patient states that no LOC or neck pain noted. EKG obtained and IV inserted. Pt states 6/10 pain noted on the left side with no hits to the head. Patient is alert and oriented x4, patient does not appear in acute distress upon triage. Patient respirations are regular and unlabored with even rise and fall of chest. Patient family at the bedside. Call light within reach. Tachycardia noted with pacer.

## 2024-12-21 NOTE — ED PROVIDER NOTES
PATIENT NAME: Raymond Hummel  MRN: [id number]  : 1940  DOS SANTOS: 2024    I performed a history and physical examination of the patient and discussed management with the Resident. I reviewed the Resident's note and agree with the documented findings and plan of care. Any areas of disagreement are noted on the chart. I was personally present for the key portions of any procedures and have documented in the chart those procedures where I was not present during the key portions. I have reviewed the emergency nurses triage note and agree with the chief complaint, past medical history, past surgical history, allergies, medications, social and family history as documented unless otherwise noted below.    MEDICAL DEDISION MAKING (MDM)     Raymond Hummel is a 84 y.o. male who presents to Emergency Department with Fall     He fell on the ice and sustained left side rib injury (he states he fell on right side but he has apparent left lower rib cage and chest wall ecchymosis and tenderness). No head or neck injury. No LOC.     PMH of AS s/p TAVR, LVH, Dual pacer and CHF. Currently on Warfarin.     Exam: Tachycardic.  No fever.  S1-S2.  Grade 2 cardiac murmur.  Left lateral mid and lower chest wall ecchymosis and tenderness.  Soft abdomen without tenderness.  Normal active bowel sounds.  Neurologically intact.    A level 2 trauma activation down to trauma consult.  Negative primary survey.  ED FAST negative.    Secondary surgery reveals tachycardia and left lateral mid and lower chest wall ecchymosis and the tenderness.    EKG: Ventricular paced rhythm, 113 bpm, QRS duration 220 ms,  ms, no acute ischemic changes.    Trauma service discussed with cardiologist regarding patient's ventricular paced rhythm with tachycardia.  I performed a vagal maneuver and could see patient VR down to 80s with atrial flutter rate.     ED workup reveals left side multiple rib Fx (8-12th ribs) with left lower lobe contusion and a  contain   minor errors which are inherent in voice recognition technology.**      Electronically signed by Dr. Bety Tinajero      CT THORACIC RECONSTRUCTION WO POST PROCESS   Final Result   1. No acute posttraumatic findings in the chest.   2. A small left pleural effusion with left basilar atelectasis.            **This report has been created using voice recognition software.  It may contain   minor errors which are inherent in voice recognition technology.**      Electronically signed by Dr. Bety Tinajero      CT LUMBAR RECONSTRUCTION WO POST PROCESS   Final Result   1. Left rib fractures involving the 8-12 ribs..   2. No acute fracture of the lumbar spine.   3. Abdominal aortic aneurysm.   4. Other findings as described above.         **This report has been created using voice recognition software.  It may contain   minor errors which are inherent in voice recognition technology.**      Electronically signed by Dr. Bety Tinajero      XR CHEST PORTABLE   Final Result   1. No acute cardiopulmonary finding..               **This report has been created using voice recognition software.  It may contain   minor errors which are inherent in voice recognition technology.**      Electronically signed by Dr. Bety Tinajero      XR PELVIS (1-2 VIEWS)   Final Result   1. No acute bony abnormality            **This report has been created using voice recognition software.  It may contain   minor errors which are inherent in voice recognition technology.**      Electronically signed by Dr. Bety Tinajero        Medications   iopamidol (ISOVUE-370) 76 % injection 80 mL (80 mLs IntraVENous Given 12/21/24 1734)   dilTIAZem injection 10 mg (10 mg IntraVENous Given 12/21/24 1655)   LORazepam (ATIVAN) injection 1 mg (1 mg IntraVENous Given 12/21/24 1658)   morphine (PF) injection 4 mg (4 mg IntraVENous Given 12/21/24 1900)     FINAL IMPRESSION AND DISPOSITION      1. Fall, initial encounter    2.     Multiple left-sided

## 2024-12-21 NOTE — PROGRESS NOTES
Level 2 Trauma downgraded to Trauma Consult.  Full note to follow.      Electronically signed by JEAN Simmons CNP on 12/21/2024 at 4:01 PM

## 2024-12-21 NOTE — ED NOTES
Dr. Liu at bedside to perform carotid massage. Pt heart rate dropped to 70's momentarily. Dr. Patten at bedside for trauma consult.

## 2024-12-21 NOTE — H&P
I have independently performed an evaluation on Raymond . I have reviewed the   documentation completed by the Brynn Diaz APRN - SARAH     I personally saw and examined the patient     Total time spent 40minutes.  Time could have been discontiguous.  Time does not include procedures.  Time does include my direct assessment of the patient and coordination of care.        Patient seen and examined.  Patient's family was there.  Patient slipped on the step.  Complains of left-sided pain.  Initially patient thought he had his right side but when further discussion of the son he did hit his left.  Does have some bruising on his left upper posterior chest wall.  Patient also being paced what appears to be likely 115 bpm reviewed with cardiology.  [place] like patient in a flutter with subsequent capturing.  Cardiology recommended adenosine to try to reset pacing.  Patient with left-sided rib fractures and a small hemothorax elevated troponin and INR.  Hold anticoagulation repeat x-ray, if any worse may need to have drainage of hemothorax.  Although it is small at this time.  Medicine and cardiology consults with his underlying history of TAVR's.    Electronically signed by Viviana Patten MD on 12/22/2024 at 8:33 AM    Trauma H&P  Dr. Viviana Patten      Patient:  Raymond Hummel  Admit date: 12/21/2024   YOB: 1940 Date of Evaluation: 12/21/2024  MRN: [id number]  Acct: [id number]    Injury Date:12/21/2024  Injury time:PTA  PCP: Duran Dukes MD   Referring physician: Dr. Liu    Time of Trauma Surgeon Notification:  1544  Time of Trauma ANDRE Arrival: 1559  Time of Trauma Surgeon Arrival:  1555  Services Requested Within 30 Minutes: None   Time Contacted:N/A    Assessment:    Trauma by fall  Left sided rib fractures  Left sided hemothorax  Leukocytosis  Elevated troponin  Elevated INR  HTN, diverticulosis, LVH, pacemaker, TAVR, insulin resistance    Plan:    Admit to 4K under Trauma  softeners  Repeat CXR in the AM  Discharge disposition pending clinical course        Activation: []Level I (Trauma Alert) [x]Level II (Injury Call) []Level III (Trauma Consult) [x]Downgraded  Mode of Arrival: family  Referring Facility: N/A   Loss of Consciousness [x]No []Yes[]Unknown  Duration(min)  Mechanism of Injury:  []Motor Vehicle crash   []Single Vehicle [] []Passenger []Scene Fatality []Front Seat  []Restrained   []Air Bag Deployed   []Ejected []Rollover []Pedestrian []Trapped   Type of vehicle:   Protective Devices:   []Motorcycle  Wearing Helmet []Yes []No  []Bicycle  Wearing Helmet []Yes []No  [x]Fall   Distance - Few steps   []Assault    Abuse Reported []Yes []No  []Gunshot  []Stabbing  []Work Related  []Burn: []Flame []Scald []Electrical []Chemical []Contact []Inhalation []House Fire  []Other:   Patient Active Problem List   Diagnosis    Myalgia    Bursitis of ankle    Diverticulitis large intestine    Hypertension    Memory difficulties    IFG (impaired fasting glucose)    Heart murmur    Diastolic dysfunction    Hyperlipidemia    Abnormal findings on cardiac catheterization    Status post angioplasty with stent    Nonrheumatic aortic valve stenosis    Aortic stenosis, severe    History of transcatheter aortic valve replacement (TAVR)    Severe aortic insufficiency    Complete heart block (HCC)    Medtronic dual pacer    Physical deconditioning    S/P TAVR (transcatheter aortic valve replacement)    Aortic insufficiency    Long term (current) use of anticoagulants    Encounter for therapeutic drug monitoring    Anticoagulated on Coumadin    Fall at home, initial encounter     Subjective   Chief Complaint: Fall    History of Present Illness:  Raymond is an 84 year old male presenting to the Emergency Department via POV for evaluation of injuries sustained in a fall at home.  He reports that he was walking down the steps outside and slipped on some ice, falling onto his left side.  He denies   It may contain   minor errors which are inherent in voice recognition technology.**      Electronically signed by Dr. Bety Tinajero      CT LUMBAR RECONSTRUCTION WO POST PROCESS   Final Result   1. Left rib fractures involving the 8-12 ribs..   2. No acute fracture of the lumbar spine.   3. Abdominal aortic aneurysm.   4. Other findings as described above.         **This report has been created using voice recognition software.  It may contain   minor errors which are inherent in voice recognition technology.**      Electronically signed by Dr. Bety Tinajero      XR CHEST PORTABLE   Final Result   1. No acute cardiopulmonary finding..               **This report has been created using voice recognition software.  It may contain   minor errors which are inherent in voice recognition technology.**      Electronically signed by Dr. Bety Tinajero      XR PELVIS (1-2 VIEWS)   Final Result   1. No acute bony abnormality            **This report has been created using voice recognition software.  It may contain   minor errors which are inherent in voice recognition technology.**      Electronically signed by Dr. Bety Tinajero        Fast Exam: yes      25 Minutes spent in patient care collectively between subjective/objective examination, chart review, documentation, clinical reasoning and discussion with attending regarding plan/interval changes.    Electronically signed by JEAN Simmons CNP on 12/21/2024 at 6:51 PM

## 2024-12-21 NOTE — SIGNIFICANT EVENT
Patient was initially a trauma level 2  for mechanical fall on stairs where he hit is right sided but having left sided pain. Was a level 2 trauma for tachycardia.EKG demonstrating pacing at 115. I did speak with Dr. Eirck vieira who reviewed his EKG and felt that patient may be in a.flutter and capturing on the pacer.  Dr. Vieira gave recommnedations to ER provider for cardiac concerns. There are no overt traumatic injuries on exam, will scan patient due to left sided pain to ensure no injuries and check labs to rule out any bleeding in patient on blood thinners.

## 2024-12-21 NOTE — ED NOTES
This RN assisted patient to bathroom via wheelchair. Pt states pain when getting into bed. Sharp intermittent to left quadrant of abdomin. No acute distress noted call light in reach.

## 2024-12-21 NOTE — PROCEDURES
PROCEDURE NOTE  Date: 12/21/2024   Name: Raymond Hummel  YOB: 1940    Procedures  12 lead EKG completed. Results handed to Francisco J DESHPANDE. Amanda XIE

## 2024-12-21 NOTE — ED PROVIDER NOTES
abd pelvis. CBC, BMP, coags, EKG    Although some of these diagnoses are unlikely they were considered in my medical decision making.  Chronic Conditions considered:   Patient Active Problem List   Diagnosis    Myalgia    Bursitis of ankle    Diverticulitis large intestine    Hypertension    Memory difficulties    IFG (impaired fasting glucose)    Heart murmur    Diastolic dysfunction    Hyperlipidemia    Abnormal findings on cardiac catheterization    Status post angioplasty with stent    Nonrheumatic aortic valve stenosis    Aortic stenosis, severe    History of transcatheter aortic valve replacement (TAVR)    Severe aortic insufficiency    Complete heart block (HCC)    Medtronic dual pacer    Physical deconditioning    S/P TAVR (transcatheter aortic valve replacement)    Aortic insufficiency    Long term (current) use of anticoagulants    Encounter for therapeutic drug monitoring    Anticoagulated on Coumadin    Fall at home, initial encounter    Closed fracture of multiple ribs of left side    Hemothorax on left    Elevated troponin       ED RESULTS   Laboratory results:  Labs Reviewed   CBC WITH AUTO DIFFERENTIAL - Abnormal; Notable for the following components:       Result Value    WBC 14.3 (*)     MCHC 32.1 (*)     Neutrophils Absolute 12.6 (*)     Lymphocytes Absolute 0.8 (*)     Immature Grans (Abs) 0.12 (*)     All other components within normal limits   APTT - Abnormal; Notable for the following components:    aPTT 43.1 (*)     All other components within normal limits   PROTIME-INR - Abnormal; Notable for the following components:    INR 2.65 (*)     All other components within normal limits   TROPONIN - Abnormal; Notable for the following components:    Troponin, High Sensitivity 48 (*)     All other components within normal limits   BASIC METABOLIC PANEL - Abnormal; Notable for the following components:    CO2 22 (*)     Glucose 197 (*)     BUN 25 (*)     All other components within normal limits  time range)     Or   potassium chloride 10 mEq/100 mL IVPB (Peripheral Line) (has no administration in time range)   magnesium sulfate 2000 mg in 50 mL IVPB premix (has no administration in time range)   ondansetron (ZOFRAN-ODT) disintegrating tablet 4 mg (has no administration in time range)     Or   ondansetron (ZOFRAN) injection 4 mg (has no administration in time range)   polyethylene glycol (GLYCOLAX) packet 17 g (17 g Oral Given 12/21/24 2213)   acetaminophen (TYLENOL) tablet 650 mg (has no administration in time range)   lidocaine 4 % external patch 2 patch (2 patches Topical Patch Applied 12/21/24 2122)   oxyCODONE (ROXICODONE) immediate release tablet 5 mg (5 mg Oral Given 12/21/24 2121)     Or   oxyCODONE (ROXICODONE) immediate release tablet 10 mg ( Oral See Alternative 12/21/24 2121)   HYDROmorphone (DILAUDID) injection 0.5 mg (has no administration in time range)     Or   HYDROmorphone (DILAUDID) injection 1 mg (has no administration in time range)   methocarbamol (ROBAXIN) tablet 1,000 mg (1,000 mg Oral Given 12/21/24 2123)   famotidine (PEPCID) tablet 20 mg (20 mg Oral Given 12/21/24 2121)   atorvastatin (LIPITOR) tablet 40 mg (has no administration in time range)   bumetanide (BUMEX) tablet 0.5 mg (has no administration in time range)   finasteride (PROSCAR) tablet 5 mg (has no administration in time range)   metoprolol succinate (TOPROL XL) extended release tablet 50 mg ( Oral Canceled Entry 12/21/24 2144)   spironolactone (ALDACTONE) tablet 25 mg (has no administration in time range)   tamsulosin (FLOMAX) capsule 0.4 mg (0.4 mg Oral Not Given 12/21/24 2212)   ticagrelor (BRILINTA) tablet 90 mg (90 mg Oral Given 12/21/24 2213)   warfarin placeholder: dosing by pharmacy (has no administration in time range)   insulin lispro (HUMALOG,ADMELOG) injection vial 0-4 Units (1 Units SubCUTAneous Given 12/21/24 2219)   glucose chewable tablet 16 g (has no administration in time range)   dextrose bolus 10% 125

## 2024-12-22 ENCOUNTER — APPOINTMENT (OUTPATIENT)
Dept: GENERAL RADIOLOGY | Age: 84
End: 2024-12-22
Payer: MEDICARE

## 2024-12-22 PROBLEM — I25.10 CORONARY ARTERY DISEASE INVOLVING NATIVE CORONARY ARTERY OF NATIVE HEART WITHOUT ANGINA PECTORIS: Status: ACTIVE | Noted: 2024-12-22

## 2024-12-22 PROBLEM — I50.32 CHRONIC HEART FAILURE WITH PRESERVED EJECTION FRACTION (HCC): Status: ACTIVE | Noted: 2024-12-22

## 2024-12-22 LAB
ANION GAP SERPL CALC-SCNC: 15 MEQ/L (ref 8–16)
BASOPHILS ABSOLUTE: 0 THOU/MM3 (ref 0–0.1)
BASOPHILS NFR BLD AUTO: 0.2 %
BILIRUB UR QL STRIP: NEGATIVE
BUN SERPL-MCNC: 26 MG/DL (ref 7–22)
CALCIUM SERPL-MCNC: 9.5 MG/DL (ref 8.5–10.5)
CHARACTER UR: CLEAR
CHLORIDE SERPL-SCNC: 101 MEQ/L (ref 98–111)
CO2 SERPL-SCNC: 21 MEQ/L (ref 23–33)
COLOR UR: YELLOW
CREAT SERPL-MCNC: 1 MG/DL (ref 0.4–1.2)
DEPRECATED RDW RBC AUTO: 40.8 FL (ref 35–45)
EKG ATRIAL RATE: 57 BPM
EKG ATRIAL RATE: 84 BPM
EKG P AXIS: 36 DEGREES
EKG P-R INTERVAL: 168 MS
EKG Q-T INTERVAL: 470 MS
EKG Q-T INTERVAL: 508 MS
EKG QRS DURATION: 220 MS
EKG QRS DURATION: 232 MS
EKG QTC CALCULATION (BAZETT): 600 MS
EKG QTC CALCULATION (BAZETT): 644 MS
EKG R AXIS: -81 DEGREES
EKG R AXIS: -86 DEGREES
EKG T AXIS: 82 DEGREES
EKG T AXIS: 93 DEGREES
EKG VENTRICULAR RATE: 113 BPM
EKG VENTRICULAR RATE: 84 BPM
EOSINOPHIL NFR BLD AUTO: 0.2 %
EOSINOPHILS ABSOLUTE: 0 THOU/MM3 (ref 0–0.4)
ERYTHROCYTE [DISTWIDTH] IN BLOOD BY AUTOMATED COUNT: 13.5 % (ref 11.5–14.5)
GFR SERPL CREATININE-BSD FRML MDRD: 74 ML/MIN/1.73M2
GLUCOSE BLD STRIP.AUTO-MCNC: 169 MG/DL (ref 70–108)
GLUCOSE BLD STRIP.AUTO-MCNC: 276 MG/DL (ref 70–108)
GLUCOSE BLD STRIP.AUTO-MCNC: 288 MG/DL (ref 70–108)
GLUCOSE BLD STRIP.AUTO-MCNC: 324 MG/DL (ref 70–108)
GLUCOSE BLD STRIP.AUTO-MCNC: 356 MG/DL (ref 70–108)
GLUCOSE SERPL-MCNC: 240 MG/DL (ref 70–108)
GLUCOSE UR QL STRIP.AUTO: 500 MG/DL
HCT VFR BLD AUTO: 44.9 % (ref 42–52)
HGB BLD-MCNC: 14.6 GM/DL (ref 14–18)
HGB UR QL STRIP.AUTO: NEGATIVE
IMM GRANULOCYTES # BLD AUTO: 0.04 THOU/MM3 (ref 0–0.07)
IMM GRANULOCYTES NFR BLD AUTO: 0.4 %
INR PPP: 2.56 (ref 0.85–1.13)
KETONES UR QL STRIP.AUTO: NEGATIVE
LEUKOCYTE ESTERASE UR QL STRIP.AUTO: NEGATIVE
LYMPHOCYTES ABSOLUTE: 1 THOU/MM3 (ref 1–4.8)
LYMPHOCYTES NFR BLD AUTO: 9.8 %
MCH RBC QN AUTO: 27.1 PG (ref 26–33)
MCHC RBC AUTO-ENTMCNC: 32.5 GM/DL (ref 32.2–35.5)
MCV RBC AUTO: 83.5 FL (ref 80–94)
MONOCYTES ABSOLUTE: 0.8 THOU/MM3 (ref 0.4–1.3)
MONOCYTES NFR BLD AUTO: 8 %
NEUTROPHILS ABSOLUTE: 8.4 THOU/MM3 (ref 1.8–7.7)
NEUTROPHILS NFR BLD AUTO: 81.4 %
NITRITE UR QL STRIP.AUTO: NEGATIVE
NRBC BLD AUTO-RTO: 0 /100 WBC
PH UR STRIP.AUTO: 5 [PH] (ref 5–9)
PLATELET # BLD AUTO: 155 THOU/MM3 (ref 130–400)
PMV BLD AUTO: 10.6 FL (ref 9.4–12.4)
POTASSIUM SERPL-SCNC: 4.6 MEQ/L (ref 3.5–5.2)
PROT UR STRIP.AUTO-MCNC: NEGATIVE MG/DL
RBC # BLD AUTO: 5.38 MILL/MM3 (ref 4.7–6.1)
SODIUM SERPL-SCNC: 137 MEQ/L (ref 135–145)
SP GR UR REFRACT.AUTO: 1.02 (ref 1–1.03)
TROPONIN, HIGH SENSITIVITY: 41 NG/L (ref 0–12)
TROPONIN, HIGH SENSITIVITY: 45 NG/L (ref 0–12)
TROPONIN, HIGH SENSITIVITY: 47 NG/L (ref 0–12)
TROPONIN, HIGH SENSITIVITY: 48 NG/L (ref 0–12)
UROBILINOGEN UR QL STRIP.AUTO: 0.2 EU/DL (ref 0–1)
WBC # BLD AUTO: 10.3 THOU/MM3 (ref 4.8–10.8)

## 2024-12-22 PROCEDURE — 85025 COMPLETE CBC W/AUTO DIFF WBC: CPT

## 2024-12-22 PROCEDURE — 99223 1ST HOSP IP/OBS HIGH 75: CPT | Performed by: INTERNAL MEDICINE

## 2024-12-22 PROCEDURE — 84484 ASSAY OF TROPONIN QUANT: CPT

## 2024-12-22 PROCEDURE — 99222 1ST HOSP IP/OBS MODERATE 55: CPT | Performed by: STUDENT IN AN ORGANIZED HEALTH CARE EDUCATION/TRAINING PROGRAM

## 2024-12-22 PROCEDURE — 80048 BASIC METABOLIC PNL TOTAL CA: CPT

## 2024-12-22 PROCEDURE — 2500000003 HC RX 250 WO HCPCS: Performed by: NURSE PRACTITIONER

## 2024-12-22 PROCEDURE — 6370000000 HC RX 637 (ALT 250 FOR IP)

## 2024-12-22 PROCEDURE — 85610 PROTHROMBIN TIME: CPT

## 2024-12-22 PROCEDURE — 6370000000 HC RX 637 (ALT 250 FOR IP): Performed by: NURSE PRACTITIONER

## 2024-12-22 PROCEDURE — 97116 GAIT TRAINING THERAPY: CPT

## 2024-12-22 PROCEDURE — 93010 ELECTROCARDIOGRAM REPORT: CPT | Performed by: INTERNAL MEDICINE

## 2024-12-22 PROCEDURE — 2060000000 HC ICU INTERMEDIATE R&B

## 2024-12-22 PROCEDURE — 71046 X-RAY EXAM CHEST 2 VIEWS: CPT

## 2024-12-22 PROCEDURE — 97530 THERAPEUTIC ACTIVITIES: CPT

## 2024-12-22 PROCEDURE — 97166 OT EVAL MOD COMPLEX 45 MIN: CPT

## 2024-12-22 PROCEDURE — 81003 URINALYSIS AUTO W/O SCOPE: CPT

## 2024-12-22 PROCEDURE — 97162 PT EVAL MOD COMPLEX 30 MIN: CPT

## 2024-12-22 PROCEDURE — 82948 REAGENT STRIP/BLOOD GLUCOSE: CPT

## 2024-12-22 PROCEDURE — 94799 UNLISTED PULMONARY SVC/PX: CPT

## 2024-12-22 PROCEDURE — 36415 COLL VENOUS BLD VENIPUNCTURE: CPT

## 2024-12-22 PROCEDURE — 93005 ELECTROCARDIOGRAM TRACING: CPT | Performed by: NURSE PRACTITIONER

## 2024-12-22 PROCEDURE — 94010 BREATHING CAPACITY TEST: CPT

## 2024-12-22 RX ORDER — WARFARIN SODIUM 5 MG/1
5 TABLET ORAL
Status: COMPLETED | OUTPATIENT
Start: 2024-12-22 | End: 2024-12-22

## 2024-12-22 RX ADMIN — ATORVASTATIN CALCIUM 40 MG: 40 TABLET, FILM COATED ORAL at 08:51

## 2024-12-22 RX ADMIN — OXYCODONE 5 MG: 5 TABLET ORAL at 03:50

## 2024-12-22 RX ADMIN — WARFARIN SODIUM 5 MG: 5 TABLET ORAL at 17:47

## 2024-12-22 RX ADMIN — INSULIN LISPRO 4 UNITS: 100 INJECTION, SOLUTION INTRAVENOUS; SUBCUTANEOUS at 20:39

## 2024-12-22 RX ADMIN — TAMSULOSIN HYDROCHLORIDE 0.4 MG: 0.4 CAPSULE ORAL at 08:50

## 2024-12-22 RX ADMIN — OXYCODONE 10 MG: 5 TABLET ORAL at 23:34

## 2024-12-22 RX ADMIN — BUMETANIDE 0.5 MG: 0.5 TABLET ORAL at 08:50

## 2024-12-22 RX ADMIN — TICAGRELOR 90 MG: 90 TABLET ORAL at 20:39

## 2024-12-22 RX ADMIN — METHOCARBAMOL 1000 MG: 500 TABLET ORAL at 20:39

## 2024-12-22 RX ADMIN — SPIRONOLACTONE 25 MG: 25 TABLET ORAL at 08:51

## 2024-12-22 RX ADMIN — TICAGRELOR 90 MG: 90 TABLET ORAL at 08:51

## 2024-12-22 RX ADMIN — METHOCARBAMOL 1000 MG: 500 TABLET ORAL at 17:47

## 2024-12-22 RX ADMIN — FINASTERIDE 5 MG: 5 TABLET, FILM COATED ORAL at 08:50

## 2024-12-22 RX ADMIN — METHOCARBAMOL 1000 MG: 500 TABLET ORAL at 09:52

## 2024-12-22 RX ADMIN — METHOCARBAMOL 1000 MG: 500 TABLET ORAL at 13:43

## 2024-12-22 RX ADMIN — FAMOTIDINE 20 MG: 20 TABLET, FILM COATED ORAL at 20:39

## 2024-12-22 RX ADMIN — METOPROLOL SUCCINATE 50 MG: 50 TABLET, EXTENDED RELEASE ORAL at 08:51

## 2024-12-22 RX ADMIN — OXYCODONE 10 MG: 5 TABLET ORAL at 09:05

## 2024-12-22 RX ADMIN — INSULIN LISPRO 2 UNITS: 100 INJECTION, SOLUTION INTRAVENOUS; SUBCUTANEOUS at 16:08

## 2024-12-22 RX ADMIN — FAMOTIDINE 20 MG: 20 TABLET, FILM COATED ORAL at 08:51

## 2024-12-22 RX ADMIN — ACETAMINOPHEN 650 MG: 325 TABLET ORAL at 09:05

## 2024-12-22 RX ADMIN — INSULIN LISPRO 1 UNITS: 100 INJECTION, SOLUTION INTRAVENOUS; SUBCUTANEOUS at 09:00

## 2024-12-22 RX ADMIN — SODIUM CHLORIDE, PRESERVATIVE FREE 10 ML: 5 INJECTION INTRAVENOUS at 20:41

## 2024-12-22 ASSESSMENT — PAIN DESCRIPTION - LOCATION
LOCATION: RIB CAGE
LOCATION: RIB CAGE

## 2024-12-22 ASSESSMENT — PAIN SCALES - GENERAL
PAINLEVEL_OUTOF10: 7
PAINLEVEL_OUTOF10: 10
PAINLEVEL_OUTOF10: 7
PAINLEVEL_OUTOF10: 0

## 2024-12-22 ASSESSMENT — PAIN DESCRIPTION - ORIENTATION
ORIENTATION: LEFT
ORIENTATION: LEFT;MID

## 2024-12-22 ASSESSMENT — PAIN DESCRIPTION - DESCRIPTORS
DESCRIPTORS: SORE;CRAMPING;ACHING
DESCRIPTORS: ACHING;DISCOMFORT

## 2024-12-22 ASSESSMENT — PAIN SCALES - WONG BAKER: WONGBAKER_NUMERICALRESPONSE: NO HURT

## 2024-12-22 ASSESSMENT — PAIN - FUNCTIONAL ASSESSMENT: PAIN_FUNCTIONAL_ASSESSMENT: ACTIVITIES ARE NOT PREVENTED

## 2024-12-22 NOTE — CARE COORDINATION
12/22/24 1513   Service Assessment   Patient Orientation Alert and Oriented   Cognition Alert   History Provided By Patient   Primary Caregiver Self   Accompanied By/Relationship wife   Support Systems Spouse/Significant Other;Children   Patient's Healthcare Decision Maker is: Legal Next of Kin   PCP Verified by CM Yes   Last Visit to PCP Within last 3 months   Prior Functional Level Independent in ADLs/IADLs   Current Functional Level Independent in ADLs/IADLs   Can patient return to prior living arrangement Yes   Ability to make needs known: Good   Family able to assist with home care needs: Yes   Would you like for me to discuss the discharge plan with any other family members/significant others, and if so, who? No   Financial Resources Medicare   Community Resources None   CM/SW Referral DME   Social/Functional History   Lives With Spouse   Type of Home Condo   Home Layout One level   Active  Yes   Discharge Planning   Type of Residence House   Living Arrangements Spouse/Significant Other   Current Services Prior To Admission Durable Medical Equipment   Current DME Prior to Arrival Cane   Potential Assistance Needed N/A   DME Ordered? No   Potential Assistance Purchasing Medications No   Type of Home Care Services None   Patient expects to be discharged to: House   Follow Up Appointment: Best Day/Time  Monday PM   One/Two Story Residence Other (comment)  ([facility] [place])   Lift Chair Available No   History of falls? 1   Services At/After Discharge   Transition of Care Consult (CM Consult) Discharge Planning   Services At/After Discharge None   Confirm Follow Up Transport Family   Condition of Participation: Discharge Planning   The Plan for Transition of Care is related to the following treatment goals: pain control, get home before [place]           Patient Goals/Plan/Treatment Preferences: Visited with Raymond and his wife; they reside home in [facility] [place] [facility]. Son Heriberto assist with needs and HCDM verified.  Has a cane but does not use DME. Declined HH or any in-home services.     If patient is discharged prior to next notation, then this note serves as note for discharge by case management.

## 2024-12-22 NOTE — PLAN OF CARE
Problem: Discharge Planning  Goal: Discharge to home or other facility with appropriate resources  Outcome: Progressing  Flowsheets (Taken 12/22/2024 0120)  Discharge to home or other facility with appropriate resources:   Identify barriers to discharge with patient and caregiver   Identify discharge learning needs (meds, wound care, etc)   Arrange for needed discharge resources and transportation as appropriate     Problem: Safety - Adult  Goal: Free from fall injury  Outcome: Progressing  Flowsheets (Taken 12/22/2024 0120)  Free From Fall Injury: Instruct family/caregiver on patient safety     Problem: ABCDS Injury Assessment  Goal: Absence of physical injury  Outcome: Progressing  Flowsheets (Taken 12/22/2024 0121)  Absence of Physical Injury: Implement safety measures based on patient assessment     Problem: Chronic Conditions and Co-morbidities  Goal: Patient's chronic conditions and co-morbidity symptoms are monitored and maintained or improved  Outcome: Progressing  Flowsheets (Taken 12/22/2024 0121)  Care Plan - Patient's Chronic Conditions and Co-Morbidity Symptoms are Monitored and Maintained or Improved:   Monitor and assess patient's chronic conditions and comorbid symptoms for stability, deterioration, or improvement   Collaborate with multidisciplinary team to address chronic and comorbid conditions and prevent exacerbation or deterioration   Update acute care plan with appropriate goals if chronic or comorbid symptoms are exacerbated and prevent overall improvement and discharge

## 2024-12-22 NOTE — PROGRESS NOTES
[facility]  INPATIENT OCCUPATIONAL THERAPY  STRZ ICU STEPDOWN TELEMETRY 4K  EVALUATION      Discharge Recommendations: Home with assist PRN, Home with Home health OT  Equipment Recommendations: No        Time In: 0943  Time Out: 1001  Timed Code Treatment Minutes: 9 Minutes  Minutes: 18          Date: 2024  Patient Name: Raymond Hummel,   Gender: male      MRN: [id number]  : 1940  (84 y.o.)  Referring Practitioner: ROMEO Leon  Diagnosis: Fall  Additional Pertinent Hx: per chart review;  Raymond is an 84 year old male presenting to the Emergency Department via POV for evaluation of injuries sustained in a fall at home.  He reports that he was walking down the steps outside and slipped on some ice, falling onto his left side.  He denies hitting his head or loss of consciousness.  Arrives complaining of left sided chest wall pain with movement.  Denies any chest pain, shortness of breath, abdominal pain, pain to extremities, neck pain, back pain, nausea or vomiting.    Restrictions/Precautions:  Restrictions/Precautions: Fall Risk, General Precautions  Position Activity Restriction  Other Position/Activity Restrictions: L rib fractures    Subjective  Chart Reviewed: Yes, Orders, Progress Notes, History and Physical, Imaging  Patient assessed for rehabilitation services?: Yes  Family / Caregiver Present: No    Subjective: RN approved session, patient seated up in recliner upon OT arrival and agreeable to eval with MOD encouragement. patient A & O x 4. patient seated on chair alarm in recliner and activated at end of session.    Pain: /10: slight discomfort in L ribs; did not rate. No facial grimacing during eval.     Vitals: Vitals not assessed per clinical judgement, see nursing flowsheet    Social/Functional History:  Lives With: Spouse  Type of Home: Condo  Home Layout: One level  Home Access: Stairs to enter with rails  Entrance Stairs - Number of Steps: 8 LAZARA  Entrance

## 2024-12-22 NOTE — H&P
Hospitalist consult note  Internal Medicine Resident      Patient: Raymond Hummel 84 y.o. male      Unit/Bed: 4K-15/015-A    Admit Date: 12/21/2024      ASSESSMENT AND PLAN  Active Problems  L 8-12 rib fractures s/p fall: Patient slipped on stairs which were covered in ice landing on his left side.  CT scan showed 8-12 rib fractures on the left side and a small L sided pleural effusion.  -Pain per primary  -PT OT  -Lidocaine patches  -Incentive spirometry    Elevated troponin: Heart score 6 patient denies chest pain, SOB, lightheadedness.  From chart review EKG was sent to cardiology with concern patient is in a flutter.  Initial troponin 48.  -Repeat troponin  -If patient develops chest pain obtain stat EKG and troponin  -Cardiology has been consulted    Leukocytosis: WBC 14.3 on arrival.  I suspect the WBC elevation is likely reactive to the patient's fall and acute rib fractures.    Chronic Conditions (reviewed and stable unless otherwise stated)  HLD: Continue Lipitor  GERD: Continue Pepcid  HFpEF: Echo 11/13/2024 showing EF 60-65% with moderately increased wall thickness.  Diastolic dysfunction present.  Patient has an appropriately positioned transcatheter bioprosthetic valve.  Left atrium is moderately dilated.  GDMT: Metoprolol and spironolactone.  Diuretics: Bumex 0.5 mg daily.  Hx TAVR: Continue Brilinta and warfarin  BPH: Flomax and finasteride  Prediabetes: Last HbA1c 6.2 6/2023.  Patient takes metformin at home, will hold it while in hospital.  Low-dose sliding scale, POCT x 4, hypoglycemia protocol          ===================================================================    Chief Complaint: Fall      HPI / Hospital Course:  84-year-old male with PMH of TAVR 8/2024 on Coumadin and Brilinta, HTN, prediabetes.  Patient presents today after a mechanical fall.  He was going outside walking down some steps to check on his car and slipped landing on his left side, denies hitting his head.  Patient  tenderness. Normal tone. No abnormal movements.  Patient has tenderness to left lower ribs  Skin: Warm and dry. No rashes or lesions.  Neurologic:  No focal sensory/motor deficits in the upper or lower extremities. Cranial nerves:  grossly non-focal 2-12.     Psychiatric: Alert and oriented, normal insight and thought content.   Capillary Refill: Brisk,< 3 seconds.  Peripheral Pulses: +2 palpable, equal bilaterally.       Labs/Radiology: See chart or assessment above.     Electronically signed by Mauricio De Los Santos MD on 12/21/2024 at 9:50 PM  Case was discussed with Attending, Dr. Johnson.

## 2024-12-22 NOTE — ED NOTES
ED to inpatient nurses report      Chief Complaint:  Chief Complaint   Patient presents with    Fall     Present to ED from: Home    MOA:     LOC: alert and orientated to name, place, date  Mobility: Requires assistance * 1  Oxygen Baseline: RA    Current needs required: RA     Code Status:   Prior    What abnormal results were found and what did you give/do to treat them? Rib Fx;   Any procedures or intervention occur? Analgesics; CT; XR;    Mental Status:       Psych Assessment:        Vitals:  Patient Vitals for the past 24 hrs:   BP Temp Temp src Pulse Resp SpO2 Height Weight   12/21/24 1910 128/80 -- -- 88 28 99 % -- --   12/21/24 1855 135/73 -- -- 91 14 95 % -- --   12/21/24 1825 118/78 -- -- 100 22 96 % -- --   12/21/24 1810 133/78 -- -- 98 25 94 % -- --   12/21/24 1755 (!) 174/105 -- -- 100 30 96 % -- --   12/21/24 1740 (!) 151/106 -- -- 88 22 97 % -- --   12/21/24 1710 -- -- -- 91 25 95 % -- --   12/21/24 1655 (!) 129/93 -- -- (!) 110 17 96 % -- --   12/21/24 1640 117/83 -- -- (!) 106 24 94 % -- --   12/21/24 1625 115/73 -- -- (!) 105 14 95 % -- --   12/21/24 1615 (!) 145/108 -- -- (!) 114 19 94 % -- --   12/21/24 1610 -- -- -- (!) 106 14 95 % -- --   12/21/24 1554 (!) 144/92 -- -- (!) 115 22 96 % -- --   12/21/24 1545 (!) 124/98 97.9 °F (36.6 °C) Oral (!) 119 28 96 % 1.753 m (5' 9\") 92.5 kg (204 lb)        LDAs:   Peripheral IV 12/21/24 Right Antecubital (Active)       Ambulatory Status:  Presents to emergency department  because of falls (Syncope, seizure, or loss of consciousness): Yes, Age > 70: Yes, Altered Mental Status, Intoxication with alcohol or substance confusion (Disorientation, impaired judgment, poor safety awaremess, or inability to follow instructions): No    Diagnosis:  DISPOSITION Admitted 12/21/2024 06:36:13 PM   Final diagnoses:   Fall, initial encounter        Consults:  None     Pain Score:  Pain Assessment  Pain Assessment: None - Denies Pain  Pain Level: 6  Pain Location: Rib  cage  Pain Orientation: Left  Pain Type: Acute pain  Pain Frequency: Continuous    C-SSRS:   Risk of Suicide: No Risk    Sepsis Screening:       Kinder Fall Risk:  [place] 1 Fall Risk  Presents to emergency department  because of falls (Syncope, seizure, or loss of consciousness): Yes  Age > 70: Yes  Altered Mental Status, Intoxication with alcohol or substance confusion (Disorientation, impaired judgment, poor safety awaremess, or inability to follow instructions): No    Swallow Screening        Preferred Language:   English      ALLERGIES     Patient has no known allergies.    SURGICAL HISTORY       Past Surgical History:   Procedure Laterality Date    CARDIAC PROCEDURE Bilateral 6/12/2024    Left and right heart cath / coronary angiography performed by Erick Arguello MD at [facility] CARDIAC CATH LAB    CARDIAC PROCEDURE N/A 6/12/2024    Percutaneous coronary intervention performed by Erick Arguello MD at [facility] CARDIAC CATH LAB    CARDIAC PROCEDURE N/A 8/15/2024    Transcatheter aortic valve replacement performed by Erick Arguello MD at [facility] CARDIAC CATH LAB    CARDIAC PROCEDURE N/A 9/13/2024    Transcatheter aortic valve replacement performed by Erick Arguello MD at [facility] CARDIAC CATH LAB    CHOLECYSTECTOMY      COLONOSCOPY  2003, 2006, 2011    EP DEVICE PROCEDURE N/A 9/17/2024    Insert PPM dual performed by Lydia Giang MD at [facility] CARDIAC CATH LAB    HERNIA REPAIR  1980    x 2    SIGMOIDOSCOPY      TONSILLECTOMY      TRANSESOPHAGEAL ECHOCARDIOGRAM N/A 9/9/2024    TRANSESOPHAGEAL ECHOCARDIOGRAM performed by Erick Arguello MD at [facility] ENDOSCOPY       PAST MEDICAL HISTORY       Past Medical History:   Diagnosis Date    Diverticulosis     Heart murmur     Hematuria 08/15/2024    during admission where pt got TAVR 08/15/2024    High triglycerides     History of colonic polyps     History of transcatheter aortic valve replacement (TAVR) 08/15/2024    w/ Dr. Arguello    Hypertension     Insulin resistance     LVH

## 2024-12-22 NOTE — PROGRESS NOTES
Warfarin Pharmacy Consult Note    Raymond Hummel is a 84 y.o. male for whom pharmacy has been asked to manage warfarin therapy.     Consulting Provider: Dr. De Los Santos  Indication:  TAVR  Target INR: 2.0-3.0   Warfarin dose prior to admission: 5 mg daily  Outpatient warfarin provider: [facility]    Recent Labs     12/21/24  1607   HGB 15.5        Recent Labs     12/21/24  1607   INR 2.65*     Concurrent anticoagulants/antiplatelets: ticagrelor  Significant warfarin drug-drug interactions: none    Date INR Warfarin Dose   12/21/24 2.65 5 mg (PTA)                                   INR will be monitored routinely until therapeutic INR is achieved.    Pharmacy will continue to follow. Thank you for the consult,   Kadie Duran, PharmD  12/21/2024 8:42 PM

## 2024-12-22 NOTE — PROGRESS NOTES
[facility]  INPATIENT PHYSICAL THERAPY  EVALUATION  STR ICU STEPDOWN TELEMETRY 4K - 4K-15/015-A    Discharge Recommendations: Continue to assess pending progress, Home with assist PRN, possible HH services  Equipment Recommendations: No             Time In: 743  Time Out: 0805  Timed Code Treatment Minutes: 13 Minutes  Minutes: 22          Date: 2024  Patient Name: Raymond Hummel,  Gender:  male        MRN: [id number]  : 1940  (84 y.o.)      Referring Practitioner: Brynn Diaz, JEAN - CNP  Diagnosis: Fall  Additional Pertinent Hx: Per chart review: 84-year-old male with PMH of TAVR 2024 on Coumadin and Brilinta, HTN, prediabetes.  Patient presents today after a mechanical fall.  He was going outside walking down some steps to check on his car and slipped landing on his left side, denies hitting his head.  Patient was subsequently brought to [facility] for further evaluation.  Patient denies fever, chills, lightheadedness, dizziness, cough, SOB, chest pain, palpitations, nausea, vomiting, constipation, diarrhea, dysuria, lower extremity edema.  Of note the patient stated he was not in any pain at the time but he did morphine. CT of the abdomen and pelvis and lumbar spine recons note left rib fractures involving the 8-12 ribs     Restrictions/Precautions:  Restrictions/Precautions: Fall Risk, General Precautions       Other Position/Activity Restrictions: L rib fractures    Required Braces or Orthoses?: No      Subjective:  Chart Reviewed: Yes  Patient assessed for rehabilitation services?: Yes  Family/Caregiver Present: No  Subjective: OK to see pt per nursing. Pt sitting in bedside chair when PT arrived, agreeable to PT session with min encouragement. Pt seemed slightly confused during session    General:  Overall Orientation Status: Within Functional Limits  Orientation Level: Oriented X4  Vision: Impaired  Vision Exceptions: Wears glasses for reading  Hearing: Within functional  Bilaterally, Decreased Gait Speed, and Increased reliance on assistive device   Slow pace due to fear of pain, required directions to get back to room  Stairs:  Not Tested    Exercise:  None    Functional Outcome Measures:  [facility] (6 CLICK) BASIC MOBILITY  AM-[facility] Inpatient Mobility Raw Score : 18  AM-[facility] Inpatient T-Scale Score : 43.63          Modified Julien:  Premorbid Functional Status: Not Applicable  Current Functional Status:  Not Applicable    ASSESSMENT:  Activity Tolerance:  Patient tolerance of treatment:Good.    Treatment Initiated: Treatment and education initiated within context of evaluation.  Evaluation time included review of current medical information, gathering information related to past medical, social and functional history, completion of standardized testing, formal and informal observation of tasks, assessment of data and development of plan of care and goals.  Treatment time included skilled education and facilitation of tasks to increase safety and independence with functional mobility for improved independence and quality of life.    Assessment:  Body Structures, Functions, Activity Limitations Requiring Skilled Therapeutic Intervention: Decreased functional mobility , Increased pain, Decreased balance, Decreased strength  Assessment: Raymond Hummel is a 84 y.o. male who presents with the deficits stated previously. Pt requires 1 person assist for functional tasks with use of SC for support. Pt cont to require skilled PT services to increase IND with functional tasks and progress towards PLOF to return to home environment safely.   Therapy Prognosis: Good    Requires PT Follow-Up: Yes    Patient Education:      .    Patient Education  Education Given To: Patient  Education Provided: Role of Therapy, Plan of Care, Mobility Training, Transfer Training, Equipment  Education Method: Verbal  Education Outcome: Verbalized understanding, Demonstrated understanding, Continued education needed

## 2024-12-22 NOTE — CONSULTS
S/p mech Fall and rib fracture  Elevated troponin  Pilo TAVR   HOCM  LAD PCI   HTN  PPm      Plan  Cont the current med RX  Pain control  Will follow as needed    [id number]        Fab Jansen MD

## 2024-12-22 NOTE — CONSULTS
[place] [id number]                              CONSULTATION      PATIENT NAME: LEIGH ANN FRANKLIN             : 1940  MED REC NO: [id number]                       ROOM: 4K-15  ACCOUNT NO: [id number]                       ADMIT DATE: 2024  PROVIDER: Fab Jansen MD      CONSULT DATE: 2024    REASON FOR CONSULTATION:  Mild elevation in troponin, status post mechanical fall.    HISTORY OF PRESENT ILLNESS:  This is an 84-year-old  gentleman, who is known to have history of coronary artery disease, status post previous stent and history of transcatheter aortic valve replacement, presented to the emergency room after a mechanical fall at home.  The patient was walking down the steps outside and slipped on ice and fell and sustained fracture on the left ribs, and the pain persisted.  He came to the emergency room.  Did not have any chest pain, but he has chest wall pain on the left infra-axillary region.  No shortness of breath.  No orthopnea, paroxysmal nocturnal dyspnea.  He has no palpitation.  So, currently, the chest wall pain is controlled with medication.  The patient denied loss of consciousness during the incident.    REVIEW OF SYSTEMS:  Negative, except the above-mentioned ones.    PAST MEDICAL HISTORY:    1. History of valve-in-valve transcatheter aortic valve replacement.  2. History of hypertrophic obstructive cardiomyopathy.  3. History of permanent pacemaker placement.  4. History of coronary artery disease, status post LAD stent.  5. Hypertension.  6. Hyperlipidemia.  7. CHF.    PAST SURGICAL HISTORY:  History of cardiac procedure with transcatheter aortic valve replacement, history of pacemaker placement.    FAMILY HISTORY:  Positive for coronary artery disease.    SOCIAL HISTORY:  He has never smoked.  No alcohol.  No drug use.    ALLERGIES:  THE PATIENT HAS NO KNOWN DRUG ALLERGIES.      HOME  MEDICATIONS:  Prior to this admission include Lipitor 40 mg daily, Bumex 0.5 mg daily.  He is on finasteride, metformin, metoprolol, omega-3, probiotic, spironolactone, tamsulosin, Brilinta, and warfarin.    PHYSICAL EXAMINATION:  GENERAL:  Looks sick, in no form of distress.  VITAL SIGNS:  Blood pressure 159/87, pulse rate 76, respiratory rate 18, temperature 97.7, saturation 97 on room air.  HEENT:  Pink conjunctivae.  Anicteric sclerae.  Pupils are equal and reactive bilaterally to light.  NECK:  No lymphadenopathy.  No goiter.  No JVD.  CHEST:  Clear to auscultation and percussion.  CARDIOVASCULAR:  Arteries are felt; carotid, femoral, pedal.  No bruits.  S1 and S2 well heard.  No murmur or gallop rhythm.  ABDOMEN:  Soft, nontender, nondistended.  Bowel sounds are positive.    GENITOURINARY:  No CVA, flank, or suprapubic tenderness.  LOCOMOTOR:  No cyanosis, clubbing, or muscle or joint tenderness.  SKIN:  No rashes, ulcers, or nodules.  CNS:  Alert, awake, oriented to time, person, place.  Cranial nerves are intact.  Sensation is intact to light touch and pain.  Motor, normal muscle strength and tone.  Appropriate mood and affect.    WORKUP:  Ventricular-paced rhythm, rate of 113 beats per minute.  Basically, it is regular, and subsequent EKG showed A-sensing and ventricular-paced rhythm at a rate of 84 beats per minute.    Had an echocardiogram in November 2024, ejection fraction 60%, normal functioning bioprosthetic aortic valve noted.    He had cardiac catheterization in June 2024.  Has successful stent placement in the LAD.    He had a transcatheter valve replacement done in August 2024 and then a valve-in-valve replacement in September 2024 was done.  Chemistry; sodium and potassium within normal limits.  BUN 26, creatinine 1.  Troponin 48, 41, 47, flat basically.  Hematology; white blood cell 10, hemoglobin 14, and platelets 155.    Imaging study showed a left-sided rib fracture, and left-sided hemothorax

## 2024-12-22 NOTE — PROGRESS NOTES
Warfarin Pharmacy Consult Note    Warfarin Indication:  TAVR  Target INR: 2.0-3.0  Dose prior to admission: 5 mg daily     Recent Labs     12/21/24  1607 12/22/24  0054   HGB 15.5 14.6    155     Recent Labs     12/21/24  1607 12/22/24  0054   INR 2.65* 2.56*     Concurrent anticoagulants/antiplatelets: ticagrelor  Significant warfarin drug-drug interactions: none     Date INR Warfarin Dose   12/21/24 2.65 5 mg (PTA)    12/22/24  2.56  5 mg                                                Monitoring:                   INR will be monitored daily.    **Please contact pharmacy for discharge instructions when indicated**    Saloni Shin PharmD, BCPS 12/22/2024 6:39 AM

## 2024-12-22 NOTE — PROGRESS NOTES
Respiratory Care is following the rib fracture order set. Patient's position when testing was done was Sitting in chair.  A Negative Inspiratory Force (NIF) was performed with patient achieving a NIF of ->-40 cm H2O. The NIF was greater than 25 cm H2O. A Forced Vital Capacity (FVC) was obtained with patient achieving an FVC of 2.44 liters. The patient's calculated ideal body weight, (IBW) is  70.7 kg. 0.020 liters/kg of the patient's IBW is 1.41 liters. The patient's FVC was greater than 0.020 liters/kg of IBW. Based on the spirometry measurement alone, patient does not meet ICU admission criteria. Previous FVC was N/A liters and previous NIF was N/A cm H2O.  Patient's NIF and FVC N/A from previous screening(s). Last pain medication was given on  12/21/24 @ 2121. Physician was not called regarding spirometry measurement.

## 2024-12-22 NOTE — PROGRESS NOTES
Respiratory Care is following the rib fracture order set. Patient's position when testing was done was sitting.  A Negative Inspiratory Force (NIF) was performed with patient achieving a NIF of >-40 cm H2O. The NIF was greater than 25 cm H2O. A Forced Vital Capacity (FVC) was obtained with patient achieving an FVC of 2.4 liters. The patient's calculated ideal body weight, (IBW) is  70.7 kg. 0.020 liters/kg of the patient's IBW is 1.41 liters. The patient's FVC was greater than 0.020 liters/kg of IBW. Based on the spirometry measurement alone, patient does not meet ICU admission criteria. Previous FVC was 2.44 liters and previous NIF was >-40 cm H2O.  Patient's NIF and FVC show no change from previous screening(s). Last pain medication was given on  12/22 @ 0350. Physician was not called regarding spirometry measurement.

## 2024-12-22 NOTE — PLAN OF CARE
Problem: Discharge Planning  Goal: Discharge to home or other facility with appropriate resources  12/22/2024 0120 by Lakeshia Collins, RN  Outcome: Progressing  Flowsheets (Taken 12/22/2024 0120)  Discharge to home or other facility with appropriate resources:   Identify barriers to discharge with patient and caregiver   Identify discharge learning needs (meds, wound care, etc)   Arrange for needed discharge resources and transportation as appropriate     Problem: Safety - Adult  Goal: Free from fall injury  12/22/2024 0120 by Lakeshia Collins, RN  Outcome: Progressing  Flowsheets (Taken 12/22/2024 0120)  Free From Fall Injury: Instruct family/caregiver on patient safety     Problem: ABCDS Injury Assessment  Goal: Absence of physical injury  12/22/2024 0121 by Lakeshia Collins, RN  Outcome: Progressing  Flowsheets (Taken 12/22/2024 0121)  Absence of Physical Injury: Implement safety measures based on patient assessment     Problem: Chronic Conditions and Co-morbidities  Goal: Patient's chronic conditions and co-morbidity symptoms are monitored and maintained or improved  12/22/2024 0121 by Lakeshia Collins, RN  Outcome: Progressing  Flowsheets (Taken 12/22/2024 0121)  Care Plan - Patient's Chronic Conditions and Co-Morbidity Symptoms are Monitored and Maintained or Improved:   Monitor and assess patient's chronic conditions and comorbid symptoms for stability, deterioration, or improvement   Collaborate with multidisciplinary team to address chronic and comorbid conditions and prevent exacerbation or deterioration   Update acute care plan with appropriate goals if chronic or comorbid symptoms are exacerbated and prevent overall improvement and discharge

## 2024-12-22 NOTE — PROCEDURES
PROCEDURE NOTE  Date: 12/22/2024   Name: Raymond Hummel  YOB: 1940    Procedures    12 lead EKG completed. Results handed to Lakeshia DESHPANDE

## 2024-12-23 ENCOUNTER — HOSPITAL ENCOUNTER (OUTPATIENT)
Dept: CARDIAC REHAB | Age: 84
Setting detail: THERAPIES SERIES
End: 2024-12-23
Payer: MEDICARE

## 2024-12-23 PROBLEM — R90.0 INTRACRANIAL MASS: Status: ACTIVE | Noted: 2024-12-23

## 2024-12-23 PROBLEM — R41.0 ACUTE DELIRIUM: Status: ACTIVE | Noted: 2024-12-23

## 2024-12-23 LAB
ANION GAP SERPL CALC-SCNC: 14 MEQ/L (ref 8–16)
BASOPHILS ABSOLUTE: 0 THOU/MM3 (ref 0–0.1)
BASOPHILS NFR BLD AUTO: 0.3 %
BUN SERPL-MCNC: 26 MG/DL (ref 7–22)
CALCIUM SERPL-MCNC: 9.4 MG/DL (ref 8.5–10.5)
CHLORIDE SERPL-SCNC: 99 MEQ/L (ref 98–111)
CO2 SERPL-SCNC: 24 MEQ/L (ref 23–33)
CREAT SERPL-MCNC: 1 MG/DL (ref 0.4–1.2)
DEPRECATED RDW RBC AUTO: 41 FL (ref 35–45)
EOSINOPHIL NFR BLD AUTO: 0.9 %
EOSINOPHILS ABSOLUTE: 0.1 THOU/MM3 (ref 0–0.4)
ERYTHROCYTE [DISTWIDTH] IN BLOOD BY AUTOMATED COUNT: 13.7 % (ref 11.5–14.5)
GFR SERPL CREATININE-BSD FRML MDRD: 74 ML/MIN/1.73M2
GLUCOSE BLD STRIP.AUTO-MCNC: 165 MG/DL (ref 70–108)
GLUCOSE BLD STRIP.AUTO-MCNC: 170 MG/DL (ref 70–108)
GLUCOSE BLD STRIP.AUTO-MCNC: 175 MG/DL (ref 70–108)
GLUCOSE BLD STRIP.AUTO-MCNC: 201 MG/DL (ref 70–108)
GLUCOSE BLD STRIP.AUTO-MCNC: 222 MG/DL (ref 70–108)
GLUCOSE SERPL-MCNC: 174 MG/DL (ref 70–108)
HCT VFR BLD AUTO: 41.8 % (ref 42–52)
HGB BLD-MCNC: 13.7 GM/DL (ref 14–18)
IMM GRANULOCYTES # BLD AUTO: 0.04 THOU/MM3 (ref 0–0.07)
IMM GRANULOCYTES NFR BLD AUTO: 0.3 %
INR PPP: 2.58 (ref 0.85–1.13)
LYMPHOCYTES ABSOLUTE: 1.3 THOU/MM3 (ref 1–4.8)
LYMPHOCYTES NFR BLD AUTO: 11.6 %
MCH RBC QN AUTO: 27.2 PG (ref 26–33)
MCHC RBC AUTO-ENTMCNC: 32.8 GM/DL (ref 32.2–35.5)
MCV RBC AUTO: 83.1 FL (ref 80–94)
MONOCYTES ABSOLUTE: 1.4 THOU/MM3 (ref 0.4–1.3)
MONOCYTES NFR BLD AUTO: 12.3 %
NEUTROPHILS ABSOLUTE: 8.6 THOU/MM3 (ref 1.8–7.7)
NEUTROPHILS NFR BLD AUTO: 74.6 %
NRBC BLD AUTO-RTO: 0 /100 WBC
PLATELET # BLD AUTO: 156 THOU/MM3 (ref 130–400)
PMV BLD AUTO: 11.3 FL (ref 9.4–12.4)
POTASSIUM SERPL-SCNC: 3.9 MEQ/L (ref 3.5–5.2)
RBC # BLD AUTO: 5.03 MILL/MM3 (ref 4.7–6.1)
SODIUM SERPL-SCNC: 137 MEQ/L (ref 135–145)
TROPONIN, HIGH SENSITIVITY: 45 NG/L (ref 0–12)
TROPONIN, HIGH SENSITIVITY: 51 NG/L (ref 0–12)
TROPONIN, HIGH SENSITIVITY: 51 NG/L (ref 0–12)
TROPONIN, HIGH SENSITIVITY: 55 NG/L (ref 0–12)
WBC # BLD AUTO: 11.5 THOU/MM3 (ref 4.8–10.8)

## 2024-12-23 PROCEDURE — 85025 COMPLETE CBC W/AUTO DIFF WBC: CPT

## 2024-12-23 PROCEDURE — 36415 COLL VENOUS BLD VENIPUNCTURE: CPT

## 2024-12-23 PROCEDURE — 94150 VITAL CAPACITY TEST: CPT

## 2024-12-23 PROCEDURE — 2500000003 HC RX 250 WO HCPCS

## 2024-12-23 PROCEDURE — 99232 SBSQ HOSP IP/OBS MODERATE 35: CPT | Performed by: STUDENT IN AN ORGANIZED HEALTH CARE EDUCATION/TRAINING PROGRAM

## 2024-12-23 PROCEDURE — 6370000000 HC RX 637 (ALT 250 FOR IP): Performed by: NURSE PRACTITIONER

## 2024-12-23 PROCEDURE — 84484 ASSAY OF TROPONIN QUANT: CPT

## 2024-12-23 PROCEDURE — 85610 PROTHROMBIN TIME: CPT

## 2024-12-23 PROCEDURE — 6360000002 HC RX W HCPCS

## 2024-12-23 PROCEDURE — 6370000000 HC RX 637 (ALT 250 FOR IP): Performed by: PSYCHIATRY & NEUROLOGY

## 2024-12-23 PROCEDURE — 82948 REAGENT STRIP/BLOOD GLUCOSE: CPT

## 2024-12-23 PROCEDURE — 90792 PSYCH DIAG EVAL W/MED SRVCS: CPT | Performed by: PSYCHIATRY & NEUROLOGY

## 2024-12-23 PROCEDURE — 51798 US URINE CAPACITY MEASURE: CPT

## 2024-12-23 PROCEDURE — 51701 INSERT BLADDER CATHETER: CPT

## 2024-12-23 PROCEDURE — 2060000000 HC ICU INTERMEDIATE R&B

## 2024-12-23 PROCEDURE — 94799 UNLISTED PULMONARY SVC/PX: CPT

## 2024-12-23 PROCEDURE — 2500000003 HC RX 250 WO HCPCS: Performed by: NURSE PRACTITIONER

## 2024-12-23 PROCEDURE — 80048 BASIC METABOLIC PNL TOTAL CA: CPT

## 2024-12-23 PROCEDURE — 6370000000 HC RX 637 (ALT 250 FOR IP)

## 2024-12-23 RX ORDER — LORAZEPAM 0.5 MG/1
0.5 TABLET ORAL 2 TIMES DAILY
Status: DISCONTINUED | OUTPATIENT
Start: 2024-12-23 | End: 2024-12-23

## 2024-12-23 RX ORDER — MAGNESIUM SULFATE IN WATER 40 MG/ML
2000 INJECTION, SOLUTION INTRAVENOUS ONCE
Status: COMPLETED | OUTPATIENT
Start: 2024-12-23 | End: 2024-12-23

## 2024-12-23 RX ORDER — WARFARIN SODIUM 5 MG/1
5 TABLET ORAL
Status: COMPLETED | OUTPATIENT
Start: 2024-12-23 | End: 2024-12-23

## 2024-12-23 RX ORDER — LORAZEPAM 0.5 MG/1
0.5 TABLET ORAL 2 TIMES DAILY PRN
Status: DISCONTINUED | OUTPATIENT
Start: 2024-12-23 | End: 2024-12-31 | Stop reason: HOSPADM

## 2024-12-23 RX ORDER — OLANZAPINE 5 MG/1
5 TABLET ORAL 3 TIMES DAILY PRN
Status: DISCONTINUED | OUTPATIENT
Start: 2024-12-23 | End: 2024-12-23 | Stop reason: ALTCHOICE

## 2024-12-23 RX ADMIN — WATER 5 MG: 1 INJECTION INTRAMUSCULAR; INTRAVENOUS; SUBCUTANEOUS at 01:35

## 2024-12-23 RX ADMIN — ATORVASTATIN CALCIUM 40 MG: 40 TABLET, FILM COATED ORAL at 10:29

## 2024-12-23 RX ADMIN — FINASTERIDE 5 MG: 5 TABLET, FILM COATED ORAL at 10:29

## 2024-12-23 RX ADMIN — SODIUM CHLORIDE, PRESERVATIVE FREE 10 ML: 5 INJECTION INTRAVENOUS at 21:21

## 2024-12-23 RX ADMIN — INSULIN LISPRO 1 UNITS: 100 INJECTION, SOLUTION INTRAVENOUS; SUBCUTANEOUS at 17:37

## 2024-12-23 RX ADMIN — FAMOTIDINE 20 MG: 20 TABLET, FILM COATED ORAL at 21:21

## 2024-12-23 RX ADMIN — METOPROLOL SUCCINATE 50 MG: 50 TABLET, EXTENDED RELEASE ORAL at 10:29

## 2024-12-23 RX ADMIN — BUMETANIDE 0.5 MG: 0.5 TABLET ORAL at 10:29

## 2024-12-23 RX ADMIN — INSULIN LISPRO 1 UNITS: 100 INJECTION, SOLUTION INTRAVENOUS; SUBCUTANEOUS at 13:08

## 2024-12-23 RX ADMIN — TICAGRELOR 90 MG: 90 TABLET ORAL at 10:29

## 2024-12-23 RX ADMIN — TICAGRELOR 90 MG: 90 TABLET ORAL at 21:21

## 2024-12-23 RX ADMIN — METHOCARBAMOL 1000 MG: 500 TABLET ORAL at 21:21

## 2024-12-23 RX ADMIN — MAGNESIUM SULFATE HEPTAHYDRATE 2000 MG: 40 INJECTION, SOLUTION INTRAVENOUS at 01:38

## 2024-12-23 RX ADMIN — METHOCARBAMOL 1000 MG: 500 TABLET ORAL at 15:04

## 2024-12-23 RX ADMIN — FAMOTIDINE 20 MG: 20 TABLET, FILM COATED ORAL at 10:29

## 2024-12-23 RX ADMIN — LORAZEPAM 0.5 MG: 0.5 TABLET ORAL at 16:33

## 2024-12-23 RX ADMIN — SODIUM CHLORIDE, PRESERVATIVE FREE 10 ML: 5 INJECTION INTRAVENOUS at 10:29

## 2024-12-23 RX ADMIN — WARFARIN SODIUM 5 MG: 5 TABLET ORAL at 16:33

## 2024-12-23 RX ADMIN — TAMSULOSIN HYDROCHLORIDE 0.4 MG: 0.4 CAPSULE ORAL at 10:29

## 2024-12-23 RX ADMIN — METHOCARBAMOL 1000 MG: 500 TABLET ORAL at 10:22

## 2024-12-23 RX ADMIN — SPIRONOLACTONE 25 MG: 25 TABLET ORAL at 10:29

## 2024-12-23 ASSESSMENT — PAIN SCALES - GENERAL
PAINLEVEL_OUTOF10: 0
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
## 2024-12-23 NOTE — PALLIATIVE CARE
Initial Evaluation        Patient:   Raymond Hummel  YOB: 1940  Age:  84 y.o.  Room:  [facility]15/015-A  MRN:  [id number]   Acct: [id number]    Date of Admission:  12/21/2024  3:28 PM  Date of Service:  12/23/2024  Completed By:  Mary Sellers RN        Reason for Palliative Care Evaluation:-   Goals of Care     Current Concerns   Pain, confusion and intermittent aggressiveness     Palliative Performance Scale   60%  Ambulation reduced; Significant disease; Can't do hobbies/housework; intake normal or reduced; occasional assist; LOC full/confusion     History    Patient with history of HTN, CAD, CHF, GERD, recent TAVR (09/13/2024) and PPM.  Patient admitted after a fall at home in which he sustained multiple rib fractures.     Goals of Care Discussions and Plan         Advance Care Planning   Goals of Care/Advance Care Planning (ACP) Conversation    Date of Conversation: 12/23/24    Individuals present for the conversation: Patient     ACP documents on file prior to discussion:  -None    Previously completed document/s not on file:  Medical POA document was completed previously but it is not available for review. This writer requested a copy of the document for patient's chart.     Healthcare Power of /Healthcare Surrogate Decision Makers:  Per [place] hierarchy of surrogate decision makers, wife would be decision maker unless medical POA is presented.    Conversation Summary: Patient is oriented to name only.  Patient denies much discomfort at this time although he does indicate some pain on left side of chest.  No family currently present at this time.    Resuscitation Status: Full Code     Documentation Completed:  -No new documents completed.    Plan/Follow-Up:  Primary RNPiper called family to update them.  Family will be arriving around 1:30- 2 PM and will bring in patient's advance directives.  Palliative care contact information provided and primary RN will call when family
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
## 2024-12-23 NOTE — PALLIATIVE CARE
Follow Up / Progress Note        Patient:   Raymond Hummel  YOB: 1940  Age:  84 y.o.  Room:  [address] [place] [address]  MRN:  [id number]         Advance Care Planning     Palliative Team Advance Care Planning (ACP) Conversation    Date of Conversation: 12/23/24    Individuals present for the conversation: Patient, Spouse Gela, and Son Juan Carlos and son Heriberto     ACP documents on file prior to discussion:  -None    Previously completed document/s not on file: Received patient's medical POA paperwork with copy placed in paper chart, copy faxed to medical records and original given back to patient's sonJuan Carlos.    Healthcare Decision Maker:    Primary Decision Maker (Active): Juan Carlos Hummel - Child - [phone]    Secondary Decision Maker: Heriberto Hummel - Child - [phone]    Supplemental (Other) Decision Maker: Gela Hummel-Cell - Spouse - [phone]     Conversation Summary:  Patient eating lunch and does not interact in the conversation.  Family shares that the patient has shown such a decline over the past several months.  Family shares that the patient has dementia and has had 3 TAVR's and under anesthesia for over 5 1/2 hours and that he has been declining.  SonJuan Carlos asks this RN about code status.  Discussion about code status levels and what each level entails.  Discussed complications of rib fractures, brain and organ damage associated with resuscitative measures.  All family members are in agreement that they want the patient's care focused on comfort measures/basic medical care.  Family would like for the patient to have PT/OT and would like for him to go to [facility] or ECF for therapy if able.   Discussed PT/OT recommendations of home health and criteria for skilled stay at ECF may not be warranted for medicare benefits.  Discussed that he could be discharged to an ECF but may have viola responsibility.  Discussed goals if patient should require a readmission to the

## 2024-12-23 NOTE — PROGRESS NOTES
I have independently performed an evaluation on Raymond . I have reviewed the   documentation completed by the Jose Mohr PA-C     I personally saw and examined the patient     Total time spent 35minutes.  Time could have been discontiguous.  Time does not include procedures.  Time does include my direct assessment of the patient and coordination of care.        Patient became agitated overnight, patient is alert to time and place.  When I saw patient I redirected him, and was able to calm him down.  He is in soft restraints.  Denies any current chest pain or shortness of breath.  Patient's INR remains elevated.  Will resume blood thinners at with TAVR.    Electronically signed by Viviana Patten MD on 12/23/2024 at 11:51 AM  [facility] [place]  Trauma Surgery - Dr. Viviana Patten   Daily Progress Note  Pt Name: Raymond Hummel  Medical Record Number: [id number]  Date of Birth 1940   Today's Date: 12/23/2024    HD: # 2    CC:  no complaints     ASSESSMENT  1.  Active Hospital Problems    Diagnosis Date Noted    Coronary artery disease involving native coronary artery of native heart without angina pectoris [I25.10] 12/22/2024    Chronic heart failure with preserved ejection fraction (HCC) [I50.32] 12/22/2024    Fall [W19.XXXA] 12/21/2024    Closed fracture of multiple ribs of left side [S22.42XA] 12/21/2024    Hemothorax on left [J94.2] 12/21/2024    Elevated troponin [R79.89] 12/21/2024    Anticoagulated on Coumadin [Z79.01] 10/08/2024         PLAN  1. Admit to 4K under Trauma Services     Trauma by fall              - PT, OT eval and treat              - Fall precautions     Left sided rib fractures              - CT of the abdomen and pelvis and lumbar spine recons note left rib fractures involving the 8-12 ribs              - Rib fracture protocol              - Lidoderm patches              - Continuous pulse oximetry              - Aggressive pulmonary toileting              - IS, C&DB    soft restraints   -Improved this a.m. suspect secondary to hospital delirium   -De-escalate restraints as able.     Pain control              - Oxy IR, Dilaudid PRN              - Robaxin and Lidoderm patches scheduled     General diet as tolerated  Up with assistance  Repeat labs in the AM  PT, OT, SLP eval and treat  Prophylaxis: SCDs, IS, C&DB, Pepcid, stool softeners  Repeat CXR this a.m.  Discharge disposition pending clinical course            SUBJECTIVE  Patient evaluated at bedside.  In 4 point restraints.  Reports pain in right chest controlled patient continues to attempt to disrupt treatment and get out of bed per sitter at bedside.  Will remain in soft restraints at this time though will attempt to de-escalate throughout the course of the day.  Patient reports pain control.  Attempts I-S at bedside achieved approximately 1000 mL.  Discussed POC.  Questions answered.  Oriented to person only.  Care in coordination with with Dr. Viviana Patten.     Wt Readings from Last 3 Encounters:   12/22/24 88.9 kg (195 lb 14.4 oz)   12/02/24 92.1 kg (203 lb)   11/13/24 88.9 kg (196 lb)     Temp Readings from Last 3 Encounters:   12/23/24 98.5 °F (36.9 °C) (Oral)   09/24/24 97.5 °F (36.4 °C) (Oral)   09/09/24 97.5 °F (36.4 °C) (Temporal)     BP Readings from Last 3 Encounters:   12/23/24 (!) 170/81   12/02/24 128/88   11/13/24 102/68     Pulse Readings from Last 3 Encounters:   12/23/24 66   12/02/24 87   11/14/24 93       24 HR INTAKE/OUTPUT :   Intake/Output Summary (Last 24 hours) at 12/23/2024 0857  Last data filed at 12/23/2024 0313  Gross per 24 hour   Intake 550 ml   Output 0 ml   Net 550 ml     ADULT DIET; Regular; No Added Salt (3-4 gm)    OBJECTIVE  CURRENT VITALS BP (!) 170/81   Pulse 66   Temp 98.5 °F (36.9 °C) (Oral)   Resp 16   Ht 1.753 m (5' 9\")   Wt 88.9 kg (195 lb 14.4 oz)   SpO2 91%   BMI 28.93 kg/m²     GENERAL: Presents sitting upright in bed unassisted, Awake and alert; In no acute distress  are intact. No focal pulmonary consolidation. No large pulmonary mass. Central airway is patent. No pleural effusions.  No significant pericardial effusion. The heart is not enlarged. Ascending thoracic aorta is not dilated.  The main pulmonary artery is not dilated. No significantly enlarged mediastinal or  axillary lymph node. The thyroid is not enlarged. No chest wall mass. The abdomen is described on the concurrent CT of the abdomen and pelvis. Bones: DISH of the thoracic spine. The bones are mildly demineralized.. THORACIC SPINE: ALIGNMENT: The thoracic kyphosis is maintained FRACTURE: None DISC LEVEL: 1. The lack of intrathecal contrast limits the evaluation for  spinal stenosis or foraminal stenosis No significant central canal narrowing. No significant neural minor narrowing.     1. No acute posttraumatic findings in the chest. 2. A small left pleural effusion with left basilar atelectasis. **This report has been created using voice recognition software.  It may contain minor errors which are inherent in voice recognition technology.** Electronically signed by Dr. Bety Tinajero    CT THORACIC RECONSTRUCTION WO POST PROCESS    Result Date: 12/21/2024  PROCEDURE: CT CHEST W CONTRAST, CT THORACIC RECONSTRUCTION WO POST PROCESS CLINICAL INFORMATION: fall on thinners COMPARISON: CTA chest 6/13/2023 TECHNIQUE: 1. Helical CT acquisition of the chest was performed with  administration of intravenous contrast. Multiplanar reformats are provided. 2. Reconstructed CT images of the thoracic spine from the CT chest examination. Multiplanar reformats are provided. All CT scans at this facility use dose modulation, iterative reconstruction, and/or weight based dosing when appropriate to reduce the radiation dose to as low as reasonably achievable. CONTRAST: 80 cc of Isovue-370 FINDINGS: CT CHEST: A small left pleural effusion. Left basal platelike atelectasis. Aortic valve stent graft is seen. A small hiatal hernia.

## 2024-12-23 NOTE — PROGRESS NOTES
Restraint type: Soft restraint:  right wrist and left wrist  Reason for restraints: Poor safety judgment, agitated, aggressive, threatening staff  Duration: 24 hours    Restraints must be removed when an alternative is available and effective and/or patient no longer meets criteria.    Orders must be renewed every calendar day or when discontinued.    The MD must conduct a face to face assessment within 1 calendar day of initiation when initial restraint order is verbal.

## 2024-12-23 NOTE — PROGRESS NOTES
Respiratory Care is following the rib fracture order set. Patient's position when testing was done was Sitting in chair.  A Negative Inspiratory Force (NIF) was performed with patient achieving a NIF of -30 cm H2O. The NIF was greater than 25 cm H2O. A Forced Vital Capacity (FVC) was obtained with patient achieving an FVC of 2.27 liters. The patient's calculated ideal body weight, (IBW) is  70.7 kg. 0.020 liters/kg of the patient's IBW is 1.41 liters. The patient's FVC was greater than 0.020 liters/kg of IBW. Based on the spirometry measurement alone, patient does not meet ICU admission criteria. Previous FVC was 2.4 liters and previous NIF was >-40 cm H2O.  Patient's NIF and FVC are worsening from previous screening(s). Last pain medication was given on  12/22/24 @ 0905. Physician was not called regarding spirometry measurement.

## 2024-12-23 NOTE — CARE COORDINATION
12/23/24, 2:49 PM EST  DISCHARGE PLANNING EVALUATION    SW spoke with patient's spouse Pricilla, and 2 sons Juan Carlos and Heriberto who are present at bedside. SW discussed discharge planning. Patient's spouse and sons are requesting referral to [facility]. Patient has been confused and has a sitter. Patient's family expressed concerns about patient returning home and reported that discharging home is not a safe option.     SHELLEY called [facility] and made a referral to [facility].

## 2024-12-23 NOTE — PROGRESS NOTES
gallbladder is surgically absent. Marked atrophy of the pancreas. Bilateral adrenal hyperplasia. Atrophic kidneys. A 1.4 cm hypodensity of the superior pole of the right kidney is likely a cyst. 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 Aortoiliac calcifications. Colonic diverticulosis. No hydronephrosis. The liver, biliary tree, and spleen are unremarkable. No bowel obstruction or acute inflammatory bowel process. The appendix is unremarkable. The abdominal aorta is not aneurysmal. No significantly enlarged lymph nodes are seen. The bladder is grossly unremarkable. The prostate is not enlarged. Bones: Degenerative changes of the thoracolumbar spine. Degenerative changes of the SI joints and both hips. The bones are demineralized. Grade 1 spondylolisthesis of L4. CT LUMBAR SPINE: ALIGNMENT: The lumbar lordosis is maintained. FRACTURE: Left rib fractures are noted. DISC SPACES: The lack of intrathecal contrast limits the evaluation of the spinal canal. Broad-based disc bulge L2-L3 with ligamentum flavum hypertrophy leading to mild central canal narrowing and mild bilateral neuroforaminal narrowing. Broad-based disc bulge at L3/L4 with ligamentum flavum hypertrophy leading to mild central canal narrowing and mild bilateral neuroforaminal narrowing. A pseudo disc is seen at L4/L5 with marked central canal narrowing and mild bilateral neuroforaminal narrowing.     1. Left rib fractures involving the 8-12 ribs.. 2. No acute fracture of the lumbar spine. 3. Abdominal aortic aneurysm. 4. Other findings as described above. **This report has been created using voice recognition software.  It may contain minor errors which are inherent in voice recognition technology.** Electronically signed by Dr. Bety Tinajero    CT CHEST W CONTRAST    Result Date: 12/21/2024  PROCEDURE: CT CHEST W CONTRAST, CT THORACIC RECONSTRUCTION WO POST PROCESS CLINICAL INFORMATION: fall on  CERVICAL SPINE WO CONTRAST    Result Date: 12/21/2024  PROCEDURE: CT CERVICAL SPINE WO CONTRAST CLINICAL INFORMATION: fall COMPARISON: No prior study. TECHNIQUE: 3 mm axial imaging through the cervical spine without contrast.  Sagittal and coronal reconstructions were performed. All CT scans at this facility use dose modulation, iterative reconstruction, and/or weight based dosing when appropriate to reduce the radiation dose to as low as reasonably achievable. FINDINGS: ALIGNMENT: Cervical lordosis is maintained. BONES: The bones are demineralized. Degenerative changes of the cervical spine. No acute fracture of the cervical spine. Multilevel facet arthrosis and uncovertebral degeneration. SOFT TISSUES: Unremarkable OTHER: None. DISC LEVELS: The lack of intrathecal contrast limits the evaluation for  spinal stenosis or foraminal stenosis C2-C3: No significant central canal narrowing. No significant neuroforamina narrowing. C3-C4: Disc osteophyte complex. No significant central canal narrowing. Mild neuroforaminal narrowing. C4-C5: Mild disc osteophyte complex. No significant central canal narrowing. Marked right neuroforaminal narrowing. Mild left neuroforaminal narrowing. C5-C6: Disc osteophyte complex. Mild central canal narrowing. Marked bilateral neuroforaminal narrowing. C6-C7: Disc osteophyte complex. No significant central canal narrowing. Marked left neuroforaminal narrowing. Mild right neuroforaminal narrowing. C7-T1: No significant central canal narrowing. No significant neuroforamina narrowing.     1. No acute fracture of the cervical spine. 2. Multilevel degenerative changes of the cervical spine. 3. Central canal and neuroforaminal narrowing as described above. 4. Other findings as described above. **This report has been created using voice recognition software.  It may contain minor errors which are inherent in voice recognition technology.** Electronically signed by Dr. Bety Tinajero    CT HEAD WO  CONTRAST    Result Date: 12/21/2024  PROCEDURE: CT HEAD WO CONTRAST CLINICAL INFORMATION:fall COMPARISON: No prior study. TECHNIQUE: 5 mm axial imaging through the head without IV contrast. All CT scans at this facility use dose modulation, iterative reconstruction, and/or weight based dosing when appropriate to reduce the radiation dose to as low as reasonably achievable. FINDINGS: Mild cerebellar and cerebral volume loss. Scattered periventricular and subcortical white matter hypodensities that can relate to chronic microvascular changes. Ex-vacuo dilatation of the ventricles. No ventriculomegaly.  No midline shift or mass effect.  No acute intracranial hemorrhage. No intracranial collection.  Gray-white differentiation is unremarkable. The posterior fossa is unremarkable. The craniocervical junction is unremarkable. No acute bony abnormality. The  paranasal sinuses are clear. The  mastoid air cells are clear. The orbits are unremarkable.     1. No acute intracranial hemorrhage **This report has been created using voice recognition software.  It may contain minor errors which are inherent in voice recognition technology.** Electronically signed by Dr. Bety Tinajero    XR CHEST PORTABLE    Result Date: 12/21/2024  PROCEDURE: XR CHEST PORTABLE CLINICAL INFORMATION: fall COMPARISON: Chest radiograph 9/18/2024 TECHNIQUE: Single frontal view of the chest performed. FINDINGS: Cardiac conduction device is seen with 2 leads. The leads are intact. Degenerative changes of the thoracic spine. No focal pulmonary consolidation. Platelike atelectasis in the right lung base. Cardiac silhouette is mild enlarged. Aortic valve prosthesis. No pleural effusion. No pneumothorax. No acute bony abnormality. The bones are demineralized.     1. No acute cardiopulmonary finding.. **This report has been created using voice recognition software.  It may contain minor errors which are inherent in voice recognition technology.**

## 2024-12-23 NOTE — PROGRESS NOTES
Warfarin Pharmacy Consult Note    Warfarin Indication:  TAVR  [facility] INR: 2.0-3.0  Dose prior to admission: 5 mg daily    Recent Labs     12/21/24  1607 12/22/24  0054 12/23/24  0326   HGB 15.5 14.6 13.7*    155 156     Recent Labs     12/21/24  1607 12/22/24  0054 12/23/24  0327   INR 2.65* 2.56* 2.58*     Concurrent anticoagulants/antiplatelets: ticagrelor  Significant warfarin drug-drug interactions: none     Date INR Warfarin Dose   12/21/24 2.65 5 mg (PTA)    12/22/24  2.56 5 mg   12/23/24  2.58  5 mg                                          Monitoring:                   INR will be monitored daily.    **Please contact pharmacy for discharge instructions when indicated**    [facility] 12/23/2024 8:35 AM

## 2024-12-23 NOTE — PROGRESS NOTES
[facility]  OCCUPATIONAL THERAPY MISSED TREATMENT NOTE  STRZ ICU STEPDOWN TELEMETRY 4K  4K-15/015-A      Date: 2024  Patient Name: Raymond Hummel        CSN: [id number]   : 1940  (84 y.o.)  Gender: male   Referring Practitioner: ROMEO Leon            REASON FOR MISSED TREATMENT:  Patient currently in 4 point soft restraints d/t exhibiting violent behavior.  Will attempt next available time when appropriate.

## 2024-12-23 NOTE — PROGRESS NOTES
Respiratory Care is following the rib fracture order set. Patient's position when testing was done was semi-fowlers.  A Negative Inspiratory Force (NIF) was performed with patient achieving a NIF of -16 cm H2O. The NIF was less than 25 cm H2O. A Forced Vital Capacity (FVC) was obtained with patient achieving an FVC of 1.38 liters. The patient's calculated ideal body weight, (IBW) is  70.7 kg. 0.020 liters/kg of the patient's IBW is 1.41 liters. The patient's FVC was less than 0.020 liters/kg of IBW. Based on the spirometry measurement alone achieved FVC 1.38.  patient  is not very cooperative at this time and is in restraints   Previous FVC was 2.27 liters and previous NIF was -30 cm H2O.  Patient's NIF and FVC are worsening from previous screening(s) but is not cooperative. Last pain medication was given on  12/22 @ 350 am. Physician was not called regarding spirometry measurement.

## 2024-12-23 NOTE — PLAN OF CARE
Problem: Discharge Planning  Goal: Discharge to home or other facility with appropriate resources  Flowsheets (Taken 12/22/2024 2252)  Discharge to home or other facility with appropriate resources:   Identify barriers to discharge with patient and caregiver   Identify discharge learning needs (meds, wound care, etc)   Arrange for needed discharge resources and transportation as appropriate     Problem: Safety - Adult  Goal: Free from fall injury  Flowsheets (Taken 12/22/2024 2252)  Free From Fall Injury: Instruct family/caregiver on patient safety     Problem: ABCDS Injury Assessment  Goal: Absence of physical injury  Flowsheets (Taken 12/22/2024 2252)  Absence of Physical Injury: Implement safety measures based on patient assessment     Problem: Chronic Conditions and Co-morbidities  Goal: Patient's chronic conditions and co-morbidity symptoms are monitored and maintained or improved  Flowsheets (Taken 12/22/2024 2252)  Care Plan - Patient's Chronic Conditions and Co-Morbidity Symptoms are Monitored and Maintained or Improved:   Monitor and assess patient's chronic conditions and comorbid symptoms for stability, deterioration, or improvement   Collaborate with multidisciplinary team to address chronic and comorbid conditions and prevent exacerbation or deterioration   Update acute care plan with appropriate goals if chronic or comorbid symptoms are exacerbated and prevent overall improvement and discharge

## 2024-12-23 NOTE — PROGRESS NOTES
[facility]  PHYSICAL THERAPY MISSED TREATMENT NOTE  STRZ ICU STEPDOWN TELEMETRY 4K    Date: 2024  Patient Name: Raymond Hummel        MRN: [id number]   : 1940  (84 y.o.)  Gender: male   Referring Practitioner: Brynn Diaz APRN - SARAH            REASON FOR MISSED TREATMENT:  Patient currently in 4 point soft restraints d/t exhibiting violent behavior. Will attempt next available time when appropriate.

## 2024-12-23 NOTE — SIGNIFICANT EVENT
Came down on the patient this morning, he was in 4 point restraints.  Per nursing patient very agitated and hostile and aggressive last night.  Went and saw the patient, he was redirectable, he had forgotten where he was did not remember why he was in the hospital.  I have after reorienting patient was more calm.  Family coming.  I think when family gets here will help him reorient.  Continue supportive care.

## 2024-12-24 ENCOUNTER — APPOINTMENT (OUTPATIENT)
Dept: GENERAL RADIOLOGY | Age: 84
End: 2024-12-24
Payer: MEDICARE

## 2024-12-24 PROBLEM — Z51.5 PALLIATIVE CARE PATIENT: Status: ACTIVE | Noted: 2024-12-24

## 2024-12-24 PROBLEM — R41.89 COGNITIVE IMPAIRMENT: Status: ACTIVE | Noted: 2024-12-24

## 2024-12-24 PROBLEM — R41.0 DELIRIUM: Status: ACTIVE | Noted: 2024-12-24

## 2024-12-24 LAB
ANION GAP SERPL CALC-SCNC: 12 MEQ/L (ref 8–16)
BACTERIA URNS QL MICRO: ABNORMAL /HPF
BASOPHILS ABSOLUTE: 0 THOU/MM3 (ref 0–0.1)
BASOPHILS NFR BLD AUTO: 0.3 %
BILIRUB UR QL STRIP.AUTO: NEGATIVE
BUN SERPL-MCNC: 25 MG/DL (ref 7–22)
CALCIUM SERPL-MCNC: 9.6 MG/DL (ref 8.5–10.5)
CASTS #/AREA URNS LPF: ABNORMAL /LPF
CASTS 2: ABNORMAL /LPF
CHARACTER UR: ABNORMAL
CHLORIDE SERPL-SCNC: 101 MEQ/L (ref 98–111)
CO2 SERPL-SCNC: 25 MEQ/L (ref 23–33)
COLOR, UA: YELLOW
CREAT SERPL-MCNC: 1 MG/DL (ref 0.4–1.2)
CRYSTALS URNS MICRO: ABNORMAL
DEPRECATED RDW RBC AUTO: 41.6 FL (ref 35–45)
EKG ATRIAL RATE: 114 BPM
EKG Q-T INTERVAL: 454 MS
EKG QRS DURATION: 210 MS
EKG QTC CALCULATION (BAZETT): 625 MS
EKG R AXIS: -82 DEGREES
EKG T AXIS: 82 DEGREES
EKG VENTRICULAR RATE: 114 BPM
EOSINOPHIL NFR BLD AUTO: 0.2 %
EOSINOPHILS ABSOLUTE: 0 THOU/MM3 (ref 0–0.4)
EPITHELIAL CELLS, UA: ABNORMAL /HPF
ERYTHROCYTE [DISTWIDTH] IN BLOOD BY AUTOMATED COUNT: 13.7 % (ref 11.5–14.5)
GFR SERPL CREATININE-BSD FRML MDRD: 74 ML/MIN/1.73M2
GLUCOSE BLD STRIP.AUTO-MCNC: 221 MG/DL (ref 70–108)
GLUCOSE BLD STRIP.AUTO-MCNC: 222 MG/DL (ref 70–108)
GLUCOSE BLD STRIP.AUTO-MCNC: 225 MG/DL (ref 70–108)
GLUCOSE BLD STRIP.AUTO-MCNC: 228 MG/DL (ref 70–108)
GLUCOSE BLD STRIP.AUTO-MCNC: 279 MG/DL (ref 70–108)
GLUCOSE SERPL-MCNC: 195 MG/DL (ref 70–108)
GLUCOSE UR QL STRIP.AUTO: 100 MG/DL
HCT VFR BLD AUTO: 46.9 % (ref 42–52)
HGB BLD-MCNC: 15.3 GM/DL (ref 14–18)
HGB UR QL STRIP.AUTO: ABNORMAL
IMM GRANULOCYTES # BLD AUTO: 0.09 THOU/MM3 (ref 0–0.07)
IMM GRANULOCYTES NFR BLD AUTO: 0.7 %
INR PPP: 2.18 (ref 0.85–1.13)
KETONES UR QL STRIP.AUTO: ABNORMAL
LYMPHOCYTES ABSOLUTE: 1.4 THOU/MM3 (ref 1–4.8)
LYMPHOCYTES NFR BLD AUTO: 10.1 %
MCH RBC QN AUTO: 27.3 PG (ref 26–33)
MCHC RBC AUTO-ENTMCNC: 32.6 GM/DL (ref 32.2–35.5)
MCV RBC AUTO: 83.6 FL (ref 80–94)
MISCELLANEOUS 2: ABNORMAL
MONOCYTES ABSOLUTE: 1.5 THOU/MM3 (ref 0.4–1.3)
MONOCYTES NFR BLD AUTO: 10.8 %
NEUTROPHILS ABSOLUTE: 10.8 THOU/MM3 (ref 1.8–7.7)
NEUTROPHILS NFR BLD AUTO: 77.9 %
NITRITE UR QL STRIP: NEGATIVE
NRBC BLD AUTO-RTO: 0 /100 WBC
PH UR STRIP.AUTO: 5 [PH] (ref 5–9)
PLATELET # BLD AUTO: 166 THOU/MM3 (ref 130–400)
PMV BLD AUTO: 10.8 FL (ref 9.4–12.4)
POTASSIUM SERPL-SCNC: 4.3 MEQ/L (ref 3.5–5.2)
PROT UR STRIP.AUTO-MCNC: 300 MG/DL
RBC # BLD AUTO: 5.61 MILL/MM3 (ref 4.7–6.1)
RBC URINE: ABNORMAL /HPF
RENAL EPI CELLS #/AREA URNS HPF: ABNORMAL /[HPF]
SODIUM SERPL-SCNC: 138 MEQ/L (ref 135–145)
SP GR UR REFRACT.AUTO: 1.03 (ref 1–1.03)
TROPONIN, HIGH SENSITIVITY: 45 NG/L (ref 0–12)
TROPONIN, HIGH SENSITIVITY: 46 NG/L (ref 0–12)
TROPONIN, HIGH SENSITIVITY: 46 NG/L (ref 0–12)
TROPONIN, HIGH SENSITIVITY: 50 NG/L (ref 0–12)
UROBILINOGEN, URINE: 0.2 EU/DL (ref 0–1)
WBC # BLD AUTO: 13.8 THOU/MM3 (ref 4.8–10.8)
WBC #/AREA URNS HPF: > 100 /HPF
WBC #/AREA URNS HPF: ABNORMAL /[HPF]
YEAST LIKE FUNGI URNS QL MICRO: ABNORMAL

## 2024-12-24 PROCEDURE — 97116 GAIT TRAINING THERAPY: CPT

## 2024-12-24 PROCEDURE — 97530 THERAPEUTIC ACTIVITIES: CPT

## 2024-12-24 PROCEDURE — 84484 ASSAY OF TROPONIN QUANT: CPT

## 2024-12-24 PROCEDURE — 87040 BLOOD CULTURE FOR BACTERIA: CPT

## 2024-12-24 PROCEDURE — 6370000000 HC RX 637 (ALT 250 FOR IP): Performed by: NURSE PRACTITIONER

## 2024-12-24 PROCEDURE — 87077 CULTURE AEROBIC IDENTIFY: CPT

## 2024-12-24 PROCEDURE — 93010 ELECTROCARDIOGRAM REPORT: CPT | Performed by: INTERNAL MEDICINE

## 2024-12-24 PROCEDURE — 85025 COMPLETE CBC W/AUTO DIFF WBC: CPT

## 2024-12-24 PROCEDURE — 71045 X-RAY EXAM CHEST 1 VIEW: CPT

## 2024-12-24 PROCEDURE — 99223 1ST HOSP IP/OBS HIGH 75: CPT | Performed by: STUDENT IN AN ORGANIZED HEALTH CARE EDUCATION/TRAINING PROGRAM

## 2024-12-24 PROCEDURE — 93005 ELECTROCARDIOGRAM TRACING: CPT | Performed by: STUDENT IN AN ORGANIZED HEALTH CARE EDUCATION/TRAINING PROGRAM

## 2024-12-24 PROCEDURE — 87186 SC STD MICRODIL/AGAR DIL: CPT

## 2024-12-24 PROCEDURE — 6370000000 HC RX 637 (ALT 250 FOR IP)

## 2024-12-24 PROCEDURE — 97535 SELF CARE MNGMENT TRAINING: CPT

## 2024-12-24 PROCEDURE — 87086 URINE CULTURE/COLONY COUNT: CPT

## 2024-12-24 PROCEDURE — 92523 SPEECH SOUND LANG COMPREHEN: CPT

## 2024-12-24 PROCEDURE — 85610 PROTHROMBIN TIME: CPT

## 2024-12-24 PROCEDURE — 82948 REAGENT STRIP/BLOOD GLUCOSE: CPT

## 2024-12-24 PROCEDURE — 1200000000 HC SEMI PRIVATE

## 2024-12-24 PROCEDURE — 2500000003 HC RX 250 WO HCPCS: Performed by: NURSE PRACTITIONER

## 2024-12-24 PROCEDURE — 6370000000 HC RX 637 (ALT 250 FOR IP): Performed by: STUDENT IN AN ORGANIZED HEALTH CARE EDUCATION/TRAINING PROGRAM

## 2024-12-24 PROCEDURE — 99232 SBSQ HOSP IP/OBS MODERATE 35: CPT | Performed by: STUDENT IN AN ORGANIZED HEALTH CARE EDUCATION/TRAINING PROGRAM

## 2024-12-24 PROCEDURE — 80048 BASIC METABOLIC PNL TOTAL CA: CPT

## 2024-12-24 PROCEDURE — 6370000000 HC RX 637 (ALT 250 FOR IP): Performed by: OCCUPATIONAL THERAPIST

## 2024-12-24 PROCEDURE — 81001 URINALYSIS AUTO W/SCOPE: CPT

## 2024-12-24 PROCEDURE — 36415 COLL VENOUS BLD VENIPUNCTURE: CPT

## 2024-12-24 RX ORDER — GRANULES FOR ORAL 3 G/1
3 POWDER ORAL ONCE
Status: COMPLETED | OUTPATIENT
Start: 2024-12-24 | End: 2024-12-24

## 2024-12-24 RX ORDER — RIVASTIGMINE 4.6 MG/24H
1 PATCH, EXTENDED RELEASE TRANSDERMAL DAILY
Status: DISCONTINUED | OUTPATIENT
Start: 2024-12-24 | End: 2024-12-31 | Stop reason: HOSPADM

## 2024-12-24 RX ORDER — WARFARIN SODIUM 5 MG/1
5 TABLET ORAL ONCE
Status: COMPLETED | OUTPATIENT
Start: 2024-12-24 | End: 2024-12-25

## 2024-12-24 RX ORDER — LORAZEPAM 2 MG/ML
1 INJECTION INTRAMUSCULAR ONCE
Status: DISCONTINUED | OUTPATIENT
Start: 2024-12-24 | End: 2024-12-31 | Stop reason: HOSPADM

## 2024-12-24 RX ADMIN — ATORVASTATIN CALCIUM 40 MG: 40 TABLET, FILM COATED ORAL at 09:53

## 2024-12-24 RX ADMIN — SODIUM CHLORIDE, PRESERVATIVE FREE 10 ML: 5 INJECTION INTRAVENOUS at 21:29

## 2024-12-24 RX ADMIN — TAMSULOSIN HYDROCHLORIDE 0.4 MG: 0.4 CAPSULE ORAL at 09:52

## 2024-12-24 RX ADMIN — INSULIN LISPRO 1 UNITS: 100 INJECTION, SOLUTION INTRAVENOUS; SUBCUTANEOUS at 21:27

## 2024-12-24 RX ADMIN — SPIRONOLACTONE 25 MG: 25 TABLET ORAL at 09:51

## 2024-12-24 RX ADMIN — SODIUM CHLORIDE, PRESERVATIVE FREE 10 ML: 5 INJECTION INTRAVENOUS at 09:52

## 2024-12-24 RX ADMIN — METHOCARBAMOL 1000 MG: 500 TABLET ORAL at 12:43

## 2024-12-24 RX ADMIN — METHOCARBAMOL 1000 MG: 500 TABLET ORAL at 21:19

## 2024-12-24 RX ADMIN — METHOCARBAMOL 1000 MG: 500 TABLET ORAL at 17:36

## 2024-12-24 RX ADMIN — TICAGRELOR 90 MG: 90 TABLET ORAL at 09:50

## 2024-12-24 RX ADMIN — METHOCARBAMOL 1000 MG: 500 TABLET ORAL at 09:51

## 2024-12-24 RX ADMIN — FAMOTIDINE 20 MG: 20 TABLET, FILM COATED ORAL at 21:19

## 2024-12-24 RX ADMIN — FINASTERIDE 5 MG: 5 TABLET, FILM COATED ORAL at 09:52

## 2024-12-24 RX ADMIN — INSULIN LISPRO 1 UNITS: 100 INJECTION, SOLUTION INTRAVENOUS; SUBCUTANEOUS at 09:50

## 2024-12-24 RX ADMIN — POLYETHYLENE GLYCOL 3350 17 G: 17 POWDER, FOR SOLUTION ORAL at 09:51

## 2024-12-24 RX ADMIN — FAMOTIDINE 20 MG: 20 TABLET, FILM COATED ORAL at 09:50

## 2024-12-24 RX ADMIN — METOPROLOL SUCCINATE 50 MG: 50 TABLET, EXTENDED RELEASE ORAL at 09:52

## 2024-12-24 RX ADMIN — INSULIN LISPRO 1 UNITS: 100 INJECTION, SOLUTION INTRAVENOUS; SUBCUTANEOUS at 12:43

## 2024-12-24 RX ADMIN — INSULIN LISPRO 2 UNITS: 100 INJECTION, SOLUTION INTRAVENOUS; SUBCUTANEOUS at 17:36

## 2024-12-24 RX ADMIN — TICAGRELOR 90 MG: 90 TABLET ORAL at 21:19

## 2024-12-24 RX ADMIN — BUMETANIDE 0.5 MG: 0.5 TABLET ORAL at 09:52

## 2024-12-24 RX ADMIN — GRANULES FOR ORAL SOLUTION 1 PACKET: 3 POWDER ORAL at 21:19

## 2024-12-24 ASSESSMENT — PAIN SCALES - WONG BAKER: WONGBAKER_NUMERICALRESPONSE: NO HURT

## 2024-12-24 ASSESSMENT — PAIN DESCRIPTION - LOCATION: LOCATION: RIB CAGE

## 2024-12-24 ASSESSMENT — PAIN SCALES - GENERAL
PAINLEVEL_OUTOF10: 0
PAINLEVEL_OUTOF10: 0

## 2024-12-24 ASSESSMENT — PAIN DESCRIPTION - DESCRIPTORS: DESCRIPTORS: ACHING

## 2024-12-24 NOTE — CARE COORDINATION
12/24/24, 3:05 PM EST    DISCHARGE ON GOING EVALUATION    Raymond Hummel       [facility] day: 3  Location: [facility]15/015-A Reason for admit: Fall, initial encounter [W19.XXXA]  Fall at home, initial encounter [W19.XXXA, Y92.009]     Barriers to Discharge:   Fall/Left Rib(s) Fractures/Left Hemothorax/Delirium  PMH: TAVR, Aortic Stenosis  DNR Comfort Care  Elevated WBC; monitor  Await Cultures  Client assigned to AlissaK; Hand-Off given to TREV Waddell CM  PCP: Duran Dukes MD  Readmission Risk Score: 15.5  Patient Goals/Plan/Treatment Preferences: lives w spouse Gela, has britnie, plans new [facility] (precert, therapy following, not safe for home); sitter in room; son/FLORINDA [facility], current Coumadin Clinic, Cardio-Pulmonary Rehab, CHF Clinic

## 2024-12-24 NOTE — CONSULTS
Provider Consult Note        Patient:   Raymond Hummel  YOB: 1940  Age:  84 y.o.  Room:  [facility]15/015-A  MRN:  [id number]   Acct: [id number]  PCP: Duran Dukes MD    Date of Admission:  12/21/2024  3:28 PM  Date of Service:  12/24/2024    Reason for Consult: Goals of Care             Subjective   Chief Complaint:-   Fall       History Obtained From:-  Patient, Electronic Medical Record, and Patient's primary nurse    History of Present Illness:-                   Raymond Hummel is a 84 y.o. male who  has a past medical history of Diverticulosis, Heart murmur, Hematuria, High triglycerides, History of colonic polyps, History of transcatheter aortic valve replacement (TAVR), Hypertension, Insulin resistance, LVH (left ventricular hypertrophy), and Medtronic dual pacer. They present to the hospital with Fall   and are admitted for Fall. Family does report a history of progressive dementia which has worsened since the fall. Reportedly was walking outside to check on his car and mis-stepped. Left-sided rib fractures and hemothorax evaluated by Trauma w/ no plans for surgical intervention. Required minimal prn pain medications since admission. Experienced multiple bouts of delirium; Psych recommended Exelon patch. Palliative care was consulted for Goals of Care.    Symptoms:  Pain: They deny pain.  Shortness of breath: Patient denies shortness of breath or difficulty breathing.  Sleep: Patient reports they are not having issues with sleep.  Fatigue/Drowsiness: Patient denies fatigue.  Patient denies drowsiness.   Appetite: Patient denied issues with appetite.  Wt. Loss: Patient's weight is stable.   Dry Mouth: Patient denies dry mouth.  Nausea/Vomiting: Patient denies nausea and vomiting.  Constipation/Diarrhea: Patient denies constipation or diarrhea.  Depression: Patient denies depression or issues with mood.  Anxiety: Patient denies anxiety.    Serious Illness  Conversation:  Information: Patient would like to hear things straight. Family would like to know details and the big picture about the patient's illness.  Understanding: Patient does not have a good understanding of their illness and Family has a good understanding of patient's illness  Goals: better symptom control  Worries: Symptoms  Prognosis: Patient would want to know prognosis. and Family would want to know prognosis.  Joys: Spending time with family  Sources of Strength: Family  Minimal Acceptable Outcome: They consider their [place], Ability to Eat, Mobility, and Cognition so important to them they wouldn't want to live without.  Maximum [place]: They would not want cardiopulmonary resuscitation in the event of cardiac arrest nor would they want to be intubated and mechanically ventilated under any circumstances. They would want their medical care to maximize comfort through symptom management and allow for a natural death. Oxygen, suction and manual treatment of airway obstruction should be used as needed for comfort. They would want to avoid non-invasive positive airway pressure, antibiotics and IV fluids unless consistent with comfort goal. They would want to transfer to a hospital only if comfort cannot be achieved in current setting. This is consistent with a code status of Do Not Resuscitate Comfort Care (DNR-CC).  Family: Family is aware of wishes    Advanced Directives:  Patient does not have capacity to make medical decisions.  Patient does have a Health Care Power of . Juan Carlos Hummel (son), is POA.  Patient does not have a living will.  Patient does and does not have a POLST form completed. POLST form completion deferred today.    Social History:  Marital Status:     Spiritual History:  Patient is not spiritual or religous. and Patient has a kenrda background of [employer].    ADL's:  Patient needs help with:  transportation and walking    Past Medical History:    Past Medical  fracture of multiple ribs of left side    Hemothorax on left    Elevated troponin    Coronary artery disease involving native coronary artery of native heart without angina pectoris    Chronic heart failure with preserved ejection fraction (HCC)    Intracranial mass    Acute delirium    Delirium    Cognitive impairment    Palliative care patient  Resolved Problems:    * No resolved hospital problems. *       Continue current plan of care for acute and chronic conditions per primary and specialist teams  Change code status to DNR-CC. Discussed that patient does not qualify for hospice at this time. Patient family expresses understanding and states they would want patient symptoms better controlled but no aggressive resuscitative measures.  Continue Oxycodone IR 10mg and Hydromorphone IR 1mg every 4 hours as needed for breakthrough pain  Continue Exelon Patch 4.6mg/24hr for agitation  Continue Ativan IV 0.5mg every twice a day as needed for breakthrough agitation  Please give IV medications only if oral medications are not effective at controlling symptoms  There must be at least one hour between giving oral immediate release opioids and the next dose of either IV or oral immediate release opioid  Will refer to Palliative Care Clinic on discharge  Hospice following; providing information for future use per family request.  Palliative Care will continue to follow the patient while they are hospitalized   Please PerfectServe or call the Palliative Care team with any questions or concerns    CURRENT CODE STATUS:  DNR-CC Limited Code details: Intubation/Re-intubation No Comment; Defibrillation/Cardioversion No Comment; Chest Compressions No Comment; Resuscitative Medications No Comment; Other No Comment       Parts of this note may have been dictated by use of voice recognition software and electronically transcribed. The note may contain errors not detected in proofreading    Electronically signed by Vincent Tam

## 2024-12-24 NOTE — PROGRESS NOTES
Warfarin Pharmacy Consult Note    Warfarin Indication:  TAVR  [facility] INR: 2.0-3.0  Dose prior to admission: 5 mg daily    Recent Labs     12/22/24  0054 12/23/24  0326 12/24/24  0357   HGB 14.6 13.7* 15.3    156 166     Recent Labs     12/22/24  0054 12/23/24  0327 12/24/24  0357   INR 2.56* 2.58* 2.18*     Concurrent anticoagulants/antiplatelets: ticagrelor  Significant warfarin drug-drug interactions: none     Date INR Warfarin Dose   12/21/24 2.65 5 mg (PTA)    12/22/24  2.56 5 mg   12/23/24  2.58  5 mg      12/24/24 2.18  5 mg                               Monitoring:                   INR will be monitored daily. INR ordered.     **Please contact pharmacy for discharge instructions when indicated**  Ashley Lee, PharmD 12/24/2024 6:37 AM

## 2024-12-24 NOTE — PROGRESS NOTES
[facility]  INPATIENT PHYSICAL THERAPY  DAILY NOTE  STRZ ICU STEPDOWN TELEMETRY 4K - 4K-15/015-A      Discharge Recommendations: Continue to assess pending progress and pt not safe to return home at this time  Equipment Recommendations: No               Time In: 1130  Time Out: 1153  Timed Code Treatment Minutes: 23 Minutes  Minutes: 23          Date: 2024  Patient Name: Raymond Hummel,  Gender:  male        MRN: [id number]  : 1940  (84 y.o.)     Referring Practitioner: Brynn Diaz APRN - CNP  Diagnosis: Fall  Additional Pertinent Hx: Per chart review: 84-year-old male with PMH of TAVR 2024 on Coumadin and Brilinta, HTN, prediabetes.  Patient presents today after a mechanical fall.  He was going outside walking down some steps to check on his car and slipped landing on his left side, denies hitting his head.  Patient was subsequently brought to [facility] for further evaluation.  Patient denies fever, chills, lightheadedness, dizziness, cough, SOB, chest pain, palpitations, nausea, vomiting, constipation, diarrhea, dysuria, lower extremity edema.  Of note the patient stated he was not in any pain at the time but he did morphine. CT of the abdomen and pelvis and lumbar spine recons note left rib fractures involving the 8-12 ribs     Prior Level of Function:  Lives With: Spouse  Type of Home: Condo  Home Layout: One level  Home Access: Stairs to enter with rails  Entrance Stairs - Number of Steps: 8 LAZARA  Entrance Stairs - Rails: Both  Home Equipment: Cane, Grab bars   Bathroom Shower/Tub: Walk-in shower  Bathroom Toilet: Handicap height  Bathroom Equipment: Shower chair  Bathroom Accessibility: Accessible    Receives Help From: Family  Prior Level of Assist for ADLs: Independent  Prior Level of Assist for Homemaking: Needs assistance (wife completes most tasks)  Prior Level of Assist for Transfers: Independent  Active : Yes  Additional Comments: IND with functional tasks, has been

## 2024-12-24 NOTE — PROCEDURES
PROCEDURE NOTE  Date: 12/24/2024   Name: Raymond Hummel  YOB: 1940    Procedures  EKG completed

## 2024-12-24 NOTE — PROGRESS NOTES
[facility]  SPEECH THERAPY  STRZ ICU STEPDOWN TELEMETRY 4K  Speech - Language - Cognitive Evaluation    Discharge Recommendations: SNF -  supervision    SLP Individual Minutes  Time In: 0856  Time Out: 0920  Minutes: 24  Timed Code Treatment Minutes: 0 Minutes       Date: 2024  Patient Name: Raymond Hummel      CSN: [id number]   : 1940  (84 y.o.)  Gender: male   Referring Physician:  Brynn Diaz, APRN - CNP   Diagnosis: Fall  Precautions: Fall Risk  History of Present Illness/Injury: Patient admitted with above diagnosis. Per chart review, \"Raymond is an 84 year old male presenting to the Emergency Department via POV for evaluation of injuries sustained in a fall at home. He reports that he was walking down the steps outside and slipped on some ice, falling onto his left side. He denies hitting his head or loss of consciousness. Arrives complaining of left sided chest wall pain with movement. Denies any chest pain, shortness of breath, abdominal pain, pain to extremities, neck pain, back pain, nausea or vomiting. \"     CT Head 2024  IMPRESSION:  1. No acute intracranial hemorrhage      Past Medical History:   Diagnosis Date    Diverticulosis     Heart murmur     Hematuria 08/15/2024    during admission where pt got TAVR 08/15/2024    High triglycerides     History of colonic polyps     History of transcatheter aortic valve replacement (TAVR) 08/15/2024    w/ Dr. Arguello    Hypertension     Insulin resistance     LVH (left ventricular hypertrophy)     Medtronic dual pacer 2024       Pain: No pain reported.    Subjective:  JERAMY Corrigan approved completion of rjmccg-[id number]-[id number] evaluation. Patient awake in bed with live sitter. Agreeable to skilled ST services. Appropriate engagement throughout.    SOCIAL HISTORY:   Living Arrangements: Home with wife  Work History: Retired  Education Level: 11th grade   Driving Status: Active   Finance Management: Shared task  services to address aformentioned deficits in order to make successful return to PLOF.      Rehabilitation Potential: good    EDUCATION:  Learner: Patient  Education:  Reviewed results and recommendations of this evaluation, Reviewed ST goals and Plan of Care, Reviewed recommendations for follow-up, Education Related to Potential Risks and Complications Due to Impairment/Illness/Injury, and Education Related to Health Promotion and Wellness  Evaluation of Education: Verbalizes understanding, Needs further instruction, and Family not present    PLAN:  Skilled SLP intervention on acute care 1-3 x per week or until goals met and or patient plateaus in function.  Specific interventions for next session may include: functional recall, problem solving/reasoning    PATIENT GOAL:    Did not state.  Will further assess during treatment.    SHORT TERM GOALS:  Short Term Goals  Time Frame for Short Term Goals: 2 weeks  Goal 1: Patient will complete functional recall tasks (orientation, biographics, 1-3 units) with 65% accuracy given mod cues to improve retention of pertinent, novel information r/t daily living requirements.  Goal 2: Patient will complete problem solving, verbal/visual reasoning, and executive functioning tasks (household obligations) with 65% accuracy given mod cues to enhance success within ADLs/IADLs.  Goal 3: Patient will complete sequencing and thought organization tasks with 65% accuracy given mod cues to improve mental flexibility for home scenarios.  Goal 4: Patient will complete structured attention tasks with no more than 5 errors until task completion to permit potential return to multi-tasking, IADLs, driving, and occupational hobbies.    LONG TERM GOALS:  No long term goals recommended d/t short BEV Robbins M.A., CCC-SLP [id number]

## 2024-12-24 NOTE — PLAN OF CARE
Problem: Discharge Planning  Goal: Discharge to home or other facility with appropriate resources  12/24/2024 0142 by Marcella Mercer RN  Flowsheets (Taken 12/24/2024 0142)  Discharge to home or other facility with appropriate resources:   Identify barriers to discharge with patient and caregiver   Identify discharge learning needs (meds, wound care, etc)   Refer to discharge planning if patient needs post-hospital services based on physician order or complex needs related to functional status, cognitive ability or social support system     Problem: Safety - Adult  Goal: Free from fall injury  12/24/2024 0144 by Marcella Mercer RN  Flowsheets (Taken 12/24/2024 0144)  Free From Fall Injury:   Instruct family/caregiver on patient safety   Based on caregiver fall risk screen, instruct family/caregiver to ask for assistance with transferring infant if caregiver noted to have fall risk factors     Problem: ABCDS Injury Assessment  Goal: Absence of physical injury  12/24/2024 0144 by Marcella Mercer RN  Flowsheets (Taken 12/24/2024 0144)  Absence of Physical Injury: Implement safety measures based on patient assessment     Problem: Chronic Conditions and Co-morbidities  Goal: Patient's chronic conditions and co-morbidity symptoms are monitored and maintained or improved  12/24/2024 0144 by Marcella Mercer RN  Flowsheets (Taken 12/24/2024 0144)  Care Plan - Patient's Chronic Conditions and Co-Morbidity Symptoms are Monitored and Maintained or Improved:   Collaborate with multidisciplinary team to address chronic and comorbid conditions and prevent exacerbation or deterioration   Update acute care plan with appropriate goals if chronic or comorbid symptoms are exacerbated and prevent overall improvement and discharge     Problem: Skin/Tissue Integrity  Goal: Absence of new skin breakdown  Description: 1.  Monitor for areas of redness and/or skin breakdown  2.  Assess vascular access sites hourly  3.  Every 4-6 hours minimum:   Change oxygen saturation probe site  4.  Every 4-6 hours:  If on nasal continuous positive airway pressure, respiratory therapy assess nares and determine need for appliance change or resting period.  12/24/2024 0144 by Marcella Mercer RN  Note: No new skin breakdown

## 2024-12-24 NOTE — CONSULTS
Department of Psychiatry   Psychiatric Assessment      Thank you very much for allowing us to participate in the care of this patient.      Reason for Consult: Agitation    HISTORY OF PRESENT ILLNESS:    Patient is an 84-year-old male with significant history of neurocognitive decline presented following a fall.  Psychiatrist consulted to evaluate for agitation.  Patient is currently reporting that he came in after he fell on ice but denies hitting his head.  Mentions that there were several people trying to handcuff him last night.  He is not oriented to place or time but some to situation at this time.  Sitter in the room and staff mentions that he has been refusing to eat, take his medications or participate in the care plan.  He was extremely aggressive requiring multiple emergency medications and also had to be placed in 4-point restraint.  Reviewed records and psychiatry was consulted in September of this year for capacity evaluation during which patient demonstrated some fair decision making at that time.  However family has some concerns at this time per staff that he has been more confused than he at his baseline.      PSYCHIATRIC HISTORY:  Per records no significant psychiatric history other than neurocognitive decline, no previous suicide attempts or inpatient psychiatric hospitalizations    Social History: Per records  Born in: [place], [place]  Highest Level of Education: 10th grade  Occupation: retired  Marital Status:   Children: 2 adult sons  Residence: apartment with wife  Stressors: medical issues  Patient Assets/Supportive Factors: Very supportive family.    SUBSTANCE USE HISTORY: Per records none reported    Family Medical and Psychiatric History:           Problem Relation Age of Onset    Alzheimer's Disease Mother     Parkinsonism Father     Colon Cancer Neg Hx      Prior to Admission medications    Medication Sig Start Date End Date Taking? Authorizing Provider   bumetanide (BUMEX) 0.5 MG  ideations   Denies homicidal ideations    Denies hallucinations   Denies delusions  Cognition:  Oriented to self, and some to situation but not to location or time   concentration clinically adequate  Memory poor  Insight & Judgment: Poor    DSM-5 DIAGNOSIS:  Acute delirium  Major neurocognitive decline    Stressors     Severity of stressors is moderate  Source of stressors include:  Other psychosocial and environmental stressors    PLAN:    Recommend starting Exelon 4.6 mg 24-hour patch  Patient's QTc was over 600 however is currently trending down.  Would recommend repeating EKG  Recommend switching metoprolol to propranolol 20 mg 3 times daily to help with aggression.  Can utilize Ativan 0.5 mg up to 3 times a day as needed IM for any breakthrough agitation.  Recommend having a family meeting to discuss his disposition plan.  Some concerns were expressed about recurrent falls during his last discharge.  At this time patient mentions that he lives with his wife however this needs to be verified.  Patient might benefit from going to a dementia care unit-nursing facility as long-term care due to concerns of recurrent falls.  Will continue to follow as needed.    Thank you very much for allowing us to participate in the care of this patient.      Electronically signed by Katya Brown MD on 12/23/24 at 7:21 PM EST

## 2024-12-24 NOTE — PROGRESS NOTES
[facility]  STRZ ICU STEPDOWN TELEMETRY 4K  Occupational Therapy  Daily Note    Discharge Recommendations: ECF with OT and 24 hour assistance or supervision  Equipment Recommendations: No       Time In: 1258  Time Out: 1326  Timed Code Treatment Minutes: 28 Minutes  Minutes: 28          Date: 2024  Patient Name: Raymond Hummel,   Gender: male      Room: [address] [place] [address]  MRN: [id number]  : 1940  (84 y.o.)  Referring Practitioner: ROMEO Leon  Diagnosis: Fall  Additional Pertinent Hx: per chart review;  Raymond is an 84 year old male presenting to the Emergency Department via POV for evaluation of injuries sustained in a fall at home.  He reports that he was walking down the steps outside and slipped on some ice, falling onto his left side.  He denies hitting his head or loss of consciousness.  Arrives complaining of left sided chest wall pain with movement.  Denies any chest pain, shortness of breath, abdominal pain, pain to extremities, neck pain, back pain, nausea or vomiting.    Restrictions/Precautions:  Restrictions/Precautions: Fall Risk, General Precautions  Position Activity Restriction  Other Position/Activity Restrictions: L rib fractures      Social/Functional History:  Lives With: Spouse  Type of Home: Condo  Home Layout: One level  Home Access: Stairs to enter with rails  Entrance Stairs - Number of Steps: 8 LAZARA  Entrance Stairs - Rails: Both  Home Equipment: Cane, Grab bars   Bathroom Shower/Tub: Walk-in shower  Bathroom Toilet: Handicap height  Bathroom Equipment: Shower chair  Bathroom Accessibility: Accessible    Receives Help From: Family  Prior Level of Assist for ADLs: Independent  Prior Level of Assist for Homemaking: Needs assistance (wife completes most tasks)  Prior Level of Assist for Transfers: Independent  Prior Level of Assist for Ambulation: Independent household ambulator, with or without device, Independent community ambulator, with or without  device  Has the patient had two or more falls in the past year or any fall with injury in the past year?: No    Active : Yes  Occupation: Retired  Additional Comments: IND with functional tasks, has been using a cane for the last 3 months. patient's spouse is in good health to assist as needed.    SUBJECTIVE: Patient supine upon arrival, with sitter present. RN Meenu hahn'ed session and reports that patient has been c/o urgency to use BM but hasn't been successful on bed pan, and requested for this writer to attempt either BSC or toilet (RN reports that patient was unable to safely complete before). Patient pleasantly confused but was redirected fairly easily. No s/s of agitation or violence with this writer. Towards EOS, patient's spouse, brother & son were present. Patient gave permission to talk with family and for family to be present within room. At EOS, physician and CM were also present in room.      PAIN: no c/o pain during session.     Vitals: Vitals not assessed per clinical judgement, see nursing flowsheet    COGNITION: Slow Processing, Decreased Recall, Decreased Insight, Decreased Safety Awareness, Impaired Attention, and impaired memory, poor historian. Disoriented. Unable to vocalize family members names upon their arrival to room.      ADL:   Grooming: with set-up and with verbal cues .  Pt unable to self initiate and sequence task, requiring backward chaining to initiate beginning of oral care. Once toothbrush set-up and place in R hand, patient then able to recall and sequence rote task and finish remaining.  Unable to tolerate in standing position this date as when standing pt had B knee buckling, and was unsafe to remain in standing position. Required min A to facilitate trunk control to sit upright in recliner during oral care.      Upper Extremity Dressing: Moderate Assistance.  Pt unable to follow sequence to don gown that was pre-tied to mimic t-shirt. Required backward chaining to

## 2024-12-24 NOTE — PROGRESS NOTES
Hospitalist Progress Note      Patient:  Raymond Hummel    Unit/Bed:4K-15/015-A  YOB: 1940  MRN: [id number]   Acct: [id number]   PCP: Duran Dukes MD  Date of Admission: 12/21/2024      ASSESSMENT AND PLAN    Acute delirium (improving)  Major neurocognitive decline  QTc was over 600   Pt was AOx2  Plan   Psychiatry consulted appreciate recs  Starting Exelon 4.6 mg 24-hour patch   Repeat EKG   Referral to [facility].    L 8-12 rib fractures s/p fall: Patient slipped on stairs which were covered in ice landing on his left side.  CT scan showed 8-12 rib fractures on the left side and a small L sided pleural effusion.  The family reports a significant decline in the patient's condition over the past several months, noting a history of dementia and three TAVR procedures, including one requiring over 5.5 hours under anesthesia, which they believe contributed to the deterioration. They have decided to change the patient's code status to DNR CC after discussion with [facility] and have notified hospice for consultation.    - Pain per primary  -  PT OT  - Lidocaine patches  - Incentive spirometry  - Hospice consulted   - Palliative med following      Elevated troponin: Heart score 6 patient denies chest pain, SOB, lightheadedness.  From chart review EKG was sent to cardiology with concern patient is in a flutter.  Initial troponin 48.  -Repeat troponin  -If patient develops chest pain obtain stat EKG and troponin  -Cardiology has been consulted     Leukocytosis: WBC 14.3 on arrival.  I suspect the WBC elevation is likely reactive to the patient's fall and acute rib fractures.     Chronic Conditions (reviewed and stable unless otherwise stated)  HLD: Continue Lipitor  GERD: Continue Pepcid  HFpEF: Echo 11/13/2024 showing EF 60-65% with moderately increased wall thickness.  Diastolic dysfunction present.  Patient has an  recognition software.  It may contain minor errors which are inherent in voice recognition technology.** Electronically signed by Dr. Bety Tinajero    CT CERVICAL SPINE WO CONTRAST    Result Date: 12/21/2024  PROCEDURE: CT CERVICAL SPINE WO CONTRAST CLINICAL INFORMATION: fall COMPARISON: No prior study. TECHNIQUE: 3 mm axial imaging through the cervical spine without contrast.  Sagittal and coronal reconstructions were performed. All CT scans at this facility use dose modulation, iterative reconstruction, and/or weight based dosing when appropriate to reduce the radiation dose to as low as reasonably achievable. FINDINGS: ALIGNMENT: Cervical lordosis is maintained. BONES: The bones are demineralized. Degenerative changes of the cervical spine. No acute fracture of the cervical spine. Multilevel facet arthrosis and uncovertebral degeneration. SOFT TISSUES: Unremarkable OTHER: None. DISC LEVELS: The lack of intrathecal contrast limits the evaluation for  spinal stenosis or foraminal stenosis C2-C3: No significant central canal narrowing. No significant neuroforamina narrowing. C3-C4: Disc osteophyte complex. No significant central canal narrowing. Mild neuroforaminal narrowing. C4-C5: Mild disc osteophyte complex. No significant central canal narrowing. Marked right neuroforaminal narrowing. Mild left neuroforaminal narrowing. C5-C6: Disc osteophyte complex. Mild central canal narrowing. Marked bilateral neuroforaminal narrowing. C6-C7: Disc osteophyte complex. No significant central canal narrowing. Marked left neuroforaminal narrowing. Mild right neuroforaminal narrowing. C7-T1: No significant central canal narrowing. No significant neuroforamina narrowing.     1. No acute fracture of the cervical spine. 2. Multilevel degenerative changes of the cervical spine. 3. Central canal and neuroforaminal narrowing as described above. 4. Other findings as described above. **This report has been created using voice

## 2024-12-24 NOTE — PROGRESS NOTES
[facility]  Trauma Surgery - Dr. Jorge Luis Espinoza for Dr. Viviana Patten   Daily Progress Note  Pt Name: Raymond Hummel  Medical Record Number: [id number]  Date of Birth 1940   Today's Date: 12/24/2024    HD: # 3    CC:  no complaints. L chest pain with movement     ASSESSMENT  1.  Active Hospital Problems    Diagnosis Date Noted    Delirium [R41.0] 12/24/2024    Intracranial mass [R90.0] 12/23/2024    Acute delirium [R41.0] 12/23/2024    Coronary artery disease involving native coronary artery of native heart without angina pectoris [I25.10] 12/22/2024    Chronic heart failure with preserved ejection fraction (HCC) [I50.32] 12/22/2024    Fall [W19.XXXA] 12/21/2024    Closed fracture of multiple ribs of left side [S22.42XA] 12/21/2024    Hemothorax on left [J94.2] 12/21/2024    Elevated troponin [R79.89] 12/21/2024    Anticoagulated on Coumadin [Z79.01] 10/08/2024         PLAN  Admit to 4K under Trauma Services     Trauma by fall              - PT, OT eval and treat              - Fall precautions     Left sided rib fractures              - CT of the abdomen and pelvis and lumbar spine recons note left rib fractures involving the 8-12 ribs              - Rib fracture protocol              - Lidoderm patches              - Continuous pulse oximetry              - Aggressive pulmonary toileting              - IS, C&DB              - Pain control              - Repeat CXR in the AM    - 12/22: Stable. Small left pleural effusion with adjacent atlectasis. Multiple left rib fractures. Has been doing well with respiratory therapy: NIF > 25 cm H2O and FVC 2.4L. Continue aggressive pulmonary toilet.     Left sided hemothorax              - CT of the chest and thoracic spine indicates a small left pleural effusion with left basilar atelectasis              - Above noted pleural effusion is likely a hemothorax due to patient's anticoagulation on Coumadin and chest trauma with noted rib fractures as

## 2024-12-25 ENCOUNTER — APPOINTMENT (OUTPATIENT)
Dept: CARDIAC REHAB | Age: 84
End: 2024-12-25
Payer: MEDICARE

## 2024-12-25 LAB
GLUCOSE BLD STRIP.AUTO-MCNC: 207 MG/DL (ref 70–108)
GLUCOSE BLD STRIP.AUTO-MCNC: 230 MG/DL (ref 70–108)
GLUCOSE BLD STRIP.AUTO-MCNC: 242 MG/DL (ref 70–108)
GLUCOSE BLD STRIP.AUTO-MCNC: 245 MG/DL (ref 70–108)
INR PPP: 2.61 (ref 0.85–1.13)

## 2024-12-25 PROCEDURE — 36415 COLL VENOUS BLD VENIPUNCTURE: CPT

## 2024-12-25 PROCEDURE — 94200 LUNG FUNCTION TEST (MBC/MVV): CPT

## 2024-12-25 PROCEDURE — 85610 PROTHROMBIN TIME: CPT

## 2024-12-25 PROCEDURE — 6370000000 HC RX 637 (ALT 250 FOR IP)

## 2024-12-25 PROCEDURE — 99232 SBSQ HOSP IP/OBS MODERATE 35: CPT | Performed by: PHYSICIAN ASSISTANT

## 2024-12-25 PROCEDURE — 6370000000 HC RX 637 (ALT 250 FOR IP): Performed by: NURSE PRACTITIONER

## 2024-12-25 PROCEDURE — 94799 UNLISTED PULMONARY SVC/PX: CPT

## 2024-12-25 PROCEDURE — 82948 REAGENT STRIP/BLOOD GLUCOSE: CPT

## 2024-12-25 PROCEDURE — 1200000000 HC SEMI PRIVATE

## 2024-12-25 PROCEDURE — 99231 SBSQ HOSP IP/OBS SF/LOW 25: CPT | Performed by: SURGERY

## 2024-12-25 PROCEDURE — 6370000000 HC RX 637 (ALT 250 FOR IP): Performed by: PHYSICIAN ASSISTANT

## 2024-12-25 PROCEDURE — 2500000003 HC RX 250 WO HCPCS: Performed by: NURSE PRACTITIONER

## 2024-12-25 PROCEDURE — 6370000000 HC RX 637 (ALT 250 FOR IP): Performed by: STUDENT IN AN ORGANIZED HEALTH CARE EDUCATION/TRAINING PROGRAM

## 2024-12-25 PROCEDURE — APPSS30 APP SPLIT SHARED TIME 16-30 MINUTES: Performed by: NURSE PRACTITIONER

## 2024-12-25 RX ORDER — METFORMIN HYDROCHLORIDE 500 MG/1
500 TABLET, EXTENDED RELEASE ORAL 2 TIMES DAILY
Status: DISCONTINUED | OUTPATIENT
Start: 2024-12-25 | End: 2024-12-31 | Stop reason: HOSPADM

## 2024-12-25 RX ORDER — WARFARIN SODIUM 5 MG/1
5 TABLET ORAL
Status: COMPLETED | OUTPATIENT
Start: 2024-12-25 | End: 2024-12-25

## 2024-12-25 RX ADMIN — FAMOTIDINE 20 MG: 20 TABLET, FILM COATED ORAL at 09:56

## 2024-12-25 RX ADMIN — INSULIN LISPRO 1 UNITS: 100 INJECTION, SOLUTION INTRAVENOUS; SUBCUTANEOUS at 11:44

## 2024-12-25 RX ADMIN — POLYETHYLENE GLYCOL 3350 17 G: 17 POWDER, FOR SOLUTION ORAL at 09:56

## 2024-12-25 RX ADMIN — TICAGRELOR 90 MG: 90 TABLET ORAL at 09:56

## 2024-12-25 RX ADMIN — METHOCARBAMOL 1000 MG: 500 TABLET ORAL at 09:56

## 2024-12-25 RX ADMIN — ATORVASTATIN CALCIUM 40 MG: 40 TABLET, FILM COATED ORAL at 09:56

## 2024-12-25 RX ADMIN — METOPROLOL SUCCINATE 50 MG: 50 TABLET, EXTENDED RELEASE ORAL at 09:56

## 2024-12-25 RX ADMIN — METHOCARBAMOL 1000 MG: 500 TABLET ORAL at 14:11

## 2024-12-25 RX ADMIN — INSULIN LISPRO 1 UNITS: 100 INJECTION, SOLUTION INTRAVENOUS; SUBCUTANEOUS at 09:56

## 2024-12-25 RX ADMIN — BUMETANIDE 0.5 MG: 0.5 TABLET ORAL at 09:56

## 2024-12-25 RX ADMIN — INSULIN LISPRO 1 UNITS: 100 INJECTION, SOLUTION INTRAVENOUS; SUBCUTANEOUS at 17:42

## 2024-12-25 RX ADMIN — METHOCARBAMOL 1000 MG: 500 TABLET ORAL at 17:43

## 2024-12-25 RX ADMIN — TICAGRELOR 90 MG: 90 TABLET ORAL at 20:36

## 2024-12-25 RX ADMIN — METHOCARBAMOL 1000 MG: 500 TABLET ORAL at 20:34

## 2024-12-25 RX ADMIN — SODIUM CHLORIDE, PRESERVATIVE FREE 10 ML: 5 INJECTION INTRAVENOUS at 20:43

## 2024-12-25 RX ADMIN — SPIRONOLACTONE 25 MG: 25 TABLET ORAL at 09:56

## 2024-12-25 RX ADMIN — METFORMIN HYDROCHLORIDE 500 MG: 500 TABLET, EXTENDED RELEASE ORAL at 20:34

## 2024-12-25 RX ADMIN — WARFARIN SODIUM 5 MG: 5 TABLET ORAL at 17:42

## 2024-12-25 RX ADMIN — INSULIN LISPRO 1 UNITS: 100 INJECTION, SOLUTION INTRAVENOUS; SUBCUTANEOUS at 20:42

## 2024-12-25 RX ADMIN — FINASTERIDE 5 MG: 5 TABLET, FILM COATED ORAL at 09:56

## 2024-12-25 RX ADMIN — FAMOTIDINE 20 MG: 20 TABLET, FILM COATED ORAL at 20:36

## 2024-12-25 RX ADMIN — WARFARIN SODIUM 5 MG: 5 TABLET ORAL at 00:34

## 2024-12-25 RX ADMIN — SODIUM CHLORIDE, PRESERVATIVE FREE 10 ML: 5 INJECTION INTRAVENOUS at 09:56

## 2024-12-25 RX ADMIN — TAMSULOSIN HYDROCHLORIDE 0.4 MG: 0.4 CAPSULE ORAL at 09:56

## 2024-12-25 ASSESSMENT — PATIENT HEALTH QUESTIONNAIRE - PHQ9
SUM OF ALL RESPONSES TO PHQ QUESTIONS 1-9: 0
2. FEELING DOWN, DEPRESSED OR HOPELESS: NOT AT ALL
SUM OF ALL RESPONSES TO PHQ9 QUESTIONS 1 & 2: 0
SUM OF ALL RESPONSES TO PHQ QUESTIONS 1-9: 0
1. LITTLE INTEREST OR PLEASURE IN DOING THINGS: NOT AT ALL

## 2024-12-25 ASSESSMENT — LIFESTYLE VARIABLES
HOW MANY STANDARD DRINKS CONTAINING ALCOHOL DO YOU HAVE ON A TYPICAL DAY: 3 OR 4
HOW OFTEN DO YOU HAVE A DRINK CONTAINING ALCOHOL: 4 OR MORE TIMES A WEEK

## 2024-12-25 NOTE — PROGRESS NOTES
Brief Intervention and Referral to Treatment Summary    Patient was provided PHQ-9, AUDIT-C and DAST Screening:      PHQ-9 Score: 0  AUDIT-C Score:  5  DAST Score:  0    Patient’s substance use is considered     Moderate Risk      Patient’s depression is considered:     Minimal    Brief Education Was Provided    Patient was not receptive      Brief Intervention Is Provided (Only for AUDIT-C or DAST)     Patient reports readiness to decrease and/or stop use and a plan was discussed   Patient denies readiness to decrease and/or stop use and a plan was not discussed      Injured Trauma Survivor Screening  1.  When you were injured or right afterward   Did you think you were going to die? RESPONSES; YES+1/NO: NO  Do you think this was done to you intentionally? NO    Since your injury  Have you felt more restless, tense or jumpy than usual? RESPONSES; YES+1/NO: NO  Have you found yourself unable to stop worrying? RESPONSES; YES+1/NO: NO  Do you find yourself thinking that the world is unsafe and that people are not to be trusted? RESPONSES; YES+1/NO: NO    TOTAL SCORE from ITSS Questions 1 and 2: 0  NOTE: A score of greater than or equal to 2 is considered positive for PTSD risk and is to receive a community resource packet to link with appropriate providers.    Recommendations/Referrals for Brief and/or Specialized Treatment Provided to Patient:

## 2024-12-25 NOTE — PROGRESS NOTES
1907  Trauma SBIRT attempted, pt continues to be confused at this time per sitter.  DILIP to re-attempt.

## 2024-12-25 NOTE — PLAN OF CARE
Problem: Discharge Planning  Goal: Discharge to home or other facility with appropriate resources  Outcome: Progressing  Flowsheets (Taken 12/24/2024 1730 by Isamar Carranza, RN)  Discharge to home or other facility with appropriate resources:   Identify barriers to discharge with patient and caregiver   Arrange for needed discharge resources and transportation as appropriate   Identify discharge learning needs (meds, wound care, etc)     Problem: Safety - Adult  Goal: Free from fall injury  Outcome: Progressing  Flowsheets (Taken 12/24/2024 0144 by Marcella Mercer, RN)  Free From Fall Injury:   Instruct family/caregiver on patient safety   Based on caregiver fall risk screen, instruct family/caregiver to ask for assistance with transferring infant if caregiver noted to have fall risk factors     Problem: ABCDS Injury Assessment  Goal: Absence of physical injury  Outcome: Progressing  Flowsheets (Taken 12/24/2024 0144 by Marcella Mercer RN)  Absence of Physical Injury: Implement safety measures based on patient assessment     Problem: Chronic Conditions and Co-morbidities  Goal: Patient's chronic conditions and co-morbidity symptoms are monitored and maintained or improved  Outcome: Progressing  Flowsheets (Taken 12/24/2024 1730 by Isamar Carranza, RN)  Care Plan - Patient's Chronic Conditions and Co-Morbidity Symptoms are Monitored and Maintained or Improved: Monitor and assess patient's chronic conditions and comorbid symptoms for stability, deterioration, or improvement     Problem: Safety - Medical Restraint  Goal: Remains free of injury from restraints (Restraint for Interference with Medical Device)  Description: INTERVENTIONS:  1. Determine that other, less restrictive measures have been tried or would not be effective before applying the restraint  2. Evaluate the patient's condition at the time of restraint application  3. Inform patient/family regarding the reason for restraint  4. Q2H: Monitor safety, psychosocial

## 2024-12-25 NOTE — PROGRESS NOTES
Warfarin Pharmacy Consult Note    Warfarin Indication:  TAVR  Target INR: 2.0-3.0  Dose prior to admission: 5 mg daily    Recent Labs     12/23/24  0326 12/24/24  0357   HGB 13.7* 15.3    166     Recent Labs     12/23/24  0327 12/24/24  0357 12/25/24  0708   INR 2.58* 2.18* 2.61*     Concurrent anticoagulants/antiplatelets: ticagrelor  Significant warfarin drug-drug interactions: none     Date INR Warfarin Dose   12/21/24 2.65 5 mg (PTA)    12/22/24  2.56 5 mg   12/23/24  2.58  5 mg      12/24/24 2.18  5 mg     12/25/24 2.61 5 mg                     Monitoring:                   INR will be monitored daily.    **Please contact pharmacy for discharge instructions when indicated**    Ramya Tran, PharmD  12/25/2024 at 4:36 PM

## 2024-12-25 NOTE — PROGRESS NOTES
Pt. Refusing vital signs and nightly medication. \"States to come back later this afternoon.\" Pt aware he is in the hospital but has \"a wedding to go to.\" Nurse advise pt . That he will be staying at the hospital tonight. Pt. Still refusing vital signs and night time medications.

## 2024-12-25 NOTE — PROGRESS NOTES
Hospitalist Progress Note      Patient:  Raymond Hummel    Unit/Bed:7K-13/013-A  YOB: 1940  MRN: [id number]   Acct: [id number]   PCP: Duran Dukes MD  Date of Admission: 12/21/2024      ASSESSMENT AND PLAN    Acute delirium on dementia  Psychiatry consulted and initiated Exelon patch-recommending dementia care unit  Family reports persistent decline over the last few months, worse after most recent TAVR procedure  Referral to [facility].  Currently with sitter.  Did not require medical or physical restraints overnight    L 8-12 rib fractures s/p fall:   Patient slipped on stairs which were covered in ice landing on his left side.    CT scan showed 8-12 rib fractures on the left side and a small L sided pleural effusion.  Trauma surgery following  Repeat chest x-ray in the a.m.  Continuous pulse oximetry  Pain control.  Pulmonary toileting    Long QT  Patient with chronic QT C prolongation dating back 6/2024  Avoid QT prolongating medications   Repeat EKG tomorrow a.m.      Goals of care  Palliative care was consulted.  Patient's CODE STATUS was changed to DNR CC  Hospice was considered however Dr. Carlisle evaluated patient does not meet hospice criteria currently     Leukocytosis: WBC 14.3 on arrival.  likely reactive to the patient's fall and acute rib fractures.    Resolved:  Elevated troponin: Heart score 6 patient denies chest pain, SOB, lightheadedness.  From chart review EKG was sent to cardiology with concern patient is in a flutter.  Initial troponin 48.  -Repeat troponin  -If patient develops chest pain obtain stat EKG and troponin  -Cardiology has been consulted and signed off     Chronic Conditions (reviewed and stable unless otherwise stated)  HLD: Continue Lipitor  GERD: Continue Pepcid  HFpEF: Echo 11/13/2024 showing EF 60-65% with moderately increased wall thickness.  Diastolic dysfunction  has been created using voice recognition software.  It may contain   minor errors which are inherent in voice recognition technology.**      Electronically signed by Dr. Bety Tinajero      XR CHEST PORTABLE    (Results Pending)     XR CHEST (2 VW)    Result Date: 12/22/2024  PROCEDURE: XR CHEST (2 VW) CLINICAL INFORMATION: 84-year-old male with left-sided rib fractures. Follow-up exam. COMPARISON: Radiograph 12/21/2024 TECHNIQUE: PA and lateral views of the chest were obtained. FINDINGS: Dual-chamber cardiac conduction device remains over the left chest. There are several left-sided rib fractures. There is cardiomegaly. There is a prosthetic aortic valve. There is blunting of the left costophrenic angle representing a small pleural effusion. There is adjacent atelectasis. There is no pneumothorax. Visualized portions of the upper abdomen are within normal limits. The osseous structures are intact. No acute fractures or suspicious osseous lesions.     Small left pleural effusion with adjacent atelectasis. Multiple left rib fractures. **This report has been created using voice recognition software. It may contain minor errors which are inherent in voice recognition technology.** Electronically signed by Dr Ky Enrique    CT ABDOMEN PELVIS W IV CONTRAST Additional Contrast? None    Result Date: 12/21/2024  PROCEDURE: CT ABDOMEN PELVIS W IV CONTRAST, CT LUMBAR RECONSTRUCTION WO POST PROCESS CLINICAL INFORMATION: fall on thinners COMPARISON: No prior study. TECHNIQUE: 1. Helical CT acquisition of the abdomen and pelvis was performed with administration of intravenous contrast. Multiplanar reformats are provided. 2. Reconstructed CT images through the lumbar spine from the CT abdomen and pelvis. Multiplanar reformats are provided. All CT scans at this facility use dose modulation, iterative reconstruction, and/or weight based dosing when appropriate to reduce the radiation dose to as low as reasonably achievable.  signed by Dr. Bety Tinajero    XR CHEST PORTABLE    Result Date: 12/21/2024  PROCEDURE: XR CHEST PORTABLE CLINICAL INFORMATION: fall COMPARISON: Chest radiograph 9/18/2024 TECHNIQUE: Single frontal view of the chest performed. FINDINGS: Cardiac conduction device is seen with 2 leads. The leads are intact. Degenerative changes of the thoracic spine. No focal pulmonary consolidation. Platelike atelectasis in the right lung base. Cardiac silhouette is mild enlarged. Aortic valve prosthesis. No pleural effusion. No pneumothorax. No acute bony abnormality. The bones are demineralized.     1. No acute cardiopulmonary finding.. **This report has been created using voice recognition software.  It may contain minor errors which are inherent in voice recognition technology.** Electronically signed by Dr. Bety Tinajero    XR PELVIS (1-2 VIEWS)    Result Date: 12/21/2024  PROCEDURE: XR PELVIS (1-2 VIEWS) CLINICAL INFORMATION: fall COMPARISON: No prior study. TECHNIQUE: AP pelvis performed. FINDINGS: No acute fracture or dislocation. The bones are demineralized.. The joint spaces are unremarkable. No significant soft tissue abnormality.     1. No acute bony abnormality **This report has been created using voice recognition software.  It may contain minor errors which are inherent in voice recognition technology.** Electronically signed by Dr. Bety Tinajero      Electronically signed by Isabel Hutton PA-C on 12/25/2024 at 3:51 PM

## 2024-12-25 NOTE — PROGRESS NOTES
Respiratory Care is following the rib fracture order set. Patient's position when testing was done was fowlers.  A Negative Inspiratory Force (NIF) was performed with patient achieving a NIF of -22 cm H2O. The NIF was less than 25 cm H2O. A Forced Vital Capacity (FVC) was obtained with patient achieving an FVC of 1.4 liters. The patient's calculated ideal body weight, (IBW) is  70.7 kg. 0.020 liters/kg of the patient's IBW is 1.41 liters. The patient's FVC was greater than 0.020 liters/kg of IBW. Based on the spirometry measurement alone, patient does not meet ICU admission criteria. Previous FVC was 1.38 liters and previous NIF was -16 cm H2O.  Patient's NIF and FVC are improving from previous screening(s). Last pain medication was given on  12/22 @ 6607. Physician was not called regarding spirometry measurement.

## 2024-12-25 NOTE — PROGRESS NOTES
[facility]  Trauma Surgery - Dr. Jorge Luis Espinoza for Dr. Viviana Patten   Daily Progress Note  Pt Name: Raymond Hummel  Medical Record Number: [id number]  Date of Birth 1940   Today's Date: 12/25/2024    HD: # 4    CC:  Left chest wall pain    ASSESSMENT  1.  Active Hospital Problems    Diagnosis Date Noted    Delirium [R41.0] 12/24/2024    Cognitive impairment [R41.89] 12/24/2024    Palliative care patient [Z51.5] 12/24/2024    Intracranial mass [R90.0] 12/23/2024    Acute delirium [R41.0] 12/23/2024    Coronary artery disease involving native coronary artery of native heart without angina pectoris [I25.10] 12/22/2024    Chronic heart failure with preserved ejection fraction (HCC) [I50.32] 12/22/2024    Fall [W19.XXXA] 12/21/2024    Closed fracture of multiple ribs of left side [S22.42XA] 12/21/2024    Hemothorax on left [J94.2] 12/21/2024    Elevated troponin [R79.89] 12/21/2024    Anticoagulated on Coumadin [Z79.01] 10/08/2024         PLAN  Admitted to  under Trauma Services     Trauma by fall              - PT, OT continue to treat              - Fall precautions     Left sided rib fractures              - CT of the abdomen and pelvis and lumbar spine recons note left rib fractures involving the 8-12 ribs              - Rib fracture protocol              - Lidoderm patches              - Continuous pulse oximetry              - Aggressive pulmonary toileting              - IS, C&DB              - Pain control              - Repeat CXR in the AM    - 12/22: Stable. Small left pleural effusion with adjacent atlectasis. Multiple left rib fractures. Has been doing well with respiratory therapy: NIF > 25 cm H2O and FVC 2.4L. Continue aggressive pulmonary toilet.    - 12/24: CXR notes small effusion on the left with multiple rib fracture.  Left basilar atelectasis.  Overall similar appearance of the chest when compared to the previous exam.   - 12/25:  Achieved NIF of -22 cm H2O and FVC of  chart)  Obtaining and reviewing separately obtained history  Performing a medically appropriate examination and evaluation  Ordering medications, tests, or procedures  Counseling and educating the patient/family/caregiver  Care coordination  Referring and communicating with other healthcare professionals  Documenting clinical information in the EHR  Independent interpretation of results and communicating the results to patient and care team  This includes a direct physical exam as well as all the other encounter activities described above.   Time may be discontiguous.   Time does not include procedures.    Please see our orders that were directed and approved by me if there are any new ones for the updated patient care plan.    Above discussed and I agree with documentation and orders placed by Brynn Diaz CNP    See any additional comments if needed below for any other updated orders and plans.     -- Sitter at bedside.  Extremely confused but pleasant this morning.  No nausea or vomiting.  Passing flatus.  Admission per charting.  PT/OT.  Seems stable from a trauma standpoint.  Final disposition in process.    Electronically signed by Jorge Luis Espinoza MD on 12/25/24 at 2:29 PM EST

## 2024-12-25 NOTE — PLAN OF CARE
Problem: Discharge Planning  Goal: Discharge to home or other facility with appropriate resources  12/25/2024 1207 by Dana Parker RN  Outcome: Progressing  Flowsheets (Taken 12/25/2024 1207)  Discharge to home or other facility with appropriate resources: Identify barriers to discharge with patient and caregiver     Problem: Safety - Adult  Goal: Free from fall injury  12/25/2024 1207 by Dana Parker RN  Outcome: Progressing  Flowsheets (Taken 12/25/2024 1207)  Free From Fall Injury: Instruct family/caregiver on patient safety     Problem: ABCDS Injury Assessment  Goal: Absence of physical injury  12/25/2024 1207 by Dana Parker RN  Outcome: Progressing  Flowsheets (Taken 12/25/2024 1207)  Absence of Physical Injury: Implement safety measures based on patient assessment     Problem: Chronic Conditions and Co-morbidities  Goal: Patient's chronic conditions and co-morbidity symptoms are monitored and maintained or improved  12/25/2024 1207 by Dana Parker RN  Outcome: Progressing  Flowsheets (Taken 12/25/2024 1207)  Care Plan - Patient's Chronic Conditions and Co-Morbidity Symptoms are Monitored and Maintained or Improved: Monitor and assess patient's chronic conditions and comorbid symptoms for stability, deterioration, or improvement     Problem: Safety - Medical Restraint  Goal: Remains free of injury from restraints (Restraint for Interference with Medical Device)  Description: INTERVENTIONS:  1. Determine that other, less restrictive measures have been tried or would not be effective before applying the restraint  2. Evaluate the patient's condition at the time of restraint application  3. Inform patient/family regarding the reason for restraint  4. Q2H: Monitor safety, psychosocial status, comfort, nutrition and hydration  12/25/2024 1207 by Dana Parker RN  Outcome: Progressing  Flowsheets (Taken 12/25/2024 1207)  Remains free of injury from restraints (restraint for interference with medical

## 2024-12-26 ENCOUNTER — APPOINTMENT (OUTPATIENT)
Dept: GENERAL RADIOLOGY | Age: 84
End: 2024-12-26
Payer: MEDICARE

## 2024-12-26 LAB
EKG ATRIAL RATE: 76 BPM
EKG P AXIS: 39 DEGREES
EKG P-R INTERVAL: 160 MS
EKG Q-T INTERVAL: 534 MS
EKG QRS DURATION: 238 MS
EKG QTC CALCULATION (BAZETT): 600 MS
EKG R AXIS: -83 DEGREES
EKG T AXIS: 93 DEGREES
EKG VENTRICULAR RATE: 76 BPM
GLUCOSE BLD STRIP.AUTO-MCNC: 170 MG/DL (ref 70–108)
GLUCOSE BLD STRIP.AUTO-MCNC: 201 MG/DL (ref 70–108)
GLUCOSE BLD STRIP.AUTO-MCNC: 226 MG/DL (ref 70–108)
GLUCOSE BLD STRIP.AUTO-MCNC: 232 MG/DL (ref 70–108)
INR PPP: 4.01 (ref 0.85–1.13)

## 2024-12-26 PROCEDURE — 6360000002 HC RX W HCPCS: Performed by: NURSE PRACTITIONER

## 2024-12-26 PROCEDURE — 1200000000 HC SEMI PRIVATE

## 2024-12-26 PROCEDURE — 85610 PROTHROMBIN TIME: CPT

## 2024-12-26 PROCEDURE — 82948 REAGENT STRIP/BLOOD GLUCOSE: CPT

## 2024-12-26 PROCEDURE — 2500000003 HC RX 250 WO HCPCS: Performed by: NURSE PRACTITIONER

## 2024-12-26 PROCEDURE — 71045 X-RAY EXAM CHEST 1 VIEW: CPT

## 2024-12-26 PROCEDURE — 36415 COLL VENOUS BLD VENIPUNCTURE: CPT

## 2024-12-26 PROCEDURE — 6370000000 HC RX 637 (ALT 250 FOR IP)

## 2024-12-26 PROCEDURE — 99232 SBSQ HOSP IP/OBS MODERATE 35: CPT | Performed by: PHYSICIAN ASSISTANT

## 2024-12-26 PROCEDURE — 99231 SBSQ HOSP IP/OBS SF/LOW 25: CPT | Performed by: SURGERY

## 2024-12-26 PROCEDURE — 6370000000 HC RX 637 (ALT 250 FOR IP): Performed by: PHYSICIAN ASSISTANT

## 2024-12-26 PROCEDURE — 97535 SELF CARE MNGMENT TRAINING: CPT

## 2024-12-26 PROCEDURE — 93010 ELECTROCARDIOGRAM REPORT: CPT | Performed by: INTERNAL MEDICINE

## 2024-12-26 PROCEDURE — 6370000000 HC RX 637 (ALT 250 FOR IP): Performed by: NURSE PRACTITIONER

## 2024-12-26 PROCEDURE — 6370000000 HC RX 637 (ALT 250 FOR IP): Performed by: STUDENT IN AN ORGANIZED HEALTH CARE EDUCATION/TRAINING PROGRAM

## 2024-12-26 PROCEDURE — 93005 ELECTROCARDIOGRAM TRACING: CPT | Performed by: PHYSICIAN ASSISTANT

## 2024-12-26 RX ORDER — INSULIN GLARGINE 100 [IU]/ML
5 INJECTION, SOLUTION SUBCUTANEOUS NIGHTLY
Status: DISCONTINUED | OUTPATIENT
Start: 2024-12-26 | End: 2024-12-31 | Stop reason: HOSPADM

## 2024-12-26 RX ADMIN — METFORMIN HYDROCHLORIDE 500 MG: 500 TABLET, EXTENDED RELEASE ORAL at 20:41

## 2024-12-26 RX ADMIN — METFORMIN HYDROCHLORIDE 500 MG: 500 TABLET, EXTENDED RELEASE ORAL at 08:57

## 2024-12-26 RX ADMIN — POLYETHYLENE GLYCOL 3350 17 G: 17 POWDER, FOR SOLUTION ORAL at 08:58

## 2024-12-26 RX ADMIN — TICAGRELOR 90 MG: 90 TABLET ORAL at 08:57

## 2024-12-26 RX ADMIN — BUMETANIDE 0.5 MG: 0.5 TABLET ORAL at 08:57

## 2024-12-26 RX ADMIN — METHOCARBAMOL 1000 MG: 500 TABLET ORAL at 08:57

## 2024-12-26 RX ADMIN — SODIUM CHLORIDE, PRESERVATIVE FREE 10 ML: 5 INJECTION INTRAVENOUS at 08:57

## 2024-12-26 RX ADMIN — SODIUM CHLORIDE, PRESERVATIVE FREE 10 ML: 5 INJECTION INTRAVENOUS at 20:42

## 2024-12-26 RX ADMIN — METHOCARBAMOL 1000 MG: 500 TABLET ORAL at 16:44

## 2024-12-26 RX ADMIN — INSULIN LISPRO 1 UNITS: 100 INJECTION, SOLUTION INTRAVENOUS; SUBCUTANEOUS at 12:32

## 2024-12-26 RX ADMIN — METOPROLOL SUCCINATE 50 MG: 50 TABLET, EXTENDED RELEASE ORAL at 08:57

## 2024-12-26 RX ADMIN — METHOCARBAMOL 1000 MG: 500 TABLET ORAL at 20:40

## 2024-12-26 RX ADMIN — TAMSULOSIN HYDROCHLORIDE 0.4 MG: 0.4 CAPSULE ORAL at 08:57

## 2024-12-26 RX ADMIN — ACETAMINOPHEN 650 MG: 325 TABLET ORAL at 18:24

## 2024-12-26 RX ADMIN — SPIRONOLACTONE 25 MG: 25 TABLET ORAL at 08:57

## 2024-12-26 RX ADMIN — TICAGRELOR 90 MG: 90 TABLET ORAL at 20:43

## 2024-12-26 RX ADMIN — INSULIN LISPRO 1 UNITS: 100 INJECTION, SOLUTION INTRAVENOUS; SUBCUTANEOUS at 16:44

## 2024-12-26 RX ADMIN — WATER 1000 MG: 1 INJECTION INTRAMUSCULAR; INTRAVENOUS; SUBCUTANEOUS at 14:12

## 2024-12-26 RX ADMIN — ATORVASTATIN CALCIUM 40 MG: 40 TABLET, FILM COATED ORAL at 08:57

## 2024-12-26 RX ADMIN — FINASTERIDE 5 MG: 5 TABLET, FILM COATED ORAL at 08:57

## 2024-12-26 RX ADMIN — FAMOTIDINE 20 MG: 20 TABLET, FILM COATED ORAL at 20:41

## 2024-12-26 RX ADMIN — FAMOTIDINE 20 MG: 20 TABLET, FILM COATED ORAL at 08:57

## 2024-12-26 RX ADMIN — INSULIN LISPRO 1 UNITS: 100 INJECTION, SOLUTION INTRAVENOUS; SUBCUTANEOUS at 06:18

## 2024-12-26 RX ADMIN — INSULIN GLARGINE 5 UNITS: 100 INJECTION, SOLUTION SUBCUTANEOUS at 20:40

## 2024-12-26 ASSESSMENT — PAIN DESCRIPTION - LOCATION
LOCATION: BACK
LOCATION: BACK

## 2024-12-26 ASSESSMENT — PAIN DESCRIPTION - ORIENTATION
ORIENTATION: MID;LOWER
ORIENTATION: MID

## 2024-12-26 ASSESSMENT — PAIN DESCRIPTION - DESCRIPTORS
DESCRIPTORS: ACHING
DESCRIPTORS: ACHING

## 2024-12-26 ASSESSMENT — PAIN - FUNCTIONAL ASSESSMENT
PAIN_FUNCTIONAL_ASSESSMENT: ACTIVITIES ARE NOT PREVENTED
PAIN_FUNCTIONAL_ASSESSMENT: ACTIVITIES ARE NOT PREVENTED

## 2024-12-26 ASSESSMENT — PAIN SCALES - GENERAL
PAINLEVEL_OUTOF10: 3
PAINLEVEL_OUTOF10: 2
PAINLEVEL_OUTOF10: 3

## 2024-12-26 NOTE — PROGRESS NOTES
Warfarin Pharmacy Consult Note    Warfarin Indication:  TAVR  Target INR: 2.0-3.0  Dose prior to admission: 5 mg daily    Recent Labs     12/24/24  0357   HGB 15.3        Recent Labs     12/24/24  0357 12/25/24  0708 12/26/24  0421   INR 2.18* 2.61* 4.01*     Concurrent anticoagulants/antiplatelets: ticagrelor  Significant warfarin drug-drug interactions: none     Date INR Warfarin Dose   12/21/24 2.65 5 mg (PTA)    12/22/24  2.56 5 mg   12/23/24  2.58  5 mg      12/24/24 2.18  5 mg     12/25/24 2.61 5 mg   12/26/24  4.01  No Coumadin              Monitoring:                   INR will be monitored daily.     **Please contact pharmacy for discharge instructions when indicated**  Ashley Lee, PharmD 12/26/2024 8:14 AM

## 2024-12-26 NOTE — PROGRESS NOTES
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.  Currently with sitter.  Did not require medical or physical restraints overnight    L 8-12 rib fractures s/p fall:   Patient slipped on stairs which were covered in ice landing on his left side.    CT scan showed 8-12 rib fractures on the left side and a small L sided pleural effusion.  Trauma surgery following  Repeat chest x-ray this a.m. is stable.  Respiratory function stable as well  Continuous pulse oximetry  Pain control.  Pulmonary toileting    Long QT  Patient with chronic QT C prolongation dating back 6/2024  Avoid QT prolongating medications   Repeat EKG today with QTc of 600.    Bacteriuria  Urine culture with 50-90 K GNB which does not meet criteria for urinary tract infection.  Primary team initiated Rocephin therapy    Supratherapeutic INR  INR 4.0 today.  Pharmacy to dose.    Goals of care  Palliative care was consulted.  Patient's CODE STATUS was changed to DNR CC  Hospice was considered however Dr. Carlisle evaluated patient does not meet hospice criteria currently     Leukocytosis: WBC 14.3 on arrival.  likely reactive to the patient's fall and acute rib fractures.    Hyperglycemia with prediabetes  Last HbA1c 6.2 6/2023.  Patient takes metformin at home, -resume low-dose sliding scale, POCT x 4, hypoglycemia protocol  Start Lantus 5 units daily    Resolved:  Elevated troponin: Heart score 6 patient denies chest pain, SOB, lightheadedness.  From chart review

## 2024-12-26 NOTE — PROCEDURES
PROCEDURE NOTE  Date: 12/26/2024   Name: Raymond Hummel  YOB: 1940    Procedures  12 lead EKG completed. Results placed in patients chart.

## 2024-12-26 NOTE — PROGRESS NOTES
Spoke with hospitalist team who has agreed to take over as primary. No current traumatic injuries requiring hospitalization but does have confusion at night, supratherapeutic INR, and possible UTI.     Appreciate hospitalist assistance in caring for patient's medical issues.     Discussed with Dr Viviana Patten. Trauma team will sign off today. Available if needed.     Vielka Shafer, APRN - CNP

## 2024-12-26 NOTE — PROGRESS NOTES
I have independently performed an evaluation on Raymond . I have reviewed the   documentation completed by the Vielka Shafer APRN - CNP     I personally saw and examined the patient     Total time spent 20minutes.  Time could have been discontiguous.  Time does not include procedures.  Time does include my direct assessment of the patient and coordination of care.        Patient is more alert today, he is interactive.  He denies any significant pain with respirations.  He is not needed any supplemental oxygen.  Continues to have confusion overnight but it is improving.  Patient does have a urinary tract infection.  Medicine is following.  There is no traumatic injuries or not keeping him from discharge.  Patient may be better served on medical service.  Will see if they will accept in transfer.  Otherwise continue to work on nursing home placement    Electronically signed by Viviana Patten MD on 12/26/2024 at 4:52 PM    [facility]  Trauma Surgery - Dr. Viviana Patten   Daily Progress Note  Pt Name: Raymond Hummel  Medical Record Number: [id number]  Date of Birth 1940   Today's Date: 12/26/2024    HD: # 5    CC:  no complaints today    ASSESSMENT  1.  Active Hospital Problems    Diagnosis Date Noted    Delirium [R41.0] 12/24/2024    Cognitive impairment [R41.89] 12/24/2024    Palliative care patient [Z51.5] 12/24/2024    Intracranial mass [R90.0] 12/23/2024    Acute delirium [R41.0] 12/23/2024    Coronary artery disease involving native coronary artery of native heart without angina pectoris [I25.10] 12/22/2024    Chronic heart failure with preserved ejection fraction (HCC) [I50.32] 12/22/2024    Fall [W19.XXXA] 12/21/2024    Closed fracture of multiple ribs of left side [S22.42XA] 12/21/2024    Hemothorax on left [J94.2] 12/21/2024    Elevated troponin [R79.89] 12/21/2024    Anticoagulated on Coumadin [Z79.01] 10/08/2024         PLAN  Admitted to  under Trauma Services

## 2024-12-26 NOTE — PLAN OF CARE
Problem: Discharge Planning  Goal: Discharge to home or other facility with appropriate resources  Outcome: Progressing  Flowsheets (Taken 12/26/2024 1258)  Discharge to home or other facility with appropriate resources:   Identify barriers to discharge with patient and caregiver   Identify discharge learning needs (meds, wound care, etc)   Refer to discharge planning if patient needs post-hospital services based on physician order or complex needs related to functional status, cognitive ability or social support system   Arrange for needed discharge resources and transportation as appropriate     Problem: Safety - Adult  Goal: Free from fall injury  Outcome: Progressing  Flowsheets (Taken 12/26/2024 1258)  Free From Fall Injury: Instruct family/caregiver on patient safety     Problem: ABCDS Injury Assessment  Goal: Absence of physical injury  Outcome: Progressing  Flowsheets (Taken 12/26/2024 1258)  Absence of Physical Injury: Implement safety measures based on patient assessment     Problem: Chronic Conditions and Co-morbidities  Goal: Patient's chronic conditions and co-morbidity symptoms are monitored and maintained or improved  Outcome: Progressing  Flowsheets (Taken 12/26/2024 1258)  Care Plan - Patient's Chronic Conditions and Co-Morbidity Symptoms are Monitored and Maintained or Improved:   Monitor and assess patient's chronic conditions and comorbid symptoms for stability, deterioration, or improvement   Collaborate with multidisciplinary team to address chronic and comorbid conditions and prevent exacerbation or deterioration   Update acute care plan with appropriate goals if chronic or comorbid symptoms are exacerbated and prevent overall improvement and discharge     Problem: Skin/Tissue Integrity  Goal: Absence of new skin breakdown  Description: 1.  Monitor for areas of redness and/or skin breakdown  2.  Assess vascular access sites hourly  3.  Every 4-6 hours minimum:  Change oxygen saturation probe

## 2024-12-26 NOTE — PROGRESS NOTES
Respiratory Care is following the rib fracture order set. Patient's position when testing was done was sitting.  A Negative Inspiratory Force (NIF) was performed with patient achieving a NIF of -40 cm H2O. The NIF was greater than 25 cm H2O. A Forced Vital Capacity (FVC) was obtained with patient achieving an FVC of 1.48 liters. The patient's calculated ideal body weight, (IBW) is  70.7 kg. 0.020 liters/kg of the patient's IBW is 1.41 liters. The patient's FVC was greater than 0.020 liters/kg of IBW. Based on the spirometry measurement alone, patient does not meet ICU admission criteria. Previous FVC was 1.4 liters and previous NIF was -22 cm H2O.  Patient's NIF and FVC are improving from previous screening(s). Last pain medication was given on  12/22/24 @ 4505. Physician was not called regarding spirometry measurement.

## 2024-12-26 NOTE — PROGRESS NOTES
[facility]  [facility] 7K  Occupational Therapy  Daily Note    Discharge Recommendations: Skilled Nursing Facility  Equipment Recommendations: No        Time In: 0737  Time Out: 0815  Timed Code Treatment Minutes: 38 Minutes  Minutes: 38          Date: 2024  Patient Name: Raymond Hummel,   Gender: male      Room: [facility]13/013-A  MRN: [id number]  : 1940  (84 y.o.)  Referring Practitioner: ROMEO Leon  Diagnosis: Fall  Additional Pertinent Hx: per chart review;  Raymond is an 84 year old male presenting to the Emergency Department via POV for evaluation of injuries sustained in a fall at home.  He reports that he was walking down the steps outside and slipped on some ice, falling onto his left side.  He denies hitting his head or loss of consciousness.  Arrives complaining of left sided chest wall pain with movement.  Denies any chest pain, shortness of breath, abdominal pain, pain to extremities, neck pain, back pain, nausea or vomiting.    Restrictions/Precautions:  Restrictions/Precautions: Fall Risk, General Precautions  Position Activity Restriction  Other Position/Activity Restrictions: L rib fractures     Social/Functional History:  Lives With: Spouse  Type of Home: Condo  Home Layout: One level  Home Access: Stairs to enter with rails  Entrance Stairs - Number of Steps: 8 LAZARA  Entrance Stairs - Rails: Both  Home Equipment: Cane, Grab bars   Bathroom Shower/Tub: Walk-in shower  Bathroom Toilet: Handicap height  Bathroom Equipment: Shower chair  Bathroom Accessibility: Accessible    Receives Help From: Family  Prior Level of Assist for ADLs: Independent  Prior Level of Assist for Homemaking: Needs assistance (wife completes most tasks)  Prior Level of Assist for Transfers: Independent  Prior Level of Assist for Ambulation: Independent household ambulator, with or without device, Independent community ambulator, with or without device  Has the patient had two or more  falls in the past year or any fall with injury in the past year?: No    Active : Yes  Occupation: Retired  Additional Comments: IND with functional tasks, has been using a cane for the last 3 months. patient's spouse is in good health to assist as needed.    SUBJECTIVE: RN okayed OT session.  Pt. Pleasant and cooperative with no agitation noted.  Pt.  agreeable to participate. Pt. Oriented to self and x 3 although demo one episode of confusion.  Pt. Reports he was getting ready for a wedding ceremony yesterday with the staff here.     PAIN: Denies    Vitals: Vitals not assessed per clinical judgement, see nursing flowsheet    COGNITION: Decreased Insight, Decreased Problem Solving, and confusion at times    ADL:   Grooming: Stand By Assistance and with set-up.  To complete oral care while seated  Bathing: Stand By Assistance and with set-up.  To complete sponge bath and CGA for dilia care with increased time to complete. Pt. Demo ability to sequence bathing of UB/LB   Upper Extremity Dressing: Minimal Assistance.  To tie gown  Lower Extremity Dressing: Stand By Assistance.  To doff brief and to don new brief figure four technique   Toileting: Contact Guard Assistance.     Toilet Transfer: Contact Guard Assistance.   .    IADL:   Not Tested    BALANCE:  Sitting Balance:  Stand By Assistance.    Standing Balance: Contact Guard Assistance. No LOB noted no knee buck    BED MOBILITY:  Not Tested    TRANSFERS:  Sit to Stand:  Contact Guard Assistance.    Stand to Sit: Contact Guard Assistance.      FUNCTIONAL MOBILITY:  Assistive Device: Rolling Walker  Assist Level:  Contact Guard Assistance.   Distance: To and from bathroom and Within room  Vcs for safe walker placement at times pushing RW too far out in front, Pt. Previously utilizing a quad cane and no device.     [facility] (6 CLICK) DAILY ACTIVITY INPATIENT   AM-PAC Inpatient Daily Activity Raw Score: 20  AM-PAC Inpatient ADL T-Scale Score : 42.03  ADL Inpatient CMS

## 2024-12-26 NOTE — PLAN OF CARE
Problem: Discharge Planning  Goal: Discharge to home or other facility with appropriate resources  12/25/2024 2256 by Adore Felix RN  Outcome: Progressing  Flowsheets (Taken 12/25/2024 2000)  Discharge to home or other facility with appropriate resources:   Identify barriers to discharge with patient and caregiver   Arrange for needed discharge resources and transportation as appropriate  12/25/2024 1207 by Dana Parker RN  Outcome: Progressing  Flowsheets (Taken 12/25/2024 1207)  Discharge to home or other facility with appropriate resources: Identify barriers to discharge with patient and caregiver     Problem: Safety - Adult  Goal: Free from fall injury  12/25/2024 2256 by Adore Felix RN  Outcome: Progressing  Flowsheets  Taken 12/25/2024 2254 by Adore Felix RN  Free From Fall Injury:   Instruct family/caregiver on patient safety   Based on caregiver fall risk screen, instruct family/caregiver to ask for assistance with transferring infant if caregiver noted to have fall risk factors  Taken 12/25/2024 1209 by Dana Parker RN  Free From Fall Injury: Instruct family/caregiver on patient safety  12/25/2024 1207 by Dana Parker RN  Outcome: Progressing  Flowsheets (Taken 12/25/2024 1207)  Free From Fall Injury: Instruct family/caregiver on patient safety     Problem: ABCDS Injury Assessment  Goal: Absence of physical injury  12/25/2024 2256 by Adore Felix RN  Outcome: Progressing  Flowsheets (Taken 12/25/2024 1209 by Dana Parker RN)  Absence of Physical Injury: Implement safety measures based on patient assessment  12/25/2024 1207 by Dana Parker RN  Outcome: Progressing  Flowsheets (Taken 12/25/2024 1207)  Absence of Physical Injury: Implement safety measures based on patient assessment     Problem: Chronic Conditions and Co-morbidities  Goal: Patient's chronic conditions and co-morbidity symptoms are monitored and maintained or improved  12/25/2024 2256 by Adore Felix RN  Outcome:

## 2024-12-27 ENCOUNTER — HOSPITAL ENCOUNTER (OUTPATIENT)
Dept: CARDIAC REHAB | Age: 84
Setting detail: THERAPIES SERIES
End: 2024-12-27
Payer: MEDICARE

## 2024-12-27 ENCOUNTER — APPOINTMENT (OUTPATIENT)
Dept: GENERAL RADIOLOGY | Age: 84
End: 2024-12-27
Payer: MEDICARE

## 2024-12-27 LAB
BACTERIA UR CULT: ABNORMAL
GLUCOSE BLD STRIP.AUTO-MCNC: 145 MG/DL (ref 70–108)
GLUCOSE BLD STRIP.AUTO-MCNC: 169 MG/DL (ref 70–108)
GLUCOSE BLD STRIP.AUTO-MCNC: 246 MG/DL (ref 70–108)
GLUCOSE BLD STRIP.AUTO-MCNC: 321 MG/DL (ref 70–108)
INR PPP: 4.77 (ref 0.85–1.13)
ORGANISM: ABNORMAL

## 2024-12-27 PROCEDURE — 6370000000 HC RX 637 (ALT 250 FOR IP): Performed by: NURSE PRACTITIONER

## 2024-12-27 PROCEDURE — 82948 REAGENT STRIP/BLOOD GLUCOSE: CPT

## 2024-12-27 PROCEDURE — 99232 SBSQ HOSP IP/OBS MODERATE 35: CPT | Performed by: PHYSICIAN ASSISTANT

## 2024-12-27 PROCEDURE — 2500000003 HC RX 250 WO HCPCS: Performed by: NURSE PRACTITIONER

## 2024-12-27 PROCEDURE — 6370000000 HC RX 637 (ALT 250 FOR IP): Performed by: STUDENT IN AN ORGANIZED HEALTH CARE EDUCATION/TRAINING PROGRAM

## 2024-12-27 PROCEDURE — 6360000002 HC RX W HCPCS: Performed by: NURSE PRACTITIONER

## 2024-12-27 PROCEDURE — 97530 THERAPEUTIC ACTIVITIES: CPT

## 2024-12-27 PROCEDURE — 85610 PROTHROMBIN TIME: CPT

## 2024-12-27 PROCEDURE — 1200000000 HC SEMI PRIVATE

## 2024-12-27 PROCEDURE — 6370000000 HC RX 637 (ALT 250 FOR IP): Performed by: PHYSICIAN ASSISTANT

## 2024-12-27 PROCEDURE — 6370000000 HC RX 637 (ALT 250 FOR IP)

## 2024-12-27 PROCEDURE — 71045 X-RAY EXAM CHEST 1 VIEW: CPT

## 2024-12-27 PROCEDURE — 97116 GAIT TRAINING THERAPY: CPT

## 2024-12-27 PROCEDURE — 36415 COLL VENOUS BLD VENIPUNCTURE: CPT

## 2024-12-27 RX ADMIN — ACETAMINOPHEN 650 MG: 325 TABLET ORAL at 14:03

## 2024-12-27 RX ADMIN — SPIRONOLACTONE 25 MG: 25 TABLET ORAL at 09:40

## 2024-12-27 RX ADMIN — TAMSULOSIN HYDROCHLORIDE 0.4 MG: 0.4 CAPSULE ORAL at 09:40

## 2024-12-27 RX ADMIN — ATORVASTATIN CALCIUM 40 MG: 40 TABLET, FILM COATED ORAL at 09:40

## 2024-12-27 RX ADMIN — OXYCODONE 5 MG: 5 TABLET ORAL at 02:35

## 2024-12-27 RX ADMIN — INSULIN LISPRO 3 UNITS: 100 INJECTION, SOLUTION INTRAVENOUS; SUBCUTANEOUS at 12:15

## 2024-12-27 RX ADMIN — SODIUM CHLORIDE, PRESERVATIVE FREE 10 ML: 5 INJECTION INTRAVENOUS at 21:15

## 2024-12-27 RX ADMIN — SODIUM CHLORIDE, PRESERVATIVE FREE 10 ML: 5 INJECTION INTRAVENOUS at 09:40

## 2024-12-27 RX ADMIN — METFORMIN HYDROCHLORIDE 500 MG: 500 TABLET, EXTENDED RELEASE ORAL at 09:40

## 2024-12-27 RX ADMIN — POLYETHYLENE GLYCOL 3350 17 G: 17 POWDER, FOR SOLUTION ORAL at 09:40

## 2024-12-27 RX ADMIN — METHOCARBAMOL 1000 MG: 500 TABLET ORAL at 12:16

## 2024-12-27 RX ADMIN — METHOCARBAMOL 1000 MG: 500 TABLET ORAL at 16:58

## 2024-12-27 RX ADMIN — TICAGRELOR 90 MG: 90 TABLET ORAL at 09:40

## 2024-12-27 RX ADMIN — ACETAMINOPHEN 650 MG: 325 TABLET ORAL at 01:19

## 2024-12-27 RX ADMIN — METFORMIN HYDROCHLORIDE 500 MG: 500 TABLET, EXTENDED RELEASE ORAL at 21:15

## 2024-12-27 RX ADMIN — INSULIN GLARGINE 5 UNITS: 100 INJECTION, SOLUTION SUBCUTANEOUS at 21:17

## 2024-12-27 RX ADMIN — METHOCARBAMOL 1000 MG: 500 TABLET ORAL at 21:14

## 2024-12-27 RX ADMIN — METHOCARBAMOL 1000 MG: 500 TABLET ORAL at 09:40

## 2024-12-27 RX ADMIN — INSULIN LISPRO 1 UNITS: 100 INJECTION, SOLUTION INTRAVENOUS; SUBCUTANEOUS at 21:16

## 2024-12-27 RX ADMIN — FAMOTIDINE 20 MG: 20 TABLET, FILM COATED ORAL at 09:40

## 2024-12-27 RX ADMIN — WATER 1000 MG: 1 INJECTION INTRAMUSCULAR; INTRAVENOUS; SUBCUTANEOUS at 12:16

## 2024-12-27 RX ADMIN — FINASTERIDE 5 MG: 5 TABLET, FILM COATED ORAL at 09:40

## 2024-12-27 RX ADMIN — METOPROLOL SUCCINATE 50 MG: 50 TABLET, EXTENDED RELEASE ORAL at 09:40

## 2024-12-27 RX ADMIN — BUMETANIDE 0.5 MG: 0.5 TABLET ORAL at 09:40

## 2024-12-27 RX ADMIN — TICAGRELOR 90 MG: 90 TABLET ORAL at 21:13

## 2024-12-27 RX ADMIN — FAMOTIDINE 20 MG: 20 TABLET, FILM COATED ORAL at 21:13

## 2024-12-27 ASSESSMENT — PAIN DESCRIPTION - LOCATION
LOCATION: HIP;LEG
LOCATION: HIP;LEG

## 2024-12-27 ASSESSMENT — PAIN SCALES - GENERAL
PAINLEVEL_OUTOF10: 0
PAINLEVEL_OUTOF10: 1
PAINLEVEL_OUTOF10: 5
PAINLEVEL_OUTOF10: 6
PAINLEVEL_OUTOF10: 1
PAINLEVEL_OUTOF10: 2
PAINLEVEL_OUTOF10: 3
PAINLEVEL_OUTOF10: 0
PAINLEVEL_OUTOF10: 0
PAINLEVEL_OUTOF10: 1

## 2024-12-27 ASSESSMENT — PAIN DESCRIPTION - DESCRIPTORS
DESCRIPTORS: SORE;ACHING
DESCRIPTORS: SORE;ACHING

## 2024-12-27 ASSESSMENT — PAIN DESCRIPTION - ORIENTATION
ORIENTATION: LEFT
ORIENTATION: LEFT

## 2024-12-27 NOTE — PROGRESS NOTES
Warfarin Pharmacy Consult Note    Warfarin Indication:  TAVR  Target INR: 2.0-3.0  Dose prior to admission: 5mg daily    No results for input(s): \"HGB\", \"PLT\" in the last 72 hours.  Recent Labs     12/25/24  0708 12/26/24  0421 12/27/24  0629   INR 2.61* 4.01* 4.77*     Concurrent anticoagulants/antiplatelets: ticagrelor  Significant warfarin drug-drug interactions: none     Date INR Warfarin Dose   12/21/24 2.65 5 mg (PTA)    12/22/24  2.56 5 mg   12/23/24  2.58  5 mg      12/24/24 2.18  5 mg     12/25/24 2.61 5 mg   12/26/24  4.01  No Coumadin     12/27/2024 4.77   No Coumadin        Monitoring:                   INR will be monitored daily. Unclear why INR has markedly increased on home medication, unless due to markedly different activity level.    **Please contact pharmacy for discharge instructions when indicated**    Glenna Hung, Pharm.D., BCPS, BCCCP 12/27/2024 11:41 AM

## 2024-12-27 NOTE — PROGRESS NOTES
[facility]  INPATIENT PHYSICAL THERAPY  DAILY NOTE  [facility] ORTHOPEDICS 7K - 7K-13/013-A      Discharge Recommendations: Subacte/Skilled Nursing Facility  Equipment Recommendations: No             Time In: 0756  Time Out: 08  Timed Code Treatment Minutes: 30 Minutes  Minutes: 30          Date: 2024  Patient Name: Raymond Hummel,  Gender:  male        MRN: [id number]  : 1940  (84 y.o.)     Referring Practitioner: Brynn Diaz APRN - CNP  Diagnosis: Fall  Additional Pertinent Hx: Per chart review: 84-year-old male with PMH of TAVR 2024 on Coumadin and Brilinta, HTN, prediabetes.  Patient presents today after a mechanical fall.  He was going outside walking down some steps to check on his car and slipped landing on his left side, denies hitting his head.  Patient was subsequently brought to [facility] for further evaluation.  Patient denies fever, chills, lightheadedness, dizziness, cough, SOB, chest pain, palpitations, nausea, vomiting, constipation, diarrhea, dysuria, lower extremity edema.  Of note the patient stated he was not in any pain at the time but he did morphine. CT of the abdomen and pelvis and lumbar spine recons note left rib fractures involving the 8-12 ribs     Prior Level of Function:  Lives With: Spouse  Type of Home: Condo  Home Layout: One level  Home Access: Stairs to enter with rails  Entrance Stairs - Number of Steps: 8 LAZARA  Entrance Stairs - Rails: Both  Home Equipment: Cane, Grab bars   Bathroom Shower/Tub: Walk-in shower  Bathroom Toilet: Handicap height  Bathroom Equipment: Shower chair  Bathroom Accessibility: Accessible    Receives Help From: Family  Prior Level of Assist for ADLs: Independent  Prior Level of Assist for Homemaking: Needs assistance (wife completes most tasks)  Prior Level of Assist for Transfers: Independent  Active : Yes  Additional Comments: IND with functional tasks, has been using a cane for the last 3 months. patient's spouse is in  preventions. Without verbal and tactile cues, patient would require more physical assistance in order to complete task, and Cues for proximity to assistive device   Slow pace  Stairs:  Not Tested    Balance:  Static Sitting Balance:  Supervision, Stand By Assistance  Dynamic Sitting Balance: Stand By Assistance, Contact Guard Assistance  Static Standing Balance: Contact Guard Assistance  Dynamic Standing Balance: Contact Guard Assistance  Pt completed toileting tasks, increased time, able to complete self dilia care, cues to brenda and doff brief with assist required.   Exercise:  None    Functional Outcome Measures:  [facility] (6 CLICK) BASIC MOBILITY  AM-PAC Inpatient Mobility Raw Score : 15  AM-PAC Inpatient T-Scale Score : 39.45        Modified Phoenix Scale:  Not Applicable    ASSESSMENT:  Assessment:  fair overall progression, demos confusion and agitation with mobility. Goals downgraded due to change in cognition status  Activity Tolerance:  Patient tolerance of  treatment:Fair.  Plan: Current Treatment Recommendations: Strengthening, Balance training, Gait training, Stair training, Functional mobility training, Transfer training, Neuromuscular re-education, Endurance training, Equipment evaluation, education, & procurement, Patient/Caregiver education & training, Safety education & training, Therapeutic activities, Home exercise program  General Plan:  (3-5x T)    Education:  Learners: Patient  Patient Education: Plan of Care, Bed Mobility, Equipment Education, Transfers, Gait, Use of Gait Belt, - Patient Requires Continued Education    Goals:  Patient Goals : return home with wife  Short Term Goals  Time Frame for Short Term Goals: by discharge  Short Term Goal 1: Pt will amb for >200 feet with IND with SC to return home safely.  Short Term Goal 2: Pt will demo IND with sit to/from stand transfers with SC to return home safely.  Short Term Goal 3: Pt will demo S for stair negotiation for 8 steps with LRAD/rail to

## 2024-12-27 NOTE — PROGRESS NOTES
Hospitalist Progress Note      Patient:  Raymond Hummel    Unit/Bed:7K-13/013-A  YOB: 1940  MRN: [id number]   Acct: [id number]   PCP: Duran Dukes MD  Date of Admission: 12/21/2024      ASSESSMENT AND PLAN    Acute delirium on dementia  Psychiatry consulted and initiated Exelon patch-recommending dementia care unit  Family reports persistent decline over the last few months, worse after most recent TAVR procedure  Referral to [facility].  Has been without a sitter for over a day  Last MoCA per family scored 16/30.  Repeat cog eval ordered    L 8-12 rib fractures s/p fall:   Patient slipped on stairs which were covered in ice landing on his left side.    CT scan showed 8-12 rib fractures on the left side and a small L sided pleural effusion.  Trauma surgery initially primary.  They have signed off  Repeat chest x-ray this a.m. is stable.  Respiratory function stable as well-no further chest x-ray is needed  Pain control.  Pulmonary toileting    Long QT  Patient with chronic QT C prolongation dating back 6/2024  Avoid QT prolongating medications   Repeat EKG 12/26 with QTc of 600.    Bacteriuria  Urine culture with 50-90 K GNB which does not meet criteria for urinary tract infection.  Primary team initiated Rocephin therapy-no need to continue    Supratherapeutic INR  INR 4.77.  Pharmacy to dose.    Goals of care  Palliative care was consulted.  Patient's CODE STATUS was changed to DNR CC  Hospice was considered however Dr. Carlisle evaluated patient does not meet hospice criteria currently     Leukocytosis: WBC 14.3 on arrival.  likely reactive to the patient's fall and acute rib fractures.  No further blood draws due to DNRCC    Hyperglycemia with prediabetes  Last HbA1c 6.2 6/2023.  Patient takes metformin at home, -resume low-dose sliding scale, POCT x 4, hypoglycemia protocol  Start Lantus 5 units  contain minor errors which are inherent in voice recognition technology.** Electronically signed by Dr. Bety Tinajero    CT THORACIC RECONSTRUCTION WO POST PROCESS    Result Date: 12/21/2024  PROCEDURE: CT CHEST W CONTRAST, CT THORACIC RECONSTRUCTION WO POST PROCESS CLINICAL INFORMATION: fall on thinners COMPARISON: CTA chest 6/13/2023 TECHNIQUE: 1. Helical CT acquisition of the chest was performed with  administration of intravenous contrast. Multiplanar reformats are provided. 2. Reconstructed CT images of the thoracic spine from the CT chest examination. Multiplanar reformats are provided. All CT scans at this facility use dose modulation, iterative reconstruction, and/or weight based dosing when appropriate to reduce the radiation dose to as low as reasonably achievable. CONTRAST: 80 cc of Isovue-370 FINDINGS: CT CHEST: A small left pleural effusion. Left basal platelike atelectasis. Aortic valve stent graft is seen. A small hiatal hernia. Aortocoronary calcifications. Cardiac conduction device is seen with 2 leads. The leads are intact. No focal pulmonary consolidation. No large pulmonary mass. Central airway is patent. No pleural effusions.  No significant pericardial effusion. The heart is not enlarged. Ascending thoracic aorta is not dilated.  The main pulmonary artery is not dilated. No significantly enlarged mediastinal or  axillary lymph node. The thyroid is not enlarged. No chest wall mass. The abdomen is described on the concurrent CT of the abdomen and pelvis. Bones: DISH of the thoracic spine. The bones are mildly demineralized.. THORACIC SPINE: ALIGNMENT: The thoracic kyphosis is maintained FRACTURE: None DISC LEVEL: 1. The lack of intrathecal contrast limits the evaluation for  spinal stenosis or foraminal stenosis No significant central canal narrowing. No significant neural minor narrowing.     1. No acute posttraumatic findings in the chest. 2. A small left pleural effusion with left basilar  atelectasis. **This report has been created using voice recognition software.  It may contain minor errors which are inherent in voice recognition technology.** Electronically signed by Dr. Bety Tinajero    CT CERVICAL SPINE WO CONTRAST    Result Date: 12/21/2024  PROCEDURE: CT CERVICAL SPINE WO CONTRAST CLINICAL INFORMATION: fall COMPARISON: No prior study. TECHNIQUE: 3 mm axial imaging through the cervical spine without contrast.  Sagittal and coronal reconstructions were performed. All CT scans at this facility use dose modulation, iterative reconstruction, and/or weight based dosing when appropriate to reduce the radiation dose to as low as reasonably achievable. FINDINGS: ALIGNMENT: Cervical lordosis is maintained. BONES: The bones are demineralized. Degenerative changes of the cervical spine. No acute fracture of the cervical spine. Multilevel facet arthrosis and uncovertebral degeneration. SOFT TISSUES: Unremarkable OTHER: None. DISC LEVELS: The lack of intrathecal contrast limits the evaluation for  spinal stenosis or foraminal stenosis C2-C3: No significant central canal narrowing. No significant neuroforamina narrowing. C3-C4: Disc osteophyte complex. No significant central canal narrowing. Mild neuroforaminal narrowing. C4-C5: Mild disc osteophyte complex. No significant central canal narrowing. Marked right neuroforaminal narrowing. Mild left neuroforaminal narrowing. C5-C6: Disc osteophyte complex. Mild central canal narrowing. Marked bilateral neuroforaminal narrowing. C6-C7: Disc osteophyte complex. No significant central canal narrowing. Marked left neuroforaminal narrowing. Mild right neuroforaminal narrowing. C7-T1: No significant central canal narrowing. No significant neuroforamina narrowing.     1. No acute fracture of the cervical spine. 2. Multilevel degenerative changes of the cervical spine. 3. Central canal and neuroforaminal narrowing as described above. 4. Other findings as described

## 2024-12-27 NOTE — CARE COORDINATION
12/27/24, 7:58 AM EST    DISCHARGE ON GOING EVALUATION    Raymond ABDALLA Estephanie       [facility] day: 6  Location: [facility]13/013-A Reason for admit: Fall, initial encounter [W19.XXXA]  Fall at home, initial encounter [W19.XXXA, Y92.009]     Procedures: none    Imaging since last note: na    Barriers to Discharge: cont PT/OT. Rib fracture protocol conts. INR 4.77.    PCP: Duran Dukes MD  Readmission Risk Score: 14.7    Patient Goals/Plan/Treatment Preferences: pt from home with wife; planning [facility] and await precert.

## 2024-12-27 NOTE — CARE COORDINATION
12/27/24, 3:08 PM EST    DISCHARGE PLANNING EVALUATION    [facility] will have a bed on Tuesday 12/31, will start precert and will accept if approved.

## 2024-12-27 NOTE — PLAN OF CARE
Problem: Discharge Planning  Goal: Discharge to home or other facility with appropriate resources  12/26/2024 2212 by Gerber Lizarraga, RN  Outcome: Progressing  Flowsheets (Taken 12/26/2024 2015)  Discharge to home or other facility with appropriate resources:   Identify barriers to discharge with patient and caregiver   Arrange for needed discharge resources and transportation as appropriate   Identify discharge learning needs (meds, wound care, etc)   Refer to discharge planning if patient needs post-hospital services based on physician order or complex needs related to functional status, cognitive ability or social support system  12/26/2024 1258 by Parisa Lizama RN  Outcome: Progressing  Flowsheets (Taken 12/26/2024 1258)  Discharge to home or other facility with appropriate resources:   Identify barriers to discharge with patient and caregiver   Identify discharge learning needs (meds, wound care, etc)   Refer to discharge planning if patient needs post-hospital services based on physician order or complex needs related to functional status, cognitive ability or social support system   Arrange for needed discharge resources and transportation as appropriate     Problem: Safety - Adult  Goal: Free from fall injury  12/26/2024 2212 by Gerber Lizarraga RN  Outcome: Progressing  Flowsheets (Taken 12/26/2024 1258 by Parisa Lizama, RN)  Free From Fall Injury: Instruct family/caregiver on patient safety  12/26/2024 1258 by Parisa Lizama RN  Outcome: Progressing  Flowsheets (Taken 12/26/2024 1258)  Free From Fall Injury: Instruct family/caregiver on patient safety     Problem: ABCDS Injury Assessment  Goal: Absence of physical injury  12/26/2024 2212 by Gerber Lizarraga, RN  Outcome: Progressing  Flowsheets (Taken 12/26/2024 1258 by Parisa Lizama, RN)  Absence of Physical Injury: Implement safety measures based on patient assessment  12/26/2024 1258 by Parisa Lizama RN  Outcome:  comfort, nutrition and hydration  12/26/2024 1258 by Parisa Lizama, RN  Outcome: Completed     Problem: Safety - Violent/Self-destructive Restraint  Goal: Remains free of injury from restraints (Restraint for Violent/Self-Destructive Behavior)  Description: INTERVENTIONS:  1. Determine that de-escalation and other, less restrictive measures have been tried or would not be effective before applying the restraint  2. Identify and document the criteria for restraint  3. Evaluate the patient's condition at the time of restraint application  4. Inform patient/family regarding the reason for restraint/seclusion  5. Q2H: Monitor comfort, nutrition and hydration needs  6. Q15M: Perform safety checks including skin, circulation, sensory, respiratory and psychological status  7. Ensure continuous observation  8. Identify and implement measures to help patient regain control, assess readiness for release and initiate progressive release per policy  12/26/2024 1258 by Parisa Lizama, RN  Outcome: Completed     Problem: Skin/Tissue Integrity  Goal: Absence of new skin breakdown  Description: 1.  Monitor for areas of redness and/or skin breakdown  2.  Assess vascular access sites hourly  3.  Every 4-6 hours minimum:  Change oxygen saturation probe site  4.  Every 4-6 hours:  If on nasal continuous positive airway pressure, respiratory therapy assess nares and determine need for appliance change or resting period.  12/26/2024 2212 by Gerber Lizarraga, RN  Outcome: Progressing  12/26/2024 1258 by Parisa Lizama, RN  Outcome: Progressing     Problem: Pain  Goal: Verbalizes/displays adequate comfort level or baseline comfort level  12/26/2024 2212 by Gerber Lizarraga, RN  Outcome: Progressing  Flowsheets (Taken 12/26/2024 1258 by Parisa Lizama, RN)  Verbalizes/displays adequate comfort level or baseline comfort level:   Encourage patient to monitor pain and request assistance   Administer analgesics based on type and  severity of pain and evaluate response   Consider cultural and social influences on pain and pain management   Assess pain using appropriate pain scale   Implement non-pharmacological measures as appropriate and evaluate response   Notify Licensed Independent Practitioner if interventions unsuccessful or patient reports new pain  12/26/2024 1258 by Parisa Lizama, RN  Outcome: Progressing  Flowsheets  Taken 12/26/2024 1258 by Parisa Lizama, RN  Verbalizes/displays adequate comfort level or baseline comfort level:   Encourage patient to monitor pain and request assistance   Administer analgesics based on type and severity of pain and evaluate response   Consider cultural and social influences on pain and pain management   Assess pain using appropriate pain scale   Implement non-pharmacological measures as appropriate and evaluate response   Notify Licensed Independent Practitioner if interventions unsuccessful or patient reports new pain  Taken 12/26/2024 0440 by Adore Felix RN  Verbalizes/displays adequate comfort level or baseline comfort level:   Encourage patient to monitor pain and request assistance   Assess pain using appropriate pain scale   Careplan reviewed with patient. Patient verbalizes understanding of plan of care and contributes towards goals of care.

## 2024-12-27 NOTE — CARE COORDINATION
12/27/24, 2:26 PM EST    DISCHARGE PLANNING EVALUATION    Spoke with [facility], updated referral and facility will review.

## 2024-12-27 NOTE — PLAN OF CARE
Problem: Discharge Planning  Goal: Discharge to home or other facility with appropriate resources  Outcome: Progressing  Flowsheets (Taken 12/27/2024 1301)  Discharge to home or other facility with appropriate resources:   Identify barriers to discharge with patient and caregiver   Identify discharge learning needs (meds, wound care, etc)   Refer to discharge planning if patient needs post-hospital services based on physician order or complex needs related to functional status, cognitive ability or social support system   Arrange for needed discharge resources and transportation as appropriate     Problem: Safety - Adult  Goal: Free from fall injury  Outcome: Progressing  Flowsheets (Taken 12/27/2024 1301)  Free From Fall Injury: Instruct family/caregiver on patient safety     Problem: ABCDS Injury Assessment  Goal: Absence of physical injury  Outcome: Progressing  Flowsheets (Taken 12/27/2024 1301)  Absence of Physical Injury: Implement safety measures based on patient assessment     Problem: Chronic Conditions and Co-morbidities  Goal: Patient's chronic conditions and co-morbidity symptoms are monitored and maintained or improved  Outcome: Progressing  Flowsheets (Taken 12/27/2024 1301)  Care Plan - Patient's Chronic Conditions and Co-Morbidity Symptoms are Monitored and Maintained or Improved:   Monitor and assess patient's chronic conditions and comorbid symptoms for stability, deterioration, or improvement   Collaborate with multidisciplinary team to address chronic and comorbid conditions and prevent exacerbation or deterioration   Update acute care plan with appropriate goals if chronic or comorbid symptoms are exacerbated and prevent overall improvement and discharge     Problem: Skin/Tissue Integrity  Goal: Absence of new skin breakdown  Description: 1.  Monitor for areas of redness and/or skin breakdown  2.  Assess vascular access sites hourly  3.  Every 4-6 hours minimum:  Change oxygen saturation probe

## 2024-12-27 NOTE — PROGRESS NOTES
[facility]  OCCUPATIONAL THERAPY MISSED TREATMENT NOTE  [facility] 7K  7K-13/013-A      Date: 2024  Patient Name: Raymond Hummel        CSN: [id number]   : 1940  (84 y.o.)  Gender: male   Referring Practitioner: ROMOE Leon            REASON FOR MISSED TREATMENT: Patient Unavailable patient sleeping at this time, will attempt next available time.

## 2024-12-27 NOTE — CARE COORDINATION
12/27/24, 2:04 PM EST    DISCHARGE PLANNING EVALUATION    Call made to [facility], message left request for update on referral made yesterday.

## 2024-12-28 ENCOUNTER — APPOINTMENT (OUTPATIENT)
Dept: GENERAL RADIOLOGY | Age: 84
End: 2024-12-28
Payer: MEDICARE

## 2024-12-28 LAB
GLUCOSE BLD STRIP.AUTO-MCNC: 136 MG/DL (ref 70–108)
GLUCOSE BLD STRIP.AUTO-MCNC: 154 MG/DL (ref 70–108)
GLUCOSE BLD STRIP.AUTO-MCNC: 215 MG/DL (ref 70–108)
GLUCOSE BLD STRIP.AUTO-MCNC: 397 MG/DL (ref 70–108)
INR PPP: 2.63 (ref 0.85–1.13)

## 2024-12-28 PROCEDURE — 99232 SBSQ HOSP IP/OBS MODERATE 35: CPT | Performed by: PHYSICIAN ASSISTANT

## 2024-12-28 PROCEDURE — 36415 COLL VENOUS BLD VENIPUNCTURE: CPT

## 2024-12-28 PROCEDURE — 6370000000 HC RX 637 (ALT 250 FOR IP)

## 2024-12-28 PROCEDURE — 71045 X-RAY EXAM CHEST 1 VIEW: CPT

## 2024-12-28 PROCEDURE — 6370000000 HC RX 637 (ALT 250 FOR IP): Performed by: NURSE PRACTITIONER

## 2024-12-28 PROCEDURE — 6370000000 HC RX 637 (ALT 250 FOR IP): Performed by: PHARMACIST

## 2024-12-28 PROCEDURE — 6370000000 HC RX 637 (ALT 250 FOR IP): Performed by: STUDENT IN AN ORGANIZED HEALTH CARE EDUCATION/TRAINING PROGRAM

## 2024-12-28 PROCEDURE — 85610 PROTHROMBIN TIME: CPT

## 2024-12-28 PROCEDURE — 82948 REAGENT STRIP/BLOOD GLUCOSE: CPT

## 2024-12-28 PROCEDURE — 1200000000 HC SEMI PRIVATE

## 2024-12-28 PROCEDURE — 2500000003 HC RX 250 WO HCPCS: Performed by: NURSE PRACTITIONER

## 2024-12-28 PROCEDURE — 6370000000 HC RX 637 (ALT 250 FOR IP): Performed by: PHYSICIAN ASSISTANT

## 2024-12-28 RX ORDER — INSULIN LISPRO 100 [IU]/ML
0-4 INJECTION, SOLUTION INTRAVENOUS; SUBCUTANEOUS
Status: DISCONTINUED | OUTPATIENT
Start: 2024-12-28 | End: 2024-12-31 | Stop reason: HOSPADM

## 2024-12-28 RX ORDER — INSULIN LISPRO 100 [IU]/ML
0-4 INJECTION, SOLUTION INTRAVENOUS; SUBCUTANEOUS
Status: DISCONTINUED | OUTPATIENT
Start: 2024-12-28 | End: 2024-12-28

## 2024-12-28 RX ORDER — WARFARIN SODIUM 5 MG/1
5 TABLET ORAL ONCE
Status: COMPLETED | OUTPATIENT
Start: 2024-12-28 | End: 2024-12-28

## 2024-12-28 RX ADMIN — ATORVASTATIN CALCIUM 40 MG: 40 TABLET, FILM COATED ORAL at 08:41

## 2024-12-28 RX ADMIN — FAMOTIDINE 20 MG: 20 TABLET, FILM COATED ORAL at 21:27

## 2024-12-28 RX ADMIN — FINASTERIDE 5 MG: 5 TABLET, FILM COATED ORAL at 08:41

## 2024-12-28 RX ADMIN — SPIRONOLACTONE 25 MG: 25 TABLET ORAL at 08:41

## 2024-12-28 RX ADMIN — TAMSULOSIN HYDROCHLORIDE 0.4 MG: 0.4 CAPSULE ORAL at 08:41

## 2024-12-28 RX ADMIN — METFORMIN HYDROCHLORIDE 500 MG: 500 TABLET, EXTENDED RELEASE ORAL at 21:27

## 2024-12-28 RX ADMIN — METOPROLOL SUCCINATE 50 MG: 50 TABLET, EXTENDED RELEASE ORAL at 08:41

## 2024-12-28 RX ADMIN — ACETAMINOPHEN 650 MG: 325 TABLET ORAL at 10:23

## 2024-12-28 RX ADMIN — FAMOTIDINE 20 MG: 20 TABLET, FILM COATED ORAL at 08:41

## 2024-12-28 RX ADMIN — SODIUM CHLORIDE, PRESERVATIVE FREE 10 ML: 5 INJECTION INTRAVENOUS at 21:28

## 2024-12-28 RX ADMIN — ACETAMINOPHEN 650 MG: 325 TABLET ORAL at 00:02

## 2024-12-28 RX ADMIN — WARFARIN SODIUM 5 MG: 5 TABLET ORAL at 17:07

## 2024-12-28 RX ADMIN — INSULIN LISPRO 1 UNITS: 100 INJECTION, SOLUTION INTRAVENOUS; SUBCUTANEOUS at 21:28

## 2024-12-28 RX ADMIN — SODIUM CHLORIDE, PRESERVATIVE FREE 10 ML: 5 INJECTION INTRAVENOUS at 08:45

## 2024-12-28 RX ADMIN — ACETAMINOPHEN 650 MG: 325 TABLET ORAL at 14:33

## 2024-12-28 RX ADMIN — METHOCARBAMOL 1000 MG: 500 TABLET ORAL at 21:27

## 2024-12-28 RX ADMIN — BUMETANIDE 0.5 MG: 0.5 TABLET ORAL at 08:41

## 2024-12-28 RX ADMIN — METFORMIN HYDROCHLORIDE 500 MG: 500 TABLET, EXTENDED RELEASE ORAL at 08:41

## 2024-12-28 RX ADMIN — INSULIN GLARGINE 5 UNITS: 100 INJECTION, SOLUTION SUBCUTANEOUS at 21:27

## 2024-12-28 RX ADMIN — TICAGRELOR 90 MG: 90 TABLET ORAL at 08:41

## 2024-12-28 RX ADMIN — TICAGRELOR 90 MG: 90 TABLET ORAL at 21:27

## 2024-12-28 ASSESSMENT — PAIN SCALES - GENERAL
PAINLEVEL_OUTOF10: 0
PAINLEVEL_OUTOF10: 2
PAINLEVEL_OUTOF10: 0
PAINLEVEL_OUTOF10: 3
PAINLEVEL_OUTOF10: 0
PAINLEVEL_OUTOF10: 3
PAINLEVEL_OUTOF10: 0

## 2024-12-28 ASSESSMENT — PAIN DESCRIPTION - LOCATION
LOCATION: BACK
LOCATION: BACK
LOCATION: HIP

## 2024-12-28 ASSESSMENT — PAIN DESCRIPTION - ORIENTATION
ORIENTATION: LEFT
ORIENTATION: LOWER;MID
ORIENTATION: LEFT;LOWER

## 2024-12-28 ASSESSMENT — PAIN - FUNCTIONAL ASSESSMENT: PAIN_FUNCTIONAL_ASSESSMENT: PREVENTS OR INTERFERES SOME ACTIVE ACTIVITIES AND ADLS

## 2024-12-28 ASSESSMENT — PAIN DESCRIPTION - DESCRIPTORS
DESCRIPTORS: ACHING;DULL
DESCRIPTORS: ACHING
DESCRIPTORS: ACHING;SORE

## 2024-12-28 NOTE — PLAN OF CARE
Problem: Discharge Planning  Goal: Discharge to home or other facility with appropriate resources  Outcome: Progressing  Flowsheets (Taken 12/28/2024 1756)  Discharge to home or other facility with appropriate resources: Identify barriers to discharge with patient and caregiver     Problem: Safety - Adult  Goal: Free from fall injury  Outcome: Progressing  Flowsheets (Taken 12/28/2024 1756)  Free From Fall Injury: Instruct family/caregiver on patient safety     Problem: ABCDS Injury Assessment  Goal: Absence of physical injury  Outcome: Progressing  Flowsheets (Taken 12/28/2024 1756)  Absence of Physical Injury: Implement safety measures based on patient assessment     Problem: Chronic Conditions and Co-morbidities  Goal: Patient's chronic conditions and co-morbidity symptoms are monitored and maintained or improved  Outcome: Progressing  Flowsheets (Taken 12/28/2024 1756)  Care Plan - Patient's Chronic Conditions and Co-Morbidity Symptoms are Monitored and Maintained or Improved: Monitor and assess patient's chronic conditions and comorbid symptoms for stability, deterioration, or improvement     Problem: Skin/Tissue Integrity  Goal: Absence of new skin breakdown  Description: 1.  Monitor for areas of redness and/or skin breakdown  2.  Assess vascular access sites hourly  3.  Every 4-6 hours minimum:  Change oxygen saturation probe site  4.  Every 4-6 hours:  If on nasal continuous positive airway pressure, respiratory therapy assess nares and determine need for appliance change or resting period.  Outcome: Progressing     Problem: Pain  Goal: Verbalizes/displays adequate comfort level or baseline comfort level  Outcome: Progressing  Flowsheets (Taken 12/28/2024 1756)  Verbalizes/displays adequate comfort level or baseline comfort level: Encourage patient to monitor pain and request assistance  ..Care plan reviewed with patient. Patient verbalize understanding of the plan of care and contribute to goal setting.

## 2024-12-28 NOTE — PROGRESS NOTES
Hospitalist Progress Note      Patient:  Raymond Hummel    Unit/Bed:7K-13/013-A  YOB: 1940  MRN: [id number]   Acct: [id number]   PCP: Duran Dukes MD  Date of Admission: 12/21/2024      ASSESSMENT AND PLAN    Acute delirium on dementia  Psychiatry consulted and initiated [employer] patch-recommending dementia care unit  Family reports persistent decline over the last few months, worse after most recent TAVR procedure  Referral to [facility] [place].  Has been without a sitter for over a day  Last MoCA per family scored 16/30.  Repeat cog eval ordered- pending    L 8-12 rib fractures s/p fall:   Patient slipped on stairs which were covered in ice landing on his left side.    CT scan showed 8-12 rib fractures on the left side and a small L sided pleural effusion.  Trauma surgery initially primary.  They have signed off  Repeat chest x-ray this a.m. is stable.  Respiratory function stable as well-no further chest x-ray is needed  Pain control.  Pulmonary toileting    Long QT  Patient with chronic QT C prolongation dating back 6/2024  Avoid QT prolongating medications   Repeat EKG 12/26 with QTc of 600.    Bacteriuria  Urine culture with 50-90 K GNB which does not meet criteria for urinary tract infection.  Primary team initiated Rocephin therapy-no need to continue    Supratherapeutic INR  INR 4.77.  Pharmacy to dose.-No INR was drawn today.  Ordered    Goals of care  Palliative care was consulted.  Patient's CODE STATUS was changed to DNR CC  Hospice was considered however Dr. Carlisle evaluated patient does not meet hospice criteria currently     Leukocytosis: WBC 14.3 on arrival.  likely reactive to the patient's fall and acute rib fractures.  No further blood draws due to DNRCC    Hyperglycemia with prediabetes  Last HbA1c 6.2 6/2023.  Patient takes metformin at home, -resume low-dose sliding scale, POCT x 4, hypoglycemia  which are inherent in voice recognition technology.**      Electronically signed by Dr. Bety Tinajero      CT ABDOMEN PELVIS W IV CONTRAST Additional Contrast? None   Final Result   1. Left rib fractures involving the 8-12 ribs..   2. No acute fracture of the lumbar spine.   3. Abdominal aortic aneurysm.   4. Other findings as described above.         **This report has been created using voice recognition software.  It may contain   minor errors which are inherent in voice recognition technology.**      Electronically signed by Dr. Bety Tinajero      CT THORACIC RECONSTRUCTION WO POST PROCESS   Final Result   1. No acute posttraumatic findings in the chest.   2. A small left pleural effusion with left basilar atelectasis.            **This report has been created using voice recognition software.  It may contain   minor errors which are inherent in voice recognition technology.**      Electronically signed by Dr. Bety Tinajero      CT LUMBAR RECONSTRUCTION WO POST PROCESS   Final Result   1. Left rib fractures involving the 8-12 ribs..   2. No acute fracture of the lumbar spine.   3. Abdominal aortic aneurysm.   4. Other findings as described above.         **This report has been created using voice recognition software.  It may contain   minor errors which are inherent in voice recognition technology.**      Electronically signed by Dr. Bety Tinajero      XR CHEST PORTABLE   Final Result   1. No acute cardiopulmonary finding..               **This report has been created using voice recognition software.  It may contain   minor errors which are inherent in voice recognition technology.**      Electronically signed by Dr. Bety Tinajero      XR PELVIS (1-2 VIEWS)   Final Result   1. No acute bony abnormality            **This report has been created using voice recognition software.  It may contain   minor errors which are inherent in voice recognition technology.**      Electronically signed by

## 2024-12-28 NOTE — PROGRESS NOTES
Warfarin Pharmacy Consult Note    Warfarin Indication: TAVR  Target INR: 2.0-3.0  Dose prior to admission: 5 mg daily     No results for input(s): \"HGB\", \"PLT\" in the last 72 hours.  Recent Labs     12/26/24  0421 12/27/24  0629 12/28/24  1222   INR 4.01* 4.77* 2.63*     Concurrent anticoagulants/antiplatelets: ticagrelor  Significant warfarin drug-drug interactions: none     Date INR Warfarin Dose   12/21/24 2.65 5 mg (PTA)    12/22/24  2.56 5 mg   12/23/24  2.58  5 mg      12/24/24 2.18  5 mg     12/25/24 2.61 5 mg   12/26/24  4.01  No Coumadin     12/27/2024 4.77   No Coumadin    12/28/2024 2.63 5 mg     Monitoring:                   INR will be monitored daily.    **Please contact pharmacy for discharge instructions when indicated**    Larisa Momin PharmD 12/28/2024 3:13 PM

## 2024-12-28 NOTE — PLAN OF CARE
Problem: Discharge Planning  Goal: Discharge to home or other facility with appropriate resources  12/28/2024 0237 by Bhavna Sears LPN  Outcome: Progressing  Flowsheets (Taken 12/27/2024 2100)  Discharge to home or other facility with appropriate resources: Identify barriers to discharge with patient and caregiver  12/27/2024 1301 by Parisa Lizama, RN  Outcome: Progressing  Flowsheets (Taken 12/27/2024 1301)  Discharge to home or other facility with appropriate resources:   Identify barriers to discharge with patient and caregiver   Identify discharge learning needs (meds, wound care, etc)   Refer to discharge planning if patient needs post-hospital services based on physician order or complex needs related to functional status, cognitive ability or social support system   Arrange for needed discharge resources and transportation as appropriate     Problem: Safety - Adult  Goal: Free from fall injury  12/28/2024 0237 by Bhavna Sears LPN  Outcome: Progressing  Flowsheets (Taken 12/27/2024 1301 by Parisa Lizama, RN)  Free From Fall Injury: Instruct family/caregiver on patient safety  12/27/2024 1301 by Parisa Lizama RN  Outcome: Progressing  Flowsheets (Taken 12/27/2024 1301)  Free From Fall Injury: Instruct family/caregiver on patient safety     Problem: ABCDS Injury Assessment  Goal: Absence of physical injury  12/28/2024 0237 by Bhavna Sears LPN  Outcome: Progressing  Flowsheets (Taken 12/27/2024 1301 by Parisa Lizama, RN)  Absence of Physical Injury: Implement safety measures based on patient assessment  12/27/2024 1301 by Parisa Lizama, RN  Outcome: Progressing  Flowsheets (Taken 12/27/2024 1301)  Absence of Physical Injury: Implement safety measures based on patient assessment     Problem: Chronic Conditions and Co-morbidities  Goal: Patient's chronic conditions and co-morbidity symptoms are monitored and maintained or improved  12/28/2024 0237 by Bhavna Sears LPN  Outcome:  Progressing  Flowsheets (Taken 12/27/2024 2100)  Care Plan - Patient's Chronic Conditions and Co-Morbidity Symptoms are Monitored and Maintained or Improved: Monitor and assess patient's chronic conditions and comorbid symptoms for stability, deterioration, or improvement  12/27/2024 1301 by Parisa Lizama, RN  Outcome: Progressing  Flowsheets (Taken 12/27/2024 1301)  Care Plan - Patient's Chronic Conditions and Co-Morbidity Symptoms are Monitored and Maintained or Improved:   Monitor and assess patient's chronic conditions and comorbid symptoms for stability, deterioration, or improvement   Collaborate with multidisciplinary team to address chronic and comorbid conditions and prevent exacerbation or deterioration   Update acute care plan with appropriate goals if chronic or comorbid symptoms are exacerbated and prevent overall improvement and discharge     Problem: Skin/Tissue Integrity  Goal: Absence of new skin breakdown  Description: 1.  Monitor for areas of redness and/or skin breakdown  2.  Assess vascular access sites hourly  3.  Every 4-6 hours minimum:  Change oxygen saturation probe site  4.  Every 4-6 hours:  If on nasal continuous positive airway pressure, respiratory therapy assess nares and determine need for appliance change or resting period.  12/28/2024 0237 by Bhavna Sears LPN  Outcome: Progressing  12/27/2024 1301 by Parisa Lizama, RN  Outcome: Progressing     Problem: Pain  Goal: Verbalizes/displays adequate comfort level or baseline comfort level  12/28/2024 0237 by Bhavna Sears LPN  Outcome: Progressing  Flowsheets (Taken 12/27/2024 1301 by Parisa Lizama, RN)  Verbalizes/displays adequate comfort level or baseline comfort level:   Encourage patient to monitor pain and request assistance   Administer analgesics based on type and severity of pain and evaluate response   Consider cultural and social influences on pain and pain management   Implement non-pharmacological measures as

## 2024-12-29 LAB
BACTERIA BLD AEROBE CULT: NORMAL
BACTERIA BLD AEROBE CULT: NORMAL
GLUCOSE BLD STRIP.AUTO-MCNC: 132 MG/DL (ref 70–108)
GLUCOSE BLD STRIP.AUTO-MCNC: 161 MG/DL (ref 70–108)
GLUCOSE BLD STRIP.AUTO-MCNC: 226 MG/DL (ref 70–108)
GLUCOSE BLD STRIP.AUTO-MCNC: 254 MG/DL (ref 70–108)
INR PPP: 1.72 (ref 0.85–1.13)

## 2024-12-29 PROCEDURE — 99231 SBSQ HOSP IP/OBS SF/LOW 25: CPT | Performed by: PHYSICIAN ASSISTANT

## 2024-12-29 PROCEDURE — 82948 REAGENT STRIP/BLOOD GLUCOSE: CPT

## 2024-12-29 PROCEDURE — 6370000000 HC RX 637 (ALT 250 FOR IP): Performed by: STUDENT IN AN ORGANIZED HEALTH CARE EDUCATION/TRAINING PROGRAM

## 2024-12-29 PROCEDURE — 36415 COLL VENOUS BLD VENIPUNCTURE: CPT

## 2024-12-29 PROCEDURE — 6370000000 HC RX 637 (ALT 250 FOR IP): Performed by: PHYSICIAN ASSISTANT

## 2024-12-29 PROCEDURE — 2500000003 HC RX 250 WO HCPCS: Performed by: NURSE PRACTITIONER

## 2024-12-29 PROCEDURE — 6370000000 HC RX 637 (ALT 250 FOR IP): Performed by: NURSE PRACTITIONER

## 2024-12-29 PROCEDURE — 85610 PROTHROMBIN TIME: CPT

## 2024-12-29 PROCEDURE — 6370000000 HC RX 637 (ALT 250 FOR IP)

## 2024-12-29 PROCEDURE — 1200000000 HC SEMI PRIVATE

## 2024-12-29 PROCEDURE — 6370000000 HC RX 637 (ALT 250 FOR IP): Performed by: PHARMACIST

## 2024-12-29 RX ORDER — WARFARIN SODIUM 5 MG/1
5 TABLET ORAL ONCE
Status: COMPLETED | OUTPATIENT
Start: 2024-12-29 | End: 2024-12-29

## 2024-12-29 RX ADMIN — INSULIN LISPRO 2 UNITS: 100 INJECTION, SOLUTION INTRAVENOUS; SUBCUTANEOUS at 22:18

## 2024-12-29 RX ADMIN — POLYETHYLENE GLYCOL 3350 17 G: 17 POWDER, FOR SOLUTION ORAL at 10:28

## 2024-12-29 RX ADMIN — TAMSULOSIN HYDROCHLORIDE 0.4 MG: 0.4 CAPSULE ORAL at 10:24

## 2024-12-29 RX ADMIN — FINASTERIDE 5 MG: 5 TABLET, FILM COATED ORAL at 10:24

## 2024-12-29 RX ADMIN — INSULIN GLARGINE 5 UNITS: 100 INJECTION, SOLUTION SUBCUTANEOUS at 22:18

## 2024-12-29 RX ADMIN — METHOCARBAMOL 1000 MG: 500 TABLET ORAL at 10:24

## 2024-12-29 RX ADMIN — FAMOTIDINE 20 MG: 20 TABLET, FILM COATED ORAL at 10:28

## 2024-12-29 RX ADMIN — ATORVASTATIN CALCIUM 40 MG: 40 TABLET, FILM COATED ORAL at 10:23

## 2024-12-29 RX ADMIN — INSULIN LISPRO 1 UNITS: 100 INJECTION, SOLUTION INTRAVENOUS; SUBCUTANEOUS at 12:24

## 2024-12-29 RX ADMIN — WARFARIN SODIUM 5 MG: 5 TABLET ORAL at 18:06

## 2024-12-29 RX ADMIN — SODIUM CHLORIDE, PRESERVATIVE FREE 10 ML: 5 INJECTION INTRAVENOUS at 22:21

## 2024-12-29 RX ADMIN — METFORMIN HYDROCHLORIDE 500 MG: 500 TABLET, EXTENDED RELEASE ORAL at 22:19

## 2024-12-29 RX ADMIN — ACETAMINOPHEN 650 MG: 325 TABLET ORAL at 12:20

## 2024-12-29 RX ADMIN — TICAGRELOR 90 MG: 90 TABLET ORAL at 10:28

## 2024-12-29 RX ADMIN — FAMOTIDINE 20 MG: 20 TABLET, FILM COATED ORAL at 22:19

## 2024-12-29 RX ADMIN — SODIUM CHLORIDE, PRESERVATIVE FREE 10 ML: 5 INJECTION INTRAVENOUS at 10:25

## 2024-12-29 RX ADMIN — METHOCARBAMOL 1000 MG: 500 TABLET ORAL at 22:19

## 2024-12-29 RX ADMIN — TICAGRELOR 90 MG: 90 TABLET ORAL at 22:19

## 2024-12-29 RX ADMIN — METOPROLOL SUCCINATE 50 MG: 50 TABLET, EXTENDED RELEASE ORAL at 10:25

## 2024-12-29 RX ADMIN — METHOCARBAMOL 1000 MG: 500 TABLET ORAL at 18:06

## 2024-12-29 RX ADMIN — OXYCODONE 5 MG: 5 TABLET ORAL at 02:31

## 2024-12-29 RX ADMIN — METFORMIN HYDROCHLORIDE 500 MG: 500 TABLET, EXTENDED RELEASE ORAL at 10:23

## 2024-12-29 RX ADMIN — SPIRONOLACTONE 25 MG: 25 TABLET ORAL at 10:24

## 2024-12-29 RX ADMIN — OXYCODONE 5 MG: 5 TABLET ORAL at 12:19

## 2024-12-29 RX ADMIN — BUMETANIDE 0.5 MG: 0.5 TABLET ORAL at 10:25

## 2024-12-29 ASSESSMENT — PAIN DESCRIPTION - ORIENTATION
ORIENTATION: LEFT
ORIENTATION: LEFT

## 2024-12-29 ASSESSMENT — PAIN DESCRIPTION - LOCATION
LOCATION: RIB CAGE
LOCATION: RIB CAGE

## 2024-12-29 ASSESSMENT — PAIN SCALES - GENERAL
PAINLEVEL_OUTOF10: 5
PAINLEVEL_OUTOF10: 2

## 2024-12-29 ASSESSMENT — PAIN DESCRIPTION - DESCRIPTORS
DESCRIPTORS: ACHING
DESCRIPTORS: ACHING

## 2024-12-29 NOTE — PROGRESS NOTES
Warfarin Pharmacy Consult Note    Warfarin Indication:  TAVR  Target INR: 2.0-3.0  Dose prior to admission: 5 mg daily     No results for input(s): \"HGB\", \"PLT\" in the last 72 hours.  Recent Labs     12/27/24  0629 12/28/24  1222 12/29/24  0739   INR 4.77* 2.63* 1.72*     Concurrent anticoagulants/antiplatelets: ticagrelor  Significant warfarin drug-drug interactions: none   Date INR Warfarin Dose   12/21/24 2.65 5 mg (PTA)    12/22/24  2.56 5 mg   12/23/24  2.58  5 mg      12/24/24 2.18  5 mg     12/25/24 2.61 5 mg   12/26/24  4.01  No Coumadin     12/27/2024 4.77   No Coumadin    12/28/2024 2.63 5 mg   12/29/2024 1.72 5 mg     Monitoring:                   INR will be monitored daily.    **Please contact pharmacy for discharge instructions when indicated**    Larisa Momin PharmD 12/29/2024 1:52 PM

## 2024-12-29 NOTE — PROGRESS NOTES
on thinners COMPARISON: CTA chest 6/13/2023 TECHNIQUE: 1. Helical CT acquisition of the chest was performed with  administration of intravenous contrast. Multiplanar reformats are provided. 2. Reconstructed CT images of the thoracic spine from the CT chest examination. Multiplanar reformats are provided. All CT scans at this facility use dose modulation, iterative reconstruction, and/or weight based dosing when appropriate to reduce the radiation dose to as low as reasonably achievable. CONTRAST: 80 cc of Isovue-370 FINDINGS: CT CHEST: A small left pleural effusion. Left basal platelike atelectasis. Aortic valve stent graft is seen. A small hiatal hernia. Aortocoronary calcifications. Cardiac conduction device is seen with 2 leads. The leads are intact. No focal pulmonary consolidation. No large pulmonary mass. Central airway is patent. No pleural effusions.  No significant pericardial effusion. The heart is not enlarged. Ascending thoracic aorta is not dilated.  The main pulmonary artery is not dilated. No significantly enlarged mediastinal or  axillary lymph node. The thyroid is not enlarged. No chest wall mass. The abdomen is described on the concurrent CT of the abdomen and pelvis. Bones: DISH of the thoracic spine. The bones are mildly demineralized.. THORACIC SPINE: ALIGNMENT: The thoracic kyphosis is maintained FRACTURE: None DISC LEVEL: 1. The lack of intrathecal contrast limits the evaluation for  spinal stenosis or foraminal stenosis No significant central canal narrowing. No significant neural minor narrowing.     1. No acute posttraumatic findings in the chest. 2. A small left pleural effusion with left basilar atelectasis. **This report has been created using voice recognition software.  It may contain minor errors which are inherent in voice recognition technology.** Electronically signed by Dr. Bety Tinajero    CT CERVICAL SPINE WO CONTRAST    Result Date: 12/21/2024  PROCEDURE: CT CERVICAL SPINE WO  12/21/2024  PROCEDURE: XR PELVIS (1-2 VIEWS) CLINICAL INFORMATION: fall COMPARISON: No prior study. TECHNIQUE: AP pelvis performed. FINDINGS: No acute fracture or dislocation. The bones are demineralized.. The joint spaces are unremarkable. No significant soft tissue abnormality.     1. No acute bony abnormality **This report has been created using voice recognition software.  It may contain minor errors which are inherent in voice recognition technology.** Electronically signed by Dr. Bety Tinajero      Electronically signed by Isabel Hutton PA-C on 12/29/2024 at 3:03 PM

## 2024-12-29 NOTE — PLAN OF CARE
Problem: Discharge Planning  Goal: Discharge to home or other facility with appropriate resources  Outcome: Progressing  Flowsheets (Taken 12/29/2024 0834)  Discharge to home or other facility with appropriate resources:   Identify barriers to discharge with patient and caregiver   Arrange for needed discharge resources and transportation as appropriate   Identify discharge learning needs (meds, wound care, etc)     Problem: Safety - Adult  Goal: Free from fall injury  Outcome: Progressing  Flowsheets (Taken 12/29/2024 0834)  Free From Fall Injury: Instruct family/caregiver on patient safety     Problem: ABCDS Injury Assessment  Goal: Absence of physical injury  Outcome: Progressing  Flowsheets (Taken 12/29/2024 0834)  Absence of Physical Injury: Implement safety measures based on patient assessment     Problem: Chronic Conditions and Co-morbidities  Goal: Patient's chronic conditions and co-morbidity symptoms are monitored and maintained or improved  Outcome: Progressing  Flowsheets (Taken 12/29/2024 0834)  Care Plan - Patient's Chronic Conditions and Co-Morbidity Symptoms are Monitored and Maintained or Improved:   Monitor and assess patient's chronic conditions and comorbid symptoms for stability, deterioration, or improvement   Collaborate with multidisciplinary team to address chronic and comorbid conditions and prevent exacerbation or deterioration   Update acute care plan with appropriate goals if chronic or comorbid symptoms are exacerbated and prevent overall improvement and discharge     Problem: Skin/Tissue Integrity  Goal: Absence of new skin breakdown  Description: 1.  Monitor for areas of redness and/or skin breakdown  2.  Assess vascular access sites hourly  3.  Every 4-6 hours minimum:  Change oxygen saturation probe site  4.  Every 4-6 hours:  If on nasal continuous positive airway pressure, respiratory therapy assess nares and determine need for appliance change or resting period.  Outcome:

## 2024-12-29 NOTE — PLAN OF CARE
Problem: Discharge Planning  Goal: Discharge to home or other facility with appropriate resources  12/29/2024 1559 by Shanae Moya RN  Outcome: Progressing  Flowsheets (Taken 12/29/2024 0834 by Stacey Berger, RN)  Discharge to home or other facility with appropriate resources:   Identify barriers to discharge with patient and caregiver   Arrange for needed discharge resources and transportation as appropriate   Identify discharge learning needs (meds, wound care, etc)  12/29/2024 0834 by Stacey Berger RN  Outcome: Progressing  Flowsheets (Taken 12/29/2024 0834)  Discharge to home or other facility with appropriate resources:   Identify barriers to discharge with patient and caregiver   Arrange for needed discharge resources and transportation as appropriate   Identify discharge learning needs (meds, wound care, etc)     Problem: Safety - Adult  Goal: Free from fall injury  12/29/2024 1559 by Shanae Moya RN  Outcome: Progressing  Flowsheets (Taken 12/29/2024 0834 by Stacey Berger, RN)  Free From Fall Injury: Instruct family/caregiver on patient safety  12/29/2024 0834 by Stacey Berger RN  Outcome: Progressing  Flowsheets (Taken 12/29/2024 0834)  Free From Fall Injury: Instruct family/caregiver on patient safety     Problem: ABCDS Injury Assessment  Goal: Absence of physical injury  12/29/2024 1559 by Shanae Moya RN  Outcome: Progressing  Flowsheets (Taken 12/29/2024 0834 by Stacey Berger, RN)  Absence of Physical Injury: Implement safety measures based on patient assessment  12/29/2024 0834 by Stacey Berger RN  Outcome: Progressing  Flowsheets (Taken 12/29/2024 0834)  Absence of Physical Injury: Implement safety measures based on patient assessment     Problem: Chronic Conditions and Co-morbidities  Goal: Patient's chronic conditions and co-morbidity symptoms are monitored and maintained or improved  12/29/2024 1559 by Shanae Moya RN  Outcome: Progressing  Flowsheets (Taken 12/29/2024 0834

## 2024-12-30 ENCOUNTER — HOSPITAL ENCOUNTER (OUTPATIENT)
Dept: CARDIAC REHAB | Age: 84
Setting detail: THERAPIES SERIES
End: 2024-12-30
Payer: MEDICARE

## 2024-12-30 LAB
GLUCOSE BLD STRIP.AUTO-MCNC: 120 MG/DL (ref 70–108)
GLUCOSE BLD STRIP.AUTO-MCNC: 161 MG/DL (ref 70–108)
GLUCOSE BLD STRIP.AUTO-MCNC: 169 MG/DL (ref 70–108)
GLUCOSE BLD STRIP.AUTO-MCNC: 175 MG/DL (ref 70–108)
INR PPP: 1.41 (ref 0.85–1.13)

## 2024-12-30 PROCEDURE — 6370000000 HC RX 637 (ALT 250 FOR IP): Performed by: STUDENT IN AN ORGANIZED HEALTH CARE EDUCATION/TRAINING PROGRAM

## 2024-12-30 PROCEDURE — 97535 SELF CARE MNGMENT TRAINING: CPT

## 2024-12-30 PROCEDURE — 6370000000 HC RX 637 (ALT 250 FOR IP)

## 2024-12-30 PROCEDURE — 99231 SBSQ HOSP IP/OBS SF/LOW 25: CPT | Performed by: PHYSICIAN ASSISTANT

## 2024-12-30 PROCEDURE — 82948 REAGENT STRIP/BLOOD GLUCOSE: CPT

## 2024-12-30 PROCEDURE — 36415 COLL VENOUS BLD VENIPUNCTURE: CPT

## 2024-12-30 PROCEDURE — 2500000003 HC RX 250 WO HCPCS: Performed by: NURSE PRACTITIONER

## 2024-12-30 PROCEDURE — 85610 PROTHROMBIN TIME: CPT

## 2024-12-30 PROCEDURE — 6370000000 HC RX 637 (ALT 250 FOR IP): Performed by: PHYSICIAN ASSISTANT

## 2024-12-30 PROCEDURE — 6370000000 HC RX 637 (ALT 250 FOR IP): Performed by: NURSE PRACTITIONER

## 2024-12-30 PROCEDURE — 1200000000 HC SEMI PRIVATE

## 2024-12-30 RX ORDER — CALCIUM CARBONATE 500 MG/1
1000 TABLET, CHEWABLE ORAL ONCE
Status: COMPLETED | OUTPATIENT
Start: 2024-12-30 | End: 2024-12-30

## 2024-12-30 RX ORDER — WARFARIN SODIUM 5 MG/1
5 TABLET ORAL
Status: COMPLETED | OUTPATIENT
Start: 2024-12-30 | End: 2024-12-30

## 2024-12-30 RX ADMIN — TAMSULOSIN HYDROCHLORIDE 0.4 MG: 0.4 CAPSULE ORAL at 08:06

## 2024-12-30 RX ADMIN — FAMOTIDINE 20 MG: 20 TABLET, FILM COATED ORAL at 20:07

## 2024-12-30 RX ADMIN — METHOCARBAMOL 1000 MG: 500 TABLET ORAL at 14:09

## 2024-12-30 RX ADMIN — METHOCARBAMOL 1000 MG: 500 TABLET ORAL at 20:07

## 2024-12-30 RX ADMIN — ACETAMINOPHEN 650 MG: 325 TABLET ORAL at 14:09

## 2024-12-30 RX ADMIN — METOPROLOL SUCCINATE 50 MG: 50 TABLET, EXTENDED RELEASE ORAL at 08:06

## 2024-12-30 RX ADMIN — FAMOTIDINE 20 MG: 20 TABLET, FILM COATED ORAL at 08:06

## 2024-12-30 RX ADMIN — FINASTERIDE 5 MG: 5 TABLET, FILM COATED ORAL at 08:06

## 2024-12-30 RX ADMIN — WARFARIN SODIUM 5 MG: 5 TABLET ORAL at 17:42

## 2024-12-30 RX ADMIN — BUMETANIDE 0.5 MG: 0.5 TABLET ORAL at 08:06

## 2024-12-30 RX ADMIN — METHOCARBAMOL 1000 MG: 500 TABLET ORAL at 17:42

## 2024-12-30 RX ADMIN — METHOCARBAMOL 1000 MG: 500 TABLET ORAL at 08:06

## 2024-12-30 RX ADMIN — ATORVASTATIN CALCIUM 40 MG: 40 TABLET, FILM COATED ORAL at 08:07

## 2024-12-30 RX ADMIN — SODIUM CHLORIDE, PRESERVATIVE FREE 10 ML: 5 INJECTION INTRAVENOUS at 08:06

## 2024-12-30 RX ADMIN — ANTACID TABLETS 1000 MG: 500 TABLET, CHEWABLE ORAL at 17:58

## 2024-12-30 RX ADMIN — SODIUM CHLORIDE, PRESERVATIVE FREE 10 ML: 5 INJECTION INTRAVENOUS at 20:07

## 2024-12-30 RX ADMIN — POLYETHYLENE GLYCOL 3350 17 G: 17 POWDER, FOR SOLUTION ORAL at 08:06

## 2024-12-30 RX ADMIN — TICAGRELOR 90 MG: 90 TABLET ORAL at 08:06

## 2024-12-30 RX ADMIN — INSULIN GLARGINE 5 UNITS: 100 INJECTION, SOLUTION SUBCUTANEOUS at 22:00

## 2024-12-30 RX ADMIN — METFORMIN HYDROCHLORIDE 500 MG: 500 TABLET, EXTENDED RELEASE ORAL at 20:07

## 2024-12-30 RX ADMIN — METFORMIN HYDROCHLORIDE 500 MG: 500 TABLET, EXTENDED RELEASE ORAL at 08:06

## 2024-12-30 RX ADMIN — SPIRONOLACTONE 25 MG: 25 TABLET ORAL at 08:06

## 2024-12-30 RX ADMIN — TICAGRELOR 90 MG: 90 TABLET ORAL at 20:07

## 2024-12-30 ASSESSMENT — PAIN DESCRIPTION - DESCRIPTORS: DESCRIPTORS: ACHING

## 2024-12-30 ASSESSMENT — PAIN DESCRIPTION - ORIENTATION: ORIENTATION: LEFT

## 2024-12-30 ASSESSMENT — PAIN DESCRIPTION - LOCATION: LOCATION: RIB CAGE

## 2024-12-30 ASSESSMENT — PAIN SCALES - GENERAL
PAINLEVEL_OUTOF10: 3
PAINLEVEL_OUTOF10: 0

## 2024-12-30 NOTE — PLAN OF CARE
Problem: Discharge Planning  Goal: Discharge to home or other facility with appropriate resources  12/30/2024 0238 by Bhavna Sears LPN  Outcome: Progressing  Flowsheets (Taken 12/29/2024 0834 by Stacey Berger, RN)  Discharge to home or other facility with appropriate resources:   Identify barriers to discharge with patient and caregiver   Arrange for needed discharge resources and transportation as appropriate   Identify discharge learning needs (meds, wound care, etc)  12/29/2024 1559 by Shanae Moya RN  Outcome: Progressing  Flowsheets (Taken 12/29/2024 0834 by Stacey Berger, RN)  Discharge to home or other facility with appropriate resources:   Identify barriers to discharge with patient and caregiver   Arrange for needed discharge resources and transportation as appropriate   Identify discharge learning needs (meds, wound care, etc)     Problem: Safety - Adult  Goal: Free from fall injury  12/30/2024 0238 by Bhavna Sears LPN  Outcome: Progressing  Flowsheets (Taken 12/29/2024 0834 by Stacey Berger, RN)  Free From Fall Injury: Instruct family/caregiver on patient safety  12/29/2024 1559 by Shanae Moya RN  Outcome: Progressing  Flowsheets (Taken 12/29/2024 0834 by Stacey Berger, RN)  Free From Fall Injury: Instruct family/caregiver on patient safety     Problem: ABCDS Injury Assessment  Goal: Absence of physical injury  12/30/2024 0238 by Bhavna Sears LPN  Outcome: Progressing  Flowsheets (Taken 12/29/2024 0834 by Stacey Berger, RN)  Absence of Physical Injury: Implement safety measures based on patient assessment  12/29/2024 1559 by Shanae Moya RN  Outcome: Progressing  Flowsheets (Taken 12/29/2024 0834 by Stacey Berger, RN)  Absence of Physical Injury: Implement safety measures based on patient assessment     Problem: Chronic Conditions and Co-morbidities  Goal: Patient's chronic conditions and co-morbidity symptoms are monitored and maintained or improved  12/30/2024 0238 by Bhavna Sears  monitor pain and request assistance   Assess pain using appropriate pain scale   Administer analgesics based on type and severity of pain and evaluate response   Implement non-pharmacological measures as appropriate and evaluate response  Care plan reviewed with patient and Patient verbalize understanding of the plan of care and contribute to goal setting.

## 2024-12-30 NOTE — PROGRESS NOTES
Hospitalist Progress Note      Patient:  Raymond Hummel    Unit/Bed:7K-13/013-A  YOB: 1940  MRN: [id number]   Acct: [id number]   PCP: Duran Dukes MD  Date of Admission: 12/21/2024      ASSESSMENT AND PLAN    Acute delirium on dementia- resolved   Psychiatry consulted and initiated Exelon patch-recommending dementia care unit  Family reports persistent decline over the last few months, worse after most recent TAVR procedure  Referral to [facility].  Last MoCA per family scored 16/30.  Repeat cog eval ordered- pending    L 8-12 rib fractures s/p fall:   Patient slipped on stairs which were covered in ice landing on his left side.    CT scan showed 8-12 rib fractures on the left side and a small L sided pleural effusion.  Trauma surgery initially primary.  They have signed off  Repeat chest x-ray this a.m. is stable.  Respiratory function stable as well-no further chest x-ray is needed  Pain control.  Pulmonary toileting    Long QT  Patient with chronic QT C prolongation dating back 6/2024  Avoid QT prolongating medications   Repeat EKG 12/26 with QTc of 600.    Bacteriuria  Urine culture with 50-90 K GNB which does not meet criteria for urinary tract infection.  Primary team initiated Rocephin therapy-no need to continue    Goals of care  Palliative care was consulted.  Patient's CODE STATUS was changed to DNR CC  Hospice was considered however Dr. Carlisle evaluated patient does not meet hospice criteria currently     Leukocytosis: WBC 14.3 on arrival.  likely reactive to the patient's fall and acute rib fractures.  No further blood draws due to DNRCC    Hyperglycemia with prediabetes  Last HbA1c 6.2 6/2023.  Patient takes metformin at home, -resume low-dose sliding scale, POCT x 4, hypoglycemia protocol  Start Lantus 5 units daily-continue  Initiate low-dose SSI with POCT after meals     Resolved:  Elevated  No prior study. TECHNIQUE: 5 mm axial imaging through the head without IV contrast. All CT scans at this facility use dose modulation, iterative reconstruction, and/or weight based dosing when appropriate to reduce the radiation dose to as low as reasonably achievable. FINDINGS: Mild cerebellar and cerebral volume loss. Scattered periventricular and subcortical white matter hypodensities that can relate to chronic microvascular changes. Ex-vacuo dilatation of the ventricles. No ventriculomegaly.  No midline shift or mass effect.  No acute intracranial hemorrhage. No intracranial collection.  Gray-white differentiation is unremarkable. The posterior fossa is unremarkable. The craniocervical junction is unremarkable. No acute bony abnormality. The  paranasal sinuses are clear. The  mastoid air cells are clear. The orbits are unremarkable.     1. No acute intracranial hemorrhage **This report has been created using voice recognition software.  It may contain minor errors which are inherent in voice recognition technology.** Electronically signed by Dr. Bety Tinajero    XR CHEST PORTABLE    Result Date: 12/21/2024  PROCEDURE: XR CHEST PORTABLE CLINICAL INFORMATION: fall COMPARISON: Chest radiograph 9/18/2024 TECHNIQUE: Single frontal view of the chest performed. FINDINGS: Cardiac conduction device is seen with 2 leads. The leads are intact. Degenerative changes of the thoracic spine. No focal pulmonary consolidation. Platelike atelectasis in the right lung base. Cardiac silhouette is mild enlarged. Aortic valve prosthesis. No pleural effusion. No pneumothorax. No acute bony abnormality. The bones are demineralized.     1. No acute cardiopulmonary finding.. **This report has been created using voice recognition software.  It may contain minor errors which are inherent in voice recognition technology.** Electronically signed by Dr. Bety Tinajero    XR PELVIS (1-2 VIEWS)    Result Date: 12/21/2024  PROCEDURE: XR PELVIS

## 2024-12-30 NOTE — PROGRESS NOTES
Spiritual Health History and Assessment/Progress Note  [facility]    (P) Follow-up,  ,  ,      Name: Raymond Hummel MRN: [id number]    Age: 84 y.o.     Sex: male   Language: English   Cheondoism: Holiness   Fall     Date: 12/30/2024            Total Time Calculated: (P) 9 min              Spiritual Assessment began in [facility] [place] [facility] 7K        Referral/Consult From: (P) Rounding   Encounter Overview/Reason: (P) Follow-up  Service Provided For: (P) Patient    Yamile, Belief, Meaning:   Patient identifies as spiritual, is connected with a yamile tradition or spiritual practice, and has beliefs or practices that help with coping during difficult times  Family/Friends No family/friends present      Importance and Influence:  Patient has spiritual/personal beliefs that influence decisions regarding their health  Family/Friends No family/friends present    Community:  Patient is connected with a spiritual community  Family/Friends No family/friends present    Assessment and Plan of Care:   Mr Hummel is a 84 year old male admitted on 7K due to a fall. This visit is a response to a regular Spiritual Health round by this  to provide spiritual and emotional support to patient and family. During this encounter, patient is seated in his recliner chair, awake, alone, alert, calm, approachable as well as receptive to 's visit. Patient shared with me that he fell at home and said this is his second time falling. Patient stated he is receiving wonderful support from his spouse, his children and his Sikhism family. Patient also shared with me that he hopeful that he will continue to get better and return home. Patient shared his appreciation for the care and support he has received during his hospital stay. Patient showed strong yamile and belief in God and asked for prayer at the end of our visit.   During this visit, I provided active listening, nurtured hope, provided words of comfort

## 2024-12-30 NOTE — PROGRESS NOTES
Patient concern with acid reflux after dinner and is asking for two tums. Message sent to Isabel Hutton. See order for one time dose. Monitor.

## 2024-12-30 NOTE — DISCHARGE INSTR - COC
Continuity of Care Form    Patient Name: Raymond Hummel   :  1940  MRN:  [id number]    Admit date:  2024  Discharge date:  2024    Code Status Order: DNR-CC   Advance Directives:   Advance Care Flowsheet Documentation             Admitting Physician:  No admitting provider for patient encounter.  PCP: Duran Dukes MD    Discharging Nurse: Shanae Moya RN  Discharging Hospital Unit/Room#: 7K-13/013-A  Discharging Unit Phone Number: [phone]    Emergency Contact:   Extended Emergency Contact Information  Primary Emergency Contact: Gela Hummel-Cell  Address: [address] [place] [address]            [place] [address] [facility]  Home Phone: [phone]  Relation: Spouse  Secondary Emergency Contact: Heriberto Hummel           [facility]  Home Phone: [phone]  Mobile Phone: [phone]  Relation: Child    Past Surgical History:  Past Surgical History:   Procedure Laterality Date    CARDIAC PROCEDURE Bilateral 2024    Left and right heart cath / coronary angiography performed by Erick Arguello MD at [facility] CARDIAC CATH LAB    CARDIAC PROCEDURE N/A 2024    Percutaneous coronary intervention performed by Erick Arguello MD at [facility] CARDIAC CATH LAB    CARDIAC PROCEDURE N/A 8/15/2024    Transcatheter aortic valve replacement performed by Erick Arguello MD at [facility] CARDIAC CATH LAB    CARDIAC PROCEDURE N/A 2024    Transcatheter aortic valve replacement performed by Erick Arguello MD at [facility] CARDIAC CATH LAB    CHOLECYSTECTOMY      COLONOSCOPY  , ,     EP DEVICE PROCEDURE N/A 2024    Insert PPM dual performed by Lydia Giang MD at [facility] CARDIAC CATH LAB    HERNIA REPAIR  1980    x 2    SIGMOIDOSCOPY      TONSILLECTOMY      TRANSESOPHAGEAL ECHOCARDIOGRAM N/A 2024    TRANSESOPHAGEAL ECHOCARDIOGRAM performed by Erick Arguello MD at [facility] ENDOSCOPY       Immunization History:   Immunization History   Administered  [address] [place]  [address]  Phone: [phone]  Fax: [phone]    Dialysis Facility (if applicable)   Name:  Address:  Dialysis Schedule:  Phone:  Fax:    / signature: Electronically signed by ABBEY Winkler on 12/30/24 at 11:22 AM EST    PHYSICIAN SECTION    Prognosis: Fair    Condition at Discharge: Stable    Rehab Potential (if transferring to Rehab): Fair    Recommended Labs or Other Treatments After Discharge: INR management with goal 2-3. Continue lovenox BID until INR to goal   Patient TO NOT RETURN TO HOSPITAL FOR CARE. IF CHANGE IN STATUS, CONSULT HOSPICE    Physician Certification: I certify the above information and transfer of Raymond Hummel  is necessary for the continuing treatment of the diagnosis listed and that he requires Skilled Nursing Facility for greater 30 days.     Update Admission H&P: No change in H&P    PHYSICIAN SIGNATURE:  Electronically signed by Isabel Hutton PA-C on 12/31/24 at 8:40 AM EST

## 2024-12-30 NOTE — CARE COORDINATION
12/30/24, 10:03 AM EST    DISCHARGE PLANNING EVALUATION    Spoke with [facility] [place] admissions, facility will accept and has submitted precert, which is approved for 12/31.  Discussed with son, Heriberto, who is in agreement.

## 2024-12-30 NOTE — PROGRESS NOTES
Warfarin Pharmacy Consult Note    Warfarin Indication:  TAVR  Target INR: 2.0-3.0  Dose prior to admission: 5 mg daily     No results for input(s): \"HGB\", \"PLT\" in the last 72 hours.  Recent Labs     12/28/24  1222 12/29/24  0739 12/30/24  0601   INR 2.63* 1.72* 1.41*     Concurrent anticoagulants/antiplatelets: ticagrelor  Significant warfarin drug-drug interactions: none   Date INR Warfarin Dose   12/21/24 2.65 5 mg (PTA)    12/22/24  2.56 5 mg   12/23/24  2.58  5 mg      12/24/24 2.18  5 mg     12/25/24 2.61 5 mg   12/26/24  4.01  No Coumadin     12/27/2024 4.77   No Coumadin    12/28/2024 2.63 5 mg   12/29/2024 1.72 5 mg   12/30/24 1.41 5 mg     Monitoring:                   INR will be monitored daily.    **Please contact pharmacy for discharge instructions when indicated**    Corbin RaiD  PGY2 Resident   12/30/2024 3:02 PM

## 2024-12-30 NOTE — PLAN OF CARE
Problem: Discharge Planning  Goal: Discharge to home or other facility with appropriate resources  Outcome: Progressing  Flowsheets (Taken 12/29/2024 0834 by Stacey Berger, RN)  Discharge to home or other facility with appropriate resources:   Identify barriers to discharge with patient and caregiver   Arrange for needed discharge resources and transportation as appropriate   Identify discharge learning needs (meds, wound care, etc)     Problem: Safety - Adult  Goal: Free from fall injury  Outcome: Progressing  Flowsheets (Taken 12/29/2024 0834 by Stacey Berger, RN)  Free From Fall Injury: Instruct family/caregiver on patient safety     Problem: ABCDS Injury Assessment  Goal: Absence of physical injury  Outcome: Progressing  Flowsheets (Taken 12/29/2024 0834 by Stacey Berger, RN)  Absence of Physical Injury: Implement safety measures based on patient assessment     Problem: Chronic Conditions and Co-morbidities  Goal: Patient's chronic conditions and co-morbidity symptoms are monitored and maintained or improved  Outcome: Progressing  Flowsheets (Taken 12/29/2024 0834 by Stacey Berger, RN)  Care Plan - Patient's Chronic Conditions and Co-Morbidity Symptoms are Monitored and Maintained or Improved:   Monitor and assess patient's chronic conditions and comorbid symptoms for stability, deterioration, or improvement   Collaborate with multidisciplinary team to address chronic and comorbid conditions and prevent exacerbation or deterioration   Update acute care plan with appropriate goals if chronic or comorbid symptoms are exacerbated and prevent overall improvement and discharge     Problem: Skin/Tissue Integrity  Goal: Absence of new skin breakdown  Description: 1.  Monitor for areas of redness and/or skin breakdown  2.  Assess vascular access sites hourly  3.  Every 4-6 hours minimum:  Change oxygen saturation probe site  4.  Every 4-6 hours:  If on nasal continuous positive airway pressure, respiratory

## 2024-12-30 NOTE — PROGRESS NOTES
[facility]  [facility] 7K  Occupational Therapy  Daily Note    Discharge Recommendations: Subacute/skilled nursing facility  Equipment Recommendations: No        Time In: 0745  Time Out: 0826  Timed Code Treatment Minutes: 41 Minutes  Minutes: 41          Date: 2024  Patient Name: Raymond Hummel,   Gender: male      Room: [facility]013[facility]A  MRN: [id number]  : 1940  (84 y.o.)  Referring Practitioner: ROMEO Leon  Diagnosis: Fall  Additional Pertinent Hx: per chart review;  Raymond is an 84 year old male presenting to the Emergency Department via POV for evaluation of injuries sustained in a fall at home.  He reports that he was walking down the steps outside and slipped on some ice, falling onto his left side.  He denies hitting his head or loss of consciousness.  Arrives complaining of left sided chest wall pain with movement.  Denies any chest pain, shortness of breath, abdominal pain, pain to extremities, neck pain, back pain, nausea or vomiting.    Restrictions/Precautions:  Restrictions/Precautions: Fall Risk, General Precautions  Position Activity Restriction  Other Position/Activity Restrictions: L rib fractures     Social/Functional History:  Lives With: Spouse  Type of Home: Condo  Home Layout: One level  Home Access: Stairs to enter with rails  Entrance Stairs - Number of Steps: 8 LAZARA  Entrance Stairs - Rails: Both  Home Equipment: Cane, Grab bars   Bathroom Shower/Tub: Walk-in shower  Bathroom Toilet: Handicap height  Bathroom Equipment: Shower chair  Bathroom Accessibility: Accessible    Receives Help From: Family  Prior Level of Assist for ADLs: Independent  Prior Level of Assist for Homemaking: Needs assistance (wife completes most tasks)  Prior Level of Assist for Transfers: Independent  Prior Level of Assist for Ambulation: Independent household ambulator, with or without device, Independent community ambulator, with or without device  Has the patient had two or  more falls in the past year or any fall with injury in the past year?: No    Active : Yes  Occupation: Retired  Additional Comments: IND with functional tasks, has been using a cane for the last 3 months. patient's spouse is in good health to assist as needed.    SUBJECTIVE: RN okayed OT session.  Pt. Pleasant and agreeable to participate.  Pt. Agitation nor confusion noted during OT session.  Pt. Alert and oriented X 4.     PAIN: Denies    Vitals: Vitals not assessed per clinical judgement, see nursing flowsheet    COGNITION: Decreased Insight    ADL:   Grooming: Stand By Assistance, Contact Guard Assistance, and with set-up.  Pt. Stood x 19 mins at sink to complete oral care and to shave face with electric razor.  Once Pt. Finished shaving Pt. Cleaned razor out while standing at sink.  Pt. Demo functional reaching with BUE's incorporating unilateral and bilateral reaching.   Footwear Management: Stand By Assistance.  To doff and don socks.   Toileting: Contact Guard Assistance.  Pt. Able to manage brief up and down.    Toilet Transfer: Contact Guard Assistance.   .    IADL:   Not Tested    BALANCE:  Sitting Balance:  Stand By Assistance.    Standing Balance: Contact Guard Assistance.      BED MOBILITY:  Not Tested    TRANSFERS:  Sit to Stand:  Contact Guard Assistance.    Stand to Sit: Contact Guard Assistance.      FUNCTIONAL MOBILITY:  Assistive Device: Rolling Walker  Assist Level:  Contact Guard Assistance.   Distance: To and from bathroom  Vcs for safe RW use.      -[facility] Inpatient Daily Activity Raw Score: 20  AM-PAC Inpatient ADL T-Scale Score : 42.03  ADL Inpatient CMS 0-100% Score: 38.32    Modified [place] Scale:  Not Applicable    ASSESSMENT:     Activity Tolerance:  Patient tolerance of  treatment: Good treatment tolerance      Plan: Times Per Week: 5x  Times Per Day: Once a day  Current Treatment Recommendations: Balance training, Functional mobility training, Endurance training, Neuromuscular

## 2024-12-31 VITALS
TEMPERATURE: 97.5 F | RESPIRATION RATE: 17 BRPM | HEIGHT: 69 IN | WEIGHT: 194.22 LBS | DIASTOLIC BLOOD PRESSURE: 79 MMHG | HEART RATE: 70 BPM | OXYGEN SATURATION: 97 % | SYSTOLIC BLOOD PRESSURE: 148 MMHG | BODY MASS INDEX: 28.77 KG/M2

## 2024-12-31 LAB
ANION GAP SERPL CALC-SCNC: 15 MEQ/L (ref 8–16)
BUN SERPL-MCNC: 33 MG/DL (ref 7–22)
CALCIUM SERPL-MCNC: 8.8 MG/DL (ref 8.5–10.5)
CHLORIDE SERPL-SCNC: 105 MEQ/L (ref 98–111)
CO2 SERPL-SCNC: 23 MEQ/L (ref 23–33)
CREAT SERPL-MCNC: 0.8 MG/DL (ref 0.4–1.2)
GFR SERPL CREATININE-BSD FRML MDRD: 87 ML/MIN/1.73M2
GLUCOSE BLD STRIP.AUTO-MCNC: 119 MG/DL (ref 70–108)
GLUCOSE BLD STRIP.AUTO-MCNC: 165 MG/DL (ref 70–108)
GLUCOSE SERPL-MCNC: 115 MG/DL (ref 70–108)
INR PPP: 1.64 (ref 0.85–1.13)
POTASSIUM SERPL-SCNC: 4 MEQ/L (ref 3.5–5.2)
SODIUM SERPL-SCNC: 143 MEQ/L (ref 135–145)

## 2024-12-31 PROCEDURE — 99239 HOSP IP/OBS DSCHRG MGMT >30: CPT | Performed by: PHYSICIAN ASSISTANT

## 2024-12-31 PROCEDURE — 6370000000 HC RX 637 (ALT 250 FOR IP)

## 2024-12-31 PROCEDURE — 85610 PROTHROMBIN TIME: CPT

## 2024-12-31 PROCEDURE — 80048 BASIC METABOLIC PNL TOTAL CA: CPT

## 2024-12-31 PROCEDURE — 6370000000 HC RX 637 (ALT 250 FOR IP): Performed by: STUDENT IN AN ORGANIZED HEALTH CARE EDUCATION/TRAINING PROGRAM

## 2024-12-31 PROCEDURE — 6370000000 HC RX 637 (ALT 250 FOR IP): Performed by: NURSE PRACTITIONER

## 2024-12-31 PROCEDURE — 36415 COLL VENOUS BLD VENIPUNCTURE: CPT

## 2024-12-31 PROCEDURE — 2500000003 HC RX 250 WO HCPCS: Performed by: NURSE PRACTITIONER

## 2024-12-31 PROCEDURE — 6370000000 HC RX 637 (ALT 250 FOR IP): Performed by: PHYSICIAN ASSISTANT

## 2024-12-31 PROCEDURE — 82948 REAGENT STRIP/BLOOD GLUCOSE: CPT

## 2024-12-31 RX ORDER — POLYETHYLENE GLYCOL 3350 17 G/17G
17 POWDER, FOR SOLUTION ORAL DAILY
DISCHARGE
Start: 2024-12-31 | End: 2025-01-30

## 2024-12-31 RX ORDER — WARFARIN SODIUM 2.5 MG/1
2.5 TABLET ORAL DAILY
DISCHARGE
Start: 2024-12-31

## 2024-12-31 RX ORDER — ENOXAPARIN SODIUM 100 MG/ML
1 INJECTION SUBCUTANEOUS 2 TIMES DAILY
DISCHARGE
Start: 2024-12-31

## 2024-12-31 RX ORDER — LIDOCAINE 4 G/G
2 PATCH TOPICAL DAILY
DISCHARGE
Start: 2024-12-31

## 2024-12-31 RX ORDER — INSULIN GLARGINE 100 [IU]/ML
5 INJECTION, SOLUTION SUBCUTANEOUS NIGHTLY
DISCHARGE
Start: 2024-12-31

## 2024-12-31 RX ORDER — RIVASTIGMINE 4.6 MG/24H
1 PATCH, EXTENDED RELEASE TRANSDERMAL DAILY
Qty: 30 PATCH | Refills: 3 | Status: SHIPPED | OUTPATIENT
Start: 2024-12-31

## 2024-12-31 RX ORDER — FAMOTIDINE 20 MG/1
20 TABLET, FILM COATED ORAL 2 TIMES DAILY
Qty: 60 TABLET | Refills: 3 | Status: SHIPPED | OUTPATIENT
Start: 2024-12-31

## 2024-12-31 RX ORDER — WARFARIN SODIUM 2.5 MG/1
2.5 TABLET ORAL
Status: DISCONTINUED | OUTPATIENT
Start: 2024-12-31 | End: 2024-12-31 | Stop reason: HOSPADM

## 2024-12-31 RX ORDER — OXYCODONE HYDROCHLORIDE 5 MG/1
5 TABLET ORAL EVERY 6 HOURS PRN
Qty: 12 TABLET | Refills: 0 | Status: SHIPPED | OUTPATIENT
Start: 2024-12-31 | End: 2025-01-03

## 2024-12-31 RX ADMIN — METOPROLOL SUCCINATE 50 MG: 50 TABLET, EXTENDED RELEASE ORAL at 09:07

## 2024-12-31 RX ADMIN — METFORMIN HYDROCHLORIDE 500 MG: 500 TABLET, EXTENDED RELEASE ORAL at 09:06

## 2024-12-31 RX ADMIN — ACETAMINOPHEN 650 MG: 325 TABLET ORAL at 03:01

## 2024-12-31 RX ADMIN — POLYETHYLENE GLYCOL 3350 17 G: 17 POWDER, FOR SOLUTION ORAL at 09:05

## 2024-12-31 RX ADMIN — BUMETANIDE 0.5 MG: 0.5 TABLET ORAL at 09:06

## 2024-12-31 RX ADMIN — TICAGRELOR 90 MG: 90 TABLET ORAL at 09:05

## 2024-12-31 RX ADMIN — METHOCARBAMOL 1000 MG: 500 TABLET ORAL at 09:06

## 2024-12-31 RX ADMIN — SPIRONOLACTONE 25 MG: 25 TABLET ORAL at 09:06

## 2024-12-31 RX ADMIN — TAMSULOSIN HYDROCHLORIDE 0.4 MG: 0.4 CAPSULE ORAL at 09:06

## 2024-12-31 RX ADMIN — ATORVASTATIN CALCIUM 40 MG: 40 TABLET, FILM COATED ORAL at 09:07

## 2024-12-31 RX ADMIN — FINASTERIDE 5 MG: 5 TABLET, FILM COATED ORAL at 09:06

## 2024-12-31 RX ADMIN — FAMOTIDINE 20 MG: 20 TABLET, FILM COATED ORAL at 09:05

## 2024-12-31 RX ADMIN — SODIUM CHLORIDE, PRESERVATIVE FREE 10 ML: 5 INJECTION INTRAVENOUS at 09:12

## 2024-12-31 RX ADMIN — OXYCODONE 5 MG: 5 TABLET ORAL at 05:11

## 2024-12-31 ASSESSMENT — PAIN DESCRIPTION - ORIENTATION
ORIENTATION: LEFT

## 2024-12-31 ASSESSMENT — PAIN SCALES - GENERAL
PAINLEVEL_OUTOF10: 5
PAINLEVEL_OUTOF10: 3
PAINLEVEL_OUTOF10: 5

## 2024-12-31 ASSESSMENT — PAIN DESCRIPTION - DESCRIPTORS
DESCRIPTORS: ACHING

## 2024-12-31 ASSESSMENT — PAIN DESCRIPTION - ONSET
ONSET: ON-GOING

## 2024-12-31 ASSESSMENT — PAIN DESCRIPTION - LOCATION
LOCATION: ABDOMEN

## 2024-12-31 ASSESSMENT — PAIN - FUNCTIONAL ASSESSMENT
PAIN_FUNCTIONAL_ASSESSMENT: PREVENTS OR INTERFERES SOME ACTIVE ACTIVITIES AND ADLS

## 2024-12-31 NOTE — DISCHARGE SUMMARY
Hospital Medicine Discharge Summary      Patient Identification:   Raymond Hummel   : 1940  MRN: [id number]   Account: [id number]      Patient's PCP: Duran Dukes MD    Admit Date: 2024     Discharge Date:   2024    Admitting Physician: No admitting provider for patient encounter.     Discharging Physician Assistant: Isabel Hutton PA-C     Discharge Diagnoses with Assessment/Plan:    Acute delirium on dementia- resolved   Psychiatry consulted and initiated Ex[employer] patch-recommending dementia care unit  Family reports persistent decline over the last few months, worse after most recent TAVR procedure  Referral to [facility].  Last MoCA per family scored 16/30.         L 8-12 rib fractures s/p fall:   Patient slipped on stairs which were covered in ice landing on his left side.    CT scan showed 8-12 rib fractures on the left side and a small L sided pleural effusion.  Trauma surgery initially primary.  They have signed off  Repeat chest x-ray this a.m. is stable.  Respiratory function stable as well-no further chest x-ray is needed  Pain control.  Pulmonary toileting     Long QT  Patient with chronic QT C prolongation dating back 2024  Avoid QT prolongating medications   Repeat EKG  with QTc of 600.     Bacteriuria  Urine culture with 50-90 K GNB which does not meet criteria for urinary tract infection.  Primary team initiated Rocephin therapy-no need to continue     Goals of care  Palliative care was consulted.  Patient's CODE STATUS was changed to DNR CC  Hospice was considered however Dr. Carlisle evaluated patient does not meet hospice criteria currently.    Documentation provided to nursing home for wishes for patient to not return to hospital if there is a decline in care.  Hospice should be notified     Leukocytosis: WBC 14.3 on arrival.  likely reactive to the patient's fall and acute rib fractures.  No further blood draws due to DNRCC     Hyperglycemia  with prediabetes  Last HbA1c 6.2 6/2023.  Patient takes metformin at home, -resume low-dose sliding scale, POCT x 4, hypoglycemia protocol  Start Lantus 5 units daily-continue on discharge       Resolved:  Elevated troponin: Heart score 6 patient denies chest pain, SOB, lightheadedness.  From chart review EKG was sent to cardiology with concern patient is in a flutter.   -If patient develops chest pain obtain stat EKG and troponin  -Cardiology has been consulted and signed off     Supratherapeutic INR- resolved     Chronic Conditions (reviewed and stable unless otherwise stated)  HLD: Continue Lipitor  GERD: Continue Pepcid  HFpEF: Echo 11/13/2024 showing EF 60-65% with moderately increased wall thickness.  Diastolic dysfunction present.  Patient has an appropriately positioned transcatheter bioprosthetic valve.  Left atrium is moderately dilated.  GDMT: Metoprolol and spironolactone.  Diuretics: Bumex 0.5 mg daily.  Hx TAVR: Continue Brilinta and warfarin-pharmacy to dose  BPH: Flomax and finasteride     The patient was seen and examined on day of discharge and this discharge summary is in conjunction with any daily progress note from day of discharge.    Hospital Course:   Initial HPI per chart review    \"Raymond is an 84 year old male presenting to the Emergency Department via POV for evaluation of injuries sustained in a fall at home. He reports that he was walking down the steps outside and slipped on some ice, falling onto his left side. He denies hitting his head or loss of consciousness. Arrives complaining of left sided chest wall pain with movement. Denies any chest pain, shortness of breath, abdominal pain, pain to extremities, neck pain, back pain, nausea or vomiting.\"    Patient sustained rib fractures 2L 8 through 12 with a small left-sided pleural effusion.  Serial chest x-rays demonstrated stability in respiratory function remained stable.    Trauma team was admitting providers and hospitalist group  appointment as soon as possible for a visit in 1 week(s)  [facility] please call to schedule a 1 week hospital follow up appointment.    Victor Manuel Carlisle MD  [address]  [facility] [address]  [phone]    Go on 1/23/2025  Hospital follow up appointment, Appt time: 11:00am    [facility]  [address] [place] [address]  [phone]             Discharge Medications:        Medication List        START taking these medications      enoxaparin 100 MG/ML  Commonly known as: LOVENOX  Inject 0.9 mLs into the skin 2 times daily     famotidine 20 MG tablet  Commonly known as: PEPCID  Take 1 tablet by mouth 2 times daily     insulin glargine 100 UNIT/ML injection vial  Commonly known as: LANTUS  Inject 5 Units into the skin nightly     lidocaine 4 % external patch  Apply 2 patches topically daily     oxyCODONE 5 MG immediate release tablet  Commonly known as: ROXICODONE  Take 1 tablet by mouth every 6 hours as needed for Pain for up to 3 days. Max Daily Amount: 20 mg     polyethylene glycol 17 g packet  Commonly known as: GLYCOLAX  Take 1 packet by mouth daily     rivastigmine 4.6 MG/24HR  Commonly known as: EXELON  Place 1 patch onto the skin daily            CHANGE how you take these medications      warfarin 2.5 MG tablet  Commonly known as: COUMADIN  Take as directed. If you are unsure how to take this medication, talk to your nurse or doctor.  Original instructions: Take 1 tablet by mouth daily  What changed:   medication strength  how much to take  how to take this  when to take this  additional instructions            CONTINUE taking these medications      atorvastatin 40 MG tablet  Commonly known as: LIPITOR  TAKE 1 TABLET BY MOUTH EVERY DAY     bumetanide 0.5 MG tablet  Commonly known as: BUMEX  Take 1 tablet by mouth daily     CO Q 10 PO     finasteride 5 MG tablet  Commonly known as: PROSCAR  Take 1 tablet by mouth daily     Fish Oil 1200 MG

## 2024-12-31 NOTE — PROGRESS NOTES
Patient discharged at this time. [facility] here for transport to facility by stretcher. Family at bedside and plan to go to facility as well.

## 2024-12-31 NOTE — DISCHARGE INSTR - MEDS
Warfarin Discharge Instructions:   Date Warfarin Dose   1/1/25 Check INR &  Evaluate need for Lovenox     Provider dosing warfarin: ECF provider at [facility]  Next INR date: 1/01/2025

## 2024-12-31 NOTE — PLAN OF CARE
Problem: Discharge Planning  Goal: Discharge to home or other facility with appropriate resources  12/31/2024 0149 by Sonia Ocasio LPN  Outcome: Progressing  Flowsheets (Taken 12/31/2024 0149)  Discharge to home or other facility with appropriate resources:   Identify barriers to discharge with patient and caregiver   Arrange for needed discharge resources and transportation as appropriate   Identify discharge learning needs (meds, wound care, etc)  12/30/2024 1804 by Vincent Nieves, RN  Outcome: Progressing  Flowsheets (Taken 12/29/2024 0834 by Stacey Berger, RN)  Discharge to home or other facility with appropriate resources:   Identify barriers to discharge with patient and caregiver   Arrange for needed discharge resources and transportation as appropriate   Identify discharge learning needs (meds, wound care, etc)     Problem: Safety - Adult  Goal: Free from fall injury  12/31/2024 0149 by Sonia Ocasio LPN  Outcome: Progressing  Flowsheets (Taken 12/31/2024 0149)  Free From Fall Injury: Instruct family/caregiver on patient safety  12/30/2024 1804 by Vincent Nieves, RN  Outcome: Progressing  Flowsheets (Taken 12/29/2024 0834 by Stacey Berger, RN)  Free From Fall Injury: Instruct family/caregiver on patient safety     Problem: ABCDS Injury Assessment  Goal: Absence of physical injury  12/31/2024 0149 by Sonia Ocasio LPN  Outcome: Progressing  Flowsheets (Taken 12/31/2024 0149)  Absence of Physical Injury: Implement safety measures based on patient assessment  12/30/2024 1804 by Vincent Nieves, RN  Outcome: Progressing  Flowsheets (Taken 12/29/2024 0834 by Stacey Berger, RN)  Absence of Physical Injury: Implement safety measures based on patient assessment     Problem: Chronic Conditions and Co-morbidities  Goal: Patient's chronic conditions and co-morbidity symptoms are monitored and maintained or improved  12/31/2024 0149 by Sonia Ocasio LPN  Outcome: Progressing  Flowsheets (Taken  Progressing  Flowsheets (Taken 12/31/2024 0149)  Verbalizes/displays adequate comfort level or baseline comfort level:   Encourage patient to monitor pain and request assistance   Implement non-pharmacological measures as appropriate and evaluate response   Assess pain using appropriate pain scale  12/30/2024 1804 by Vincent Nieves, RN  Outcome: Progressing  Flowsheets (Taken 12/29/2024 0834 by Stacey Berger, RN)  Verbalizes/displays adequate comfort level or baseline comfort level:   Encourage patient to monitor pain and request assistance   Assess pain using appropriate pain scale   Administer analgesics based on type and severity of pain and evaluate response   Implement non-pharmacological measures as appropriate and evaluate response  Care plan reviewed with patient. Patient verbalize understanding of the plan of care and contribute to goal setting.

## 2024-12-31 NOTE — PROGRESS NOTES
Warfarin Pharmacy Consult Note    Warfarin Indication:  TAVR  Target INR: 2.0-3.0  Dose prior to admission: 5 mg daily     No results for input(s): \"HGB\", \"PLT\" in the last 72 hours.  Recent Labs     12/29/24  0739 12/30/24  0601 12/31/24  0640   INR 1.72* 1.41* 1.64*     Concurrent anticoagulants/antiplatelets: ticagrelor  Significant warfarin drug-drug interactions: none     Date INR Warfarin Dose   12/21/24 2.65 5 mg (PTA)    12/22/24  2.56 5 mg    12/23/24  2.58  5 mg      12/24/24 2.18  5 mg     12/25/24 2.61 5 mg    12/26/24  4.01  No Coumadin     12/27/24 4.77   No Coumadin    12/28/24 2.63 5 mg   12/29/24 1.72 5 mg   12/30/24 1.41 5 mg   12/31/24 1.64 2.5 mg     Monitoring:                   INR will be monitored daily.    **Please contact pharmacy for discharge instructions when indicated**    Holly Matos, PharmD, BCPS, BCCCP  12/31/2024 7:54 AM

## 2024-12-31 NOTE — PROGRESS NOTES
Report called to JAVIRE Osuna at [facility]. Family specifically requested that facility be notified that patient is not to return to Hospital and should contact Hospice instead and provide comfort care if condition declines. This was passed on as well as sending copy of DNR order in packet. [facility] scheduled to pick patient up at 4:00 p.m.

## 2024-12-31 NOTE — PROGRESS NOTES
[facility]  OCCUPATIONAL THERAPY MISSED TREATMENT NOTE  [facility] 7K  7K-13/013-A      Date: 2024  Patient Name: Raymond Hummel        CSN: [id number]   : 1940  (84 y.o.)  Gender: male   Referring Practitioner: ROMEO Leon            REASON FOR MISSED TREATMENT: Patient seated in bedside chair upon arrival.  Increased time spent regarding education and importance of participation with OT however patient continues to decline at this time.  Patient states \"I am going to [facility] and they have a different way of healing me and I don't want to do anything that will mess up the ribs healing\".  Patient states \"maybe later today you can take me for a walk if your up for that\".  Patient possibly d/c to facility today, will follow up later today as time allows.

## 2024-12-31 NOTE — PROGRESS NOTES
Clinical Pharmacy Note                                               Warfarin Discharge Recommendations    Patient discharged from [facility] today on warfarin.    Warfarin indication:  TAVR  INR goal during admission: 2.0-3.0  Previous home warfarin regimen: 5 mg daily  Interacting medications at discharge: Lovenox bridge, ticagrelor  Coumadin 5 mg tabs    Hospital Warfarin Doses & INR Results  Date INR Warfarin Dose   12/21/24 2.65 5 mg (PTA)    12/22/24  2.56 5 mg    12/23/24  2.58  5 mg      12/24/24 2.18  5 mg     12/25/24 2.61 5 mg    12/26/24  4.01  No Coumadin     12/27/24 4.77   No Coumadin    12/28/24 2.63 5 mg   12/29/24 1.72 5 mg   12/30/24 1.41 5 mg   12/31/24 1.64 2.5 mg     Recent INRs:  Recent Labs     12/31/24  0640   INR 1.64*     Warfarin Discharge Instructions:   Date Warfarin Dose   1/1/25 Check INR   Evaluate need for Lovenox     Provider dosing warfarin: ECF provider at [facility]  Next INR date: 1/01/2025    Holly Matos, PharmD, BCPS, BCCCP  12/31/2024 11:11 AM

## 2024-12-31 NOTE — CARE COORDINATION
12/31/24, 11:16 AM EST    DISCHARGE PLANNING EVALUATION    Planning [facility] today, transport at 4:00 pm.  Confirmed plan with [facility] admissions, sonHeriberto and son, Father Juan Carlos Hummel, who are in agreement.      12/31/24, 11:17 AM EST    Patient goals/plan/ treatment preferences discussed by  and .  Patient goals/plan/ treatment preferences reviewed with patient/ family.  Patient/ family verbalize understanding of discharge plan and are in agreement with goal/plan/treatment preferences.  Understanding was demonstrated using the teach back method.  AVS provided by RN at time of discharge, which includes all necessary medical information pertaining to the patients current course of illness, treatment, post-discharge goals of care, and treatment preferences.     Services At/After Discharge: Skilled Nursing Facility (SNF) and In ambulance

## 2025-01-06 ENCOUNTER — APPOINTMENT (OUTPATIENT)
Dept: GENERAL RADIOLOGY | Age: 85
End: 2025-01-06
Payer: MEDICARE

## 2025-01-06 ENCOUNTER — HOSPITAL ENCOUNTER (EMERGENCY)
Age: 85
Discharge: HOME | End: 2025-01-06
Attending: STUDENT IN AN ORGANIZED HEALTH CARE EDUCATION/TRAINING PROGRAM
Payer: MEDICARE

## 2025-01-06 VITALS
BODY MASS INDEX: 28.14 KG/M2 | WEIGHT: 190 LBS | RESPIRATION RATE: 18 BRPM | SYSTOLIC BLOOD PRESSURE: 133 MMHG | DIASTOLIC BLOOD PRESSURE: 81 MMHG | OXYGEN SATURATION: 99 % | HEART RATE: 90 BPM | TEMPERATURE: 97.5 F | HEIGHT: 69 IN

## 2025-01-06 DIAGNOSIS — R05.1 ACUTE COUGH: ICD-10-CM

## 2025-01-06 DIAGNOSIS — R04.0 EPISTAXIS: Primary | ICD-10-CM

## 2025-01-06 PROCEDURE — 99283 EMERGENCY DEPT VISIT LOW MDM: CPT

## 2025-01-06 PROCEDURE — 6370000000 HC RX 637 (ALT 250 FOR IP): Performed by: STUDENT IN AN ORGANIZED HEALTH CARE EDUCATION/TRAINING PROGRAM

## 2025-01-06 PROCEDURE — 6360000002 HC RX W HCPCS: Performed by: STUDENT IN AN ORGANIZED HEALTH CARE EDUCATION/TRAINING PROGRAM

## 2025-01-06 PROCEDURE — 71045 X-RAY EXAM CHEST 1 VIEW: CPT

## 2025-01-06 RX ORDER — LIDOCAINE HYDROCHLORIDE AND EPINEPHRINE 10; 10 MG/ML; UG/ML
20 INJECTION, SOLUTION INFILTRATION; PERINEURAL ONCE
Status: COMPLETED | OUTPATIENT
Start: 2025-01-06 | End: 2025-01-06

## 2025-01-06 RX ORDER — BENZONATATE 100 MG/1
100 CAPSULE ORAL 3 TIMES DAILY PRN
Qty: 30 CAPSULE | Refills: 0 | Status: SHIPPED | OUTPATIENT
Start: 2025-01-06 | End: 2025-01-16

## 2025-01-06 RX ORDER — BENZONATATE 100 MG/1
100 CAPSULE ORAL 3 TIMES DAILY PRN
Status: DISCONTINUED | OUTPATIENT
Start: 2025-01-06 | End: 2025-01-06 | Stop reason: HOSPADM

## 2025-01-06 RX ORDER — OXYMETAZOLINE HYDROCHLORIDE 0.05 G/100ML
2 SPRAY NASAL ONCE
Status: COMPLETED | OUTPATIENT
Start: 2025-01-06 | End: 2025-01-06

## 2025-01-06 RX ADMIN — LIDOCAINE HYDROCHLORIDE AND EPINEPHRINE 20 ML: 10; 10 INJECTION, SOLUTION INFILTRATION; PERINEURAL at 19:16

## 2025-01-06 RX ADMIN — OXYMETAZOLINE HCL 2 SPRAY: 0.05 SPRAY NASAL at 19:17

## 2025-01-06 RX ADMIN — BENZONATATE 100 MG: 100 CAPSULE ORAL at 20:32

## 2025-01-06 RX ADMIN — BENZONATATE 100 MG: 100 CAPSULE ORAL at 18:09

## 2025-01-06 ASSESSMENT — PAIN - FUNCTIONAL ASSESSMENT: PAIN_FUNCTIONAL_ASSESSMENT: NONE - DENIES PAIN

## 2025-01-06 NOTE — ED PROVIDER NOTES
indication for antibiotics at this time.    /  ED Course as of 01/06/25 2029 Mon Jan 06, 2025 1755 Patient family-planning any blood work such as CBC or PT/INR at this time.  This is being followed closely as an outpatient.  I feel that this is reasonable given the patient did not experience any tachycardia, lightheadedness, dizziness or shortness of breath.  Not concern for any signs or symptoms of anemia at this time. [EM]   1835 XR CHEST PORTABLE  Atelectasis and consistent pleural effusion, no new focal infiltrate concerning for pneumonia in the setting of his rib fractures. [EM]   1840 rechecked, patient's nose started mildly bleeding after he attempted to blow it even after instruction not to.  Additionally patient reports that he was chewing on a lifesaver candy and feels that some of it might of gotten stuck in his throat.  Given some effervescent fluid.  Not causing any discomfort and not in his airway. [EM]   1949 Patient's nare cleaned out of blood clot and mixture of Afrin and lidocaine with epinephrine sprayed.  Nasal clamp placed.  Will pack afterwards if no resolution. [EM]   2024 Successful stoppage at this time.  Planning discharge back to patient's rehab facility. [EM]      ED Course User Index  [EM] Ras Burris, DO     Vitals Reviewed:    Vitals:    01/06/25 1712 01/06/25 1800 01/06/25 1830 01/06/25 1943   BP: (!) 146/79 114/79 122/74 133/81   Pulse: 95   90   Resp: 18   18   Temp: 97.5 °F (36.4 °C)      SpO2: 98%   99%   Weight: 86.2 kg (190 lb)      Height: 1.753 m (5' 9\")          The patient was seen and examined. Appropriate diagnostic testing was performed and results reviewed with the patient and/or caregivers.    The results of pertinent diagnostic studies and exam findings were discussed. The patient’s provisional diagnosis and plan of care were discussed with the patient and present caregivers who expressed understanding. Any medications were reviewed and indications and risks

## 2025-01-06 NOTE — ED TRIAGE NOTES
Pt arrives from rehab in [facility]. Pt has had a nosebleed since 1500 this afternoon. Pt take coumadin and Lovenox. Pt arrives with no bleeding at time of triage.

## 2025-01-07 ENCOUNTER — TELEPHONE (OUTPATIENT)
Age: 85
End: 2025-01-07

## 2025-01-07 NOTE — ED NOTES
Handoff report from Nydia DESHPANDE. Pt sitting on cot with even and unlabored respirations. Dr. Burris bedside. This RN and family updated on POC.

## 2025-01-07 NOTE — TELEPHONE ENCOUNTER
Called patient to reschedule cancelled appointment.  Talked with the son, patient is at [facility] for rehab right now.

## 2025-01-07 NOTE — DISCHARGE INSTRUCTIONS
Please continue taking medications as directed.  Remember as we discussed do not pick at or blow your nose for the next 2 to 3 days to allow for healing.  If you can go as long as 5 to 7 days without blowing her nose I would recommend that especially as they are titrating your blood thinner medications.  If you have any recurrent nosebleeds not stop with the nose clamp provided do not hesitate to return to the emergency department for reevaluation.

## 2025-01-20 PROBLEM — R79.89 ELEVATED TROPONIN: Status: RESOLVED | Noted: 2024-12-21 | Resolved: 2025-01-20
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
## 2025-01-29 ENCOUNTER — TELEPHONE (OUTPATIENT)
Dept: CARDIOLOGY CLINIC | Age: 85
End: 2025-01-29

## 2025-01-29 RX ORDER — CLOPIDOGREL BISULFATE 75 MG/1
75 TABLET ORAL DAILY
Qty: 90 TABLET | Refills: 3 | Status: SHIPPED | OUTPATIENT
Start: 2025-01-29

## 2025-01-29 NOTE — TELEPHONE ENCOUNTER
Plavix 75 mg PO q day when they are able to  Perhaps a non-contrast CT scan should be done  Is he still on hospice?

## 2025-01-29 NOTE — TELEPHONE ENCOUNTER
Patient's son, Juan Carlos, calling to give updates on his dad. The nursing home took the patient off his blood thinners due to what they believe is a retroperitoneal hematoma. They did not do any imaging or bring the patient to the hospital to confirm the diagnosis but they have taken him off all blood thinners and his bruising has slowly decreased. The son is asking what kind of anticoagulation we can give the patient for his valve but not be so harmful in the event of his bruising.

## 2025-02-04 PROBLEM — Z79.01 ANTICOAGULATED ON COUMADIN: Status: RESOLVED | Noted: 2024-10-08 | Resolved: 2025-02-04

## 2025-02-04 PROBLEM — Z51.81 ENCOUNTER FOR THERAPEUTIC DRUG MONITORING: Status: RESOLVED | Noted: 2024-09-26 | Resolved: 2025-02-04

## 2025-02-19 ENCOUNTER — TELEPHONE (OUTPATIENT)
Dept: CARDIOLOGY CLINIC | Age: 85
End: 2025-02-19

## 2025-02-19 NOTE — TELEPHONE ENCOUNTER
Son Juan Carlos notified monitor disconnected  Fell and broke ribs 12/21/25  Is now staying at [facility], son Juan Carlos will get monitor to him   Please call Juan Carlos wong when he sends

## 2025-02-25 ENCOUNTER — TELEPHONE (OUTPATIENT)
Dept: CARDIOLOGY CLINIC | Age: 85
End: 2025-02-25

## 2025-02-25 ENCOUNTER — TELEPHONE (OUTPATIENT)
Age: 85
End: 2025-02-25

## 2025-02-25 PROCEDURE — 93296 REM INTERROG EVL PM/IDS: CPT | Performed by: INTERNAL MEDICINE

## 2025-02-25 PROCEDURE — 93294 REM INTERROG EVL PM/LDLS PM: CPT | Performed by: INTERNAL MEDICINE

## 2025-02-25 NOTE — TELEPHONE ENCOUNTER
Called son. He will bring the monitor to the nursing home today and I told him to hit the green light in the center to send a transmission

## 2025-02-25 NOTE — TELEPHONE ENCOUNTER
Collaborated with Dr. CHERIE Arguello by phone.   He reports pt with VT yesterday, found when interrogated today.  Pt wants to minimize meds and previously voluntarily stopped almost all his meds when admitted to hospice, restarted Bumex for HTN, and then lopressor for HTN, HCM and palpitations  Already had BMP ordered for today.  I added Mg.    K 3.6 today  If pt will take- Give K Cl 20meq tabs x 3 total today.   Then 1 1/2 tabs daily for goal K above 4.     Recheck lab Wed pm.  Orders sent to [facility].     I will take care of things and collaborate with cardio as needed or upon family request.  I am able to do visits to pt in his apt.

## 2025-02-25 NOTE — TELEPHONE ENCOUNTER
MAYRA:  I called [facility] and spoke to Isamar and she informs me that this pt has been on HOSPICE FOR OVER A MONTH NOW.    Isamar told me the family still wants us to watch the device even though he is on Hospice     Orders given as stated below: Nurse, Isamar did inform me that this pt was refusing to take the toprol because he is dying. Isamar said they called the hospice dr ANNE, and she gave them an order to have pt start taking toprol 50 mg bid because his blood pressures have been elevated

## 2025-02-25 NOTE — TELEPHONE ENCOUNTER
Dr Arguello pt   Pt is having many episodes of vt / medtronic dual pacer       Download under the media tab

## 2025-03-13 ENCOUNTER — LAB (OUTPATIENT)
Dept: LAB | Age: 85
End: 2025-03-13

## 2025-03-13 LAB
ANION GAP SERPL CALC-SCNC: 14 MEQ/L (ref 8–16)
BUN SERPL-MCNC: 31 MG/DL (ref 8–23)
CALCIUM SERPL-MCNC: 9.5 MG/DL (ref 8.8–10.2)
CHLORIDE SERPL-SCNC: 101 MEQ/L (ref 98–111)
CO2 SERPL-SCNC: 24 MEQ/L (ref 22–29)
CREAT SERPL-MCNC: 1.1 MG/DL (ref 0.7–1.2)
GFR SERPL CREATININE-BSD FRML MDRD: 66 ML/MIN/1.73M2
GLUCOSE SERPL-MCNC: 144 MG/DL (ref 74–109)
POTASSIUM SERPL-SCNC: 5 MEQ/L (ref 3.5–5.2)
SODIUM SERPL-SCNC: 139 MEQ/L (ref 135–145)

## 2025-04-21 NOTE — PROGRESS NOTES
Physician Progress Note      PATIENT:               LEIGH ANN FRANKLIN  CSN #:                  [id number]  :                       1940  ADMIT DATE:       8/15/2024 8:44 AM  DISCH DATE:        2024 3:50 PM  RESPONDING  PROVIDER #:        YASMINE BENSON PA-C          QUERY TEXT:    Pt admitted with Multivessel CAD and has \"Shock: Secondary to suicide   ventricle\" documented.  If possible, please make selection below , document in   progress notes and discharge summary further specificity regarding the type   of shock:    The medical record reflects the following:  Risk Factors: Multivessel cad, hypertrophic cardiomyopathy , \"suicide   ventricle\"  Clinical Indicators: Critical Care documentation \"Shock: Secondary to suicide   ventricle.  Patient was hypotensive after TAVR procedure.  Treatment: Patient received 1.5 L of normal saline.  To which patient   responded well; SBP 150s to 160s.  Patient also had a brief episode of   asystole for which he got received atropine.\" , monitoring in ICU post op ,   Post discharge, follow up as recommended .    Thank You,  Mendy TURCIOSN, RN, CRCR  Clinical   P: [phone]  F: [phone]  Options provided:  -- Cardiogenic Shock  -- Hypovolemic Shock  -- Other - I will add my own diagnosis  -- Disagree - Not applicable / Not valid  -- Disagree - Clinically unable to determine / Unknown  -- Refer to Clinical Documentation Reviewer    PROVIDER RESPONSE TEXT:    This patient is in hypovolemic shock.    Query created by: Mendy Waggoner on 2024 1:13 PM      Electronically signed by:  YASMINE BENSON PA-C 2024 12:40 PM           Culture reviewed, culture is positive, patient was discharged on an appropriate antibiotic. No further action needed.

## 2025-06-18 PROCEDURE — 93296 REM INTERROG EVL PM/IDS: CPT | Performed by: INTERNAL MEDICINE

## 2025-06-18 PROCEDURE — 93294 REM INTERROG EVL PM/LDLS PM: CPT | Performed by: INTERNAL MEDICINE

## (undated) DEVICE — Device

## (undated) DEVICE — RUNTHROUGH NS EXTRA FLOPPY PTCA GUIDEWIRE: Brand: RUNTHROUGH

## (undated) DEVICE — DELIV SYS D-EVOLUTFX-34: Brand: EVOLUT™ FX

## (undated) DEVICE — Device: Brand: MEDEX

## (undated) DEVICE — GUIDEWIRE VASC L260CM DIA0.035IN TAPR L11CM FLPY TIP L4CM

## (undated) DEVICE — KIT MED IMAG CNTRST AGNT W/ IOPAMIDOL REUSE

## (undated) DEVICE — PAD, DEFIB, ADULT, RADIOTRANS, PHYSIO: Brand: MEDLINE

## (undated) DEVICE — MEDTRONIC JL3.5 DIAGNOSTIC CATHETER

## (undated) DEVICE — KIT ANGIO W/ AT P65 PREM HND CTRL FOR CNTRST DEL ANGIOTOUCH

## (undated) DEVICE — CATHETER ANGIO AMPLATZ LT 1 6 FRX100 CM INFIN

## (undated) DEVICE — GUIDEWIRE VASC L150CM DIA0.035IN FLX TIP L7CM PTFE STR FIX

## (undated) DEVICE — ANGIO-SEAL VIP VASCULAR CLOSURE DEVICE: Brand: ANGIO-SEAL

## (undated) DEVICE — PROVE COVER: Brand: UNBRANDED

## (undated) DEVICE — TRUE™ DILATATION BALLOON VALVULOPLASTY CATHETER, 28 MM X 4.5 CM,110 CM CATHETER: Brand: TRUE DILATATION

## (undated) DEVICE — 260 CM J TIP WIRE .035

## (undated) DEVICE — CARDIAC CATH LAB PACK LF

## (undated) DEVICE — PINNACLE INTRODUCER SHEATH: Brand: PINNACLE

## (undated) DEVICE — HI-TORQUE SUPRA CORE .035 PERIPHERAL GUIDE WIRE .035 X 190 CM: Brand: HI-TORQUE SUPRA CORE

## (undated) DEVICE — DRAPE KIT RAMAPR RADIATION SHIELD

## (undated) DEVICE — FLEXOR, CHECK-FLO, INTRODUCER SET: Brand: FLEXOR

## (undated) DEVICE — GLIDESHEATH SLENDER NITINOL HYDROPHILIC COATED INTRODUCER SHEATH: Brand: GLIDESHEATH SLENDER

## (undated) DEVICE — 4F (1.0MM ID) X 9CM STIFF4F (1.0 MICRO-STICK®INTRODUCER SE WITH NITINOL GUIDEWIREWITH NITIN WITH RADIOPAQUE TIPWITH RADIOPAQ: Brand: MICRO-STICK SETMICRO-STICK SET

## (undated) DEVICE — DECANTER BAG 9": Brand: MEDLINE INDUSTRIES, INC.

## (undated) DEVICE — GUIDE CATHETER: Brand: RUNWAY™

## (undated) DEVICE — DRESSING QUIKCLOT FEMORAL INTERVENTIONAL 1.5X1.5 IN

## (undated) DEVICE — KIT MFLD ISOLATN NACL CNTRST PRT TBNG SPIK W/ PRSS TRNSDUC

## (undated) DEVICE — PERCLOSE™ PROSTYLE™ SUTURE-MEDIATED CLOSURE AND REPAIR SYSTEM: Brand: PERCLOSE™ PROSTYLE™

## (undated) DEVICE — GLOVE ORTHO 8   MSG9480

## (undated) DEVICE — KIT INTRO 6FR L6CM NDL 21GA L4CM 0018IN NIT COR PLAT TIP

## (undated) DEVICE — ELECTRODE PACE S STL BPLR BLLN TEMP CATH

## (undated) DEVICE — SHEATH INTRO L12CM DIA6FR W/ LUERLOCK HUB HEMSTAS VLV

## (undated) DEVICE — 150CM STANDARD JWIRE

## (undated) DEVICE — SOLUTION IV 1000ML 0.9% SOD CHL PH 5 INJ USP VIAFLX PLAS

## (undated) DEVICE — LOADING SYS L-EVOLUTFX-34: Brand: EVOLUT™ FX

## (undated) DEVICE — PACEMAKER: Brand: MEDLINE INDUSTRIES, INC.

## (undated) DEVICE — CHECK-FLO PERFORMER INTRODUCER: Brand: PERFORMER

## (undated) DEVICE — GUIDEWIRE VASC L80CM DIA0.018IN TIP L5CM 15DEG ANG NIT

## (undated) DEVICE — SWAN-GANZ DOUBLE LUMEN MONITORING CATHETER: Brand: SWAN-GANZ

## (undated) DEVICE — MEDTRONIC NC EUPHORA 2.5X20MM BALLOON

## (undated) DEVICE — HIWIRE HYDROPHILIC WIRE GUIDE: Brand: HIWIRE

## (undated) DEVICE — CORDIS PIG 145 110CM CATHETER

## (undated) DEVICE — TUBING RIGID ANGIO L10IN 1200PSI CNTRST INJ FIX FEM LUER CONN HI

## (undated) DEVICE — TIBURON NEONATAL DRAPE: Brand: CONVERTORS

## (undated) DEVICE — CATHETER DUAL LUMEN LANGSTON 6F L110CM GWIRE 0.038IN 1000PSI

## (undated) DEVICE — DEVICE COMPR 17 CM 60CC SAFEGUARD FOCUS ADH LF

## (undated) DEVICE — CATHETER DIAG 6FR L100CM LUMN ID0.056IN JR4 CRV 0 SIDE H

## (undated) DEVICE — CATHETER COR DIAG AMPLATZ L 1.0 CRV 6FR 100CM 0 SIDE H

## (undated) DEVICE — GUIDEWIRE VASC L260CM DIA0.035IN RAD 3MM J TIP L7CM PTFE

## (undated) DEVICE — CATHETER ANGIO (ORDER IN MULTIPLES OF 5 EACHS) PIG INFINITI 5FR 110CM 0.038IN STRAIGHT

## (undated) DEVICE — CATHETER DIAG 6FR L100CM AMPLATZ L AL 2.0 DXTERITY

## (undated) DEVICE — GUIDEWIRE VASC L150CM DIA0.035IN FLX END L7CM J 3MM PTFE

## (undated) DEVICE — INTRODUCER SHTH L13CM OD7FR SH ORNG HUB SEAMLESS SAFSHTH

## (undated) DEVICE — GUIDEWIRE VASC 0.035 INX275 CM X SM CRV LUBRIGREEN SS SAFARI

## (undated) DEVICE — DEVICE CLSR 5FR BIOABSRB FULL INTEGR RAP HEMSTAS FOR FEM

## (undated) DEVICE — MEDTRONIC NC EUPORA 3.0X8MM BALLOON

## (undated) DEVICE — CATHETER 5FR JR4 CORDIS 100CM

## (undated) DEVICE — CABLE PACE L12FT ALGTR CLP DISP FOR PACE SYS ANALZR MERLIN

## (undated) DEVICE — MEDTRONIC JL4.0 DIAGNOSTIC CATHETER

## (undated) DEVICE — OFF - ST. RITAS VASC: Brand: MEDLINE INDUSTRIES, INC.